# Patient Record
Sex: FEMALE | Race: WHITE | NOT HISPANIC OR LATINO | Employment: FULL TIME | URBAN - METROPOLITAN AREA
[De-identification: names, ages, dates, MRNs, and addresses within clinical notes are randomized per-mention and may not be internally consistent; named-entity substitution may affect disease eponyms.]

---

## 2017-01-25 ENCOUNTER — HOSPITAL ENCOUNTER (OUTPATIENT)
Dept: RADIOLOGY | Facility: OTHER | Age: 57
Discharge: HOME | End: 2017-01-25
Attending: OBSTETRICS & GYNECOLOGY
Payer: COMMERCIAL

## 2017-01-25 DIAGNOSIS — R93.89 THICKENED ENDOMETRIUM: ICD-10-CM

## 2017-01-25 PROCEDURE — 76856 US EXAM PELVIC COMPLETE: CPT | Mod: TC

## 2017-01-25 PROCEDURE — 76856 US EXAM PELVIC COMPLETE: CPT | Mod: 26,,, | Performed by: RADIOLOGY

## 2017-01-25 PROCEDURE — 76830 TRANSVAGINAL US NON-OB: CPT | Mod: 26,,, | Performed by: RADIOLOGY

## 2017-03-06 ENCOUNTER — PATIENT MESSAGE (OUTPATIENT)
Dept: GYNECOLOGIC ONCOLOGY | Facility: CLINIC | Age: 57
End: 2017-03-06

## 2017-03-06 DIAGNOSIS — N95.0 PMB (POSTMENOPAUSAL BLEEDING): ICD-10-CM

## 2017-03-06 NOTE — PROGRESS NOTES
Patient with persistent bleeding in January.   I have recommended D&C. She does not want to do this at this time.   Will get AMH, E2 and FSH to evaluate for menopausal status.     I recommended that she get a 2nd opinion with either Dr. Miranda or Dr. Miller.     She has CH that has not been treated because of breast cancer. EMBx is not possible in the office.

## 2017-03-09 ENCOUNTER — LAB VISIT (OUTPATIENT)
Dept: LAB | Facility: HOSPITAL | Age: 57
End: 2017-03-09
Attending: OBSTETRICS & GYNECOLOGY
Payer: COMMERCIAL

## 2017-03-09 DIAGNOSIS — N95.0 PMB (POSTMENOPAUSAL BLEEDING): ICD-10-CM

## 2017-03-09 LAB — FSH SERPL-ACNC: 5.7 MIU/ML

## 2017-03-09 PROCEDURE — 83001 ASSAY OF GONADOTROPIN (FSH): CPT

## 2017-03-09 PROCEDURE — 36415 COLL VENOUS BLD VENIPUNCTURE: CPT

## 2017-03-09 PROCEDURE — 83520 IMMUNOASSAY QUANT NOS NONAB: CPT

## 2017-03-13 LAB — MIS SERPL-MCNC: <0.1 NG/ML

## 2017-03-16 ENCOUNTER — OFFICE VISIT (OUTPATIENT)
Dept: INTERNAL MEDICINE | Facility: CLINIC | Age: 57
End: 2017-03-16
Payer: COMMERCIAL

## 2017-03-16 VITALS
SYSTOLIC BLOOD PRESSURE: 120 MMHG | BODY MASS INDEX: 25.8 KG/M2 | WEIGHT: 174.19 LBS | HEIGHT: 69 IN | DIASTOLIC BLOOD PRESSURE: 75 MMHG

## 2017-03-16 DIAGNOSIS — D70.0 CONGENITAL NEUTROPENIA: ICD-10-CM

## 2017-03-16 DIAGNOSIS — N85.01 COMPLEX ENDOMETRIAL HYPERPLASIA WITHOUT ATYPIA: ICD-10-CM

## 2017-03-16 DIAGNOSIS — Z00.00 ANNUAL PHYSICAL EXAM: Primary | ICD-10-CM

## 2017-03-16 DIAGNOSIS — C50.911 MALIGNANT NEOPLASM OF RIGHT BREAST, STAGE 1: ICD-10-CM

## 2017-03-16 PROCEDURE — 99396 PREV VISIT EST AGE 40-64: CPT | Mod: S$GLB,,, | Performed by: INTERNAL MEDICINE

## 2017-03-16 PROCEDURE — 99999 PR PBB SHADOW E&M-EST. PATIENT-LVL III: CPT | Mod: PBBFAC,,, | Performed by: INTERNAL MEDICINE

## 2017-03-16 NOTE — PROGRESS NOTES
Subjective:       Patient ID: Noelle Rivas is a 56 y.o. female.    Chief Complaint: Annual Exam    HPI Comments: Annual exam    Endometrial hyperplasia, has had some irregular vaginal bleeding.  Unclear menopause.  Without atypica- sees Dr Foy.  May need a hysterectomy since she should not take progesterone.  Getting a second opinion.    Breast cancer. Stage 1 doing well.    D def and B12 deficiency.  Had been a vegan and now has added some lean proteins, working on this.    Factor V Leiden- she has had no clots.    Patient Active Problem List:     Factor V Leiden mutation     Calculus of ureter     Estrogen receptor positive status (ER+)     Anxiety     Vitamin B 12 deficiency     Degenerative disc disease     Cyanocobalamin deficiency     Mild vitamin D deficiency     Leukopenia     Breast cancer, stage 1     Thrombocytopenia     Thickened endometrium     Complex endometrial hyperplasia without atypia     PMB (postmenopausal bleeding)          Review of Systems   Constitutional: Negative for activity change, appetite change, chills, fatigue and fever.   HENT: Negative for ear discharge, nosebleeds, sinus pressure and sore throat.    Eyes: Negative for visual disturbance.   Respiratory: Negative for apnea, cough, chest tightness, shortness of breath and wheezing.    Cardiovascular: Negative for chest pain, palpitations and leg swelling.   Gastrointestinal: Negative for abdominal distention, abdominal pain, anal bleeding, blood in stool, constipation, diarrhea, nausea and vomiting.   Genitourinary: Negative for dysuria, frequency, hematuria and vaginal bleeding.        Irregular bleeding   Musculoskeletal: Negative for gait problem, joint swelling and myalgias.   Skin: Negative for rash.   Neurological: Negative for dizziness, tremors, weakness, light-headedness and headaches.   Hematological: Negative for adenopathy. Does not bruise/bleed easily.   Psychiatric/Behavioral: Negative for confusion,  hallucinations, sleep disturbance and suicidal ideas.       Objective:      Physical Exam   Constitutional: She is oriented to person, place, and time. She appears well-developed and well-nourished.   HENT:   Head: Normocephalic and atraumatic.   Right Ear: External ear normal.   Left Ear: External ear normal.   Nose: Nose normal.   Mouth/Throat: Oropharynx is clear and moist. No oropharyngeal exudate.   Eyes: Conjunctivae and EOM are normal. No scleral icterus.   Neck: Normal range of motion. Neck supple. No JVD present. No thyromegaly present.   Cardiovascular: Normal rate, regular rhythm, normal heart sounds and intact distal pulses.  Exam reveals no gallop.    No murmur heard.  Pulmonary/Chest: Effort normal and breath sounds normal. No respiratory distress. She has no wheezes. She exhibits no tenderness.   Abdominal: Soft. Bowel sounds are normal. She exhibits no distension and no mass. There is no tenderness. There is no rebound and no guarding.   Musculoskeletal: Normal range of motion. She exhibits no edema or tenderness.   Lymphadenopathy:     She has no cervical adenopathy.   Neurological: She is alert and oriented to person, place, and time. She displays normal reflexes. No cranial nerve deficit. Coordination normal.   Skin: Skin is warm. No rash noted. No erythema.   Psychiatric: She has a normal mood and affect. Her behavior is normal. Judgment and thought content normal.   Nursing note and vitals reviewed.      Assessment:       1. Annual physical exam    2. Complex endometrial hyperplasia without atypia    3. Congenital neutropenia    4. Malignant neoplasm of right breast, stage 1        Plan:         Annual physical exam  -     CBC auto differential; Future; Expected date: 3/16/17  -     Comprehensive metabolic panel; Future; Expected date: 3/16/17  -     Vitamin B12; Future; Expected date: 3/16/17  -     Vitamin D; Future; Expected date: 3/16/17  -     Lipid panel; Future; Expected date:  3/16/17    Complex endometrial hyperplasia without atypia    Congenital neutropenia    Malignant neoplasm of right breast, stage 1    Keep GYN follow up  Exercise  Keep mammogram and breast clinic follow up    I will review all studies and determine further tx depending on findings    Pneumovax recommended

## 2017-03-16 NOTE — MR AVS SNAPSHOT
[facility]  [address]  [facility] [address]  Phone: [phone]  Fax: [phone]                  Noelle Rivas   3/16/2017 11:30 AM   Office Visit    Description:  Female : 1960   Provider:  Emelyn Bernardo MD   Department:  [facility]           Reason for Visit     Annual Exam           Diagnoses this Visit        Comments    Annual physical exam    -  Primary     Complex endometrial hyperplasia without atypia         Congenital neutropenia         Malignant neoplasm of right breast, stage 1                To Do List           Future Appointments        Provider Department Dept Phone    2017 9:30 AM MERLIN Kirk-FNP [facility] [phone]    2017 10:00 AM NOM [id number] DX [facility] [phone]      Goals (5 Years of Data)     None      [facility]     [facility] Care [facility] -  Assistance  Registered nurses in the [facility] provide clinical advisement, health education, appointment booking, and other advisory services.  Call for this free service at [phone].             Medications           Message regarding Medications     Verify the changes and/or additions to your medication regime listed below are the same as discussed with your clinician today.  If any of these changes or additions are incorrect, please notify your healthcare provider.        STOP taking these medications     cyanocobalamin (VITAMIN B-12) 1000 MCG tablet Take 100 mcg by mouth once daily.             Verify that the below list of medications is an accurate representation of the medications you are currently taking.  If none reported, the list may be blank. If incorrect, please contact your healthcare provider. Carry this list with you in case of emergency.           Current Medications     aspirin (ECOTRIN) 81 MG EC tablet Take 81 mg by mouth once daily.      CALCIUM  "CARBONATE/VITAMIN D3 (CALCIUM WITH VITAMIN D ORAL) Take by mouth.      lactobacillus rhamnosus, GG, (PROBIOTIC) 10 billion cell capsule Take 3 capsules by mouth once daily.     loratadine (CLARITIN) 10 mg tablet Take 10 mg by mouth once daily.      multivitamin (MULTIVITAMIN) per tablet Take 1 tablet by mouth once daily.      vitamin D 185 MG Tab Take 1,000 mg by mouth once daily.             Clinical Reference Information           Your Vitals Were     BP Height Weight BMI       120/75 (BP Location: Right arm, Patient Position: Sitting, BP Method: Manual) 5' 9" (1.753 m) 79 kg (174 lb 2.6 oz) 25.72 kg/m2       Blood Pressure          Most Recent Value    BP  120/75      Allergies as of 3/16/2017     Adhesive    Iodine And Iodide Containing Products    Latex, Natural Rubber      Immunizations Administered on Date of Encounter - 3/16/2017     None      Orders Placed During Today's Visit     Future Labs/Procedures Expected by Expires    CBC auto differential  3/16/2017 3/16/2018    Comprehensive metabolic panel  3/16/2017 3/16/2018    Lipid panel  3/16/2017 3/16/2018    Vitamin B12  3/16/2017 3/16/2018    Vitamin D  3/16/2017 (Approximate) 3/16/2018      Language Assistance Services     ATTENTION: Language assistance services are available, free of charge. Please call [phone].      ATENCIÓN: Si priyala barak, tiene a whyte disposición servicios gratuitos de asistencia lingüística. Llame al [phone].     [facility] Ý: N?u b?n nói Ti?ng Vi?t, có các d?ch v? h? tr? ngôn ng? mi?n phí dành cho b?n. G?i s? [phone].         [facility] complies with applicable Federal civil rights laws and does not discriminate on the basis of race, color, national origin, age, disability, or sex.        "

## 2017-03-17 ENCOUNTER — TELEPHONE (OUTPATIENT)
Dept: GYNECOLOGIC ONCOLOGY | Facility: CLINIC | Age: 57
End: 2017-03-17

## 2017-03-17 NOTE — TELEPHONE ENCOUNTER
I called and discussed FSH level with her. This would suggest that she still is producing estrogen.     I told her that I do not think she is having ovulatory cycles.   Her antimullerian hormone level suggest little ovarian reserve.     We discussed the following options:     1. Repeat D&C given EMS of 17 mm. Her last D&C wan April 2016. She had CH without atypia. Has not received progestins because of her history of MD (+) breast cancer. She inquired if I go to [facility]. I told her I do not. She is concerned about residents being involved and that Dr. Sheehan did not have residents.      2. She could have her D&C at [facility] with Dr. Sheehan    3. A second opinion with either Dr. Miranda or Dr. Miller about the need for a D&C.     My concern is that she had CH in April 2016 and this has not been addressed. Her EMS remains thickened. She has intermittent vaginal bleeding.     She needs a D&C to see if the CH is persistent or has advanced.     She voiced understanding and is to get back with me as to her decision.

## 2017-03-21 ENCOUNTER — PATIENT MESSAGE (OUTPATIENT)
Dept: INTERNAL MEDICINE | Facility: CLINIC | Age: 57
End: 2017-03-21

## 2017-03-21 ENCOUNTER — LAB VISIT (OUTPATIENT)
Dept: LAB | Facility: HOSPITAL | Age: 57
End: 2017-03-21
Attending: INTERNAL MEDICINE
Payer: COMMERCIAL

## 2017-03-21 DIAGNOSIS — Z00.00 ANNUAL PHYSICAL EXAM: ICD-10-CM

## 2017-03-21 DIAGNOSIS — E55.9 MILD VITAMIN D DEFICIENCY: ICD-10-CM

## 2017-03-21 DIAGNOSIS — E53.8 VITAMIN B 12 DEFICIENCY: ICD-10-CM

## 2017-03-21 DIAGNOSIS — E53.8 VITAMIN B 12 DEFICIENCY: Primary | ICD-10-CM

## 2017-03-21 LAB
25(OH)D3+25(OH)D2 SERPL-MCNC: 26 NG/ML
25(OH)D3+25(OH)D2 SERPL-MCNC: 26 NG/ML
ALBUMIN SERPL BCP-MCNC: 3.5 G/DL
ALP SERPL-CCNC: 69 U/L
ALT SERPL W/O P-5'-P-CCNC: 14 U/L
ANION GAP SERPL CALC-SCNC: 5 MMOL/L
AST SERPL-CCNC: 20 U/L
BASOPHILS # BLD AUTO: 0.02 K/UL
BASOPHILS NFR BLD: 0.5 %
BILIRUB SERPL-MCNC: 0.7 MG/DL
BUN SERPL-MCNC: 12 MG/DL
CALCIUM SERPL-MCNC: 8.8 MG/DL
CHLORIDE SERPL-SCNC: 107 MMOL/L
CHOLEST/HDLC SERPL: 2.9 {RATIO}
CO2 SERPL-SCNC: 28 MMOL/L
CREAT SERPL-MCNC: 0.7 MG/DL
DIFFERENTIAL METHOD: ABNORMAL
EOSINOPHIL # BLD AUTO: 0.2 K/UL
EOSINOPHIL NFR BLD: 4.2 %
ERYTHROCYTE [DISTWIDTH] IN BLOOD BY AUTOMATED COUNT: 12.6 %
EST. GFR  (AFRICAN AMERICAN): >60 ML/MIN/1.73 M^2
EST. GFR  (NON AFRICAN AMERICAN): >60 ML/MIN/1.73 M^2
GLUCOSE SERPL-MCNC: 87 MG/DL
HCT VFR BLD AUTO: 37.9 %
HDL/CHOLESTEROL RATIO: 34.3 %
HDLC SERPL-MCNC: 134 MG/DL
HDLC SERPL-MCNC: 46 MG/DL
HGB BLD-MCNC: 12.9 G/DL
LDLC SERPL CALC-MCNC: 75.8 MG/DL
LYMPHOCYTES # BLD AUTO: 1.1 K/UL
LYMPHOCYTES NFR BLD: 24.6 %
MCH RBC QN AUTO: 33.9 PG
MCHC RBC AUTO-ENTMCNC: 34 %
MCV RBC AUTO: 100 FL
MONOCYTES # BLD AUTO: 0.5 K/UL
MONOCYTES NFR BLD: 10.8 %
NEUTROPHILS # BLD AUTO: 2.6 K/UL
NEUTROPHILS NFR BLD: 59.7 %
NONHDLC SERPL-MCNC: 88 MG/DL
PLATELET # BLD AUTO: 163 K/UL
PMV BLD AUTO: 11.3 FL
POTASSIUM SERPL-SCNC: 4.1 MMOL/L
PROT SERPL-MCNC: 6.5 G/DL
RBC # BLD AUTO: 3.81 M/UL
SODIUM SERPL-SCNC: 140 MMOL/L
TRIGL SERPL-MCNC: 61 MG/DL
VIT B12 SERPL-MCNC: 346 PG/ML
VIT B12 SERPL-MCNC: 346 PG/ML
WBC # BLD AUTO: 4.27 K/UL

## 2017-03-21 PROCEDURE — 80053 COMPREHEN METABOLIC PANEL: CPT

## 2017-03-21 PROCEDURE — 85025 COMPLETE CBC W/AUTO DIFF WBC: CPT

## 2017-03-21 PROCEDURE — 82607 VITAMIN B-12: CPT

## 2017-03-21 PROCEDURE — 82306 VITAMIN D 25 HYDROXY: CPT

## 2017-03-21 PROCEDURE — 80061 LIPID PANEL: CPT

## 2017-03-21 PROCEDURE — 36415 COLL VENOUS BLD VENIPUNCTURE: CPT

## 2017-04-18 ENCOUNTER — HOSPITAL ENCOUNTER (OUTPATIENT)
Dept: RADIOLOGY | Facility: HOSPITAL | Age: 57
Discharge: HOME | End: 2017-04-18
Attending: NURSE PRACTITIONER
Payer: COMMERCIAL

## 2017-04-18 ENCOUNTER — OFFICE VISIT (OUTPATIENT)
Dept: SURGERY | Facility: CLINIC | Age: 57
End: 2017-04-18
Payer: COMMERCIAL

## 2017-04-18 VITALS
HEIGHT: 69 IN | WEIGHT: 173.75 LBS | DIASTOLIC BLOOD PRESSURE: 60 MMHG | HEART RATE: 60 BPM | SYSTOLIC BLOOD PRESSURE: 105 MMHG | TEMPERATURE: 98 F | BODY MASS INDEX: 25.73 KG/M2

## 2017-04-18 DIAGNOSIS — Z85.3 PERSONAL HISTORY OF BREAST CANCER: Primary | ICD-10-CM

## 2017-04-18 DIAGNOSIS — N64.4 BREAST PAIN: ICD-10-CM

## 2017-04-18 DIAGNOSIS — Z85.3 PERSONAL HISTORY OF BREAST CANCER: ICD-10-CM

## 2017-04-18 PROCEDURE — 76642 ULTRASOUND BREAST LIMITED: CPT | Mod: TC,RT

## 2017-04-18 PROCEDURE — 77062 BREAST TOMOSYNTHESIS BI: CPT | Mod: 26,,, | Performed by: RADIOLOGY

## 2017-04-18 PROCEDURE — 99213 OFFICE O/P EST LOW 20 MIN: CPT | Mod: S$GLB,,, | Performed by: NURSE PRACTITIONER

## 2017-04-18 PROCEDURE — 77066 DX MAMMO INCL CAD BI: CPT | Mod: 26,,, | Performed by: RADIOLOGY

## 2017-04-18 PROCEDURE — 99999 PR PBB SHADOW E&M-EST. PATIENT-LVL III: CPT | Mod: PBBFAC,,, | Performed by: NURSE PRACTITIONER

## 2017-04-18 PROCEDURE — 77062 BREAST TOMOSYNTHESIS BI: CPT | Mod: TC

## 2017-04-18 PROCEDURE — 76642 ULTRASOUND BREAST LIMITED: CPT | Mod: 26,RT,, | Performed by: RADIOLOGY

## 2017-04-18 RX ORDER — LANOLIN ALCOHOL/MO/W.PET/CERES
1000 CREAM (GRAM) TOPICAL EVERY OTHER DAY
COMMUNITY

## 2017-04-18 NOTE — PROGRESS NOTES
Subjective:      Patient ID: Noelle Rivas is a 56 y.o. female.    Chief Complaint: No chief complaint on file.      HPI: (PF, EPF - 1-3) (Detailed, Comp, - 4)  returning patient presents for breast cancer surveillance. Denies breast mass, nipple discharge, skin changes, new onset bone pain, unexplained weight loss, cough, SOB. She reports intermittent tenderness right UOQ. She reports mixed feelings about recommendations for hysterectomy secondary to hyperplasia of the uterus, has been seen by Dr Sheehan and Dr Foy with hysterectomy recommended. No atypia reported at time of D & C.      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 Adjuvant XRT, declined hormonal therapy       Review of Systems   Constitutional: Negative for appetite change and fatigue.   Respiratory: Negative for cough and shortness of breath.    Cardiovascular: Negative for chest pain.   Musculoskeletal: Negative for back pain.     Objective:   Physical Exam   Pulmonary/Chest: She exhibits no mass, no tenderness, no laceration, no edema, no swelling and no retraction. Right breast exhibits tenderness. Right breast exhibits no inverted nipple, no mass, no nipple discharge and no skin change. Left breast exhibits no inverted nipple, no mass, no nipple discharge, no skin change and no tenderness. Breasts are symmetrical. There is no breast swelling.   No upper extremity lymphedema  Breathing non-labored   She reports tenderness right UOQ associated with glandular tissue, no discrete mass appreciated    Lymphadenopathy:     She has no cervical adenopathy.     She has no axillary adenopathy.        Right: No supraclavicular adenopathy present.        Left: No  supraclavicular adenopathy present.     Assessment:       1. Personal history of breast cancer    2. Breast pain        Plan:       bilat diag mmg and right US today , no abnormality or changes on mammogram, ultrasound with no mass seen, this correlates with clinical findings today of no palpable breast abnormality, right breast tenderness is not suspicious for malignancy, appears to be related to glandular tissue, could be secondary to previous XRT    Spent 30 min in discussion regarding her concerns about hysterectomy, I advised her to avoid progesterone secondary to history of ER/NC + breast cancer. She will f/u with GYN oncology and may seek a third opinion as she is reluctant to proceed with hysterectomy.     Return to me in one year with bilat diag mmg  Call for any interval palpable breast mass, pain, nipple discharge, skin changes or other breast related concerns

## 2017-09-26 ENCOUNTER — OFFICE VISIT (OUTPATIENT)
Dept: OBSTETRICS AND GYNECOLOGY | Facility: CLINIC | Age: 57
End: 2017-09-26
Payer: COMMERCIAL

## 2017-09-26 VITALS
HEIGHT: 69 IN | DIASTOLIC BLOOD PRESSURE: 72 MMHG | BODY MASS INDEX: 26.22 KG/M2 | SYSTOLIC BLOOD PRESSURE: 108 MMHG | WEIGHT: 177 LBS

## 2017-09-26 DIAGNOSIS — N95.0 POST-MENOPAUSAL BLEEDING: Primary | ICD-10-CM

## 2017-09-26 DIAGNOSIS — N85.01 COMPLEX ENDOMETRIAL HYPERPLASIA WITHOUT ATYPIA: ICD-10-CM

## 2017-09-26 PROCEDURE — 3008F BODY MASS INDEX DOCD: CPT | Mod: S$GLB,,, | Performed by: OBSTETRICS & GYNECOLOGY

## 2017-09-26 PROCEDURE — 99214 OFFICE O/P EST MOD 30 MIN: CPT | Mod: S$GLB,,, | Performed by: OBSTETRICS & GYNECOLOGY

## 2017-09-26 PROCEDURE — 99999 PR PBB SHADOW E&M-EST. PATIENT-LVL III: CPT | Mod: PBBFAC,,, | Performed by: OBSTETRICS & GYNECOLOGY

## 2017-09-26 NOTE — PROGRESS NOTES
Subjective:       Patient ID: Noelle Rivas is a 56 y.o. female.    Chief Complaint:  Follow-up      History of Present Illness  HPI  This 56 yr old female is here to discuss her complex hyperplasia without atypia diagnosed in 2016. See Dr Foy's notes from last yr.  At that time I recommended a Hyst /BSO and referred her to Dr Foy GYN/ONC.  He recommended same and she declined.  Later she had ultrasound done  and had 17mm thickened lining of uterus for which Dr Foy told her she at least needed D&C as we cannot rule out cancer.  She has a history of ER pos breast ca so taking progesterone is not good option for her .  Today she is concerned about menopause and thought that if she is menopausal her endometrial cancer risk will go away and she would not need surgery.  I explained that she might already have cancer and that menopause (and no estrogen) cannot get rid of an underlying cancer.  I explained that even with the complex hyperplasia with out atypia in 2016 she could have had then cancer that was not picked up on the D&C which is why hyst was recommended.  She agreed to do ultrasound and D&C and will make decision based on this.  She has been told numerous times and by numerous physicians that she needs a Hyst/BSO.  She is concerned about where and when.  We spent over 25 min and all in discussion.  GYN & OB History  No LMP recorded. Patient is not currently having periods (Reason: Perimenopausal).   Date of Last Pap: 2015    OB History    Para Term  AB Living   0             SAB TAB Ectopic Multiple Live Births                         Review of Systems  Review of Systems   Constitutional: Negative for chills and fever.   Respiratory: Negative for shortness of breath.    Cardiovascular: Negative for chest pain.   Gastrointestinal: Negative for abdominal pain, nausea and vomiting.   Genitourinary: Negative for difficulty urinating, dyspareunia, genital sores,  menstrual problem, pelvic pain, vaginal bleeding, vaginal discharge and vaginal pain.   Skin: Negative for wound.   Hematological: Negative for adenopathy.           Objective:    Physical Exam       Assessment:        1. Post-menopausal bleeding    2. Complex endometrial hyperplasia without atypia               Plan:      Ultrasound    D&C is recommended for sure and will take one step at a time with this very hesitant pt.

## 2017-10-06 ENCOUNTER — HOSPITAL ENCOUNTER (OUTPATIENT)
Dept: RADIOLOGY | Facility: HOSPITAL | Age: 57
Discharge: HOME | End: 2017-10-06
Attending: OBSTETRICS & GYNECOLOGY
Payer: COMMERCIAL

## 2017-10-06 DIAGNOSIS — N95.0 POST-MENOPAUSAL BLEEDING: ICD-10-CM

## 2017-10-06 PROCEDURE — 76856 US EXAM PELVIC COMPLETE: CPT | Mod: 26,,, | Performed by: RADIOLOGY

## 2017-10-06 PROCEDURE — 76856 US EXAM PELVIC COMPLETE: CPT | Mod: TC

## 2017-10-06 PROCEDURE — 76830 TRANSVAGINAL US NON-OB: CPT | Mod: 26,,, | Performed by: RADIOLOGY

## 2017-10-13 ENCOUNTER — OFFICE VISIT (OUTPATIENT)
Dept: FAMILY MEDICINE | Facility: CLINIC | Age: 57
End: 2017-10-13
Payer: COMMERCIAL

## 2017-10-13 ENCOUNTER — PATIENT MESSAGE (OUTPATIENT)
Dept: OBSTETRICS AND GYNECOLOGY | Facility: CLINIC | Age: 57
End: 2017-10-13

## 2017-10-13 VITALS
RESPIRATION RATE: 18 BRPM | BODY MASS INDEX: 25.7 KG/M2 | TEMPERATURE: 98 F | HEART RATE: 72 BPM | HEIGHT: 69 IN | WEIGHT: 173.5 LBS | DIASTOLIC BLOOD PRESSURE: 68 MMHG | SYSTOLIC BLOOD PRESSURE: 103 MMHG

## 2017-10-13 DIAGNOSIS — M54.32 LEFT SIDED SCIATICA: ICD-10-CM

## 2017-10-13 DIAGNOSIS — T14.8XXA MUSCLE STRAIN: Primary | ICD-10-CM

## 2017-10-13 DIAGNOSIS — M25.552 ACUTE HIP PAIN, LEFT: ICD-10-CM

## 2017-10-13 PROCEDURE — 99213 OFFICE O/P EST LOW 20 MIN: CPT | Mod: S$GLB,,, | Performed by: FAMILY MEDICINE

## 2017-10-13 PROCEDURE — 99999 PR PBB SHADOW E&M-EST. PATIENT-LVL III: CPT | Mod: PBBFAC,,, | Performed by: FAMILY MEDICINE

## 2017-10-13 NOTE — PROGRESS NOTES
Subjective:       Patient ID: Noelle Rivas is a 56 y.o. female.    Chief Complaint: Hip Pain (left hip pain, hurt in yoga class on Saturday, pain radiates down leg to foot)    Pain onset after a full workout Saturday - yoga; ellipitcal etc.  Noted in left buttock and down to foot.  Now pain radiates to below knee to shin area.       Hip Pain    The incident occurred 5 to 7 days ago. The incident occurred at the gym. The pain is present in the left hip, left thigh, left knee and left foot. The quality of the pain is described as aching. The pain is moderate. The pain has been fluctuating since onset. Pertinent negatives include no muscle weakness, numbness or tingling. The symptoms are aggravated by weight bearing (Sitting). She has tried ice for the symptoms. The treatment provided no relief.     Review of Systems   Constitutional: Negative for fever.   Respiratory: Negative for shortness of breath.    Cardiovascular: Negative for chest pain.   Gastrointestinal: Negative for abdominal pain and nausea.   Skin: Negative for rash.   Neurological: Negative for tingling and numbness.   All other systems reviewed and are negative.      Objective:      Physical Exam   Constitutional: She appears well-developed. No distress.   Cardiovascular: Normal rate and regular rhythm.    No murmur heard.  Pulmonary/Chest: Effort normal and breath sounds normal.   Musculoskeletal:        Arms:  No LE edema; negative Cali's b/l   Skin: Skin is warm and dry. No rash noted.       Assessment:       No diagnosis found.    Plan:         Noelle Freedman was seen today for hip pain.    Diagnoses and all orders for this visit:    Muscle strain  -     X-Ray Hip 2 View Left; Future    Left sided sciatica  -     X-Ray Hip 2 View Left; Future    Acute hip pain, left  -     X-Ray Hip 2 View Left; Future    Rest; heat TID; Aleve 1 BID; XR in 3 days INI.

## 2017-10-17 ENCOUNTER — TELEPHONE (OUTPATIENT)
Dept: OBSTETRICS AND GYNECOLOGY | Facility: CLINIC | Age: 57
End: 2017-10-17

## 2017-10-17 ENCOUNTER — TELEPHONE (OUTPATIENT)
Dept: FAMILY MEDICINE | Facility: CLINIC | Age: 57
End: 2017-10-17

## 2017-10-17 NOTE — TELEPHONE ENCOUNTER
----- Message from Omero Olivier MA sent at 10/16/2017  8:29 AM CDT -----  Please call this pt for pre op appointment for D&C

## 2017-10-17 NOTE — TELEPHONE ENCOUNTER
Per patient she is feeling better after resting as recommended by Dr. Fontana and does not think she is going to need the hip x-ray.  I informed her we would take it out of the system, but to let us know if she needs anything.

## 2017-11-01 ENCOUNTER — OFFICE VISIT (OUTPATIENT)
Dept: OBSTETRICS AND GYNECOLOGY | Facility: CLINIC | Age: 57
End: 2017-11-01
Payer: COMMERCIAL

## 2017-11-01 VITALS
WEIGHT: 175.25 LBS | BODY MASS INDEX: 25.96 KG/M2 | SYSTOLIC BLOOD PRESSURE: 110 MMHG | HEIGHT: 69 IN | DIASTOLIC BLOOD PRESSURE: 68 MMHG

## 2017-11-01 DIAGNOSIS — N85.02 ATYPICAL ENDOMETRIAL HYPERPLASIA: Primary | ICD-10-CM

## 2017-11-01 PROCEDURE — 99213 OFFICE O/P EST LOW 20 MIN: CPT | Mod: S$GLB,,, | Performed by: OBSTETRICS & GYNECOLOGY

## 2017-11-01 PROCEDURE — 99999 PR PBB SHADOW E&M-EST. PATIENT-LVL III: CPT | Mod: PBBFAC,,, | Performed by: OBSTETRICS & GYNECOLOGY

## 2017-11-01 RX ORDER — MUPIROCIN 20 MG/G
OINTMENT TOPICAL
Status: CANCELLED | OUTPATIENT
Start: 2017-11-01

## 2017-11-01 NOTE — H&P
56 y.o.  presents for pre-op H&P for D&C hysteroscope for complex hyperplasia with no atypia that was diagnosed last yr.  She refused hyst by me and I referred her to Dr Foy and she also chose to not have hyst.  She has been counseled extensively that she still needs a hyst but she has agreed to the D&C first and is thinking about Hyst in Dec.  We have disucssed this several times. She has a history of breast ca and very anxious about having another surgery.  No LMP recorded. Patient is not currently having periods (Reason: Perimenopausal)..    Past Medical History:   Diagnosis Date    Allergy     Anxiety     Anxiety 2012    Breast cancer     Calcium nephrolithiasis     Complex endometrial hyperplasia without atypia 2016    Degenerative disc disease     DJD (degenerative joint disease) of knee     Factor V Leiden mutation No h/o DVT    Kidney stones     Leukopenia 2012    PMB (postmenopausal bleeding) 3/6/2017    Stroke     questionable  -     Vitamin B 12 deficiency      Past Surgical History:   Procedure Laterality Date    BREAST BIOPSY      BREAST LUMPECTOMY  12    BREAST LUMPECTOMY W/ NEEDLE LOCALIZATION      DILATION AND CURETTAGE OF UTERUS       Family History   Problem Relation Age of Onset    Hypertension Father     Coronary artery disease Father     Prostate cancer Father     Cancer Father      prostate    Heart disease Father      stenting    Colon cancer Paternal Grandfather     Melanoma Mother     Cancer Mother      melanoma    Hyperlipidemia Brother     No Known Problems Brother     No Known Problems Brother     Stroke Maternal Grandmother     Lung cancer Maternal Grandfather     Breast cancer Paternal Grandmother       [place] 62    Ovarian cancer Neg Hx     Uterine cancer Neg Hx     Psoriasis Neg Hx     Lupus Neg Hx     Eczema Neg Hx      Review of patient's allergies indicates:   Allergen Reactions    Adhesive Itching and Rash      Micropore/Medipore Tape - paper tape is ok    Iodine and iodide containing products Itching    Latex, natural rubber Itching       Current Outpatient Prescriptions:     aspirin (ECOTRIN) 81 MG EC tablet, Take 81 mg by mouth once daily.  , Disp: , Rfl:     cyanocobalamin (VITAMIN B-12) 1000 MCG tablet, Take 100 mcg by mouth once daily., Disp: , Rfl:     lactobacillus rhamnosus, GG, (PROBIOTIC) 10 billion cell capsule, Take 3 capsules by mouth once daily. , Disp: , Rfl:     loratadine (CLARITIN) 10 mg tablet, Take 10 mg by mouth once daily.  , Disp: , Rfl:     multivitamin (MULTIVITAMIN) per tablet, Take 1 tablet by mouth once daily.  , Disp: , Rfl:     vitamin D 185 MG Tab, Take 2,000 mg by mouth once daily. , Disp: , Rfl:   Social History     Social History    Marital status: Single     Spouse name: N/A    Number of children: N/A    Years of education: N/A     Occupational History    Nurse   [employer]     Social History Main Topics    Smoking status: Never Smoker    Smokeless tobacco: Never Used    Alcohol use Yes      Comment: Minimal    Drug use: No    Sexual activity: No     Other Topics Concern    Are You Pregnant Or Think You May Be? No    Breast-Feeding No     Social History Narrative    No narrative on file         Last pap  Neg 12 /15  Last pathology 04/29/16  FINAL PATHOLOGIC DIAGNOSIS  Endometrium, curettage:  Complex hyperplasia without atypia.  COMMENT: This case is reviewed by Dr. Jones who concurs with this diagnosis.  [facility]  Diagnosed by: Darius  Last ultrasound 10/17    Findings: The uterus measures 8.5 x 4.9 x 3.6 cm. Uterine parenchyma is mostly homogeneous with persistent evidence of a subserosal uterine fibroid measuring 1.0 x 0.9 x 0.6 cm. The endometrial stripe measures 0.8 cm, compared to 1.7 cm previously.    The right ovary is normal in size and measures 1.5 x 1.0 x 2.3 cm cm. The left ovary is normal in size and measures 2.5 x 2.3 x 1.1 cm.  Subcentimeter follicles are seen in both ovaries, which appear smaller than on prior examination . Arterial and venous flow are preserved bilaterally with resistive indices of 0.66 on the right and 0.87  on the left. No free fluid is seen.   Impression         Interval decrease in size of the endometrial stripe.  In a premenopausal patient, this endometrial thickness is within normal limits.  In a postmenopausal woman this thickness is above normal limits.  Recommend laboratory confirmation to assess menopausal status, and consider endometrial sampling if the patient is truly postmenopausal.    Presence of bilateral ovarian follicles, suggesting functioning ovaries.    ______________________________________     Electronically signed by resident: JEIMY ARMIJO MD  Date: 10/06/17  Time: 15:06            As the supervising and teaching physician, I personally reviewed the images and resident's interpretation and I agree with the findings.            Electronically signed by: Felipe Jarquin MD  Date: 10/06/17  Time: 15:09     Encounter     View Encounter                  Vitals:    11/01/17 1036   BP: 110/68     General Appearance: Alert, appropriate appearance for age. No acute distress, Chest/Respiratory Exam: Normal chest wall and respirations. Clear to auscultation.   Cardiovascular Exam: regular rate and rhythm, S1, S2 normal, no murmur, click, rub or gallop   Gastrointestinal Exam: soft, non-tender; bowel sounds normal; no masses,  no organomegaly  Pelvic Exam Female: Vulva and vagina appear normal. Bimanual exam reveals normal uterus and adnexa.   Psychiatric Exam: Alert and oriented, appropriate affect.    Assessment: pt with yr ago complex atypia with out atypia for repeat D&C     Plan: D&C to rule out ca at this point before hyst/BSO  I have discussed the risks, benefits, indications, and alternatives of the procedure in detail.  The patient verbalizes her understanding.  All questions answered.  Consents  signed.  The patient agrees to proceed to proceed as planned.

## 2017-11-03 ENCOUNTER — ANESTHESIA EVENT (OUTPATIENT)
Dept: SURGERY | Facility: OTHER | Age: 57
End: 2017-11-03
Payer: COMMERCIAL

## 2017-11-03 ENCOUNTER — TELEPHONE (OUTPATIENT)
Dept: OBSTETRICS AND GYNECOLOGY | Facility: CLINIC | Age: 57
End: 2017-11-03

## 2017-11-03 ENCOUNTER — HOSPITAL ENCOUNTER (OUTPATIENT)
Dept: PREADMISSION TESTING | Facility: OTHER | Age: 57
Discharge: HOME | End: 2017-11-03
Attending: ORTHOPAEDIC SURGERY
Payer: COMMERCIAL

## 2017-11-03 VITALS
WEIGHT: 173 LBS | RESPIRATION RATE: 16 BRPM | TEMPERATURE: 98 F | HEART RATE: 77 BPM | HEIGHT: 69 IN | OXYGEN SATURATION: 99 % | DIASTOLIC BLOOD PRESSURE: 67 MMHG | BODY MASS INDEX: 25.62 KG/M2 | SYSTOLIC BLOOD PRESSURE: 114 MMHG

## 2017-11-03 RX ORDER — OXYCODONE HYDROCHLORIDE 5 MG/1
5 TABLET ORAL ONCE AS NEEDED
Status: CANCELLED | OUTPATIENT
Start: 2017-11-03 | End: 2017-11-03

## 2017-11-03 RX ORDER — FAMOTIDINE 20 MG/1
20 TABLET, FILM COATED ORAL
Status: CANCELLED | OUTPATIENT
Start: 2017-11-03 | End: 2017-11-03

## 2017-11-03 RX ORDER — LIDOCAINE HYDROCHLORIDE 10 MG/ML
1 INJECTION, SOLUTION EPIDURAL; INFILTRATION; INTRACAUDAL; PERINEURAL ONCE
Status: CANCELLED | OUTPATIENT
Start: 2017-11-03 | End: 2017-11-03

## 2017-11-03 RX ORDER — MIDAZOLAM HYDROCHLORIDE 5 MG/ML
4 INJECTION INTRAMUSCULAR; INTRAVENOUS
Status: CANCELLED | OUTPATIENT
Start: 2017-11-03

## 2017-11-03 RX ORDER — SODIUM CHLORIDE, SODIUM LACTATE, POTASSIUM CHLORIDE, CALCIUM CHLORIDE 600; 310; 30; 20 MG/100ML; MG/100ML; MG/100ML; MG/100ML
INJECTION, SOLUTION INTRAVENOUS CONTINUOUS
Status: CANCELLED | OUTPATIENT
Start: 2017-11-03

## 2017-11-03 NOTE — ANESTHESIA PREPROCEDURE EVALUATION
11/03/2017  Noelle Rivas is a 56 y.o., female.    Anesthesia Evaluation    I have reviewed the Patient Summary Reports.    I have reviewed the Nursing Notes.   I have reviewed the Medications.     Review of Systems  Anesthesia Hx:  No problems with previous Anesthesia    Social:  Non-Smoker    Hematology/Oncology:  Hematology Normal   Oncology Normal   Hematology Comments: Factor V deficiency.    EENT/Dental:EENT/Dental Normal   Cardiovascular:   Exercise tolerance: good    Pulmonary:  Pulmonary Normal    Renal/:   Denies Chronic Renal Disease.     Hepatic/GI:  Hepatic/GI Normal    Musculoskeletal:   Arthritis     Neurological:   CVA, no residual symptoms    Endocrine:  Endocrine Normal    Dermatological:  Skin Normal    Psych:  Psychiatric Normal           Physical Exam  General:  Well nourished    Airway/Jaw/Neck:  Mallampati: I Jaw/Neck Findings:  Neck ROM: Normal ROM                 Anesthesia Plan  Type of Anesthesia, risks & benefits discussed:  Anesthesia Type:  general  Patient's Preference:   Intra-op Monitoring Plan: standard ASA monitors  Intra-op Monitoring Plan Comments:   Post Op Pain Control Plan:   Post Op Pain Control Plan Comments:   Induction:   IV  Beta Blocker:         Informed Consent: Patient understands risks and agrees with Anesthesia plan.  Questions answered. Anesthesia consent signed with patient.  ASA Score: 3     Day of Surgery Review of History & Physical:    H&P update referred to the surgeon.     Anesthesia Plan Notes: H/H        Ready For Surgery From Anesthesia Perspective.

## 2017-11-03 NOTE — TELEPHONE ENCOUNTER
----- Message from Caitlyn Godinez sent at 11/3/2017  3:33 PM CDT -----  Contact: pt  x_  1st Request  _  2nd Request  _  3rd Request    Who:pt    Why: pt would like to speak with a nurse in regards to dnc appt.. Please advise    What Number to Call Back: [phone]    When to Expect a call back: (Before the end of the day)   -- if call after 3:00 call back will be tomorrow.

## 2017-11-03 NOTE — DISCHARGE INSTRUCTIONS
PRE-ADMIT TESTING -  [phone]    [address]  [place] [facility]          Your surgery has been scheduled at [facility]. We are pleased to have the opportunity to serve you. For Further Information please call [phone].    On the day of surgery please report to the Information Desk on the 1st floor.    · CONTACT YOUR PHYSICIAN'S OFFICE THE DAY PRIOR TO YOUR SURGERY TO OBTAIN YOUR ARRIVAL TIME.     · The evening before surgery do not eat anything after 9 p.m. ( this includes hard candy, chewing gum and mints).  You may only have GATORADE, POWERADE AND WATER  from 9 p.m. until you leave your home.   DO NOT DRINK ANY LIQUIDS ON THE WAY TO THE HOSPITAL.      SPECIAL MEDICATION INSTRUCTIONS: TAKE medications checked off by the Anesthesiologist on your Medication List.    Angiogram Patients: Take medications as instructed by your physician, including aspirin.     Surgery Patients:    If you take ASPIRIN - Your PHYSICIAN/SURGEON will need to inform you IF/OR when you need to stop taking aspirin prior to your surgery.     Do Not take any medications containing IBUPROFEN.  Do Not Wear any make-up or dark nail polish   (especially eye make-up) to surgery. If you come to surgery with makeup on you will be required to remove the makeup or nail polish.    Do not shave your surgical area at least 5 days prior to your surgery. The surgical prep will be performed at the hospital according to Infection Control regulations.    Leave all valuables at home.   Do Not wear any jewelry or watches, including any metal in body piercings.  Contact Lens must be removed before surgery. Either do not wear the contact lens or bring a case and solution for storage.  Please bring a container for eyeglasses or dentures as required.  Bring any paperwork your physician has provided, such as consent forms,  history and physicals, doctor's orders, etc.   Bring comfortable clothes that are loose fitting to wear upon  discharge. Take into consideration the type of surgery being performed.  Maintain your diet as advised per your physician the day prior to surgery.      Adequate rest the night before surgery is advised.   Park in the Parking lot behind the hospital or in the [facility] across the street from the parking lot. Parking is complimentary.  If you will be discharged the same day as your procedure, please arrange for a responsible adult to drive you home or to accompany you if traveling by taxi.   YOU WILL NOT BE PERMITTED TO DRIVE OR TO LEAVE THE HOSPITAL ALONE AFTER SURGERY.   It is strongly recommended that you arrange for someone to remain with you for the first 24 hrs following your surgery.       Thank you for your cooperation.  The Staff of [facility].        Bathing Instructions                                                                 Please shower the evening before and morning of your procedure with    ANTIBACTERIAL SOAP. ( DIAL, etc )  Concentrate on the surgical area   for at least 3 minutes and rinse completely. Dry off as usual.   Do not use any deodorant, powder, body lotions, perfume, after shave or    cologne.

## 2017-11-09 ENCOUNTER — HOSPITAL ENCOUNTER (OUTPATIENT)
Facility: OTHER | Age: 57
Discharge: HOME | End: 2017-11-09
Attending: OBSTETRICS & GYNECOLOGY | Admitting: OBSTETRICS & GYNECOLOGY
Payer: COMMERCIAL

## 2017-11-09 ENCOUNTER — ANESTHESIA (OUTPATIENT)
Dept: SURGERY | Facility: OTHER | Age: 57
End: 2017-11-09
Payer: COMMERCIAL

## 2017-11-09 ENCOUNTER — SURGERY (OUTPATIENT)
Age: 57
End: 2017-11-09

## 2017-11-09 VITALS
BODY MASS INDEX: 25.62 KG/M2 | TEMPERATURE: 98 F | DIASTOLIC BLOOD PRESSURE: 67 MMHG | RESPIRATION RATE: 16 BRPM | HEIGHT: 69 IN | OXYGEN SATURATION: 98 % | WEIGHT: 173 LBS | HEART RATE: 66 BPM | SYSTOLIC BLOOD PRESSURE: 117 MMHG

## 2017-11-09 DIAGNOSIS — N85.02 ATYPICAL ENDOMETRIAL HYPERPLASIA: ICD-10-CM

## 2017-11-09 LAB
B-HCG UR QL: NEGATIVE
CTP QC/QA: YES

## 2017-11-09 PROCEDURE — 71000016 HC POSTOP RECOV ADDL HR: Performed by: OBSTETRICS & GYNECOLOGY

## 2017-11-09 PROCEDURE — 71000015 HC POSTOP RECOV 1ST HR: Performed by: OBSTETRICS & GYNECOLOGY

## 2017-11-09 PROCEDURE — 37000009 HC ANESTHESIA EA ADD 15 MINS: Performed by: OBSTETRICS & GYNECOLOGY

## 2017-11-09 PROCEDURE — 25000003 PHARM REV CODE 250: Performed by: OBSTETRICS & GYNECOLOGY

## 2017-11-09 PROCEDURE — 25000003 PHARM REV CODE 250: Performed by: SPECIALIST

## 2017-11-09 PROCEDURE — 63600175 PHARM REV CODE 636 W HCPCS: Performed by: NURSE ANESTHETIST, CERTIFIED REGISTERED

## 2017-11-09 PROCEDURE — 37000008 HC ANESTHESIA 1ST 15 MINUTES: Performed by: OBSTETRICS & GYNECOLOGY

## 2017-11-09 PROCEDURE — 58558 HYSTEROSCOPY BIOPSY: CPT | Mod: ,,, | Performed by: OBSTETRICS & GYNECOLOGY

## 2017-11-09 PROCEDURE — 71000033 HC RECOVERY, INTIAL HOUR: Performed by: OBSTETRICS & GYNECOLOGY

## 2017-11-09 PROCEDURE — 63600175 PHARM REV CODE 636 W HCPCS: Performed by: SPECIALIST

## 2017-11-09 PROCEDURE — 25000003 PHARM REV CODE 250: Performed by: NURSE ANESTHETIST, CERTIFIED REGISTERED

## 2017-11-09 PROCEDURE — 36000706: Performed by: OBSTETRICS & GYNECOLOGY

## 2017-11-09 PROCEDURE — 36000707: Performed by: OBSTETRICS & GYNECOLOGY

## 2017-11-09 PROCEDURE — 81025 URINE PREGNANCY TEST: CPT | Performed by: SPECIALIST

## 2017-11-09 PROCEDURE — 88305 TISSUE EXAM BY PATHOLOGIST: CPT | Mod: 26,,, | Performed by: PATHOLOGY

## 2017-11-09 PROCEDURE — 25000003 PHARM REV CODE 250: Performed by: ANESTHESIOLOGY

## 2017-11-09 PROCEDURE — 27201423 OPTIME MED/SURG SUP & DEVICES STERILE SUPPLY: Performed by: OBSTETRICS & GYNECOLOGY

## 2017-11-09 PROCEDURE — 88305 TISSUE EXAM BY PATHOLOGIST: CPT | Performed by: PATHOLOGY

## 2017-11-09 RX ORDER — HYDROCODONE BITARTRATE AND ACETAMINOPHEN 5; 325 MG/1; MG/1
1 TABLET ORAL EVERY 4 HOURS PRN
Status: DISCONTINUED | OUTPATIENT
Start: 2017-11-09 | End: 2017-11-09 | Stop reason: HOSPADM

## 2017-11-09 RX ORDER — OXYCODONE HYDROCHLORIDE 5 MG/1
5 TABLET ORAL ONCE AS NEEDED
Status: DISCONTINUED | OUTPATIENT
Start: 2017-11-09 | End: 2017-11-09 | Stop reason: HOSPADM

## 2017-11-09 RX ORDER — ONDANSETRON 8 MG/1
8 TABLET, ORALLY DISINTEGRATING ORAL EVERY 8 HOURS PRN
Status: DISCONTINUED | OUTPATIENT
Start: 2017-11-09 | End: 2017-11-09 | Stop reason: HOSPADM

## 2017-11-09 RX ORDER — LIDOCAINE HCL/PF 100 MG/5ML
SYRINGE (ML) INTRAVENOUS
Status: DISCONTINUED | OUTPATIENT
Start: 2017-11-09 | End: 2017-11-09

## 2017-11-09 RX ORDER — LIDOCAINE HYDROCHLORIDE 10 MG/ML
1 INJECTION, SOLUTION EPIDURAL; INFILTRATION; INTRACAUDAL; PERINEURAL ONCE
Status: DISCONTINUED | OUTPATIENT
Start: 2017-11-09 | End: 2017-11-09 | Stop reason: HOSPADM

## 2017-11-09 RX ORDER — ONDANSETRON 2 MG/ML
INJECTION INTRAMUSCULAR; INTRAVENOUS
Status: DISCONTINUED | OUTPATIENT
Start: 2017-11-09 | End: 2017-11-09

## 2017-11-09 RX ORDER — GLYCOPYRROLATE 0.2 MG/ML
INJECTION INTRAMUSCULAR; INTRAVENOUS
Status: DISCONTINUED | OUTPATIENT
Start: 2017-11-09 | End: 2017-11-09

## 2017-11-09 RX ORDER — OXYCODONE HYDROCHLORIDE 5 MG/1
5 TABLET ORAL
Status: DISCONTINUED | OUTPATIENT
Start: 2017-11-09 | End: 2017-11-09 | Stop reason: HOSPADM

## 2017-11-09 RX ORDER — HYDROCODONE BITARTRATE AND ACETAMINOPHEN 5; 325 MG/1; MG/1
1 TABLET ORAL EVERY 4 HOURS PRN
Qty: 5 TABLET | Refills: 0 | Status: SHIPPED | OUTPATIENT
Start: 2017-11-09 | End: 2018-04-24

## 2017-11-09 RX ORDER — MEPERIDINE HYDROCHLORIDE 50 MG/ML
12.5 INJECTION INTRAMUSCULAR; INTRAVENOUS; SUBCUTANEOUS ONCE AS NEEDED
Status: DISCONTINUED | OUTPATIENT
Start: 2017-11-09 | End: 2017-11-09 | Stop reason: HOSPADM

## 2017-11-09 RX ORDER — PROPOFOL 10 MG/ML
VIAL (ML) INTRAVENOUS
Status: DISCONTINUED | OUTPATIENT
Start: 2017-11-09 | End: 2017-11-09

## 2017-11-09 RX ORDER — MUPIROCIN 20 MG/G
OINTMENT TOPICAL
Status: DISCONTINUED | OUTPATIENT
Start: 2017-11-09 | End: 2017-11-09 | Stop reason: HOSPADM

## 2017-11-09 RX ORDER — FENTANYL CITRATE 50 UG/ML
25 INJECTION, SOLUTION INTRAMUSCULAR; INTRAVENOUS EVERY 5 MIN PRN
Status: DISCONTINUED | OUTPATIENT
Start: 2017-11-09 | End: 2017-11-09 | Stop reason: HOSPADM

## 2017-11-09 RX ORDER — DIPHENHYDRAMINE HYDROCHLORIDE 50 MG/ML
25 INJECTION INTRAMUSCULAR; INTRAVENOUS EVERY 4 HOURS PRN
Status: DISCONTINUED | OUTPATIENT
Start: 2017-11-09 | End: 2017-11-09 | Stop reason: HOSPADM

## 2017-11-09 RX ORDER — FAMOTIDINE 20 MG/1
20 TABLET, FILM COATED ORAL
Status: COMPLETED | OUTPATIENT
Start: 2017-11-09 | End: 2017-11-09

## 2017-11-09 RX ORDER — SODIUM CHLORIDE 0.9 % (FLUSH) 0.9 %
3 SYRINGE (ML) INJECTION
Status: DISCONTINUED | OUTPATIENT
Start: 2017-11-09 | End: 2017-11-09 | Stop reason: HOSPADM

## 2017-11-09 RX ORDER — DIPHENHYDRAMINE HCL 25 MG
25 CAPSULE ORAL EVERY 4 HOURS PRN
Status: DISCONTINUED | OUTPATIENT
Start: 2017-11-09 | End: 2017-11-09 | Stop reason: HOSPADM

## 2017-11-09 RX ORDER — ONDANSETRON 2 MG/ML
4 INJECTION INTRAMUSCULAR; INTRAVENOUS DAILY PRN
Status: DISCONTINUED | OUTPATIENT
Start: 2017-11-09 | End: 2017-11-09 | Stop reason: HOSPADM

## 2017-11-09 RX ORDER — FENTANYL CITRATE 50 UG/ML
INJECTION, SOLUTION INTRAMUSCULAR; INTRAVENOUS
Status: DISCONTINUED | OUTPATIENT
Start: 2017-11-09 | End: 2017-11-09

## 2017-11-09 RX ORDER — MIDAZOLAM HYDROCHLORIDE 5 MG/ML
4 INJECTION INTRAMUSCULAR; INTRAVENOUS
Status: DISCONTINUED | OUTPATIENT
Start: 2017-11-09 | End: 2017-11-09 | Stop reason: HOSPADM

## 2017-11-09 RX ORDER — IBUPROFEN 800 MG/1
800 TABLET ORAL EVERY 8 HOURS PRN
Qty: 30 TABLET | Refills: 2 | Status: SHIPPED | OUTPATIENT
Start: 2017-11-09 | End: 2018-04-24

## 2017-11-09 RX ORDER — IBUPROFEN 600 MG/1
600 TABLET ORAL EVERY 6 HOURS PRN
Status: DISCONTINUED | OUTPATIENT
Start: 2017-11-09 | End: 2017-11-09 | Stop reason: HOSPADM

## 2017-11-09 RX ORDER — SODIUM CHLORIDE, SODIUM LACTATE, POTASSIUM CHLORIDE, CALCIUM CHLORIDE 600; 310; 30; 20 MG/100ML; MG/100ML; MG/100ML; MG/100ML
INJECTION, SOLUTION INTRAVENOUS CONTINUOUS
Status: DISCONTINUED | OUTPATIENT
Start: 2017-11-09 | End: 2017-11-09 | Stop reason: HOSPADM

## 2017-11-09 RX ORDER — HYDROCODONE BITARTRATE AND ACETAMINOPHEN 5; 325 MG/1; MG/1
1 TABLET ORAL EVERY 4 HOURS PRN
Qty: 5 TABLET | Refills: 0 | Status: SHIPPED | OUTPATIENT
Start: 2017-11-09 | End: 2017-11-09

## 2017-11-09 RX ORDER — HYDROMORPHONE HYDROCHLORIDE 2 MG/ML
0.4 INJECTION, SOLUTION INTRAMUSCULAR; INTRAVENOUS; SUBCUTANEOUS EVERY 5 MIN PRN
Status: DISCONTINUED | OUTPATIENT
Start: 2017-11-09 | End: 2017-11-09 | Stop reason: HOSPADM

## 2017-11-09 RX ADMIN — GLYCOPYRROLATE 0.2 MG: 0.2 INJECTION, SOLUTION INTRAMUSCULAR; INTRAVENOUS at 09:11

## 2017-11-09 RX ADMIN — MIDAZOLAM HYDROCHLORIDE 4 MG: 5 INJECTION, SOLUTION INTRAMUSCULAR; INTRAVENOUS at 08:11

## 2017-11-09 RX ADMIN — OXYCODONE HYDROCHLORIDE 5 MG: 5 TABLET ORAL at 10:11

## 2017-11-09 RX ADMIN — SODIUM CHLORIDE, SODIUM LACTATE, POTASSIUM CHLORIDE, AND CALCIUM CHLORIDE: 600; 310; 30; 20 INJECTION, SOLUTION INTRAVENOUS at 08:11

## 2017-11-09 RX ADMIN — FAMOTIDINE 20 MG: 20 TABLET, FILM COATED ORAL at 08:11

## 2017-11-09 RX ADMIN — ONDANSETRON 4 MG: 2 INJECTION INTRAMUSCULAR; INTRAVENOUS at 09:11

## 2017-11-09 RX ADMIN — PROPOFOL 160 MG: 10 INJECTION, EMULSION INTRAVENOUS at 09:11

## 2017-11-09 RX ADMIN — MUPIROCIN: 20 OINTMENT TOPICAL at 08:11

## 2017-11-09 RX ADMIN — EPHEDRINE SULFATE 10 MG: 50 INJECTION INTRAMUSCULAR; INTRAVENOUS; SUBCUTANEOUS at 09:11

## 2017-11-09 RX ADMIN — CARBOXYMETHYLCELLULOSE SODIUM 2 DROP: 2.5 SOLUTION/ DROPS OPHTHALMIC at 09:11

## 2017-11-09 RX ADMIN — LIDOCAINE HYDROCHLORIDE 75 MG: 20 INJECTION, SOLUTION INTRAVENOUS at 09:11

## 2017-11-09 RX ADMIN — FENTANYL CITRATE 100 MCG: 50 INJECTION, SOLUTION INTRAMUSCULAR; INTRAVENOUS at 09:11

## 2017-11-09 NOTE — ANESTHESIA POSTPROCEDURE EVALUATION
"Anesthesia Post Evaluation    Patient: Noelle Rivas    Procedure(s) Performed: Procedure(s) (LRB):  [id number] (Bilateral)    Final Anesthesia Type: general  Patient location during evaluation: PACU  Patient participation: Yes- Able to Participate  Level of consciousness: oriented and awake  Post-procedure vital signs: reviewed and stable  Pain management: adequate  Airway patency: patent  PONV status at discharge: No PONV  Anesthetic complications: no      Cardiovascular status: hemodynamically stable  Respiratory status: unassisted, spontaneous ventilation and room air  Hydration status: euvolemic  Follow-up not needed.        Visit Vitals  /66 (BP Location: Left arm, Patient Position: Lying)   Pulse 62   Temp 36.5 °C (97.7 °F) (Oral)   Resp 16   Ht 5' 9" (1.753 m)   Wt 78.5 kg (173 lb 0.3 oz)   SpO2 100%   BMI 25.55 kg/m²       Pain/Emeli Score: Pain Assessment Performed: Yes (11/9/2017 11:05 AM)  Presence of Pain: denies (11/9/2017 11:05 AM)  Pain Rating Prior to Med Admin: 2 (11/9/2017 10:35 AM)  Emeli Score: 10 (11/9/2017 11:05 AM)      "

## 2017-11-09 NOTE — DISCHARGE INSTRUCTIONS
Home Care Instruction D&C Hysteroscopy             ACTIVITY LEVEL:  If you received sedation and/or an anesthetic, you may feel sleepy for several hours. Rest until you feel more  awake. Gradually resume your normal activities.    DIET:  At home, continue with liquids. If there is no nausea, you may eat a soft diet and gradually resume a regular diet.    BATHING:  You may shower  as desired in one day.  You should avoid tub baths, hot tubs and swimming pools until seen by your physician for a post-op follow up.    CARE:  You can expect watery or bloody vaginal discharge for several days. Do not use tampons, please only use pads. Sexual activity is restricted as advised by your doctor.    MEDICATIONS:  You will receive instructions for any pain and/or antibiotic prescriptions. Pain medication should be taken only if needed and as directed. Antibiotics, if ordered, should be taken as directed until the entire prescription is completed.    ADDITIONAL INFORMATION:  __________________________________________________________________________________________  WHEN TO CALL THE DOCTOR:   For any heavy vaginal bleeding   Fever over 101°F (38.4°C)   Any lower abdominal pain not relieved by the pain mediation  RETURN APPOINTMENT:  __________________________________________________________________________________________  FOR EMERGENCIES:  If any unusual problems or difficulties occur, contact  __________________ or the resident at [phone] (page ) or the Clinic office, [phone].      Anesthesia: After Your Surgery  Youve just had surgery. During surgery, you received medication called anesthesia to keep you comfortable and pain-free. After surgery, you may experience some pain or nausea. This is common. Here are some tips for feeling better and recovering after surgery.    Going home  Your doctor or nurse will show you how to take care of yourself when you go home. He or she will also answer your  questions. Have an adult family member or friend drive you home. For the first 24 hours after your surgery:  · Do not drive or use heavy equipment.  · Do not make important decisions or sign legal documents.  · Avoid alcohol.  · Have someone stay with you, if needed. He or she can watch for problems and help keep you safe.  Be sure to keep all follow-up appointments with your doctor. And rest after your procedure for as long as your doctor tells you to.    Coping with pain  If you have pain after surgery, pain medication will help you feel better. Take it as directed, before pain becomes severe. Also, ask your doctor or pharmacist about other ways to control pain, such as with heat, ice, and relaxation. And follow any other instructions your surgeon or nurse gives you.    Tips for taking pain medication  To get the best relief possible, remember these points:  · Pain medications can upset your stomach. Taking them with a little food may help.  · Most pain relievers taken by mouth need at least 20 to 30 minutes to take effect.  · Taking medication on a schedule can help you remember to take it. Try to time your medication so that you can take it before beginning an activity, such as dressing, walking, or sitting down for dinner.  · Constipation is a common side effect of pain medications. Contact your doctor before taking any medications like laxatives or stool softeners to help relieve constipation. Also ask about any dietary restrictions, because drinking lots of fluids and eating foods like fruits and vegetables that are high in fiber can also help. Remember, dont take laxatives unless your surgeon has prescribed them.  · Mixing alcohol and pain medication can cause dizziness and slow your breathing. It can even be fatal. Dont drink alcohol while taking pain medication.  · Pain medication can slow your reflexes. Dont drive or operate machinery while taking pain medication.  If your health care provider tells  you to take acetaminophen to help relieve your pain, ask him or her how much you are supposed to take each day. (Acetaminophen is the generic name for Tylenol and other brand-name pain relievers.) Acetaminophen or other pain relievers may interact with your prescription medicines or other over-the-counter (OTC) drugs. Some prescription medications contain acetaminophen along with other active ingredients. Using both prescription and OTC acetaminophen for pain can cause you to overdose. The FDA recommends that you read the labels on your OTC medications carefully. This will help you to clearly understand the list of active ingredients, dosing instructions, and any warnings. It may also help you avoid taking too much acetaminophen. If you have questions or don't understand the information, ask your pharmacist or health care provider to explain it to you before you take the OTC medication.    Managing nausea  Some people have an upset stomach after surgery. This is often due to anesthesia, pain, pain medications, or the stress of surgery. The following tips will help you manage nausea and get good nutrition as you recover. If you were on a special diet before surgery, ask your doctor if you should follow it during recovery. These tips may help:  · Dont push yourself to eat. Your body will tell you when to eat and how much.  · Start off with clear liquids and soup. They are easier to digest.  · Progress to semi-solid foods (mashed potatoes, applesauce, and gelatin) as you feel ready.  · Slowly move to solid foods. Dont eat fatty, rich, or spicy foods at first.  · Dont force yourself to have three large meals a day. Instead, eat smaller amounts more often.  · Take pain medications with a small amount of solid food, such as crackers or toast to avoid nausea.      Call your surgeon if  · You still have pain an hour after taking medication (it may not be strong enough).  · You feel too sleepy, dizzy, or groggy  (medication may be too strong).  · You have side effects like nausea, vomiting, or skin changes (rash, itching, or hives).   © 0260-9449 [employer]. [address] [place] [address], [place] [address]. All rights reserved. This information is not intended as a substitute for professional medical care. Always follow your healthcare professional's instructions.

## 2017-11-09 NOTE — PLAN OF CARE
Noelle Rivas has met all discharge criteria from Phase II. Vital Signs are stable, ambulating  without difficulty. Discharge instructions given, patient verbalized understanding. Discharged from facility via wheelchair in stable condition.

## 2017-11-09 NOTE — DISCHARGE SUMMARY
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 Bilateral ostea visualized on hysteroscopy. Atrophic endometrium visualized, no abnormal areas/polyps/lesions. Fluid deficit [id number]    Estimated Blood Loss: * No values recorded between 11/9/2017  9:42 AM and 11/9/2017 10:00 AM *         Specimens:   Specimen (12h ago through future)    None          Discharge Note    SUMMARY     Admit Date: 11/9/2017    Discharge Date and Time:  11/09/2017 10:01 AM    Hospital Course (synopsis of major diagnoses, care, treatment, and services provided during the course of the hospital stay): 55 yo female with endometrial hyperplasia and PMH including breast cancer underwent a hysteroscopy and D&C. She tolerated the procedure well and was transferred to the PACU in stable condition. She was discharged home on POD#0 after meeting routine post op milestones, ambulating, voiding, tolerating PO. She will f/u in the clinic in 6 weeks for post op visit.  See discharge medications below. Precautions given: fever, pain, purulent discharge, N/V, bleeding etc.       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  Qty: 5 tablet, Refills: 0      ibuprofen (ADVIL,MOTRIN) 800 MG tablet Take 1 tablet (800 mg total) by mouth every 8 (eight) hours as needed for Pain.  Qty: 30 tablet, Refills: 2         CONTINUE these medications which have NOT CHANGED    Details   lactobacillus rhamnosus, GG, (PROBIOTIC) 10 billion cell capsule Take 3 capsules by mouth once daily.       loratadine (CLARITIN) 10 mg tablet Take 10 mg by mouth once daily.        multivitamin (MULTIVITAMIN) per tablet Take 1 tablet by mouth once daily.        vitamin D 185 MG Tab Take 2,000 mg by mouth once daily.       aspirin (ECOTRIN) 81 MG EC tablet Take 81 mg by mouth once daily.        cyanocobalamin (VITAMIN B-12) 1000 MCG tablet Take 100 mcg by mouth once daily.             Discharge Procedure Orders  Diet general     Weight bearing restrictions (specify)     Lifting restrictions     Ice to affected area     Keep surgical extremity elevated     Shower on day dressing removed (No bath)     Sponge bath only until clinic visit     Activity as tolerated     Call MD for:  extreme fatigue     Call MD for:  persistent dizziness or light-headedness     Call MD for:  hives     Call MD for:  redness, tenderness, or signs of infection (pain, swelling, redness, odor or green/yellow discharge around incision site)     Call MD for:  difficulty breathing, headache or visual disturbances     Call MD for:  severe uncontrolled pain     Call MD for:  persistent nausea and vomiting     Call MD for:  temperature >100.4     No dressing needed       Follow-up Information     E Roldan Sheehan MD In 4 weeks.    Specialties:  Obstetrics, Obstetrics and Gynecology  Why:  Post op visit  Contact information:  [address] [address] [phone]

## 2017-11-09 NOTE — TRANSFER OF CARE
"Anesthesia Transfer of Care Note    Patient: Noelle Rivas    Procedure(s) Performed: Procedure(s) (LRB):  [id number] (Bilateral)    Patient location: PACU    Transport from OR: Transported from OR on 2-3 L/min O2 by NC with adequate spontaneous ventilation    Post pain: adequate analgesia    Post assessment: no apparent anesthetic complications    Post vital signs: stable    Level of consciousness: awake, alert and oriented    Nausea/Vomiting: no nausea/vomiting    Complications: none    Transfer of care protocol was followed      Last vitals:   Visit Vitals  /70   Pulse 64   Temp 36.7 °C (98 °F)   Resp 18   Ht 5' 9" (1.753 m)   Wt 78.5 kg (173 lb 0.3 oz)   SpO2 98%   BMI 25.55 kg/m²     "

## 2017-11-09 NOTE — INTERVAL H&P NOTE
The patient has been examined and the H&P has been reviewed:    I concur with the findings and no changes have occurred since H&P was written.    Anesthesia/Surgery risks, benefits and alternative options discussed and understood by patient/family.          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

## 2017-11-09 NOTE — H&P (VIEW-ONLY)
56 y.o.  presents for pre-op H&P for D&C hysteroscope for complex hyperplasia with no atypia that was diagnosed last yr.  She refused hyst by me and I referred her to Dr Foy and she also chose to not have hyst.  She has been counseled extensively that she still needs a hyst but she has agreed to the D&C first and is thinking about Hyst in Dec.  We have disucssed this several times. She has a history of breast ca and very anxious about having another surgery.  No LMP recorded. Patient is not currently having periods (Reason: Perimenopausal)..    Past Medical History:   Diagnosis Date    Allergy     Anxiety     Anxiety 2012    Breast cancer     Calcium nephrolithiasis     Complex endometrial hyperplasia without atypia 2016    Degenerative disc disease     DJD (degenerative joint disease) of knee     Factor V Leiden mutation No h/o DVT    Kidney stones     Leukopenia 2012    PMB (postmenopausal bleeding) 3/6/2017    Stroke     questionable  -     Vitamin B 12 deficiency      Past Surgical History:   Procedure Laterality Date    BREAST BIOPSY      BREAST LUMPECTOMY  12    BREAST LUMPECTOMY W/ NEEDLE LOCALIZATION      DILATION AND CURETTAGE OF UTERUS       Family History   Problem Relation Age of Onset    Hypertension Father     Coronary artery disease Father     Prostate cancer Father     Cancer Father      prostate    Heart disease Father      stenting    Colon cancer Paternal Grandfather     Melanoma Mother     Cancer Mother      melanoma    Hyperlipidemia Brother     No Known Problems Brother     No Known Problems Brother     Stroke Maternal Grandmother     Lung cancer Maternal Grandfather     Breast cancer Paternal Grandmother       [place] 62    Ovarian cancer Neg Hx     Uterine cancer Neg Hx     Psoriasis Neg Hx     Lupus Neg Hx     Eczema Neg Hx      Review of patient's allergies indicates:   Allergen Reactions    Adhesive Itching and Rash      Micropore/Medipore Tape - paper tape is ok    Iodine and iodide containing products Itching    Latex, natural rubber Itching       Current Outpatient Prescriptions:     aspirin (ECOTRIN) 81 MG EC tablet, Take 81 mg by mouth once daily.  , Disp: , Rfl:     cyanocobalamin (VITAMIN B-12) 1000 MCG tablet, Take 100 mcg by mouth once daily., Disp: , Rfl:     lactobacillus rhamnosus, GG, (PROBIOTIC) 10 billion cell capsule, Take 3 capsules by mouth once daily. , Disp: , Rfl:     loratadine (CLARITIN) 10 mg tablet, Take 10 mg by mouth once daily.  , Disp: , Rfl:     multivitamin (MULTIVITAMIN) per tablet, Take 1 tablet by mouth once daily.  , Disp: , Rfl:     vitamin D 185 MG Tab, Take 2,000 mg by mouth once daily. , Disp: , Rfl:   Social History     Social History    Marital status: Single     Spouse name: N/A    Number of children: N/A    Years of education: N/A     Occupational History    Nurse   [employer]     Social History Main Topics    Smoking status: Never Smoker    Smokeless tobacco: Never Used    Alcohol use Yes      Comment: Minimal    Drug use: No    Sexual activity: No     Other Topics Concern    Are You Pregnant Or Think You May Be? No    Breast-Feeding No     Social History Narrative    No narrative on file         Last pap  Neg 12 /15  Last pathology 04/29/16  FINAL PATHOLOGIC DIAGNOSIS  Endometrium, curettage:  Complex hyperplasia without atypia.  COMMENT: This case is reviewed by Dr. Jones who concurs with this diagnosis.  [facility] [place]  Diagnosed by: Darius  Last ultrasound 10/17    Findings: The uterus measures 8.5 x 4.9 x 3.6 cm. Uterine parenchyma is mostly homogeneous with persistent evidence of a subserosal uterine fibroid measuring 1.0 x 0.9 x 0.6 cm. The endometrial stripe measures 0.8 cm, compared to 1.7 cm previously.    The right ovary is normal in size and measures 1.5 x 1.0 x 2.3 cm cm. The left ovary is normal in size and measures 2.5 x 2.3 x 1.1 cm.  Subcentimeter follicles are seen in both ovaries, which appear smaller than on prior examination . Arterial and venous flow are preserved bilaterally with resistive indices of 0.66 on the right and 0.87  on the left. No free fluid is seen.   Impression         Interval decrease in size of the endometrial stripe.  In a premenopausal patient, this endometrial thickness is within normal limits.  In a postmenopausal woman this thickness is above normal limits.  Recommend laboratory confirmation to assess menopausal status, and consider endometrial sampling if the patient is truly postmenopausal.    Presence of bilateral ovarian follicles, suggesting functioning ovaries.    ______________________________________     Electronically signed by resident: JEIMY ARMIJO MD  Date: 10/06/17  Time: 15:06            As the supervising and teaching physician, I personally reviewed the images and resident's interpretation and I agree with the findings.            Electronically signed by: Felipe Jarquin MD  Date: 10/06/17  Time: 15:09     Encounter     View Encounter                  Vitals:    11/01/17 1036   BP: 110/68     General Appearance: Alert, appropriate appearance for age. No acute distress, Chest/Respiratory Exam: Normal chest wall and respirations. Clear to auscultation.   Cardiovascular Exam: regular rate and rhythm, S1, S2 normal, no murmur, click, rub or gallop   Gastrointestinal Exam: soft, non-tender; bowel sounds normal; no masses,  no organomegaly  Pelvic Exam Female: Vulva and vagina appear normal. Bimanual exam reveals normal uterus and adnexa.   Psychiatric Exam: Alert and oriented, appropriate affect.    Assessment: pt with yr ago complex atypia with out atypia for repeat D&C     Plan: D&C to rule out ca at this point before hyst/BSO  I have discussed the risks, benefits, indications, and alternatives of the procedure in detail.  The patient verbalizes her understanding.  All questions answered.  Consents  signed.  The patient agrees to proceed to proceed as planned.

## 2017-11-09 NOTE — OP NOTE
Operative Report    Procedure: Dilation and Curettage, Hysteroscopy    Date of Surgery 11/09/2017    Surgeon: Monique Sheehan MD    Assistant: Charo Tim MD (res)    Pre-Op Diagnosis: Endometrial hyperplasia    Post-op Diagnosis:  same    EBL: <1mL     IVF: 800mL    Urine Output: 120mL     Specimen: endometrial curettage     Findings: Cervix normal in appearance. Bilateral ostea visualized on hysteroscopy. Atrophic endometrium visualized, no abnormal areas/polyps/lesions. Fluid deficit [id number]    Procedure in Detail:  The patient was taken to the Operating Room where general was obtained, the patient was placed in the lithotomy position, with her legs in the  Morris stirrups.  The patient was then prepped and draped in the normal sterile fashion. The weighted speculum was placed in the posterior aspect of the vagina and the right angle retractor was placed in the  Anterior aspect of the vagina.  The cervix was visualized and a single-tooth tenaculum was placed on the anterior aspect of the cervix.  The uterus was sounded to8 cm with the uterine sound. The hysteroscope was then white balanced and the line cleared of air, it was then inserted into the cervical os and the uterine cavity was directly visualized, bilateral ostea were noted at that time with the above findings. The hysteroscope was then removed and currettage of the uterus of the uterus was performed and specimens were sent to pathology for evaluation. The single tooth tenaculum was then removed and the cervix was made hemostatic with pressure.  Sponge, lap and needle counts were correct x 2.  The patient tolerated the procedure well and was taken to recovery in stable condition.    Charo Tim MD

## 2017-11-28 ENCOUNTER — PATIENT MESSAGE (OUTPATIENT)
Dept: OBSTETRICS AND GYNECOLOGY | Facility: CLINIC | Age: 57
End: 2017-11-28

## 2017-11-29 ENCOUNTER — PATIENT MESSAGE (OUTPATIENT)
Dept: OBSTETRICS AND GYNECOLOGY | Facility: CLINIC | Age: 57
End: 2017-11-29

## 2017-12-22 ENCOUNTER — TELEPHONE (OUTPATIENT)
Dept: INTERNAL MEDICINE | Facility: CLINIC | Age: 57
End: 2017-12-22

## 2017-12-22 RX ORDER — OSELTAMIVIR PHOSPHATE 75 MG/1
75 CAPSULE ORAL 2 TIMES DAILY
Qty: 10 CAPSULE | Refills: 0 | Status: SHIPPED | OUTPATIENT
Start: 2017-12-22 | End: 2017-12-27

## 2017-12-22 NOTE — TELEPHONE ENCOUNTER
Spoke to patient and states that she is going to visit family members on Sunday for the Christmas holidays and members of the family was diagnosis with the Flu----patient is wanting to know if she should be on the Tamiflu as a procaution----pls advise  Medication can be called into [facility] in [place]

## 2017-12-22 NOTE — TELEPHONE ENCOUNTER
----- Message from Mark Rodriguez sent at 12/22/2017 12:30 PM CST -----  Contact: self   Patient would like doctor to call in RX for tamiflu. Patient going to visit family for [place] and several family members has the flu.    Pharmacy: [id number] [place] - [address]  [phone] (Phone)  [phone] (Fax)    Please advise

## 2018-01-25 DIAGNOSIS — Z85.3 PERSONAL HISTORY OF BREAST CANCER: Primary | ICD-10-CM

## 2018-02-27 ENCOUNTER — OFFICE VISIT (OUTPATIENT)
Dept: OPTOMETRY | Facility: CLINIC | Age: 58
End: 2018-02-27
Payer: COMMERCIAL

## 2018-02-27 DIAGNOSIS — Z01.00 EXAMINATION OF EYES AND VISION: Primary | ICD-10-CM

## 2018-02-27 DIAGNOSIS — H52.222 REGULAR ASTIGMATISM OF LEFT EYE: ICD-10-CM

## 2018-02-27 DIAGNOSIS — H52.11 MYOPIA OF RIGHT EYE: ICD-10-CM

## 2018-02-27 PROCEDURE — 92015 DETERMINE REFRACTIVE STATE: CPT | Mod: S$GLB,,, | Performed by: OPTOMETRIST

## 2018-02-27 PROCEDURE — 92014 COMPRE OPH EXAM EST PT 1/>: CPT | Mod: S$GLB,,, | Performed by: OPTOMETRIST

## 2018-02-27 PROCEDURE — 99999 PR PBB SHADOW E&M-EST. PATIENT-LVL III: CPT | Mod: PBBFAC,,, | Performed by: OPTOMETRIST

## 2018-02-27 NOTE — PROGRESS NOTES
"HPI     Concerns About Ocular Health    Additional comments: Eye exam and refraction.  No acute ocular/vision problems  No apparent VA changes.            Comments   Patient's age: 57 y.o. WF  Occupation: revenue integrity at [employer]  Approximate date of last eye examination:  10/04/2016  Name of last eye doctor seen: Dr. Ordoñez    Wears glasses? yes     If yes, wears  Full-time or part-time?  Full time  Present glasses are: Bifocal, SV Distance, SV Reading?  progressives  Approximate age of present glasses:  1 yr   Got new glasses following last exam, or subsequently?:  yes   Any problem with VA with glasses?  Patient feels her vision has changed   Wears CLs?:  no  Headaches?  no  Eye pain/discomfort?  no                                                                                     Flashes?  no  Floaters?  no  Diplopia/Double vision?  no  Patient's Ocular History:         Any eye surgeries? no         Any eye injury?  no         Any treatment for eye disease?  no  Family history of eye disease?  Brother-kerataconus  Significant patient medical history:         1. Diabetes?  no       If yes, IDDM or NIDDM? n/a   2. HBP?  no              3. Other (describe):  Breast cancer   ! OTC eyedrops currently using:  no   ! Prescription eye meds currently using:  no   ! Any history of allergy/adverse reaction to any eye meds used   previously?  no    ! Any history of allergy/adverse reaction to eyedrops used during prior   eye exam(s)? no    ! Any history of allergy/adverse reaction to Novacaine or similar meds?   no   ! Any history of allergy/adverse reaction to Epinephrine or similar meds?   no    ! Patient okay with use of anesthetic eyedrops to check eye pressure?    yes        ! Patient okay with use of eyedrops to dilate pupils today?  yes   !  Allergies/Medications/Medical History/Family History reviewed today?    yes      PD =   58/54  Desired reading distance =  13"                                                 "                    Last edited by Og Ordoñez, OD on 2/27/2018  4:13 PM. (History)            Assessment /Plan     For exam results, see Encounter Report.    1. Examination of eyes and vision - Both Eyes     2. Myopia of right eye     3. Regular astigmatism of left eye                      Good ocular health in both eyes.  Myopia in the right eye, and regular astigmatic refractive error in the left eye, with good correctable VA in each eye.  Presbyopia consistent with age.  New spectacle lens Rx issued for use as desired.   Recommend full-time wear.  Recheck in 18 months  - or prior if any problems or apparent change in VA in the interim.

## 2018-02-27 NOTE — PATIENT INSTRUCTIONS
Good ocular health in both eyes.  Myopia in the right eye, and regular astigmatic refractive error in the left eye, with good correctable VA in each eye.  Presbyopia consistent with age.  New spectacle lens Rx issued for use as desired.   Recommend full-time wear.  Recheck in 18 months  - or prior if any problems or apparent change in VA in the interim.

## 2018-04-24 ENCOUNTER — HOSPITAL ENCOUNTER (OUTPATIENT)
Dept: RADIOLOGY | Facility: HOSPITAL | Age: 58
Discharge: HOME | End: 2018-04-24
Attending: NURSE PRACTITIONER
Payer: COMMERCIAL

## 2018-04-24 ENCOUNTER — OFFICE VISIT (OUTPATIENT)
Dept: SURGERY | Facility: CLINIC | Age: 58
End: 2018-04-24
Payer: COMMERCIAL

## 2018-04-24 VITALS
HEART RATE: 75 BPM | SYSTOLIC BLOOD PRESSURE: 104 MMHG | BODY MASS INDEX: 26.35 KG/M2 | HEIGHT: 69 IN | TEMPERATURE: 98 F | WEIGHT: 177.94 LBS | DIASTOLIC BLOOD PRESSURE: 69 MMHG

## 2018-04-24 DIAGNOSIS — Z85.3 PERSONAL HISTORY OF BREAST CANCER: Primary | ICD-10-CM

## 2018-04-24 DIAGNOSIS — Z85.3 PERSONAL HISTORY OF BREAST CANCER: ICD-10-CM

## 2018-04-24 PROCEDURE — 77066 DX MAMMO INCL CAD BI: CPT | Mod: TC,PO

## 2018-04-24 PROCEDURE — G0279 TOMOSYNTHESIS, MAMMO: HCPCS | Mod: 26,,, | Performed by: RADIOLOGY

## 2018-04-24 PROCEDURE — 99213 OFFICE O/P EST LOW 20 MIN: CPT | Mod: S$GLB,,, | Performed by: NURSE PRACTITIONER

## 2018-04-24 PROCEDURE — 99999 PR PBB SHADOW E&M-EST. PATIENT-LVL III: CPT | Mod: PBBFAC,,, | Performed by: NURSE PRACTITIONER

## 2018-04-24 PROCEDURE — 77066 DX MAMMO INCL CAD BI: CPT | Mod: 26,,, | Performed by: RADIOLOGY

## 2018-04-24 NOTE — PROGRESS NOTES
"Subjective:      Patient ID: Noelle Rivas is a 57 y.o. female.    Chief Complaint: Breast Cancer Screening (CBE/Hx of Breast Cancer)      HPI: (PF, EPF - 1-3) (Detailed, Comp, - 4)returning patient presents for breast cancer surveillance. Denies breast mass, nipple discharge, skin changes, new onset bone pain, unexplained weight loss, cough, SOB. She reports intermittent tenderness right UOQ which she thinks this may be more frequent "pinching ".  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 Adjuvant XRT, declined hormonal therapy      Review of Systems  Objective:   Physical Exam   Pulmonary/Chest: She exhibits no mass, no laceration, no edema, no swelling and no retraction. Right breast exhibits tenderness. Right breast exhibits no inverted nipple, no mass, no nipple discharge and no skin change. Left breast exhibits no inverted nipple, no mass, no nipple discharge, no skin change and no tenderness. There is no breast swelling.   Lumpectomy scar right breast at 10 o'clock with focal tenderness reported, no mass appreciated, she also reported tenderness in the lower right breast without mass or thickening.    Lymphadenopathy:     She has no cervical adenopathy.     She has no axillary adenopathy.        Right: No supraclavicular adenopathy present.        Left: No supraclavicular adenopathy present.     Assessment:       1. Personal history of breast cancer        Plan:       No abnormality noted on exam today  Discussed referral to PT for pain " at surgical site, she declines at this time  mmg today, no changes or abnormality reported  Call for any interval palpable breast mass, pain, nipple discharge, skin changes or other breast related concerns

## 2018-05-22 ENCOUNTER — CLINICAL SUPPORT (OUTPATIENT)
Dept: INTERNAL MEDICINE | Facility: CLINIC | Age: 58
End: 2018-05-22
Payer: COMMERCIAL

## 2018-05-22 ENCOUNTER — OFFICE VISIT (OUTPATIENT)
Dept: INTERNAL MEDICINE | Facility: CLINIC | Age: 58
End: 2018-05-22
Payer: COMMERCIAL

## 2018-05-22 VITALS
SYSTOLIC BLOOD PRESSURE: 110 MMHG | WEIGHT: 176.38 LBS | BODY MASS INDEX: 26.12 KG/M2 | DIASTOLIC BLOOD PRESSURE: 60 MMHG | HEIGHT: 69 IN

## 2018-05-22 DIAGNOSIS — Z80.8 FAMILY HISTORY OF MALIGNANT MELANOMA: ICD-10-CM

## 2018-05-22 DIAGNOSIS — M67.479 GANGLION CYST OF FOOT: ICD-10-CM

## 2018-05-22 DIAGNOSIS — Z00.00 ANNUAL PHYSICAL EXAM: ICD-10-CM

## 2018-05-22 DIAGNOSIS — Z23 NEED FOR 23-POLYVALENT PNEUMOCOCCAL POLYSACCHARIDE VACCINE: Primary | ICD-10-CM

## 2018-05-22 PROBLEM — N85.00 ENDOMETRIAL HYPERPLASIA, UNSPECIFIED: Status: ACTIVE | Noted: 2018-05-22

## 2018-05-22 PROBLEM — N85.02 ATYPICAL ENDOMETRIAL HYPERPLASIA: Status: RESOLVED | Noted: 2017-11-09 | Resolved: 2018-05-22

## 2018-05-22 PROBLEM — N95.0 PMB (POSTMENOPAUSAL BLEEDING): Status: RESOLVED | Noted: 2017-03-06 | Resolved: 2018-05-22

## 2018-05-22 PROCEDURE — 99999 PR PBB SHADOW E&M-EST. PATIENT-LVL IV: CPT | Mod: PBBFAC,,, | Performed by: INTERNAL MEDICINE

## 2018-05-22 PROCEDURE — 99999 PR PBB SHADOW E&M-EST. PATIENT-LVL I: CPT | Mod: PBBFAC,,,

## 2018-05-22 PROCEDURE — 90471 IMMUNIZATION ADMIN: CPT | Mod: S$GLB,,, | Performed by: INTERNAL MEDICINE

## 2018-05-22 PROCEDURE — 99396 PREV VISIT EST AGE 40-64: CPT | Mod: 25,S$GLB,, | Performed by: INTERNAL MEDICINE

## 2018-05-22 PROCEDURE — 90732 PPSV23 VACC 2 YRS+ SUBQ/IM: CPT | Mod: S$GLB,,, | Performed by: INTERNAL MEDICINE

## 2018-05-22 RX ORDER — VIT C/E/ZN/COPPR/LUTEIN/ZEAXAN 250MG-90MG
2000 CAPSULE ORAL DAILY
Qty: 60 CAPSULE | Refills: 12 | Status: SHIPPED | OUTPATIENT
Start: 2018-05-22 | End: 2021-04-29

## 2018-05-22 NOTE — PATIENT INSTRUCTIONS
Prevention Guidelines, Women Ages 50 to 64  Screening tests and vaccines are an important part of managing your health. Health counseling is essential, too. Below are guidelines for these, for women ages 50 to 64. Talk with your healthcare provider to make sure youre up to date on what you need.  Screening Who needs it How often   Type 2 diabetes or prediabetes All adults beginning at age 45 and adults without symptoms at any age who are overweight or obese and have 1 or more additional risk factors for diabetes. At  least every 3 years   Alcohol misuse All women in this age group At routine exams   Blood pressure All women in this age group Every 2 years if your blood pressure is less than 120/80 mm Hg; yearly if your systolic blood pressure is 120 to 139 mm Hg, or your diastolic blood pressure reading is 80 to 89 mm Hg   Breast cancer All women in this age group Yearly mammogram and clinical breast exam1   Cervical cancer All women in this age group, except women who have had a complete hysterectomy Pap test every 3 years or Pap test with human papillomavirus (HPV) test every 5 years   Chlamydia Women at increased risk for infection At routine exams   Colorectal cancer All women in this age group Flexible sigmoidoscopy every 5 years, or colonoscopy every 10 years, or double-contrast barium enema every 5 years; yearly fecal occult blood test or fecal immunochemical test; or a stool DNA test as often as your health care provider advises; talk with your health care provider about which tests are best for you   Depression All women in this age group At routine exams   Gonorrhea Sexually active women at increased risk for infection At routine exams   Hepatitis C Anyone at increased risk; 1 time for those born between 1945 and 1965 At routine exams   High cholesterol or triglycerides All women in this age group who are at risk for coronary artery disease At least every 5 years   HIV All women At routine exams   Lung  cancer Adults age 55 to 80 who have smoked Yearly screening in smokers with 30 pack-year history of smoking or who quit within 15 years   Obesity All women in this age group At routine exams   Osteoporosis Women who are postmenopausal Ask your healthcare provider   Syphilis Women at increased risk for infection - talk with your healthcare provider At routine exams   Tuberculosis Women at increased risk for infection - talk with your healthcare provider Ask your healthcare provider   Vision All women in this age group Ask your healthcare provider   Vaccine Who needs it How often   Chickenpox (varicella) All women in this age group who have no record of this infection or vaccine 2 doses; the second dose should be given at least 4 weeks after the first dose   Hepatitis A Women at increased risk for infection - talk with your healthcare provider 2 doses given at least 6 months apart   Hepatitis B Women at increased risk for infection - talk with your healthcare provider 3 doses over 6 months; second dose should be given 1 month after the first dose; the third dose should be given at least 2 months after the second dose and at least 4 months after the first dose   Haemophilus influenzaeType B (HIB) Women at increased risk for infection - talk with your healthcare provider 1 to 3 doses   Influenza (flu) All women in this age group Once a year   Measles, mumps, rubella (MMR) Women in this age group through their late 50s who have no record of these infections or vaccines 1 dose   Meningococcal Women at increased risk for infection - talk with your healthcare provider 1 or more doses   Pneumococcal conjugate vaccine (PCV13) and pneumococcal polysaccharide vaccine (PPSV23) Women at increased risk for infection - talk with your healthcare provider PCV13: 1 dose ages 19 to 65 (protects against 13 types of pneumococcal bacteria)  PPSV23: 1 to 2 doses through age 64, or 1 dose at 65 or older (protects against 23 types of  pneumococcal bacteria)   Tetanus/diphtheria/pertussis (Td/Tdap) booster All women in this age group Td every 10 years, or a one-time dose of Tdap instead of a Td booster after age 18, then Td every 10 years   Zoster All women ages 60 and older 1 dose   Counseling Who needs it How often   BRCA gene mutation testing for breast and ovarian cancer susceptibility Women with increased risk for having gene mutation When your risk is known   Breast cancer and chemoprevention Women at high risk for breast cancer When your risk is known   Diet and exercise Women who are overweight or obese When diagnosed, and then at routine exams   Sexually transmitted infection prevention Women at increased risk for infection - talk with your healthcare provider At routine exams   Use of daily aspirin Women ages 55 and up in this age group who are at risk for cardiovascular health problems such as stroke When your risk is known   Use of tobacco and the health effects it can cause All women in this age group Every exam   1American [employer]  Date Last Reviewed: 1/26/2016  © 9822-3308 [employer]. [address] [place] [address], [place] [address]. All rights reserved. This information is not intended as a substitute for professional medical care. Always follow your healthcare professional's instructions.    [employer]

## 2018-05-22 NOTE — PROGRESS NOTES
Subjective:       Patient ID: Noelle Rivas is a 57 y.o. female.    Chief Complaint: Annual Exam    Annual exam    Recall endometrial hyperplasia, has had some irregular vaginal bleeding.   Without atypia- sees Dr Foy.  D & C 11/17.  Will be due soon.  Will likely have another u/s then possibly a hysterectomy.  Hyperplasia without atypia.  Menopausal at this time.     Breast cancer. Stage 1 doing well. Seen in breast surgery 4/18.     D def and B12 deficiency.  Had been a vegan and now has added some lean proteins, working on this.     Factor V Leiden- she has had no clots.    Patient Active Problem List:     Factor V Leiden mutation     Calculus of ureter     Estrogen receptor positive status (ER+)     Anxiety     Vitamin B 12 deficiency     Degenerative disc disease     Mild vitamin D deficiency     Congenital neutropenia     Malignant neoplasm of right breast, stage 1     Thrombocytopenia     Endometrial hyperplasia               Review of Systems   Constitutional: Negative for activity change and unexpected weight change.        Slight weight gain   HENT: Negative for hearing loss, rhinorrhea and trouble swallowing.    Eyes: Negative for discharge and visual disturbance.   Respiratory: Negative for chest tightness and wheezing.    Cardiovascular: Negative for chest pain and palpitations.   Gastrointestinal: Negative for blood in stool, constipation, diarrhea and vomiting.   Endocrine: Negative for polydipsia and polyuria.   Genitourinary: Negative for difficulty urinating, dysuria, hematuria and menstrual problem.        No vaginal bleeding   Musculoskeletal: Negative for arthralgias, joint swelling and neck pain.        Bumps under R foot   Neurological: Negative for weakness and headaches.   Psychiatric/Behavioral: Negative for confusion and dysphoric mood.       Objective:      Physical Exam   Constitutional: She is oriented to person, place, and time. She appears well-developed and well-nourished.    HENT:   Head: Normocephalic and atraumatic.   Right Ear: External ear normal.   Left Ear: External ear normal.   Nose: Nose normal.   Mouth/Throat: Oropharynx is clear and moist. No oropharyngeal exudate.   Eyes: Conjunctivae and EOM are normal. No scleral icterus.   Neck: Normal range of motion. Neck supple. No JVD present. No thyromegaly present.   Cardiovascular: Normal rate, regular rhythm, normal heart sounds and intact distal pulses.  Exam reveals no gallop.    No murmur heard.  Pulmonary/Chest: Effort normal and breath sounds normal. No respiratory distress. She has no wheezes.   Abdominal: Soft. Bowel sounds are normal. She exhibits no distension and no mass. There is no tenderness. There is no rebound and no guarding.   Musculoskeletal: Normal range of motion. She exhibits no edema or tenderness.   Cystic lesions R foot   Lymphadenopathy:     She has no cervical adenopathy.   Neurological: She is alert and oriented to person, place, and time. She displays normal reflexes. No cranial nerve deficit. Coordination normal.   Skin: Skin is warm. No rash noted. No erythema.   Psychiatric: She has a normal mood and affect. Her behavior is normal. Judgment and thought content normal.   Nursing note and vitals reviewed.      Assessment:       1. Annual physical exam        Plan:     Annual physical exam  -     CBC auto differential; Future; Expected date: 05/22/2018  -     Comprehensive metabolic panel; Future; Expected date: 05/22/2018  -     Lipid panel; Future; Expected date: 05/22/2018  -     TSH; Future; Expected date: 05/22/2018  -     Vitamin B12; Future; Expected date: 05/22/2018  -     Vitamin D; Future; Expected date: 05/22/2018    Other orders  -     cholecalciferol, vitamin D3, 1,000 unit capsule; Take 2 capsules (2,000 Units total) by mouth once daily.  Dispense: 60 capsule; Refill: 12        Pneumovax today; Shingrix recommended as well    Exercise and healthy habits reviewed  I will review all  studies when completed  Attempt 5 lb weight loss in the next year  Keep follow-up in gyn at the appropriate interval.  Up-to-date with breast cancer assessment

## 2018-06-05 ENCOUNTER — PATIENT MESSAGE (OUTPATIENT)
Dept: INTERNAL MEDICINE | Facility: CLINIC | Age: 58
End: 2018-06-05

## 2018-06-05 ENCOUNTER — LAB VISIT (OUTPATIENT)
Dept: LAB | Facility: HOSPITAL | Age: 58
End: 2018-06-05
Payer: COMMERCIAL

## 2018-06-05 DIAGNOSIS — Z00.00 ANNUAL PHYSICAL EXAM: ICD-10-CM

## 2018-06-05 DIAGNOSIS — R79.89 ABNORMAL TSH: ICD-10-CM

## 2018-06-05 LAB
25(OH)D3+25(OH)D2 SERPL-MCNC: 39 NG/ML
ALBUMIN SERPL BCP-MCNC: 4.1 G/DL
ALP SERPL-CCNC: 90 U/L
ALT SERPL W/O P-5'-P-CCNC: 19 U/L
ANION GAP SERPL CALC-SCNC: 6 MMOL/L
AST SERPL-CCNC: 22 U/L
BASOPHILS # BLD AUTO: 0.03 K/UL
BASOPHILS NFR BLD: 0.8 %
BILIRUB SERPL-MCNC: 0.5 MG/DL
BUN SERPL-MCNC: 13 MG/DL
CALCIUM SERPL-MCNC: 9.8 MG/DL
CHLORIDE SERPL-SCNC: 106 MMOL/L
CHOLEST SERPL-MCNC: 171 MG/DL
CHOLEST/HDLC SERPL: 3.9 {RATIO}
CO2 SERPL-SCNC: 28 MMOL/L
CREAT SERPL-MCNC: 0.9 MG/DL
DIFFERENTIAL METHOD: ABNORMAL
EOSINOPHIL # BLD AUTO: 0.2 K/UL
EOSINOPHIL NFR BLD: 4.6 %
ERYTHROCYTE [DISTWIDTH] IN BLOOD BY AUTOMATED COUNT: 12.3 %
EST. GFR  (AFRICAN AMERICAN): >60 ML/MIN/1.73 M^2
EST. GFR  (NON AFRICAN AMERICAN): >60 ML/MIN/1.73 M^2
GLUCOSE SERPL-MCNC: 93 MG/DL
HCT VFR BLD AUTO: 39.7 %
HDLC SERPL-MCNC: 44 MG/DL
HDLC SERPL: 25.7 %
HGB BLD-MCNC: 13 G/DL
IMM GRANULOCYTES # BLD AUTO: 0.01 K/UL
IMM GRANULOCYTES NFR BLD AUTO: 0.3 %
LDLC SERPL CALC-MCNC: 108.2 MG/DL
LYMPHOCYTES # BLD AUTO: 1.5 K/UL
LYMPHOCYTES NFR BLD: 37.1 %
MCH RBC QN AUTO: 33.8 PG
MCHC RBC AUTO-ENTMCNC: 32.7 G/DL
MCV RBC AUTO: 103 FL
MONOCYTES # BLD AUTO: 0.3 K/UL
MONOCYTES NFR BLD: 8.2 %
NEUTROPHILS # BLD AUTO: 1.9 K/UL
NEUTROPHILS NFR BLD: 49 %
NONHDLC SERPL-MCNC: 127 MG/DL
NRBC BLD-RTO: 0 /100 WBC
PLATELET # BLD AUTO: 160 K/UL
PMV BLD AUTO: 10.9 FL
POTASSIUM SERPL-SCNC: 4.1 MMOL/L
PROT SERPL-MCNC: 7.1 G/DL
RBC # BLD AUTO: 3.85 M/UL
SODIUM SERPL-SCNC: 140 MMOL/L
T4 FREE SERPL-MCNC: 0.88 NG/DL
TRIGL SERPL-MCNC: 94 MG/DL
TSH SERPL DL<=0.005 MIU/L-ACNC: 4.39 UIU/ML
VIT B12 SERPL-MCNC: 985 PG/ML
WBC # BLD AUTO: 3.91 K/UL

## 2018-06-05 PROCEDURE — 80053 COMPREHEN METABOLIC PANEL: CPT

## 2018-06-05 PROCEDURE — 84443 ASSAY THYROID STIM HORMONE: CPT

## 2018-06-05 PROCEDURE — 80061 LIPID PANEL: CPT

## 2018-06-05 PROCEDURE — 84439 ASSAY OF FREE THYROXINE: CPT

## 2018-06-05 PROCEDURE — 82607 VITAMIN B-12: CPT

## 2018-06-05 PROCEDURE — 36415 COLL VENOUS BLD VENIPUNCTURE: CPT

## 2018-06-05 PROCEDURE — 85025 COMPLETE CBC W/AUTO DIFF WBC: CPT

## 2018-06-05 PROCEDURE — 82306 VITAMIN D 25 HYDROXY: CPT

## 2018-06-21 ENCOUNTER — PATIENT MESSAGE (OUTPATIENT)
Dept: INTERNAL MEDICINE | Facility: CLINIC | Age: 58
End: 2018-06-21

## 2018-06-21 NOTE — TELEPHONE ENCOUNTER
Okay to schedule her for the TSH on September 11th as per her e-mail.  I already responded to her e-mail, she does not need an iodine level, thanks

## 2018-07-03 ENCOUNTER — HOSPITAL ENCOUNTER (OUTPATIENT)
Dept: RADIOLOGY | Facility: CLINIC | Age: 58
Discharge: HOME | End: 2018-07-03
Attending: PODIATRIST
Payer: COMMERCIAL

## 2018-07-03 ENCOUNTER — OFFICE VISIT (OUTPATIENT)
Dept: PODIATRY | Facility: CLINIC | Age: 58
End: 2018-07-03
Payer: COMMERCIAL

## 2018-07-03 VITALS
HEART RATE: 69 BPM | HEIGHT: 69 IN | BODY MASS INDEX: 26.22 KG/M2 | WEIGHT: 177 LBS | DIASTOLIC BLOOD PRESSURE: 67 MMHG | SYSTOLIC BLOOD PRESSURE: 105 MMHG

## 2018-07-03 DIAGNOSIS — M72.2 PLANTAR FIBROMATOSIS: ICD-10-CM

## 2018-07-03 DIAGNOSIS — M72.2 PLANTAR FIBROMATOSIS: Primary | ICD-10-CM

## 2018-07-03 PROCEDURE — 73630 X-RAY EXAM OF FOOT: CPT | Mod: 26,RT,S$GLB, | Performed by: RADIOLOGY

## 2018-07-03 PROCEDURE — 3008F BODY MASS INDEX DOCD: CPT | Mod: CPTII,S$GLB,, | Performed by: PODIATRIST

## 2018-07-03 PROCEDURE — 99999 PR PBB SHADOW E&M-EST. PATIENT-LVL III: CPT | Mod: PBBFAC,,, | Performed by: PODIATRIST

## 2018-07-03 PROCEDURE — 73630 X-RAY EXAM OF FOOT: CPT | Mod: TC,FY,PO,RT

## 2018-07-03 PROCEDURE — 99213 OFFICE O/P EST LOW 20 MIN: CPT | Mod: S$GLB,,, | Performed by: PODIATRIST

## 2018-07-03 NOTE — PROGRESS NOTES
Subjective:      Patient ID: Noelle Rivas is a 57 y.o. female.    Chief Complaint: Cyst (right foot ,tingles some times)    Pain in arch right foot with small bumps.  Gradual onset, worsening over past several weeks, aggravated by increased weight bearing, shoe gear, pressure.  No previous medical treatment.  No self treatment.     Review of Systems   Constitution: Negative for chills, diaphoresis, fever, malaise/fatigue and night sweats.   Cardiovascular: Negative for claudication, cyanosis, leg swelling and syncope.   Skin: Positive for suspicious lesions. Negative for color change, dry skin, nail changes, rash and unusual hair distribution.   Musculoskeletal: Negative for falls, joint pain, joint swelling, muscle cramps, muscle weakness and stiffness.   Gastrointestinal: Negative for constipation, diarrhea, nausea and vomiting.   Neurological: Negative for brief paralysis, disturbances in coordination, focal weakness, numbness, paresthesias, sensory change and tremors.           Objective:      Physical Exam   Constitutional: She is oriented to person, place, and time. She appears well-developed and well-nourished. She is cooperative. No distress.   Cardiovascular:   Pulses:       Popliteal pulses are 2+ on the right side, and 2+ on the left side.        Dorsalis pedis pulses are 2+ on the right side, and 2+ on the left side.        Posterior tibial pulses are 2+ on the right side, and 2+ on the left side.   Capillary refill 3 seconds all toes/distal feet, all toes/both feet warm to touch.      Negative lymphadenopathy bilateral popliteal fossa and tarsal tunnel.      Negavie lower extremity edema bilateral.     Musculoskeletal:        Right ankle: She exhibits normal range of motion, no swelling, no ecchymosis, no deformity, no laceration and normal pulse. Achilles tendon normal. Achilles tendon exhibits no pain, no defect and normal Lay's test results.   Small firm non mobile nodular masses palpable  in the medial band of right plantar fascia without pain, deformity, or loss of function.    Otherwise, Normal angle, base, station of gait. All ten toes without clubbing, cyanosis, or signs of ischemia.  No pain to palpation bilateral lower extremities.  Range of motion, stability, muscle strength, and muscle tone normal bilateral feet and legs.     Lymphadenopathy:   Negative lymphadenopathy bilateral popliteal fossa and tarsal tunnel.    Negative lymphangitic streaking bilateral feet/ankles/legs.   Neurological: She is alert and oriented to person, place, and time. She has normal strength. She displays no atrophy and no tremor. No sensory deficit. She exhibits normal muscle tone. Gait normal.   Reflex Scores:       Patellar reflexes are 2+ on the right side and 2+ on the left side.       Achilles reflexes are 2+ on the right side and 2+ on the left side.  Negative tinel sign to percussion sural, superficial peroneal, deep peroneal, saphenous, and posterior tibial nerves right and left ankles and feet.     Skin: Skin is warm, dry and intact. Capillary refill takes 2 to 3 seconds. No abrasion, no bruising, no burn, no ecchymosis, no laceration, no lesion and no rash noted. She is not diaphoretic. No cyanosis or erythema. No pallor. Nails show no clubbing.   Skin is normal age and health appropriate color, turgor, texture, and temperature bilateral lower extremities without ulceration, hyperpigmentation, discoloration, masses nodules or cords palpated.  No ecchymosis, erythema, edema, or cardinal signs of infection bilateral lower extremities.       Psychiatric: She has a normal mood and affect.             Assessment:       Encounter Diagnosis   Name Primary?    Plantar fibromatosis Yes         Plan:       Noelle Freedman was seen today for cyst.    Diagnoses and all orders for this visit:    Plantar fibromatosis  -     X-Ray Foot Complete Right; Future      I counseled the patient on her conditions, their implications  and medical management.    Discussed very low chance of cancer with any mass in body only disprovable by biopsy and pathology.  Patient verbalizes understanding and declines biopsy/immaging today.     Discussed conservative treatment with shoes of adequate dimensions, material, and style to alleviate symptoms and delay or prevent surgical intervention.    xrays right foot.          Follow-up if symptoms worsen or fail to improve.

## 2018-07-10 ENCOUNTER — OFFICE VISIT (OUTPATIENT)
Dept: CARDIOLOGY | Facility: CLINIC | Age: 58
End: 2018-07-10
Payer: COMMERCIAL

## 2018-07-10 VITALS
BODY MASS INDEX: 26.35 KG/M2 | DIASTOLIC BLOOD PRESSURE: 66 MMHG | HEIGHT: 69 IN | SYSTOLIC BLOOD PRESSURE: 112 MMHG | WEIGHT: 177.94 LBS | HEART RATE: 64 BPM

## 2018-07-10 DIAGNOSIS — R07.9 CHEST PAIN, UNSPECIFIED TYPE: Primary | ICD-10-CM

## 2018-07-10 DIAGNOSIS — R00.2 PALPITATIONS: ICD-10-CM

## 2018-07-10 PROCEDURE — 3008F BODY MASS INDEX DOCD: CPT | Mod: CPTII,S$GLB,, | Performed by: INTERNAL MEDICINE

## 2018-07-10 PROCEDURE — 99999 PR PBB SHADOW E&M-EST. PATIENT-LVL IV: CPT | Mod: PBBFAC,,, | Performed by: INTERNAL MEDICINE

## 2018-07-10 PROCEDURE — 99204 OFFICE O/P NEW MOD 45 MIN: CPT | Mod: S$GLB,,, | Performed by: INTERNAL MEDICINE

## 2018-07-10 PROCEDURE — 93000 ELECTROCARDIOGRAM COMPLETE: CPT | Mod: S$GLB,,, | Performed by: INTERNAL MEDICINE

## 2018-07-10 NOTE — PATIENT INSTRUCTIONS
ECG in clinic was Normal    - Make note of when palpitations occur  - Please let us know if chest pain returns

## 2018-07-10 NOTE — PROGRESS NOTES
Subjective     Chief Complaint: Chest pain and Palpitations    History of Present Illness:  Ms. Noelle Riavs is a 57 y.o. female who presents to clinic for evaluation of palpitations and chest pain.    The patient's past medical history is significant for breast cancer and factor V Leiden (no history of DVT). She first felt symptoms two weeks ago. She states she felt left significant left sided chest tightness following a particularly strenuous yoga class. Pain was located under her left breast and did not radiate. The pain improved after lying down for a few minutes. She has since returned to yoga several times and has not felt the pain since. She admits she was worried at the time because a coworker had just had a stent a few days prior.    The second episode of symptoms occurred two days ago where she was having palpitations. She was having an argument with her father over the phone at the time and was getting very agitated. She felt she could feel the palpitations off and on for the rest of the day. She denies chest discomfort and SOB at that time. She has not had return of symptoms.    She denies smoking and drinks only a few times a year for special occassions. Her family history is significant for her father with HTN and CAD requiring stents. She is able to do normal housework, climb stairs, and practice yoga without chest discomfort or SOB.    Review of Systems   Constitutional: Negative for chills and fever.   HENT: Negative for sore throat.    Respiratory: Negative for cough and shortness of breath.    Cardiovascular: Negative for chest pain, palpitations, leg swelling and PND.   Gastrointestinal: Negative for abdominal pain.   Genitourinary: Negative for dysuria.   Musculoskeletal: Negative for myalgias.   Skin: Negative for rash.   Neurological: Negative for dizziness, loss of consciousness and headaches.   Endo/Heme/Allergies: Does not bruise/bleed easily.       PAST HISTORY:     Past Medical  History:   Diagnosis Date    Abnormal TSH 2018    Allergy     Anxiety     Anxiety 2012    Breast cancer 2012    right    Calcium nephrolithiasis     Complex endometrial hyperplasia without atypia 2016    Degenerative disc disease     DJD (degenerative joint disease) of knee     Endometrial hyperplasia, unspecified: see evaluation 2017 2018    Factor V Leiden mutation No h/o DVT    Kidney stones     Leukopenia 2012    PMB (postmenopausal bleeding) 3/6/2017    Stroke     questionable  -     Vitamin B 12 deficiency        Past Surgical History:   Procedure Laterality Date    BREAST BIOPSY Right 2012    BREAST BIOPSY Right 2014    BREAST LUMPECTOMY Right 12    w/ radiation    BREAST LUMPECTOMY W/ NEEDLE LOCALIZATION      DILATION AND CURETTAGE OF UTERUS         Family History   Problem Relation Age of Onset    Hypertension Father     Coronary artery disease Father     Prostate cancer Father     Cancer Father         prostate    Heart disease Father         stenting    Colon cancer Paternal Grandfather     Melanoma Mother     Cancer Mother         melanoma    Hyperlipidemia Brother     No Known Problems Brother     No Known Problems Brother     Stroke Maternal Grandmother     Lung cancer Maternal Grandfather     Breast cancer Paternal Grandmother          [place] 63    Ovarian cancer Neg Hx     Uterine cancer Neg Hx     Psoriasis Neg Hx     Lupus Neg Hx     Eczema Neg Hx        Social History     Social History    Marital status: Single     Spouse name: N/A    Number of children: N/A    Years of education: N/A     Occupational History    Nurse   [employer]     Social History Main Topics    Smoking status: Never Smoker    Smokeless tobacco: Never Used    Alcohol use Yes      Comment: Minimal    Drug use: No    Sexual activity: No     Other Topics Concern    Are You Pregnant Or Think You May Be? No    Breast-Feeding  "No     Social History Narrative    None       MEDICATIONS & ALLERGIES:     Current Outpatient Prescriptions on File Prior to Visit   Medication Sig    aspirin (ECOTRIN) 81 MG EC tablet Take 81 mg by mouth once daily.      cholecalciferol, vitamin D3, 1,000 unit capsule Take 2 capsules (2,000 Units total) by mouth once daily.    cyanocobalamin (VITAMIN B-12) 1000 MCG tablet Take 100 mcg by mouth every other day.     loratadine (CLARITIN) 10 mg tablet Take 10 mg by mouth once daily.      multivitamin (MULTIVITAMIN) per tablet Take 1 tablet by mouth once daily.       No current facility-administered medications on file prior to visit.        Review of patient's allergies indicates:   Allergen Reactions    Adhesive Itching and Rash     Micropore/Medipore Tape - paper tape is ok    Iodine and iodide containing products Itching    Latex, natural rubber Itching       OBJECTIVE:     Vital Signs:  Vitals:    07/10/18 1013   BP: 112/66   BP Location: Left arm   Patient Position: Sitting   BP Method: Large (Automatic)   Pulse: 64   Weight: 80.7 kg (177 lb 14.6 oz)   Height: 5' 9" (1.753 m)       Body mass index is 26.27 kg/m².     Physical Exam:  General:  Well developed, well nourished, no acute distress  Head: Normocephalic, atraumatic  Eyes: PERRL, EOMI, clear sclera  Neck: supple, normal ROM, no thyromegaly   CVS:  RRR, S1 and S2 normal, no murmurs, rubs, gallops, no JVD, 2+ peripheral pulses  Resp:  Lungs clear to auscultation, no wheezes, rales, rhonchi, cough  GI:  Abdomen soft, non-tender, non-distended, normoactive bowel sounds  MSK:  No muscle atrophy, cyanosis, peripheral edema   Neuro:  CNII-XII grossly intact, no focal deficits noted    Laboratory  Lab Results   Component Value Date    WBC 3.91 06/05/2018    HGB 13.0 06/05/2018    HCT 39.7 06/05/2018     (H) 06/05/2018     06/05/2018       No results found for: INR, PROTIME  No results found for: HGBA1C  No results for input(s): POCTGLUCOSE " in the last 72 hours.    Diagnostic Results:  Labs: Reviewed  ECG: Reviewed   - 2012: Sinus bradycardia with PAC's    ASSESSMENT & PLAN:   Ms. Noelle Rivas is a 57 y.o. female    Palpitations  - Single isolated, high-stress incident  - Normal ECG in clinic today: Sinus bradycardia, HR 59  - If symptoms recur, may consider holter monitor    Chest Pain  - Likely nonischemic given patient has been able to repeat activity without recurrence of symptoms  - No need for ischemic workup at this time    Patient does not need to return to clinic unless symptoms return/worsen.    Ethan Khanna MD  Internal Medicine PGY1  Attending Physician: Dr. Lawrence

## 2018-07-12 NOTE — PROGRESS NOTES
I have personally interviewed and examined the patient. I have reviewed the notes, assessments and plans of Dr Khanna and I agree with his evaluation of Noelle Rivas.

## 2018-07-13 ENCOUNTER — PATIENT MESSAGE (OUTPATIENT)
Dept: INTERNAL MEDICINE | Facility: CLINIC | Age: 58
End: 2018-07-13

## 2018-07-13 NOTE — TELEPHONE ENCOUNTER
Hi, this patient has a question about an EKG that was ordered in the office visit she had with you.  Can you please respond to her message?  I appreciate it-  Thank you    Emelyn Bernardo

## 2018-08-21 ENCOUNTER — OFFICE VISIT (OUTPATIENT)
Dept: DERMATOLOGY | Facility: CLINIC | Age: 58
End: 2018-08-21
Payer: COMMERCIAL

## 2018-08-21 DIAGNOSIS — Z12.83 SCREENING EXAM FOR SKIN CANCER: Primary | ICD-10-CM

## 2018-08-21 DIAGNOSIS — D18.00 ANGIOMA: ICD-10-CM

## 2018-08-21 DIAGNOSIS — D22.9 MULTIPLE BENIGN NEVI: ICD-10-CM

## 2018-08-21 DIAGNOSIS — L72.11 PILAR CYST: ICD-10-CM

## 2018-08-21 PROCEDURE — 99999 PR PBB SHADOW E&M-EST. PATIENT-LVL II: CPT | Mod: PBBFAC,,, | Performed by: DERMATOLOGY

## 2018-08-21 PROCEDURE — 99213 OFFICE O/P EST LOW 20 MIN: CPT | Mod: S$GLB,,, | Performed by: DERMATOLOGY

## 2018-08-21 NOTE — PROGRESS NOTES
Subjective:       Patient ID:  Noelle Rivas is a 57 y.o. female who presents for   Chief Complaint   Patient presents with    Skin Check     tbse     HPI    56 yo female with fam hx of melanoma in mother (passed away from this year 7 yrs ago).  History of several pilar cysts that have been removed in the past.  The patient denies any moles or growths of the skin that are rapidly growing, hurting, itching, bleeding, or changing colors.  Has a pink spot on left nasal tip she is interested in cosmetic removal.  Also interested in LHR of dark hairs on legs; had done in bikini area and armpits in past.    Review of Systems   Skin: Positive for daily sunscreen use, activity-related sunscreen use and wears hat.   Hematologic/Lymphatic: Bruises/bleeds easily.        Bruises        Objective:    Physical Exam   Constitutional: She appears well-developed and well-nourished. No distress.   Neurological: She is alert and oriented to person, place, and time. She is not disoriented.   Psychiatric: She has a normal mood and affect.   Skin:   Areas Examined (abnormalities noted in diagram):   Scalp / Hair Palpated and Inspected  Head / Face Inspection Performed  Neck Inspection Performed  Chest / Axilla Inspection Performed  Abdomen Inspection Performed  Genitals / Buttocks / Groin Inspection Performed  Back Inspection Performed  RUE Inspected  LUE Inspection Performed  RLE Inspected  LLE Inspection Performed  Nails and Digits Inspection Performed                   Diagram Legend     Erythematous scaling macule/papule c/w actinic keratosis       Vascular papule c/w angioma      Pigmented verrucoid papule/plaque c/w seborrheic keratosis      Yellow umbilicated papule c/w sebaceous hyperplasia      Irregularly shaped tan macule c/w lentigo     1-2 mm smooth white papules consistent with Milia      Movable subcutaneous cyst with punctum c/w epidermal inclusion cyst      Subcutaneous movable cyst c/w pilar cyst      Firm pink  to brown papule c/w dermatofibroma      Pedunculated fleshy papule(s) c/w skin tag(s)      Evenly pigmented macule c/w junctional nevus     Mildly variegated pigmented, slightly irregular-bordered macule c/w mildly atypical nevus      Flesh colored to evenly pigmented papule c/w intradermal nevus       Pink pearly papule/plaque c/w basal cell carcinoma      Erythematous hyperkeratotic cursted plaque c/w SCC      Surgical scar with no sign of skin cancer recurrence      Open and closed comedones      Inflammatory papules and pustules      Verrucoid papule consistent consistent with wart     Erythematous eczematous patches and plaques     Dystrophic onycholytic nail with subungual debris c/w onychomycosis     Umbilicated papule    Erythematous-base heme-crusted tan verrucoid plaque consistent with inflamed seborrheic keratosis     Erythematous Silvery Scaling Plaque c/w Psoriasis     See annotation      Assessment / Plan:        Screening exam for skin cancer    Total body skin examination performed today including at least 12 points as noted in physical examination. No lesions suspicious for malignancy noted.    Pilar cyst  Discussed excision in future if any become painful or infected    Multiple benign nevi  Discussed ABCDE's of nevi.  Monitor for new mole or moles that are becoming bigger, darker, irritated, or developing irregular borders.     Angioma  This is a benign vascular lesion. Reassurance given. No treatment required.     Pt with fibrous papule left nasal tip. This can be removed for cosmesis if she would like, advised discuss laser treatment vs cautery with laser clinic.  She can also discuss laser hair removal with our laser clinic.    Patient instructed in importance in daily sun protection of at least spf 30. Sun avoidance and topical protection discussed.     Patient encouraged to wear hat for all outdoor exposure.     Also discussed sun protective clothing.               Follow-up in about 1 year  (around 8/21/2019).

## 2018-09-11 ENCOUNTER — LAB VISIT (OUTPATIENT)
Dept: LAB | Facility: HOSPITAL | Age: 58
End: 2018-09-11
Attending: INTERNAL MEDICINE
Payer: COMMERCIAL

## 2018-09-11 ENCOUNTER — PATIENT MESSAGE (OUTPATIENT)
Dept: INTERNAL MEDICINE | Facility: CLINIC | Age: 58
End: 2018-09-11

## 2018-09-11 DIAGNOSIS — R79.89 ABNORMAL TSH: ICD-10-CM

## 2018-09-11 LAB — TSH SERPL DL<=0.005 MIU/L-ACNC: 2.84 UIU/ML

## 2018-09-11 PROCEDURE — 36415 COLL VENOUS BLD VENIPUNCTURE: CPT

## 2018-09-11 PROCEDURE — 84443 ASSAY THYROID STIM HORMONE: CPT

## 2018-09-18 ENCOUNTER — PATIENT MESSAGE (OUTPATIENT)
Dept: GYNECOLOGIC ONCOLOGY | Facility: CLINIC | Age: 58
End: 2018-09-18

## 2019-03-21 ENCOUNTER — OFFICE VISIT (OUTPATIENT)
Dept: GYNECOLOGIC ONCOLOGY | Facility: CLINIC | Age: 59
End: 2019-03-21
Payer: COMMERCIAL

## 2019-03-21 VITALS
HEART RATE: 60 BPM | DIASTOLIC BLOOD PRESSURE: 69 MMHG | SYSTOLIC BLOOD PRESSURE: 125 MMHG | HEIGHT: 69 IN | WEIGHT: 180.75 LBS | BODY MASS INDEX: 26.77 KG/M2

## 2019-03-21 DIAGNOSIS — Z01.419 WELL WOMAN EXAM WITH ROUTINE GYNECOLOGICAL EXAM: ICD-10-CM

## 2019-03-21 DIAGNOSIS — N85.00 ENDOMETRIAL HYPERPLASIA, UNSPECIFIED: Primary | ICD-10-CM

## 2019-03-21 PROCEDURE — 88175 CYTOPATH C/V AUTO FLUID REDO: CPT

## 2019-03-21 PROCEDURE — 99396 PREV VISIT EST AGE 40-64: CPT | Mod: S$GLB,,, | Performed by: OBSTETRICS & GYNECOLOGY

## 2019-03-21 PROCEDURE — 99999 PR PBB SHADOW E&M-EST. PATIENT-LVL III: CPT | Mod: PBBFAC,,, | Performed by: OBSTETRICS & GYNECOLOGY

## 2019-03-21 PROCEDURE — 99396 PR PREVENTIVE VISIT,EST,40-64: ICD-10-PCS | Mod: S$GLB,,, | Performed by: OBSTETRICS & GYNECOLOGY

## 2019-03-21 PROCEDURE — 99999 PR PBB SHADOW E&M-EST. PATIENT-LVL III: ICD-10-PCS | Mod: PBBFAC,,, | Performed by: OBSTETRICS & GYNECOLOGY

## 2019-03-21 NOTE — PROGRESS NOTES
"Subjective:       Patient ID: Noelle Rivas is a 58 y.o. female.    Chief Complaint: Follow-up (hx PMB)    HPI   Patient comes in today for follow-up of complex hyperplasia.  She 1st saw me in June 2016 as a consultation.  At that time I saw her because of persistent complex hyperplasia.  At that time I recommended a robotic assisted laparoscopic hysterectomy with bilateral salpingo-oophorectomy but she did not want to proceed with that.    She was unable to take progestins because of progesterone receptor positive breast cancer.    She was followed conservatively.             November 2017 patient had a D&C with Dr. Sheehan.  Pathology showed:  ENDOMETRIUM: FRAGMENTS OF INACTIVE DISORDERED ENDOMETRIUM WITH AREAS OF STROMAL  BREAKDOWN, FOCUS OF SIMPLE HYPERPLASIA; NO EVIDENCE OF ATYPIA OR MALIGNANCY.    PAP: 11/17/2015: normal.     Breast Cancer in 2012. Lumpectomy and ASN. Radiation. No chemo. No tamoxifen .    BRCA 1/2 negative.      Mammogram:  April 24, 2018:  Normal  Colonoscopy:  March 18, 2013:  Normal. Repeat in 10 years    Has breast exams with breast Clinic.  She has an appointment for this.    Review of Systems   Constitutional: Negative for chills, fatigue and fever.   Respiratory: Negative for cough, shortness of breath and wheezing.    Cardiovascular: Negative for chest pain, palpitations and leg swelling.   Gastrointestinal: Negative for abdominal pain, constipation, diarrhea, nausea and vomiting.   Genitourinary: Negative for difficulty urinating, dysuria, frequency, genital sores, hematuria, urgency, vaginal bleeding, vaginal discharge and vaginal pain.   Neurological: Negative for weakness.   Hematological: Negative for adenopathy. Does not bruise/bleed easily.   Psychiatric/Behavioral: The patient is not nervous/anxious.        Objective:   /69   Pulse 60   Ht 5' 9" (1.753 m)   Wt 82 kg (180 lb 12.4 oz)   BMI 26.70 kg/m²      Physical Exam   Constitutional: She is oriented to person, " place, and time. She appears well-developed and well-nourished.   HENT:   Head: Normocephalic and atraumatic.   Abdominal: Soft. She exhibits no distension and no mass. There is no tenderness. There is no rebound and no guarding.   Genitourinary:   Genitourinary Comments: Bimanual exam:  Vulva: no lesions. Normal appearance  Urethra: Normal size and location. No lesions  Bladder: No masses or tenderness.  Vagina: normal mucosa. No lesion  Cervix: normal   Uterus: normal   Adnexa: no masses.  Rectovaginal: No posterior cul de sac thickening or nodularity.  Rectal: no masses. Nontender. Normal tone.        Neurological: She is alert and oriented to person, place, and time.   Skin: Skin is warm and dry.   Psychiatric: She has a normal mood and affect. Her behavior is normal. Judgment and thought content normal.       Assessment:       1. Endometrial hyperplasia, unspecified: see evaluation 2017    2. Well woman exam with routine gynecological exam        Plan:   Endometrial hyperplasia, unspecified: see evaluation 2017  I will call her with the results of the ultrasound.    If her endometrial stripe is thickened then she will need an office endometrial biopsy.    If her endometrial stripe is normal then she does not need any additional management at this time.  -     US Pelvis Comp with Transvag NON-OB (xpd; Future; Expected date: 03/21/2019    Well woman exam with routine gynecological exam  -     Liquid-based pap smear, screening

## 2019-04-09 ENCOUNTER — HOSPITAL ENCOUNTER (OUTPATIENT)
Dept: RADIOLOGY | Facility: HOSPITAL | Age: 59
Discharge: HOME | End: 2019-04-09
Attending: OBSTETRICS & GYNECOLOGY
Payer: COMMERCIAL

## 2019-04-09 ENCOUNTER — TELEPHONE (OUTPATIENT)
Dept: GYNECOLOGIC ONCOLOGY | Facility: CLINIC | Age: 59
End: 2019-04-09

## 2019-04-09 DIAGNOSIS — N85.00 ENDOMETRIAL HYPERPLASIA, UNSPECIFIED: ICD-10-CM

## 2019-04-09 PROCEDURE — 76856 US PELVIS COMP WITH TRANSVAG NON-OB (XPD): ICD-10-PCS | Mod: 26,,, | Performed by: RADIOLOGY

## 2019-04-09 PROCEDURE — 76856 US EXAM PELVIC COMPLETE: CPT | Mod: 26,,, | Performed by: RADIOLOGY

## 2019-04-09 PROCEDURE — 76830 TRANSVAGINAL US NON-OB: CPT | Mod: 26,,, | Performed by: RADIOLOGY

## 2019-04-09 PROCEDURE — 76830 US PELVIS COMP WITH TRANSVAG NON-OB (XPD): ICD-10-PCS | Mod: 26,,, | Performed by: RADIOLOGY

## 2019-04-09 PROCEDURE — 76830 TRANSVAGINAL US NON-OB: CPT | Mod: TC

## 2019-04-09 NOTE — TELEPHONE ENCOUNTER
Called patient per Dr. Foy informing her that her ultra sound is normal and he will see her back in 6 months. Patient voiced understanding. GONZALEZ

## 2019-04-09 NOTE — TELEPHONE ENCOUNTER
----- Message from Dallas Foy MD sent at 4/9/2019 12:37 PM CDT -----  Please let her know that her ultrasound was normal.  And that she should see me again in 6 months.Thank you.

## 2019-05-07 ENCOUNTER — HOSPITAL ENCOUNTER (OUTPATIENT)
Dept: RADIOLOGY | Facility: HOSPITAL | Age: 59
Discharge: HOME | End: 2019-05-07
Attending: NURSE PRACTITIONER
Payer: COMMERCIAL

## 2019-05-07 ENCOUNTER — OFFICE VISIT (OUTPATIENT)
Dept: SURGERY | Facility: CLINIC | Age: 59
End: 2019-05-07
Payer: COMMERCIAL

## 2019-05-07 VITALS
HEART RATE: 63 BPM | TEMPERATURE: 98 F | HEIGHT: 69 IN | WEIGHT: 180 LBS | SYSTOLIC BLOOD PRESSURE: 123 MMHG | BODY MASS INDEX: 26.66 KG/M2 | DIASTOLIC BLOOD PRESSURE: 71 MMHG

## 2019-05-07 VITALS — BODY MASS INDEX: 26.66 KG/M2 | HEIGHT: 69 IN | WEIGHT: 180 LBS

## 2019-05-07 DIAGNOSIS — Z85.3 PERSONAL HISTORY OF BREAST CANCER: ICD-10-CM

## 2019-05-07 DIAGNOSIS — N64.89 BREAST ASYMMETRY: Primary | ICD-10-CM

## 2019-05-07 DIAGNOSIS — N64.89 BREAST ASYMMETRY: ICD-10-CM

## 2019-05-07 DIAGNOSIS — Z12.39 BREAST CANCER SCREENING: ICD-10-CM

## 2019-05-07 DIAGNOSIS — N64.4 BREAST PAIN: ICD-10-CM

## 2019-05-07 PROCEDURE — 3008F BODY MASS INDEX DOCD: CPT | Mod: CPTII,S$GLB,, | Performed by: NURSE PRACTITIONER

## 2019-05-07 PROCEDURE — 76642 ULTRASOUND BREAST LIMITED: CPT | Mod: TC,50,PO

## 2019-05-07 PROCEDURE — 99213 OFFICE O/P EST LOW 20 MIN: CPT | Mod: S$GLB,,, | Performed by: NURSE PRACTITIONER

## 2019-05-07 PROCEDURE — 77062 BREAST TOMOSYNTHESIS BI: CPT | Mod: TC,PO

## 2019-05-07 PROCEDURE — 76642 ULTRASOUND BREAST LIMITED: CPT | Mod: 26,50,, | Performed by: RADIOLOGY

## 2019-05-07 PROCEDURE — 99999 PR PBB SHADOW E&M-EST. PATIENT-LVL IV: CPT | Mod: PBBFAC,,, | Performed by: NURSE PRACTITIONER

## 2019-05-07 PROCEDURE — 76642 US BREAST BILATERAL LIMITED: ICD-10-PCS | Mod: 26,50,, | Performed by: RADIOLOGY

## 2019-05-07 PROCEDURE — 77066 MAMMO DIGITAL DIAGNOSTIC BILAT WITH TOMOSYNTHESIS_CAD: ICD-10-PCS | Mod: 26,,, | Performed by: RADIOLOGY

## 2019-05-07 PROCEDURE — G0279 MAMMO DIGITAL DIAGNOSTIC BILAT WITH TOMOSYNTHESIS_CAD: ICD-10-PCS | Mod: 26,,, | Performed by: RADIOLOGY

## 2019-05-07 PROCEDURE — 77066 DX MAMMO INCL CAD BI: CPT | Mod: 26,,, | Performed by: RADIOLOGY

## 2019-05-07 PROCEDURE — 99999 PR PBB SHADOW E&M-EST. PATIENT-LVL IV: ICD-10-PCS | Mod: PBBFAC,,, | Performed by: NURSE PRACTITIONER

## 2019-05-07 PROCEDURE — 99213 PR OFFICE/OUTPT VISIT, EST, LEVL III, 20-29 MIN: ICD-10-PCS | Mod: S$GLB,,, | Performed by: NURSE PRACTITIONER

## 2019-05-07 PROCEDURE — G0279 TOMOSYNTHESIS, MAMMO: HCPCS | Mod: 26,,, | Performed by: RADIOLOGY

## 2019-05-07 PROCEDURE — 3008F PR BODY MASS INDEX (BMI) DOCUMENTED: ICD-10-PCS | Mod: CPTII,S$GLB,, | Performed by: NURSE PRACTITIONER

## 2019-05-07 NOTE — PROGRESS NOTES
"Patient ID: Noelle Rivas is a 58 y.o. female.    Chief Complaint: Breast Cancer Screening        HPI:  Established patient of the Department of Breast Surgery, previously seen by ROB Kirk, and new to me, presents today for breast cancer surveillance.    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 Adjuvant XRT, declined hormonal therapy.    Diagnosed with breast ca at ~51 y/o.    Core biopsy for calcifications right breast 2014 with benign breast tissue associated with calcifications, stability noted on f/u mmg    She continues to see Dr. Foy regarding endometrial thickening     Breast Imaging  Last mmg 18 report reviewed:  BI-RADS 2    Interval Breast History    Pt reports R ax tail/UOQ pain, "have always had it," mild, intermittent, not daily.    Pt reports feeling "little lumps occasionally" in the R UOQ.  Has felt lumps in this region since her breast ca tx, but they change, not always the same lump.    Patient denies breast-related skin changes, nipple discharge and nipple retraction.  Denies L breast pain or lumps.    GYN History:  HRT usage:  never   Uterus and ovaries intact    Other Oncologic History:  Personal history of other cancer not abovementioned:  no  Personal history of genetic testing:  BRCA1/2 testing in 2016 negative    Family Oncologic History:    Ashkenazi Latter-day ancestry:  no    Family History   Problem Relation Age of Onset    Cancer Father         prostate ca, metastatic    Colon cancer Paternal Grandfather     Melanoma Mother     Cancer Mother         melanoma    Lung cancer Maternal Grandfather     Breast cancer Paternal Grandmother          at age 62    Ovarian cancer Neg Hx      History of genetic testing in relatives:  no      Review of Systems - See HPI for breast ROS.  Constitutional:  Negative for unexplained weight " "loss.  Respiratory:  Negative for cough, hemoptysis, or dyspnea.  Musculoskeletal:  Negative for bone pain.    Neurological:  Negative for new, worsening, or changing headaches.  Gastrointestinal:  Negative for new, worsening, or changing abdominal pain.      Objective:   Physical Exam   Pulmonary/Chest: Effort normal. No respiratory distress. She exhibits no mass, no edema and no deformity. Right breast exhibits tenderness. Right breast exhibits no inverted nipple, no mass, no nipple discharge and no skin change. Left breast exhibits tenderness. Left breast exhibits no inverted nipple, no mass, no nipple discharge and no skin change. No breast swelling. Breasts are asymmetrical.   R UOQ medial to scar with pain and lumpiness per patient; slightly thickened tissue appreciated, c/w XRT changes, no discrete mass or any skin change appreciated; low clinical suspicion; marked for imaging.  L 4-5oc region about 5cmfn with TTP with associated vague nodularity appreciated, more nodular as compared to corresponding contralateral region, no discrete mass or any skin change appreciated; low clinical suspicion; marked for imaging.   Genitourinary: No breast swelling.   Lymphadenopathy:     She has no cervical adenopathy.     She has no axillary adenopathy.        Right: No supraclavicular adenopathy present.        Left: No supraclavicular adenopathy present.     Assessment:       1. Breast asymmetry    2. Breast pain    3. Personal history of breast cancer    4. Breast cancer screening          Plan:     Because of pt's age of dx, recommend Q6-mo CBEs along with annual mmg and annual breast MRI alternating Q6 mos.  Pt desires to proceed.  Bilat mmg today for annual.    Pt reports R ax tail/UOQ pain, "have always had it," mild, intermittent, not daily.  Pain not suggestive of pathology given longevity, infrequency, intermittence, mildness, and therefore likely sequela of breast ca tx.    Pt reports feeling "little lumps " "occasionally" in the R UOQ.  Has felt lumps in this region since her breast ca tx, but they change, not always the same lump.  R UOQ medial to scar with pain and lumpiness per patient; slightly thickened tissue appreciated, c/w XRT changes, no discrete mass or any skin change appreciated; low clinical suspicion; marked for imaging.  [facility] mmg & [employer] breast ltd US for R breast "lumpiness."    L 4-5oc region about 5cmfn with TTP with associated vague nodularity appreciated, more nodular as compared to corresponding contralateral region, no discrete mass or any skin change appreciated; low clinical suspicion; marked for imaging.  [employer] & [employer] breast l[facility] US for L breast pain.    If breast imaging negative/benign, RTC in 6 mos with or without breast MRI.    Offered pt referral to [facility] for genetic counseling.  Pt desires to proceed.  Paperwork initiated.    Encouraged breast awareness, including monthly breast self-exams.  Advised patient to RTC with any interval changes or concerns, whether breast-related or systemic.  Questions were encouraged and answered to patient's satisfaction, and patient verbalized understanding of information and agreement with the plan.  "

## 2019-05-10 ENCOUNTER — PATIENT MESSAGE (OUTPATIENT)
Dept: SURGERY | Facility: CLINIC | Age: 59
End: 2019-05-10

## 2019-05-13 ENCOUNTER — TELEPHONE (OUTPATIENT)
Dept: SURGERY | Facility: CLINIC | Age: 59
End: 2019-05-13

## 2019-05-13 NOTE — TELEPHONE ENCOUNTER
As requested by Alok Perez NP patient was placed on the recall list for her Follow up with Alok for her Clinical Breast  Exam  &  Breast MRI in Nov 2019 .

## 2019-05-24 ENCOUNTER — DOCUMENTATION ONLY (OUTPATIENT)
Dept: SURGERY | Facility: CLINIC | Age: 59
End: 2019-05-24

## 2019-05-24 NOTE — PROGRESS NOTES
Received fax from "[employer] advising that this patient of mine has an appointment for genetic counseling with "[facility] on 6/12/19.

## 2019-05-28 ENCOUNTER — OFFICE VISIT (OUTPATIENT)
Dept: INTERNAL MEDICINE | Facility: CLINIC | Age: 59
End: 2019-05-28
Payer: COMMERCIAL

## 2019-05-28 VITALS
HEIGHT: 69 IN | BODY MASS INDEX: 26.45 KG/M2 | WEIGHT: 178.56 LBS | SYSTOLIC BLOOD PRESSURE: 100 MMHG | DIASTOLIC BLOOD PRESSURE: 65 MMHG

## 2019-05-28 DIAGNOSIS — R79.89 ABNORMAL TSH: ICD-10-CM

## 2019-05-28 DIAGNOSIS — D69.6 THROMBOCYTOPENIA: ICD-10-CM

## 2019-05-28 DIAGNOSIS — Z80.8 FAMILY HISTORY OF MALIGNANT MELANOMA: ICD-10-CM

## 2019-05-28 DIAGNOSIS — Z00.00 ANNUAL PHYSICAL EXAM: Primary | ICD-10-CM

## 2019-05-28 DIAGNOSIS — N85.00 ENDOMETRIAL HYPERPLASIA, UNSPECIFIED: ICD-10-CM

## 2019-05-28 DIAGNOSIS — D68.51 FACTOR V LEIDEN MUTATION: ICD-10-CM

## 2019-05-28 DIAGNOSIS — E53.8 VITAMIN B 12 DEFICIENCY: ICD-10-CM

## 2019-05-28 DIAGNOSIS — E55.9 MILD VITAMIN D DEFICIENCY: ICD-10-CM

## 2019-05-28 DIAGNOSIS — C50.911 MALIGNANT NEOPLASM OF RIGHT BREAST, STAGE 1, UNSPECIFIED ESTROGEN RECEPTOR STATUS: ICD-10-CM

## 2019-05-28 PROCEDURE — 99999 PR PBB SHADOW E&M-EST. PATIENT-LVL IV: ICD-10-PCS | Mod: PBBFAC,,, | Performed by: INTERNAL MEDICINE

## 2019-05-28 PROCEDURE — 99396 PREV VISIT EST AGE 40-64: CPT | Mod: S$GLB,,, | Performed by: INTERNAL MEDICINE

## 2019-05-28 PROCEDURE — 99999 PR PBB SHADOW E&M-EST. PATIENT-LVL IV: CPT | Mod: PBBFAC,,, | Performed by: INTERNAL MEDICINE

## 2019-05-28 PROCEDURE — 99396 PR PREVENTIVE VISIT,EST,40-64: ICD-10-PCS | Mod: S$GLB,,, | Performed by: INTERNAL MEDICINE

## 2019-05-28 NOTE — PROGRESS NOTES
Subjective:       Patient ID: Noelle Rivas is a 58 y.o. female.    Chief Complaint: Annual Exam    Annual exam     Recall endometrial hyperplasia, has had some irregular vaginal bleeding.   Without atypia- sees Dr Foy in GYN; last seen 3/19.  Acceptable u/s.  Follows every 6 months.  D & C 11/17.  Hyperplasia without atypia.  In menopause at this time.     Breast cancer. Stage 1 doing well. Seen in Breast Surgery 4/18.     D def and B12 deficiency.  Had been a vegan and now has added some lean proteins, working on this.  Trying to work on her weight.     Factor V Leiden- she has had no clots.    Follows in Dermatology annually, last seen August of 2018.    Slight arthralgias DIP fingers R hand.  No weight loss or morning stiffness.  Overall feels good, energy level is excellent.  Exercises several times a week.    Patient Active Problem List:     Factor V Leiden mutation     Calculus of ureter     Estrogen receptor positive status (ER+)     Anxiety     Vitamin B 12 deficiency     Degenerative disc disease     Mild vitamin D deficiency     Congenital neutropenia     Malignant neoplasm of right breast, stage 1     Thrombocytopenia     Endometrial hyperplasia, unspecified: see evaluation 2017     Family history of malignant melanoma     Ganglion cyst of foot     Abnormal TSH      Review of Systems   Constitutional: Negative for activity change and unexpected weight change.   HENT: Negative for hearing loss, rhinorrhea and trouble swallowing.    Eyes: Negative for discharge and visual disturbance.   Respiratory: Negative for chest tightness and wheezing.    Cardiovascular: Negative for chest pain and palpitations.   Gastrointestinal: Negative for blood in stool, constipation, diarrhea and vomiting.   Endocrine: Negative for polydipsia and polyuria.   Genitourinary: Negative for difficulty urinating, dysuria, hematuria and menstrual problem.        Menopause  LMP April 2017   Musculoskeletal: Positive for  arthralgias. Negative for joint swelling and neck pain.   Neurological: Negative for weakness and headaches.   Psychiatric/Behavioral: Negative for confusion and dysphoric mood.       Objective:      Physical Exam   Constitutional: She is oriented to person, place, and time. She appears well-developed and well-nourished.   HENT:   Head: Normocephalic and atraumatic.   Right Ear: External ear normal.   Left Ear: External ear normal.   Nose: Nose normal.   Mouth/Throat: Oropharynx is clear and moist. No oropharyngeal exudate.   Eyes: Conjunctivae and EOM are normal. No scleral icterus.   Neck: Normal range of motion. Neck supple. No JVD present. No thyromegaly present.   Cardiovascular: Normal rate, regular rhythm, normal heart sounds and intact distal pulses. Exam reveals no gallop.   No murmur heard.  Pulmonary/Chest: Effort normal and breath sounds normal. No respiratory distress. She has no wheezes.   Abdominal: Soft. Bowel sounds are normal. She exhibits no distension and no mass. There is no tenderness. There is no rebound and no guarding.   Musculoskeletal: Normal range of motion. She exhibits no edema or tenderness.   No deformity  Negative squeeze test   Lymphadenopathy:     She has no cervical adenopathy.   Neurological: She is alert and oriented to person, place, and time. She displays normal reflexes. No cranial nerve deficit. Coordination normal.   Skin: Skin is warm. No rash noted. No erythema.   Psychiatric: She has a normal mood and affect. Her behavior is normal. Judgment and thought content normal.   Nursing note and vitals reviewed.      Assessment:       1. Annual physical exam    2. Endometrial hyperplasia, unspecified: see evaluation [id number]    3. Factor V Leiden mutation    4. Thrombocytopenia    5. Malignant neoplasm of right breast, stage 1, unspecified estrogen receptor status    6. Abnormal TSH    7. Mild vitamin D deficiency    8. Vitamin B 12 deficiency    9. Family history of malignant  melanoma        Plan:         Noelle Freedman was seen today for annual exam.    Diagnoses and all orders for this visit:    Annual physical exam  -     CBC auto differential; Future  -     Comprehensive metabolic panel; Future  -     Lipid panel; Future  -     Vitamin D; Future  -     Vitamin B12; Future  -     TSH; Future    Endometrial hyperplasia, unspecified: see evaluation [id number]:  Keep gyn follow-up    Factor V Leiden mutation:  No current issues, will continue to monitor    Thrombocytopenia:  Labs and review    Malignant neoplasm of right breast, stage 1, unspecified estrogen receptor status:  Keep Oncology follow-up    Abnormal TSH:  Labs and review    Mild vitamin D deficiency:  Labs and review    Vitamin B 12 deficiency:  Labs and review    Family history of malignant melanoma:  Keep Dermatology follow-up    Continue with exercise and healthy habits  I will review all studies and determine further tx depending on findings

## 2019-05-29 NOTE — PATIENT INSTRUCTIONS
Prevention Guidelines, Women Ages 50 to 64  Screening tests and vaccines are an important part of managing your health. Health counseling is essential, too. Below are guidelines for these, for women ages 50 to 64. Talk with your healthcare provider to make sure youre up to date on what you need.  Screening Who needs it How often   Type 2 diabetes or prediabetes All adults beginning at age 45 and adults without symptoms at any age who are overweight or obese and have 1 or more additional risk factors for diabetes. At  least every 3 years   Alcohol misuse All women in this age group At routine exams   Blood pressure All women in this age group Every 2 years if your blood pressure is less than 120/80 mm Hg; yearly if your systolic blood pressure is 120 to 139 mm Hg, or your diastolic blood pressure reading is 80 to 89 mm Hg   Breast cancer All women in this age group Yearly mammogram and clinical breast exam1   Cervical cancer All women in this age group, except women who have had a complete hysterectomy Pap test every 3 years or Pap test with human papillomavirus (HPV) test every 5 years   Chlamydia Women at increased risk for infection At routine exams   Colorectal cancer All women in this age group Flexible sigmoidoscopy every 5 years, or colonoscopy every 10 years, or double-contrast barium enema every 5 years; yearly fecal occult blood test or fecal immunochemical test; or a stool DNA test as often as your health care provider advises; talk with your health care provider about which tests are best for you   Depression All women in this age group At routine exams   Gonorrhea Sexually active women at increased risk for infection At routine exams   Hepatitis C Anyone at increased risk; 1 time for those born between 1945 and 1965 At routine exams   High cholesterol or triglycerides All women in this age group who are at risk for coronary artery disease At least every 5 years   HIV All women At routine exams   Lung  cancer Adults age 55 to 80 who have smoked Yearly screening in smokers with 30 pack-year history of smoking or who quit within 15 years   Obesity All women in this age group At routine exams   Osteoporosis Women who are postmenopausal Ask your healthcare provider   Syphilis Women at increased risk for infection - talk with your healthcare provider At routine exams   Tuberculosis Women at increased risk for infection - talk with your healthcare provider Ask your healthcare provider   Vision All women in this age group Ask your healthcare provider   Vaccine Who needs it How often   Chickenpox (varicella) All women in this age group who have no record of this infection or vaccine 2 doses; the second dose should be given at least 4 weeks after the first dose   Hepatitis A Women at increased risk for infection - talk with your healthcare provider 2 doses given at least 6 months apart   Hepatitis B Women at increased risk for infection - talk with your healthcare provider 3 doses over 6 months; second dose should be given 1 month after the first dose; the third dose should be given at least 2 months after the second dose and at least 4 months after the first dose   Haemophilus influenzaeType B (HIB) Women at increased risk for infection - talk with your healthcare provider 1 to 3 doses   Influenza (flu) All women in this age group Once a year   Measles, mumps, rubella (MMR) Women in this age group through their late 50s who have no record of these infections or vaccines 1 dose   Meningococcal Women at increased risk for infection - talk with your healthcare provider 1 or more doses   Pneumococcal conjugate vaccine (PCV13) and pneumococcal polysaccharide vaccine (PPSV23) Women at increased risk for infection - talk with your healthcare provider PCV13: 1 dose ages 19 to 65 (protects against 13 types of pneumococcal bacteria)  PPSV23: 1 to 2 doses through age 64, or 1 dose at 65 or older (protects against 23 types of  pneumococcal bacteria)   Tetanus/diphtheria/pertussis (Td/Tdap) booster All women in this age group Td every 10 years, or a one-time dose of Tdap instead of a Td booster after age 18, then Td every 10 years   Zoster All women ages 60 and older 1 dose   Counseling Who needs it How often   BRCA gene mutation testing for breast and ovarian cancer susceptibility Women with increased risk for having gene mutation When your risk is known   Breast cancer and chemoprevention Women at high risk for breast cancer When your risk is known   Diet and exercise Women who are overweight or obese When diagnosed, and then at routine exams   Sexually transmitted infection prevention Women at increased risk for infection - talk with your healthcare provider At routine exams   Use of daily aspirin Women ages 55 and up in this age group who are at risk for cardiovascular health problems such as stroke When your risk is known   Use of tobacco and the health effects it can cause All women in this age group Every exam   1American [employer]  Date Last Reviewed: 1/26/2016  © 6246-7324 [employer]. [address] [place] [address], [place] [address]. All rights reserved. This information is not intended as a substitute for professional medical care. Always follow your healthcare professional's instructions.

## 2019-06-04 ENCOUNTER — PATIENT MESSAGE (OUTPATIENT)
Dept: INTERNAL MEDICINE | Facility: CLINIC | Age: 59
End: 2019-06-04

## 2019-06-04 ENCOUNTER — LAB VISIT (OUTPATIENT)
Dept: LAB | Facility: HOSPITAL | Age: 59
End: 2019-06-04
Attending: INTERNAL MEDICINE
Payer: COMMERCIAL

## 2019-06-04 DIAGNOSIS — Z00.00 ANNUAL PHYSICAL EXAM: ICD-10-CM

## 2019-06-04 LAB
25(OH)D3+25(OH)D2 SERPL-MCNC: 38 NG/ML (ref 30–96)
ALBUMIN SERPL BCP-MCNC: 4 G/DL (ref 3.5–5.2)
ALP SERPL-CCNC: 89 U/L (ref 55–135)
ALT SERPL W/O P-5'-P-CCNC: 19 U/L (ref 10–44)
ANION GAP SERPL CALC-SCNC: 5 MMOL/L (ref 8–16)
AST SERPL-CCNC: 21 U/L (ref 10–40)
BASOPHILS # BLD AUTO: 0.03 K/UL (ref 0–0.2)
BASOPHILS NFR BLD: 0.7 % (ref 0–1.9)
BILIRUB SERPL-MCNC: 0.6 MG/DL (ref 0.1–1)
BUN SERPL-MCNC: 14 MG/DL (ref 6–20)
CALCIUM SERPL-MCNC: 9.8 MG/DL (ref 8.7–10.5)
CHLORIDE SERPL-SCNC: 107 MMOL/L (ref 95–110)
CHOLEST SERPL-MCNC: 168 MG/DL (ref 120–199)
CHOLEST/HDLC SERPL: 3 {RATIO} (ref 2–5)
CO2 SERPL-SCNC: 29 MMOL/L (ref 23–29)
CREAT SERPL-MCNC: 0.9 MG/DL (ref 0.5–1.4)
DIFFERENTIAL METHOD: ABNORMAL
EOSINOPHIL # BLD AUTO: 0.2 K/UL (ref 0–0.5)
EOSINOPHIL NFR BLD: 4.6 % (ref 0–8)
ERYTHROCYTE [DISTWIDTH] IN BLOOD BY AUTOMATED COUNT: 12.2 % (ref 11.5–14.5)
EST. GFR  (AFRICAN AMERICAN): >60 ML/MIN/1.73 M^2
EST. GFR  (NON AFRICAN AMERICAN): >60 ML/MIN/1.73 M^2
GLUCOSE SERPL-MCNC: 96 MG/DL (ref 70–110)
HCT VFR BLD AUTO: 39.5 % (ref 37–48.5)
HDLC SERPL-MCNC: 56 MG/DL (ref 40–75)
HDLC SERPL: 33.3 % (ref 20–50)
HGB BLD-MCNC: 12.8 G/DL (ref 12–16)
IMM GRANULOCYTES # BLD AUTO: 0 K/UL (ref 0–0.04)
IMM GRANULOCYTES NFR BLD AUTO: 0 % (ref 0–0.5)
LDLC SERPL CALC-MCNC: 96.2 MG/DL (ref 63–159)
LYMPHOCYTES # BLD AUTO: 1.7 K/UL (ref 1–4.8)
LYMPHOCYTES NFR BLD: 37.2 % (ref 18–48)
MCH RBC QN AUTO: 32.8 PG (ref 27–31)
MCHC RBC AUTO-ENTMCNC: 32.4 G/DL (ref 32–36)
MCV RBC AUTO: 101 FL (ref 82–98)
MONOCYTES # BLD AUTO: 0.4 K/UL (ref 0.3–1)
MONOCYTES NFR BLD: 8.6 % (ref 4–15)
NEUTROPHILS # BLD AUTO: 2.2 K/UL (ref 1.8–7.7)
NEUTROPHILS NFR BLD: 48.9 % (ref 38–73)
NONHDLC SERPL-MCNC: 112 MG/DL
NRBC BLD-RTO: 0 /100 WBC
PLATELET # BLD AUTO: 158 K/UL (ref 150–350)
PMV BLD AUTO: 10.8 FL (ref 9.2–12.9)
POTASSIUM SERPL-SCNC: 3.8 MMOL/L (ref 3.5–5.1)
PROT SERPL-MCNC: 7.1 G/DL (ref 6–8.4)
RBC # BLD AUTO: 3.9 M/UL (ref 4–5.4)
SODIUM SERPL-SCNC: 141 MMOL/L (ref 136–145)
TRIGL SERPL-MCNC: 79 MG/DL (ref 30–150)
TSH SERPL DL<=0.005 MIU/L-ACNC: 2.07 UIU/ML (ref 0.4–4)
VIT B12 SERPL-MCNC: 523 PG/ML (ref 210–950)
WBC # BLD AUTO: 4.52 K/UL (ref 3.9–12.7)

## 2019-06-04 PROCEDURE — 80053 COMPREHEN METABOLIC PANEL: CPT

## 2019-06-04 PROCEDURE — 85025 COMPLETE CBC W/AUTO DIFF WBC: CPT

## 2019-06-04 PROCEDURE — 84443 ASSAY THYROID STIM HORMONE: CPT

## 2019-06-04 PROCEDURE — 80061 LIPID PANEL: CPT

## 2019-06-04 PROCEDURE — 82607 VITAMIN B-12: CPT

## 2019-06-04 PROCEDURE — 82306 VITAMIN D 25 HYDROXY: CPT

## 2019-06-04 PROCEDURE — 36415 COLL VENOUS BLD VENIPUNCTURE: CPT

## 2019-06-14 ENCOUNTER — DOCUMENTATION ONLY (OUTPATIENT)
Dept: SURGERY | Facility: CLINIC | Age: 59
End: 2019-06-14

## 2019-06-14 NOTE — PROGRESS NOTES
Received report from Jaylin Syed, genetic counselor with [employer], with recommendations from genetic counseling session on this patient of mine.  Per the report, patient meets national guidelines for testing of the CHEK2 gene, with the patient-elected test being the MyRisk through [employer].  Diagnosis codes are pers hx breast ca (Z85.3), fam hx breast ca (Z80.3).  See the [employer] paperwork, which will be scanned into Media, for complete report including recommendations and instructions.  [facility] will coordinate having the patient come into clinic to sign the necessary forms and  the test kit.

## 2019-06-18 ENCOUNTER — PROCEDURE VISIT (OUTPATIENT)
Dept: DERMATOLOGY | Facility: CLINIC | Age: 59
End: 2019-06-18
Payer: COMMERCIAL

## 2019-06-18 DIAGNOSIS — Z41.1 ELECTIVE PROCEDURE FOR UNACCEPTABLE COSMETIC APPEARANCE: Primary | ICD-10-CM

## 2019-06-18 PROCEDURE — 99499 UNLISTED E&M SERVICE: CPT | Mod: CSM,S$GLB,, | Performed by: DERMATOLOGY

## 2019-06-18 PROCEDURE — 17999 UNLISTD PX SKN MUC MEMB SUBQ: CPT | Mod: CSM,S$GLB,, | Performed by: DERMATOLOGY

## 2019-06-18 PROCEDURE — 99499 NO LOS: ICD-10-PCS | Mod: CSM,S$GLB,, | Performed by: DERMATOLOGY

## 2019-06-18 PROCEDURE — 17999 PR HAIR REMOVAL, HALF LEG (UPPER OR LOWER): ICD-10-PCS | Mod: CSM,S$GLB,, | Performed by: DERMATOLOGY

## 2019-06-24 ENCOUNTER — TELEPHONE (OUTPATIENT)
Dept: SURGERY | Facility: CLINIC | Age: 59
End: 2019-06-24

## 2019-06-24 NOTE — TELEPHONE ENCOUNTER
Message was left for  to please call Mary mcclellan @ [phone] for a Nurse Apt to sign patients consents for Genetic Testing.

## 2019-06-25 ENCOUNTER — TELEPHONE (OUTPATIENT)
Dept: SURGERY | Facility: CLINIC | Age: 59
End: 2019-06-25

## 2019-06-25 NOTE — TELEPHONE ENCOUNTER
Returned call to Ms.Rob regarding scheduling a Nurse Visit to sign her consents for Genetic Testing. Scheduled apt on 07/2/19 @ 3:15 pm informed patient of apt date & time mailed apt.Slip.

## 2019-07-02 ENCOUNTER — TELEPHONE (OUTPATIENT)
Dept: SURGERY | Facility: CLINIC | Age: 59
End: 2019-07-02

## 2019-07-02 ENCOUNTER — PATIENT MESSAGE (OUTPATIENT)
Dept: SURGERY | Facility: CLINIC | Age: 59
End: 2019-07-02

## 2019-07-02 NOTE — TELEPHONE ENCOUNTER
Rescheduled  nurse apt to sign her consents for her Genetic Testing with Mary . New apt date is 7/23/19 @ 2:45 pm. Message sent to patient through my [facility].

## 2019-07-23 ENCOUNTER — CLINICAL SUPPORT (OUTPATIENT)
Dept: SURGERY | Facility: CLINIC | Age: 59
End: 2019-07-23
Payer: COMMERCIAL

## 2019-07-23 ENCOUNTER — LAB VISIT (OUTPATIENT)
Dept: LAB | Facility: HOSPITAL | Age: 59
End: 2019-07-23
Attending: INTERNAL MEDICINE
Payer: COMMERCIAL

## 2019-07-23 DIAGNOSIS — Z85.3 PERSONAL HISTORY OF BREAST CANCER: Primary | ICD-10-CM

## 2019-07-23 DIAGNOSIS — Z85.3 PERSONAL HISTORY OF BREAST CANCER: ICD-10-CM

## 2019-07-23 DIAGNOSIS — Z80.3 FAMILY HISTORY OF BREAST CANCER: ICD-10-CM

## 2019-07-23 PROCEDURE — 99999 PR PBB SHADOW E&M-EST. PATIENT-LVL II: CPT | Mod: PBBFAC,,, | Performed by: NURSE PRACTITIONER

## 2019-07-23 PROCEDURE — 36415 COLL VENOUS BLD VENIPUNCTURE: CPT

## 2019-07-23 PROCEDURE — 99999 PR PBB SHADOW E&M-EST. PATIENT-LVL II: ICD-10-PCS | Mod: PBBFAC,,, | Performed by: NURSE PRACTITIONER

## 2019-08-06 ENCOUNTER — OFFICE VISIT (OUTPATIENT)
Dept: OPTOMETRY | Facility: CLINIC | Age: 59
End: 2019-08-06
Payer: COMMERCIAL

## 2019-08-06 DIAGNOSIS — H52.4 PRESBYOPIA OF BOTH EYES: ICD-10-CM

## 2019-08-06 DIAGNOSIS — H52.222 REGULAR ASTIGMATISM OF LEFT EYE: ICD-10-CM

## 2019-08-06 DIAGNOSIS — H52.11 MYOPIA OF RIGHT EYE: ICD-10-CM

## 2019-08-06 DIAGNOSIS — Z01.00 EXAMINATION OF EYES AND VISION: Primary | ICD-10-CM

## 2019-08-06 PROCEDURE — 92014 COMPRE OPH EXAM EST PT 1/>: CPT | Mod: S$GLB,,, | Performed by: OPTOMETRIST

## 2019-08-06 PROCEDURE — 92015 DETERMINE REFRACTIVE STATE: CPT | Mod: S$GLB,,, | Performed by: OPTOMETRIST

## 2019-08-06 PROCEDURE — 99999 PR PBB SHADOW E&M-EST. PATIENT-LVL III: ICD-10-PCS | Mod: PBBFAC,,, | Performed by: OPTOMETRIST

## 2019-08-06 PROCEDURE — 92014 PR EYE EXAM, EST PATIENT,COMPREHESV: ICD-10-PCS | Mod: S$GLB,,, | Performed by: OPTOMETRIST

## 2019-08-06 PROCEDURE — 92015 PR REFRACTION: ICD-10-PCS | Mod: S$GLB,,, | Performed by: OPTOMETRIST

## 2019-08-06 PROCEDURE — 99999 PR PBB SHADOW E&M-EST. PATIENT-LVL III: CPT | Mod: PBBFAC,,, | Performed by: OPTOMETRIST

## 2019-08-06 NOTE — PROGRESS NOTES
"HPI     Concerns About Ocular Health      Additional comments: General eye examination and refraction.  Patient states vision is stable              Comments     Patient's age: 58 y.o.  Occupation:  [employer]   Approximate date of last eye examination:  02/27/2018  Name of last eye doctor seen:   City/State: [facility]  Wears glasses? Yes      If yes, wears  Full-time or part-time?  Full time   Present glasses are: Bifocal, SV Distance, SV Reading?  Progressive lens   Approximate age of present glasses:  1+ yr    Got new glasses following last exam, or subsequently?:  No    Any problem with VA with glasses?  No changes with vision  Wears CLs?:  No  Headaches?  No   Eye pain/discomfort?  No                                                                                      Flashes?  No   Floaters?  No   Diplopia/Double vision?  No   Patient's Ocular History:         Any eye surgeries? No          Any eye injury?  No          Any treatment for eye disease?  No   Family history of eye disease? Brother-kerataconus   Significant patient medical history:         1. Diabetes?  No        If yes, IDDM or NIDDM?  no   2. HBP?  No               3. Other (describe):  No    ! OTC eyedrops currently using:  no   ! Prescription eye meds currently using:  No    ! Any history of allergy/adverse reaction to any eye meds used   previously?  No    ! Any history of allergy/adverse reaction to eyedrops used during prior   eye exam(s)? No     ! Any history of allergy/adverse reaction to Novacaine or similar meds?   No    ! Any history of allergy/adverse reaction to Epinephrine or similar meds?   No     ! Patient okay with use of anesthetic eyedrops to check eye pressure?    Yes         ! Patient okay with use of eyedrops to dilate pupils today?  Yes    !  Allergies/Medications/Medical History/Family History reviewed today?    Yes       PD =   58/54  Desired reading distance =  13"                                                       "               Last edited by Og Ordoñez, OD on 8/6/2019  3:54 PM. (History)            Assessment /Plan     For exam results, see Encounter Report.    1. Examination of eyes and vision - Both Eyes     2. Myopia of right eye     3. Regular astigmatism of left eye     4. Presbyopia of both eyes                Good ocular health in both eyes.  Myopia in the right eye, and regular astigmatic refractive error in the left eye, with good correctable VA in each eye.  Presbyopia consistent with age.  New spectacle lens Rx issued for use as desired.   Recommend full-time wear.  Recheck in 18 months  - or prior if any problems or apparent change in VA in the interim.

## 2019-08-16 ENCOUNTER — DOCUMENTATION ONLY (OUTPATIENT)
Dept: SURGERY | Facility: CLINIC | Age: 59
End: 2019-08-16

## 2019-08-16 ENCOUNTER — PATIENT MESSAGE (OUTPATIENT)
Dept: SURGERY | Facility: CLINIC | Age: 59
End: 2019-08-16

## 2019-08-16 NOTE — PROGRESS NOTES
Received report from the post-test genetic counseling session, which was held with [facility] genetic counselor Jaylin Syed on 8/8/19.  See Media for full report.  Summary below.     BRCA1/BRCA2 and MyRisk testing through [facility] [employer] came back negative.    Testing is still indicated for pt's relatives.    Familial hematological malignancies  Family hx of leukemia and lymphoma in pt's maternal relatives is suggestive of a possible familial component.  Hematologic malignancies have been associated wit some known hereditary cancer syndromes for which genetic testing is available.  Family hx reported by pt does NOT meet criteria for any of these known syndromes; however, majority of hematologic malignancies are not related to a hereditary cancer syndrome.  At this point in time, there is no genetic testing recommended; however, this is an area of extensive research.  If the family is interested in participating in studies looking for genetic factors in the development of hematologic malignancies, [facility] encourages them to contact the following:  Family Studies:  Hematological Cancer Syndromes  Britta Lowry, [email or web address], [phone] ext [phone]  [email or web address]     The [facility] [employer] results report is also included in this packet.  Per the report, aspects of this pt's personal and/or family hx suggest that further tailored surveillance for colorectal and other cancers may be appropriate, and referral for this may be appropriate; colonoscopy is recommended.  Additionally, per the report, individualized skin cancer screening for melanoma is recommended based on the family hx.  ---    Messaged pt.

## 2019-08-19 ENCOUNTER — TELEPHONE (OUTPATIENT)
Dept: SURGERY | Facility: CLINIC | Age: 59
End: 2019-08-19

## 2019-08-19 NOTE — TELEPHONE ENCOUNTER
Called pt regarding her [facility] message.  She stated she reread the message and is not alarmed.  Reiterated to her that her relatives can visit [email or web address] to locate a genetics provider and that she can contact the Hematological Cancer Syndromes contact regarding family studies.  She stated she sees her dermatology provider yearly and had a colonoscopy last at age 52.  Advised her to update dermatology provider and colonoscopy provider as to [facility]'s screening recommendations and to bring her genetic counseling/testing paperwork with her to her appointments.  She stated she is due for CBE and breast MRI around 11/7/19; offered her 10/2019 appt, but she declined.  Questions were encouraged and answered to patient's satisfaction, and patient verbalized understanding of information and agreement with the plan.

## 2019-08-20 ENCOUNTER — PROCEDURE VISIT (OUTPATIENT)
Dept: DERMATOLOGY | Facility: CLINIC | Age: 59
End: 2019-08-20
Payer: COMMERCIAL

## 2019-08-20 ENCOUNTER — OFFICE VISIT (OUTPATIENT)
Dept: DERMATOLOGY | Facility: CLINIC | Age: 59
End: 2019-08-20

## 2019-08-20 DIAGNOSIS — Z41.1 ELECTIVE PROCEDURE FOR UNACCEPTABLE COSMETIC APPEARANCE: Primary | ICD-10-CM

## 2019-08-20 DIAGNOSIS — D18.00 ANGIOMA: ICD-10-CM

## 2019-08-20 DIAGNOSIS — L68.9 HYPERTRICHOSIS: ICD-10-CM

## 2019-08-20 DIAGNOSIS — Z80.8 FAMILY HISTORY OF MELANOMA: ICD-10-CM

## 2019-08-20 DIAGNOSIS — D22.30 MELANOCYTIC NEVI OF FACE: Primary | ICD-10-CM

## 2019-08-20 DIAGNOSIS — D22.5 MELANOCYTIC NEVI OF TRUNK: ICD-10-CM

## 2019-08-20 DIAGNOSIS — L82.1 SEBORRHEIC KERATOSIS: ICD-10-CM

## 2019-08-20 DIAGNOSIS — L81.4 LENTIGINES: ICD-10-CM

## 2019-08-20 LAB — INTEGRATED BRAC ANALYSIS: NORMAL

## 2019-08-20 PROCEDURE — 99499 UNLISTED E&M SERVICE: CPT | Mod: CSM,S$GLB,, | Performed by: DERMATOLOGY

## 2019-08-20 PROCEDURE — 17999 UNLISTD PX SKN MUC MEMB SUBQ: CPT | Mod: CSM,S$GLB,, | Performed by: DERMATOLOGY

## 2019-08-20 PROCEDURE — 99499 NO LOS: ICD-10-PCS | Mod: CSM,S$GLB,, | Performed by: DERMATOLOGY

## 2019-08-20 PROCEDURE — 99214 OFFICE O/P EST MOD 30 MIN: CPT | Mod: S$GLB,,, | Performed by: DERMATOLOGY

## 2019-08-20 PROCEDURE — 99214 PR OFFICE/OUTPT VISIT, EST, LEVL IV, 30-39 MIN: ICD-10-PCS | Mod: S$GLB,,, | Performed by: DERMATOLOGY

## 2019-08-20 PROCEDURE — 17999 PR HAIR REMOVAL, HALF LEG (UPPER OR LOWER): ICD-10-PCS | Mod: CSM,S$GLB,, | Performed by: DERMATOLOGY

## 2019-08-20 NOTE — PROGRESS NOTES
Subjective:       Patient ID:  Noelle Rivas is a 58 y.o. female who presents for   Chief Complaint   Patient presents with    Mole     TBSE, no change      Mole  - Initial  Affected locations: diffuse  Duration: 50 years  Signs / symptoms: asymptomatic  Severity: mild  Timing: constant  Aggravated by: nothing  Relieving factors/Treatments tried: nothing      Review of Systems   Skin: Positive for activity-related sunscreen use and wears hat. Negative for daily sunscreen use.   Hematologic/Lymphatic: Bruises/bleeds easily (due to ASA).        Objective:    Physical Exam   Constitutional: She appears well-developed and well-nourished. No distress.   HENT:   Mouth/Throat: Lips normal.    Eyes: Lids are normal.  No conjunctival no injection.   Neurological: She is alert and oriented to person, place, and time. She is not disoriented.   Psychiatric: She has a normal mood and affect.   Skin:   Areas Examined (abnormalities noted in diagram):   Scalp / Hair Palpated and Inspected  Head / Face Inspection Performed  Neck Inspection Performed  Chest / Axilla Inspection Performed  Abdomen Inspection Performed  Genitals / Buttocks / Groin Inspection Performed  Back Inspection Performed  RUE Inspected  LUE Inspection Performed  RLE Inspected  LLE Inspection Performed  Nails and Digits Inspection Performed                   Diagram Legend     Erythematous scaling macule/papule c/w actinic keratosis       Vascular papule c/w angioma      Pigmented verrucoid papule/plaque c/w seborrheic keratosis      Yellow umbilicated papule c/w sebaceous hyperplasia      Irregularly shaped tan macule c/w lentigo     1-2 mm smooth white papules consistent with Milia      Movable subcutaneous cyst with punctum c/w epidermal inclusion cyst      Subcutaneous movable cyst c/w pilar cyst      Firm pink to brown papule c/w dermatofibroma      Pedunculated fleshy papule(s) c/w skin tag(s)      Evenly pigmented macule c/w junctional nevus      Mildly variegated pigmented, slightly irregular-bordered macule c/w mildly atypical nevus      Flesh colored to evenly pigmented papule c/w intradermal nevus       Pink pearly papule/plaque c/w basal cell carcinoma      Erythematous hyperkeratotic cursted plaque c/w SCC      Surgical scar with no sign of skin cancer recurrence      Open and closed comedones      Inflammatory papules and pustules      Verrucoid papule consistent consistent with wart     Erythematous eczematous patches and plaques     Dystrophic onycholytic nail with subungual debris c/w onychomycosis     Umbilicated papule    Erythematous-base heme-crusted tan verrucoid plaque consistent with inflamed seborrheic keratosis     Erythematous Silvery Scaling Plaque c/w Psoriasis     See annotation      Assessment / Plan:        Melanocytic nevi of face  Melanocytic nevi of trunk  Multiple benign-appearing nevi present on exam today. Reassurance provided. Counseled patient to periodically examine moles and return to clinic if any changes in size, shape, or color are noted or if it becomes symptomatic (bleeding, itching, pain, etc).    Angioma  This is a benign vascular lesion. Reassurance given. No treatment required.    Seborrheic keratosis  These are benign, inherited growths without a malignant potential. Reassurance given to patient. No treatment is necessary. Handout was provided.    Lentigines  These are benign sun spots which should be monitored for changes. Daily sun protection will reduce the number of new lesions.   Patient instructed in importance of daily broad-spectrum sunscreen use with SPF of at least 30. Sun avoidance and topical protection/protective clothing discussed.    Family history of melanoma  Total body skin examination performed today including at least 12 points as noted in physical examination. No lesions suspicious for malignancy noted.  Patient instructed in importance of daily broad-spectrum sunscreen use with SPF of at  least 30. Sun avoidance and topical protection/protective clothing discussed.    Follow up in about 1 year (around 8/20/2020) for TBSE, or sooner if any new problems or changing lesions.

## 2019-09-17 ENCOUNTER — TELEPHONE (OUTPATIENT)
Dept: ADMINISTRATIVE | Facility: OTHER | Age: 59
End: 2019-09-17

## 2019-09-23 ENCOUNTER — TELEPHONE (OUTPATIENT)
Dept: ADMINISTRATIVE | Facility: OTHER | Age: 59
End: 2019-09-23

## 2019-09-23 NOTE — PROGRESS NOTES
Subjective:       Patient ID: Noelle Rivas is a 58 y.o. female.    Chief Complaint: endometrial hyperplasia    HPI     Patient comes in today for follow up with a history of complex hyperplasia without atypia. She has no GYN complaints today.    She first saw me in June 2016 as a consultation.  At that time I saw her because of persistent complex hyperplasia.  At that time I recommended a robotic assisted laparoscopic hysterectomy with bilateral salpingo-oophorectomy but she did not want to proceed with that.     She was unable to take progestins because of progesterone receptor positive breast cancer.     She was followed conservatively.    LMP 2016. Thinks she was postmenopausal 4/2017. No PMB.    Seen in follow up in March 2019. Pelvic US showed and EMS of 2 mm.     PAP: 3/21/2019: normal.   Mammogram/Breast US:  May 7, 2019:  Normal  Colonoscopy:  March 18, 2013:  Normal. Repeat in 10 years             November 2017 patient had a D&C with Dr. Sheehan.  Pathology showed:  ENDOMETRIUM: FRAGMENTS OF INACTIVE DISORDERED ENDOMETRIUM WITH AREAS OF STROMAL  BREAKDOWN, FOCUS OF SIMPLE HYPERPLASIA; NO EVIDENCE OF ATYPIA OR MALIGNANCY.          Breast Cancer in 2012. Lumpectomy and ASN. Radiation. No chemo. No tamoxifen .     BRCA 1/2 negative.       Has breast exams with breast Clinic.      Review of Systems   Constitutional: Positive for fatigue (from sitting at work). Negative for appetite change, chills, diaphoresis, fever and unexpected weight change.   Respiratory: Negative for cough, chest tightness, shortness of breath and wheezing.    Cardiovascular: Negative for chest pain, palpitations and leg swelling.   Gastrointestinal: Negative for abdominal distention, abdominal pain, blood in stool, constipation, diarrhea, nausea and vomiting.   Genitourinary: Negative for difficulty urinating, dysuria, flank pain, frequency, hematuria, pelvic pain, vaginal bleeding, vaginal discharge and vaginal pain.  "  Musculoskeletal: Negative for arthralgias and back pain.   Skin: Negative for color change and rash.   Neurological: Negative for dizziness, weakness, numbness and headaches.   Hematological: Negative for adenopathy.   Psychiatric/Behavioral: Negative for confusion and sleep disturbance. The patient is not nervous/anxious.        Objective:   /70   Pulse (!) 58   Ht 5' 9" (1.753 m)   Wt 83.9 kg (184 lb 15.5 oz)   BMI 27.31 kg/m²      Physical Exam   Constitutional: She is oriented to person, place, and time. She appears well-developed and well-nourished. No distress.   HENT:   Head: Normocephalic and atraumatic.   Eyes: No scleral icterus.   Neck: Normal range of motion.   Pulmonary/Chest: Effort normal. No respiratory distress.   Abdominal: Soft. She exhibits no distension and no mass. There is no tenderness. There is no rebound and no guarding.   Genitourinary:   Genitourinary Comments: Bimanual exam:  Vulva: no lesions. Normal appearance  Urethra: Normal size and location. No lesions  Bladder: No masses or tenderness.  Vagina: normal mucosa. No lesion. No blood in vault.  Cervix: small, no lesions  Uterus: mobile, nontender  Adnexa: no masses.  Rectovaginal: deferred today   Musculoskeletal: Normal range of motion. She exhibits no edema.   Neurological: She is alert and oriented to person, place, and time.   Skin: Skin is warm and dry. No rash noted. She is not diaphoretic. No pallor.   Psychiatric: She has a normal mood and affect. Her behavior is normal.   Nursing note and vitals reviewed.      Assessment:       1. Endometrial hyperplasia, unspecified: see evaluation 2017        Plan:   Endometrial hyperplasia, unspecified: see evaluation 2017  -     US Pelvis Complete Non OB; Future; Expected date: 09/24/2019  -     Follicle stimulating hormone; Future; Expected date: 09/24/2019    YOU on today's exam  Will repeat pelvic US and confirm postmenopause with [facility]  If pelvic US normal with thin ES, may " f/u with Dr. Sheehan for routine annual GYN care  RTC prn problems/VB

## 2019-09-24 ENCOUNTER — OFFICE VISIT (OUTPATIENT)
Dept: GYNECOLOGIC ONCOLOGY | Facility: CLINIC | Age: 59
End: 2019-09-24
Payer: COMMERCIAL

## 2019-09-24 ENCOUNTER — LAB VISIT (OUTPATIENT)
Dept: LAB | Facility: HOSPITAL | Age: 59
End: 2019-09-24
Payer: COMMERCIAL

## 2019-09-24 VITALS
SYSTOLIC BLOOD PRESSURE: 124 MMHG | DIASTOLIC BLOOD PRESSURE: 70 MMHG | BODY MASS INDEX: 27.39 KG/M2 | WEIGHT: 184.94 LBS | HEIGHT: 69 IN | HEART RATE: 58 BPM

## 2019-09-24 DIAGNOSIS — N85.00 ENDOMETRIAL HYPERPLASIA, UNSPECIFIED: Primary | ICD-10-CM

## 2019-09-24 DIAGNOSIS — N85.00 ENDOMETRIAL HYPERPLASIA, UNSPECIFIED: ICD-10-CM

## 2019-09-24 LAB — FSH SERPL-ACNC: 96.4 MIU/ML

## 2019-09-24 PROCEDURE — 36415 COLL VENOUS BLD VENIPUNCTURE: CPT

## 2019-09-24 PROCEDURE — 3008F PR BODY MASS INDEX (BMI) DOCUMENTED: ICD-10-PCS | Mod: CPTII,S$GLB,, | Performed by: OBSTETRICS & GYNECOLOGY

## 2019-09-24 PROCEDURE — 83001 ASSAY OF GONADOTROPIN (FSH): CPT

## 2019-09-24 PROCEDURE — 99214 PR OFFICE/OUTPT VISIT, EST, LEVL IV, 30-39 MIN: ICD-10-PCS | Mod: S$GLB,,, | Performed by: OBSTETRICS & GYNECOLOGY

## 2019-09-24 PROCEDURE — 3008F BODY MASS INDEX DOCD: CPT | Mod: CPTII,S$GLB,, | Performed by: OBSTETRICS & GYNECOLOGY

## 2019-09-24 PROCEDURE — 99999 PR PBB SHADOW E&M-EST. PATIENT-LVL III: ICD-10-PCS | Mod: PBBFAC,,, | Performed by: OBSTETRICS & GYNECOLOGY

## 2019-09-24 PROCEDURE — 99214 OFFICE O/P EST MOD 30 MIN: CPT | Mod: S$GLB,,, | Performed by: OBSTETRICS & GYNECOLOGY

## 2019-09-24 PROCEDURE — 99999 PR PBB SHADOW E&M-EST. PATIENT-LVL III: CPT | Mod: PBBFAC,,, | Performed by: OBSTETRICS & GYNECOLOGY

## 2019-10-08 ENCOUNTER — HOSPITAL ENCOUNTER (OUTPATIENT)
Dept: RADIOLOGY | Facility: HOSPITAL | Age: 59
Discharge: HOME | End: 2019-10-08
Attending: NURSE PRACTITIONER
Payer: COMMERCIAL

## 2019-10-08 DIAGNOSIS — N85.00 ENDOMETRIAL HYPERPLASIA, UNSPECIFIED: ICD-10-CM

## 2019-10-08 PROCEDURE — 76856 US PELVIS COMP WITH TRANSVAG NON-OB (XPD): ICD-10-PCS | Mod: 26,,, | Performed by: RADIOLOGY

## 2019-10-08 PROCEDURE — 76830 TRANSVAGINAL US NON-OB: CPT | Mod: TC

## 2019-10-08 PROCEDURE — 76830 US PELVIS COMP WITH TRANSVAG NON-OB (XPD): ICD-10-PCS | Mod: 26,,, | Performed by: RADIOLOGY

## 2019-10-08 PROCEDURE — 76830 TRANSVAGINAL US NON-OB: CPT | Mod: 26,,, | Performed by: RADIOLOGY

## 2019-10-08 PROCEDURE — 76856 US EXAM PELVIC COMPLETE: CPT | Mod: 26,,, | Performed by: RADIOLOGY

## 2019-10-15 ENCOUNTER — PATIENT MESSAGE (OUTPATIENT)
Dept: SURGERY | Facility: CLINIC | Age: 59
End: 2019-10-15

## 2019-10-16 ENCOUNTER — TELEPHONE (OUTPATIENT)
Dept: SURGERY | Facility: CLINIC | Age: 59
End: 2019-10-16

## 2019-10-16 NOTE — TELEPHONE ENCOUNTER
Spoke with pt she stated she rather schedule via my chart she would like to work our her schedule first pt also stated evertime she comes to the breast ceter for and ifeanyi appointment she is here for hours and wnts to make sure she has the time to take off from work

## 2019-10-22 ENCOUNTER — PROCEDURE VISIT (OUTPATIENT)
Dept: DERMATOLOGY | Facility: CLINIC | Age: 59
End: 2019-10-22
Payer: COMMERCIAL

## 2019-10-22 DIAGNOSIS — Z41.1 ELECTIVE PROCEDURE FOR UNACCEPTABLE COSMETIC APPEARANCE: Primary | ICD-10-CM

## 2019-10-22 DIAGNOSIS — L68.9 HYPERTRICHOSIS: ICD-10-CM

## 2019-10-22 PROCEDURE — 99499 NO LOS: ICD-10-PCS | Mod: CSM,S$GLB,, | Performed by: DERMATOLOGY

## 2019-10-22 PROCEDURE — 99499 UNLISTED E&M SERVICE: CPT | Mod: CSM,S$GLB,, | Performed by: DERMATOLOGY

## 2019-10-22 PROCEDURE — 17999 UNLISTD PX SKN MUC MEMB SUBQ: CPT | Mod: CSM,S$GLB,, | Performed by: DERMATOLOGY

## 2019-10-22 PROCEDURE — 17999 PR HAIR REMOVAL, HALF LEG (UPPER OR LOWER): ICD-10-PCS | Mod: CSM,S$GLB,, | Performed by: DERMATOLOGY

## 2019-10-23 ENCOUNTER — PATIENT MESSAGE (OUTPATIENT)
Dept: SURGERY | Facility: CLINIC | Age: 59
End: 2019-10-23

## 2019-10-24 ENCOUNTER — TELEPHONE (OUTPATIENT)
Dept: SURGERY | Facility: CLINIC | Age: 59
End: 2019-10-24

## 2019-10-24 NOTE — TELEPHONE ENCOUNTER
SPOKE WITH PT TO GET HER SCHEDULED FOR HER FOLLOW UP APPOINTMENT WITH MIGUEL ANGEL ----- Message from Michelle Guerrero MA sent at 10/24/2019 11:34 AM CDT -----  Contact: [facility]      ----- Message -----  From: Liss Ford  Sent: 10/24/2019  11:18 AM CDT  To: Octav[facility] Healy Staff    Appointment Request From: Noelle Rivas    With Provider: Yuridia Brumfield    Preferred Date Range: 11/12/2019 - 11/12/2019    Preferred Times: Tuesday Morning, Tuesday Afternoon    Reason for visit: Breast Exam    Comments:  Breast Exam

## 2019-11-12 ENCOUNTER — HOSPITAL ENCOUNTER (OUTPATIENT)
Dept: RADIOLOGY | Facility: HOSPITAL | Age: 59
Discharge: HOME | End: 2019-11-12
Attending: NURSE PRACTITIONER
Payer: COMMERCIAL

## 2019-11-12 ENCOUNTER — OFFICE VISIT (OUTPATIENT)
Dept: SURGERY | Facility: CLINIC | Age: 59
End: 2019-11-12
Payer: COMMERCIAL

## 2019-11-12 VITALS
WEIGHT: 185.75 LBS | DIASTOLIC BLOOD PRESSURE: 65 MMHG | BODY MASS INDEX: 27.51 KG/M2 | HEART RATE: 62 BPM | SYSTOLIC BLOOD PRESSURE: 119 MMHG | TEMPERATURE: 98 F | HEIGHT: 69 IN

## 2019-11-12 DIAGNOSIS — Z12.39 BREAST CANCER SCREENING: ICD-10-CM

## 2019-11-12 DIAGNOSIS — Z12.39 BREAST CANCER SCREENING: Primary | ICD-10-CM

## 2019-11-12 DIAGNOSIS — Z85.3 PERSONAL HISTORY OF BREAST CANCER: ICD-10-CM

## 2019-11-12 PROCEDURE — 3008F BODY MASS INDEX DOCD: CPT | Mod: CPTII,S$GLB,, | Performed by: PHYSICIAN ASSISTANT

## 2019-11-12 PROCEDURE — 99213 PR OFFICE/OUTPT VISIT, EST, LEVL III, 20-29 MIN: ICD-10-PCS | Mod: S$GLB,,, | Performed by: PHYSICIAN ASSISTANT

## 2019-11-12 PROCEDURE — A9577 INJ MULTIHANCE: HCPCS | Performed by: NURSE PRACTITIONER

## 2019-11-12 PROCEDURE — 77049 MRI BREAST C-+ W/CAD BI: CPT | Mod: 26,,, | Performed by: RADIOLOGY

## 2019-11-12 PROCEDURE — 25500020 PHARM REV CODE 255: Performed by: NURSE PRACTITIONER

## 2019-11-12 PROCEDURE — 99999 PR PBB SHADOW E&M-EST. PATIENT-LVL III: CPT | Mod: PBBFAC,,, | Performed by: PHYSICIAN ASSISTANT

## 2019-11-12 PROCEDURE — 99213 OFFICE O/P EST LOW 20 MIN: CPT | Mod: S$GLB,,, | Performed by: PHYSICIAN ASSISTANT

## 2019-11-12 PROCEDURE — 77049 MRI BREAST W/WO CONTRAST, W/CAD, BILATERAL: ICD-10-PCS | Mod: 26,,, | Performed by: RADIOLOGY

## 2019-11-12 PROCEDURE — 77049 MRI BREAST C-+ W/CAD BI: CPT | Mod: TC

## 2019-11-12 PROCEDURE — 99999 PR PBB SHADOW E&M-EST. PATIENT-LVL III: ICD-10-PCS | Mod: PBBFAC,,, | Performed by: PHYSICIAN ASSISTANT

## 2019-11-12 PROCEDURE — 3008F PR BODY MASS INDEX (BMI) DOCUMENTED: ICD-10-PCS | Mod: CPTII,S$GLB,, | Performed by: PHYSICIAN ASSISTANT

## 2019-11-12 RX ADMIN — GADOBENATE DIMEGLUMINE 19 ML: 529 INJECTION, SOLUTION INTRAVENOUS at 02:11

## 2019-11-12 NOTE — PROGRESS NOTES
[facility]  [facility]  11/12/2019      SUBJECTIVE:   Ms. Noelle Rivas is a 58 y.o. female with a history of RIGHT breast cancer who presents today for follow up breast cancer screening.      History of Present Illness: Patient was previously seen by QUINCY Suarez and Alok Perez.    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 Adjuvant XRT, declined hormonal therapy.  She had negative genetic testing.    Interval History:  Patient states she is doing well. She denies any unexplained weight loss, changes in vision, or recent headaches.  She denies any bone pain.  She denies palpating any masses at her breasts.  She says she gets occasional pain at her right axilla but this has been ongoing for a few years.      Her last bilateral mammogram was on 5/7/19 and read as BIRADS 2, benign.    She had an MRI of the breast today, and those results are currently pending.      Review of Systems: Denies any chest pain or shortness of breath.  Denies any fever or chills.  See HPI/ Interval History for other systems reviewed.    OBJECTIVE:   Vitals:    11/12/19 1449   BP: 119/65   Pulse: 62   Temp: 98.1 °F (36.7 °C)      Physical Exam:  HEENT: Normocephalic, atraumatic.    General: alert and oriented; no acute distress.  Breast/Chest: Well healed scar at right breast. No masses present bilaterally.  No erythema or edema.    Lymph: No palpable adjacent axillary lymph nodes.  No cervical or supraclavicular lymphadenopathy.      ASSESSMENT:  Ms. Noelle Rivas is a 58 y.o. year old female with a history of RIGHT breast cancer who presents today for follow up breast cancer screening.      PLAN:   I will call or message her once the MRI results are finalized.  We discussed that since she was diagnosed at age 51, doesn't have extremely dense breasts, and is BRCA negative, she doesn't need annual  MRI's of the breast.  She also only needs to see me once a year for follow up.    She will need an annual screening mammogram in May of 2020.    ~Monet Brumfield PA-C      Surgical Oncology            11/12/2019

## 2019-11-14 NOTE — PROGRESS NOTES
Monet,    Please see Mrs. Rivas's breast MRI results, attached.  As you saw her in clinic 2 days ago, I will defer all further breast-related care to you at this point.  Let me know if I can be of any assistance.    Thanks,  Alok

## 2019-11-15 ENCOUNTER — PATIENT MESSAGE (OUTPATIENT)
Dept: SURGERY | Facility: CLINIC | Age: 59
End: 2019-11-15

## 2020-01-21 ENCOUNTER — PROCEDURE VISIT (OUTPATIENT)
Dept: DERMATOLOGY | Facility: CLINIC | Age: 60
End: 2020-01-21
Payer: COMMERCIAL

## 2020-01-21 DIAGNOSIS — Z41.1 ELECTIVE PROCEDURE FOR UNACCEPTABLE COSMETIC APPEARANCE: Primary | ICD-10-CM

## 2020-01-21 DIAGNOSIS — L68.9 HYPERTRICHOSIS: ICD-10-CM

## 2020-01-21 PROCEDURE — 99499 UNLISTED E&M SERVICE: CPT | Mod: CSM,S$GLB,, | Performed by: DERMATOLOGY

## 2020-01-21 PROCEDURE — 17999 PR HAIR REMOVAL, HALF LEG (UPPER OR LOWER): ICD-10-PCS | Mod: CSM,S$GLB,, | Performed by: DERMATOLOGY

## 2020-01-21 PROCEDURE — 99499 NO LOS: ICD-10-PCS | Mod: CSM,S$GLB,, | Performed by: DERMATOLOGY

## 2020-01-21 PROCEDURE — 17999 UNLISTD PX SKN MUC MEMB SUBQ: CPT | Mod: CSM,S$GLB,, | Performed by: DERMATOLOGY

## 2020-04-21 DIAGNOSIS — Z01.84 ANTIBODY RESPONSE EXAMINATION: ICD-10-CM

## 2020-04-29 ENCOUNTER — LAB VISIT (OUTPATIENT)
Dept: LAB | Facility: HOSPITAL | Age: 60
End: 2020-04-29
Attending: INTERNAL MEDICINE
Payer: COMMERCIAL

## 2020-04-29 DIAGNOSIS — Z01.84 ANTIBODY RESPONSE EXAMINATION: ICD-10-CM

## 2020-04-29 LAB — SARS-COV-2 IGG SERPLBLD QL IA.RAPID: NEGATIVE

## 2020-04-29 PROCEDURE — 86769 SARS-COV-2 COVID-19 ANTIBODY: CPT

## 2020-04-29 PROCEDURE — 36415 COLL VENOUS BLD VENIPUNCTURE: CPT

## 2020-05-11 ENCOUNTER — PATIENT MESSAGE (OUTPATIENT)
Dept: SURGERY | Facility: CLINIC | Age: 60
End: 2020-05-11

## 2020-05-19 ENCOUNTER — HOSPITAL ENCOUNTER (OUTPATIENT)
Dept: RADIOLOGY | Facility: CLINIC | Age: 60
Discharge: HOME | End: 2020-05-19
Attending: PHYSICIAN ASSISTANT
Payer: COMMERCIAL

## 2020-05-19 DIAGNOSIS — Z12.39 BREAST CANCER SCREENING: ICD-10-CM

## 2020-05-19 DIAGNOSIS — Z12.31 SCREENING MAMMOGRAM, ENCOUNTER FOR: ICD-10-CM

## 2020-05-19 PROCEDURE — 77067 SCR MAMMO BI INCL CAD: CPT | Mod: 26,,, | Performed by: RADIOLOGY

## 2020-05-19 PROCEDURE — 77067 SCR MAMMO BI INCL CAD: CPT | Mod: TC,PO

## 2020-05-19 PROCEDURE — 77067 MAMMO DIGITAL SCREENING BILAT WITH TOMOSYNTHESIS_CAD: ICD-10-PCS | Mod: 26,,, | Performed by: RADIOLOGY

## 2020-05-19 PROCEDURE — 77063 MAMMO DIGITAL SCREENING BILAT WITH TOMOSYNTHESIS_CAD: ICD-10-PCS | Mod: 26,,, | Performed by: RADIOLOGY

## 2020-05-19 PROCEDURE — 77063 BREAST TOMOSYNTHESIS BI: CPT | Mod: 26,,, | Performed by: RADIOLOGY

## 2020-09-22 ENCOUNTER — OFFICE VISIT (OUTPATIENT)
Dept: OPTOMETRY | Facility: CLINIC | Age: 60
End: 2020-09-22
Payer: COMMERCIAL

## 2020-09-22 DIAGNOSIS — Z13.5 SCREENING FOR EYE CONDITION: ICD-10-CM

## 2020-09-22 DIAGNOSIS — H52.223 REGULAR ASTIGMATISM OF BOTH EYES: ICD-10-CM

## 2020-09-22 DIAGNOSIS — H52.4 PRESBYOPIA OF BOTH EYES: ICD-10-CM

## 2020-09-22 DIAGNOSIS — Z01.00 EXAMINATION OF EYES AND VISION: Primary | ICD-10-CM

## 2020-09-22 PROCEDURE — 99999 PR PBB SHADOW E&M-EST. PATIENT-LVL III: CPT | Mod: PBBFAC,,, | Performed by: OPTOMETRIST

## 2020-09-22 PROCEDURE — 99999 PR PBB SHADOW E&M-EST. PATIENT-LVL III: ICD-10-PCS | Mod: PBBFAC,,, | Performed by: OPTOMETRIST

## 2020-09-22 PROCEDURE — 92014 COMPRE OPH EXAM EST PT 1/>: CPT | Mod: S$GLB,,, | Performed by: OPTOMETRIST

## 2020-09-22 PROCEDURE — 92015 DETERMINE REFRACTIVE STATE: CPT | Mod: S$GLB,,, | Performed by: OPTOMETRIST

## 2020-09-22 PROCEDURE — 92015 PR REFRACTION: ICD-10-PCS | Mod: S$GLB,,, | Performed by: OPTOMETRIST

## 2020-09-22 PROCEDURE — 92014 PR EYE EXAM, EST PATIENT,COMPREHESV: ICD-10-PCS | Mod: S$GLB,,, | Performed by: OPTOMETRIST

## 2020-09-22 NOTE — PATIENT INSTRUCTIONS
Good ocular health in both eyes.  Myopia with astigmatism in the right eye, and regular simple hyperopic astigmatic refractive error in the left eye, with good correctable VA in each eye.  Presbyopia consistent with age.  New spectacle lens Rx issued for use as desired.   Recommend full-time wear.  Recheck in 18 months  - or prior if any problems or apparent change in VA in the interim.

## 2020-09-22 NOTE — PROGRESS NOTES
"HPI     Annual Exam      Additional comments: Annual general eye examination and refraction.   Overall changes with vision.              Comments     Patient's age: 59 y.o. 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 Yes      If yes, wears  Full-time or part-time?  Full time   Present glasses are: Bifocal, SV Distance, SV Reading?  Progressive lens   Approximate age of present glasses:  3 + yrs old   Got new glasses following last exam, or subsequently?:  Yes, pt states   that frame broke. So, patient is wearing a older pair    Any problem with VA with glasses?  Overall   Wears CLs?:  No  Headaches?  No   Eye pain/discomfort?  No                                                                                      Flashes?  No   Floaters?  No   Diplopia/Double vision?  No   Patient's Ocular History:         Any eye surgeries? No          Any eye injury?  No          Any treatment for eye disease?  No   Family history of eye disease? Brother-kerataconus   Significant patient medical history:         1. Diabetes?  No        If yes, IDDM or NIDDM?  no   2. HBP?  No               3. Other (describe):  No    ! OTC eyedrops currently using:  no   ! Prescription eye meds currently using:  No    ! Any history of allergy/adverse reaction to any eye meds used   previously?  No    ! Any history of allergy/adverse reaction to eyedrops used during prior   eye exam(s)? No     ! Any history of allergy/adverse reaction to Novacaine or similar meds?   No    ! Any history of allergy/adverse reaction to Epinephrine or similar meds?   No     ! Patient okay with use of anesthetic eyedrops to check eye pressure?    Yes         ! Patient okay with use of eyedrops to dilate pupils today?  Yes    !  Allergies/Medications/Medical History/Family History reviewed today?    Yes       PD =   58/54  Desired reading distance =  13"         "                                                              Last edited by Og Ordoñez, OD on 9/22/2020 12:04 PM. (History)            Assessment /Plan     For exam results, see Encounter Report.    1. Examination of eyes and vision - Both Eyes     2. Regular astigmatism of both eyes     3. Presbyopia of both eyes     4. Screening for eye condition                    Good ocular health in both eyes.  Myopia with astigmatism in the right eye, and regular simple hyperopic astigmatic refractive error in the left eye, with good correctable VA in each eye.  Presbyopia consistent with age.  New spectacle lens Rx issued for use as desired.   Recommend full-time wear.  Recheck in 18 months  - or prior if any problems or apparent change in VA in the interim.

## 2020-10-05 ENCOUNTER — PATIENT MESSAGE (OUTPATIENT)
Dept: ADMINISTRATIVE | Facility: HOSPITAL | Age: 60
End: 2020-10-05

## 2020-11-09 ENCOUNTER — PATIENT MESSAGE (OUTPATIENT)
Dept: SURGERY | Facility: CLINIC | Age: 60
End: 2020-11-09

## 2020-11-09 ENCOUNTER — TELEPHONE (OUTPATIENT)
Dept: SURGERY | Facility: CLINIC | Age: 60
End: 2020-11-09

## 2020-11-09 NOTE — TELEPHONE ENCOUNTER
Attempted to contact patient to reschedule appointment with CLEO Francisco. Patient scheduled on 11/12 at [facility], wanting to reschedule for 11/11 at [facility] [place] by [facility]. Unable to reach patient at this time, VM left.

## 2020-11-11 NOTE — PROGRESS NOTES
Subjective:       Patient ID: Noelle Rivas is a 59 y.o. female.    Chief Complaint: Follow-up (1 year)    HPI   Patient comes in today for follow up with a history of complex hyperplasia without atypia. She has no GYN complaints today.     She first saw me in June 2016 as a consultation.  At that time I saw her because of persistent complex hyperplasia.  At that time I recommended a robotic assisted laparoscopic hysterectomy with bilateral salpingo-oophorectomy but she did not want to proceed with that.     She was unable to take progestins because of progesterone receptor positive breast cancer.     She was followed conservatively.     LMP 2016. Thinks she was postmenopausal 4/2017. No PMB.     Seen in follow up in March 2019. Pelvic US showed and EMS of 2 mm.     Oct 2019: US: 2 mm EMS.      CBE: today in breast clinic  PAP: 3/21/2019: normal.   Mammogram:  5/19/2020:  Normal. MRI: N.  11/12/2019:   Colonoscopy:  March 18, 2013:  Normal. Repeat in 10 years            November 2017 patient had a D&C with Dr. Sheehan.  Pathology showed:  ENDOMETRIUM: FRAGMENTS OF INACTIVE DISORDERED ENDOMETRIUM WITH AREAS OF STROMAL  BREAKDOWN, FOCUS OF SIMPLE HYPERPLASIA; NO EVIDENCE OF ATYPIA OR MALIGNANCY.     Breast Cancer in 2012. Lumpectomy and ASN. Radiation. No chemo. No tamoxifen .     BRCA 1/2 negative.       Review of Systems   Constitutional: Negative for appetite change, chills, diaphoresis, fatigue, fever and unexpected weight change.   Respiratory: Negative for cough, chest tightness, shortness of breath and wheezing.    Cardiovascular: Negative for chest pain, palpitations and leg swelling.   Gastrointestinal: Negative for abdominal distention, abdominal pain, blood in stool, constipation, diarrhea, nausea and vomiting.   Genitourinary: Negative for difficulty urinating, dysuria, flank pain, frequency, hematuria, menstrual problem, pelvic pain, vaginal bleeding, vaginal discharge and vaginal pain.    Musculoskeletal: Negative for arthralgias and back pain.   Skin: Negative for color change and rash.   Neurological: Negative for dizziness, weakness, numbness and headaches.   Hematological: Negative for adenopathy.   Psychiatric/Behavioral: Negative for confusion and sleep disturbance. The patient is not nervous/anxious.        Objective:   /66   Pulse 63   Wt 78.6 kg (173 lb 4.5 oz)   BMI 25.59 kg/m²      Physical Exam  Vitals signs and nursing note reviewed. Exam conducted with a chaperone present.   Constitutional:       General: She is not in acute distress.     Appearance: Normal appearance. She is well-developed and normal weight. She is not diaphoretic.   HENT:      Head: Normocephalic and atraumatic.   Eyes:      General: No scleral icterus.  Neck:      Musculoskeletal: Normal range of motion and neck supple.   Cardiovascular:      Rate and Rhythm: Normal rate and regular rhythm.      Heart sounds: No murmur.   Pulmonary:      Effort: Pulmonary effort is normal. No respiratory distress.      Breath sounds: Normal breath sounds. No wheezing.   Abdominal:      General: There is no distension.      Palpations: Abdomen is soft. There is no mass.      Tenderness: There is no abdominal tenderness. There is no guarding or rebound.      Hernia: No hernia is present.   Genitourinary:     Comments: Bimanual exam:  Vulva: no lesions. Normal appearance  Urethra: Normal size and location. No lesions  Bladder: No masses or tenderness.  Vagina: normal mucosa. No lesion  Cervix: small, no lesion  Uterus: small, mobile, nontender  Adnexa: no masses.  Rectovaginal: No posterior cul de sac thickening or nodularity.  Rectal: no masses. Nontender. Normal tone.     Musculoskeletal: Normal range of motion.      Right lower leg: No edema.      Left lower leg: No edema.   Lymphadenopathy:      Cervical: No cervical adenopathy.   Skin:     General: Skin is warm and dry.      Coloration: Skin is not pale.      Findings:  No rash.   Neurological:      Mental Status: She is alert and oriented to person, place, and time.   Psychiatric:         Mood and Affect: Mood normal.         Behavior: Behavior normal.         Assessment:       1. Endometrial hyperplasia, unspecified: see evaluation 2017    2. Well woman exam with routine gynecological exam        Plan:   Endometrial hyperplasia, unspecified: see evaluation 2017  -     Cancel: US Pelvis Complete Non OB; Future; Expected date: 11/12/2020    Well woman exam with routine gynecological exam    YOU on today's exam  Discharged from breast clinic and now only requiring annual CBE and screening MMG  Working from home in [employer] [facility] Gyn annual follow up there  RTC prn. Bleeding precautions given.

## 2020-11-12 ENCOUNTER — OFFICE VISIT (OUTPATIENT)
Dept: SURGERY | Facility: CLINIC | Age: 60
End: 2020-11-12
Payer: COMMERCIAL

## 2020-11-12 ENCOUNTER — OFFICE VISIT (OUTPATIENT)
Dept: GYNECOLOGIC ONCOLOGY | Facility: CLINIC | Age: 60
End: 2020-11-12
Payer: COMMERCIAL

## 2020-11-12 VITALS
DIASTOLIC BLOOD PRESSURE: 71 MMHG | WEIGHT: 173.38 LBS | HEART RATE: 66 BPM | BODY MASS INDEX: 25.68 KG/M2 | HEIGHT: 69 IN | SYSTOLIC BLOOD PRESSURE: 116 MMHG

## 2020-11-12 VITALS
DIASTOLIC BLOOD PRESSURE: 66 MMHG | BODY MASS INDEX: 25.59 KG/M2 | SYSTOLIC BLOOD PRESSURE: 122 MMHG | WEIGHT: 173.31 LBS | HEART RATE: 63 BPM

## 2020-11-12 DIAGNOSIS — N85.00 ENDOMETRIAL HYPERPLASIA, UNSPECIFIED: Primary | ICD-10-CM

## 2020-11-12 DIAGNOSIS — Z78.0 MENOPAUSE: Primary | ICD-10-CM

## 2020-11-12 DIAGNOSIS — Z12.31 SCREENING MAMMOGRAM, ENCOUNTER FOR: Primary | ICD-10-CM

## 2020-11-12 DIAGNOSIS — Z01.419 WELL WOMAN EXAM WITH ROUTINE GYNECOLOGICAL EXAM: ICD-10-CM

## 2020-11-12 PROCEDURE — 99999 PR PBB SHADOW E&M-EST. PATIENT-LVL IV: ICD-10-PCS | Mod: PBBFAC,,, | Performed by: PHYSICIAN ASSISTANT

## 2020-11-12 PROCEDURE — 1126F PR PAIN SEVERITY QUANTIFIED, NO PAIN PRESENT: ICD-10-PCS | Mod: S$GLB,,, | Performed by: OBSTETRICS & GYNECOLOGY

## 2020-11-12 PROCEDURE — 1126F AMNT PAIN NOTED NONE PRSNT: CPT | Mod: S$GLB,,, | Performed by: PHYSICIAN ASSISTANT

## 2020-11-12 PROCEDURE — 99213 OFFICE O/P EST LOW 20 MIN: CPT | Mod: S$GLB,,, | Performed by: PHYSICIAN ASSISTANT

## 2020-11-12 PROCEDURE — 99214 OFFICE O/P EST MOD 30 MIN: CPT | Mod: S$GLB,,, | Performed by: OBSTETRICS & GYNECOLOGY

## 2020-11-12 PROCEDURE — 99999 PR PBB SHADOW E&M-EST. PATIENT-LVL III: ICD-10-PCS | Mod: PBBFAC,,, | Performed by: OBSTETRICS & GYNECOLOGY

## 2020-11-12 PROCEDURE — 99999 PR PBB SHADOW E&M-EST. PATIENT-LVL IV: CPT | Mod: PBBFAC,,, | Performed by: PHYSICIAN ASSISTANT

## 2020-11-12 PROCEDURE — 99214 PR OFFICE/OUTPT VISIT, EST, LEVL IV, 30-39 MIN: ICD-10-PCS | Mod: S$GLB,,, | Performed by: OBSTETRICS & GYNECOLOGY

## 2020-11-12 PROCEDURE — 1126F AMNT PAIN NOTED NONE PRSNT: CPT | Mod: S$GLB,,, | Performed by: OBSTETRICS & GYNECOLOGY

## 2020-11-12 PROCEDURE — 3008F PR BODY MASS INDEX (BMI) DOCUMENTED: ICD-10-PCS | Mod: CPTII,S$GLB,, | Performed by: PHYSICIAN ASSISTANT

## 2020-11-12 PROCEDURE — 3008F BODY MASS INDEX DOCD: CPT | Mod: CPTII,S$GLB,, | Performed by: PHYSICIAN ASSISTANT

## 2020-11-12 PROCEDURE — 99999 PR PBB SHADOW E&M-EST. PATIENT-LVL III: CPT | Mod: PBBFAC,,, | Performed by: OBSTETRICS & GYNECOLOGY

## 2020-11-12 PROCEDURE — 3008F PR BODY MASS INDEX (BMI) DOCUMENTED: ICD-10-PCS | Mod: CPTII,S$GLB,, | Performed by: OBSTETRICS & GYNECOLOGY

## 2020-11-12 PROCEDURE — 1126F PR PAIN SEVERITY QUANTIFIED, NO PAIN PRESENT: ICD-10-PCS | Mod: S$GLB,,, | Performed by: PHYSICIAN ASSISTANT

## 2020-11-12 PROCEDURE — 3008F BODY MASS INDEX DOCD: CPT | Mod: CPTII,S$GLB,, | Performed by: OBSTETRICS & GYNECOLOGY

## 2020-11-12 PROCEDURE — 99213 PR OFFICE/OUTPT VISIT, EST, LEVL III, 20-29 MIN: ICD-10-PCS | Mod: S$GLB,,, | Performed by: PHYSICIAN ASSISTANT

## 2020-11-12 NOTE — PROGRESS NOTES
[facility]  [facility] [place]  11/11/2020      SUBJECTIVE:   Ms. Noelle Rivas is a 59 y.o. female with a history of RIGHT breast cancer who presents today for follow up breast cancer screening.      History of Present Illness: Patient was previously seen by QUINCY Suarez and Alok Perez.    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 Adjuvant XRT, declined hormonal therapy.  She had negative genetic testing.    Interval History:  Patient states she is doing well. She denies any unexplained weight loss, changes in vision, or recent headaches.  She denies any bone pain.  She denies palpating any masses at her breasts.  But she does occasionally feel small right breast lumps that are tender. She also occasionally has right axillary pain.     On 5/19/20 she had a bilateral screening mammogram which was read as BIRADS 2, benign.    Review of Systems: Denies any chest pain or shortness of breath.  Denies any fever or chills.  See HPI/ Interval History for other systems reviewed.    OBJECTIVE:   Vitals:    11/12/20 1346   BP: 116/71   Pulse: 66       Physical Exam:  HEENT: Normocephalic, atraumatic.    General: alert and oriented; no acute distress.  Breast/Chest: Well healed scars at right breast. Fibrocystic changes and mild tenderness at 9-2 o'clock right breast. No discrete masses present bilaterally.  No erythema or edema.  No nipple discharge or inversion. No skin changes.  Lymph: No palpable adjacent axillary lymph nodes.     ASSESSMENT:  Ms. Noelle Rivas is a 59 y.o. year old female with a history of RIGHT breast cancer who presents today for follow up breast cancer screening.      PLAN:   We discussed that there was nothing concerning on clinical breast exam today. Since she is >5 years out from her surgery, she can follow up with us as needed. I recommended that she continue  her annual mammograms and breast exams with PCP or OBGYN.  -She will be due for a bilateral screening mammogram in May.    ~Monet Brumfield PA-C      Surgical Oncology            11/11/2020

## 2020-12-22 ENCOUNTER — IMMUNIZATION (OUTPATIENT)
Dept: PRIMARY CARE CLINIC | Facility: CLINIC | Age: 60
End: 2020-12-22
Payer: COMMERCIAL

## 2020-12-22 DIAGNOSIS — Z23 NEED FOR VACCINATION: ICD-10-CM

## 2020-12-22 PROCEDURE — 91300 COVID-19, MRNA, LNP-S, PF, 30 MCG/0.3 ML DOSE VACCINE: ICD-10-PCS | Mod: S$GLB,,, | Performed by: FAMILY MEDICINE

## 2020-12-22 PROCEDURE — 0001A COVID-19, MRNA, LNP-S, PF, 30 MCG/0.3 ML DOSE VACCINE: CPT | Mod: CV19,S$GLB,, | Performed by: FAMILY MEDICINE

## 2020-12-22 PROCEDURE — 91300 COVID-19, MRNA, LNP-S, PF, 30 MCG/0.3 ML DOSE VACCINE: CPT | Mod: S$GLB,,, | Performed by: FAMILY MEDICINE

## 2020-12-22 PROCEDURE — 0001A COVID-19, MRNA, LNP-S, PF, 30 MCG/0.3 ML DOSE VACCINE: ICD-10-PCS | Mod: CV19,S$GLB,, | Performed by: FAMILY MEDICINE

## 2021-01-04 ENCOUNTER — CLINICAL SUPPORT (OUTPATIENT)
Dept: URGENT CARE | Facility: CLINIC | Age: 61
End: 2021-01-04
Payer: COMMERCIAL

## 2021-01-04 ENCOUNTER — PATIENT MESSAGE (OUTPATIENT)
Dept: ADMINISTRATIVE | Facility: HOSPITAL | Age: 61
End: 2021-01-04

## 2021-01-04 DIAGNOSIS — R53.81 MALAISE: ICD-10-CM

## 2021-01-04 DIAGNOSIS — R68.83 CHILLS: ICD-10-CM

## 2021-01-04 DIAGNOSIS — R05.9 COUGH: ICD-10-CM

## 2021-01-04 DIAGNOSIS — R68.83 CHILLS: Primary | ICD-10-CM

## 2021-01-04 LAB
CTP QC/QA: YES
SARS-COV-2 RDRP RESP QL NAA+PROBE: NEGATIVE

## 2021-01-04 PROCEDURE — U0002 COVID-19 LAB TEST NON-CDC: HCPCS | Mod: QW,S$GLB,, | Performed by: INTERNAL MEDICINE

## 2021-01-04 PROCEDURE — U0002: ICD-10-PCS | Mod: QW,S$GLB,, | Performed by: INTERNAL MEDICINE

## 2021-01-05 ENCOUNTER — TELEPHONE (OUTPATIENT)
Dept: PRIMARY CARE CLINIC | Facility: OTHER | Age: 61
End: 2021-01-05

## 2021-01-12 ENCOUNTER — IMMUNIZATION (OUTPATIENT)
Dept: PRIMARY CARE CLINIC | Facility: CLINIC | Age: 61
End: 2021-01-12
Payer: COMMERCIAL

## 2021-01-12 DIAGNOSIS — Z23 NEED FOR VACCINATION: ICD-10-CM

## 2021-01-12 PROCEDURE — 91300 COVID-19, MRNA, LNP-S, PF, 30 MCG/0.3 ML DOSE VACCINE: ICD-10-PCS | Mod: S$GLB,,, | Performed by: PHYSICIAN ASSISTANT

## 2021-01-12 PROCEDURE — 91300 COVID-19, MRNA, LNP-S, PF, 30 MCG/0.3 ML DOSE VACCINE: CPT | Mod: S$GLB,,, | Performed by: PHYSICIAN ASSISTANT

## 2021-01-12 PROCEDURE — 0002A COVID-19, MRNA, LNP-S, PF, 30 MCG/0.3 ML DOSE VACCINE: CPT | Mod: S$GLB,,, | Performed by: PHYSICIAN ASSISTANT

## 2021-01-12 PROCEDURE — 0002A COVID-19, MRNA, LNP-S, PF, 30 MCG/0.3 ML DOSE VACCINE: ICD-10-PCS | Mod: S$GLB,,, | Performed by: PHYSICIAN ASSISTANT

## 2021-03-26 ENCOUNTER — OFFICE VISIT (OUTPATIENT)
Dept: DERMATOLOGY | Facility: CLINIC | Age: 61
End: 2021-03-26
Payer: COMMERCIAL

## 2021-03-26 VITALS — WEIGHT: 173 LBS | BODY MASS INDEX: 25.62 KG/M2 | HEIGHT: 69 IN

## 2021-03-26 DIAGNOSIS — Z80.8 FAMILY HISTORY OF MALIGNANT MELANOMA: ICD-10-CM

## 2021-03-26 DIAGNOSIS — L81.4 SOLAR LENTIGO: ICD-10-CM

## 2021-03-26 DIAGNOSIS — L82.1 SEBORRHEIC KERATOSES: Primary | ICD-10-CM

## 2021-03-26 DIAGNOSIS — D23.9 DERMATOFIBROMA: ICD-10-CM

## 2021-03-26 DIAGNOSIS — D22.9 MULTIPLE BENIGN NEVI: ICD-10-CM

## 2021-03-26 PROCEDURE — 99213 OFFICE O/P EST LOW 20 MIN: CPT | Mod: S$GLB,,, | Performed by: DERMATOLOGY

## 2021-03-26 PROCEDURE — 99999 PR PBB SHADOW E&M-EST. PATIENT-LVL III: CPT | Mod: PBBFAC,,, | Performed by: DERMATOLOGY

## 2021-03-26 PROCEDURE — 3008F BODY MASS INDEX DOCD: CPT | Mod: CPTII,S$GLB,, | Performed by: DERMATOLOGY

## 2021-03-26 PROCEDURE — 99213 PR OFFICE/OUTPT VISIT, EST, LEVL III, 20-29 MIN: ICD-10-PCS | Mod: S$GLB,,, | Performed by: DERMATOLOGY

## 2021-03-26 PROCEDURE — 1126F AMNT PAIN NOTED NONE PRSNT: CPT | Mod: S$GLB,,, | Performed by: DERMATOLOGY

## 2021-03-26 PROCEDURE — 99999 PR PBB SHADOW E&M-EST. PATIENT-LVL III: ICD-10-PCS | Mod: PBBFAC,,, | Performed by: DERMATOLOGY

## 2021-03-26 PROCEDURE — 1126F PR PAIN SEVERITY QUANTIFIED, NO PAIN PRESENT: ICD-10-PCS | Mod: S$GLB,,, | Performed by: DERMATOLOGY

## 2021-03-26 PROCEDURE — 3008F PR BODY MASS INDEX (BMI) DOCUMENTED: ICD-10-PCS | Mod: CPTII,S$GLB,, | Performed by: DERMATOLOGY

## 2021-04-18 ENCOUNTER — OFFICE VISIT (OUTPATIENT)
Dept: URGENT CARE | Facility: CLINIC | Age: 61
End: 2021-04-18
Payer: COMMERCIAL

## 2021-04-18 VITALS
OXYGEN SATURATION: 97 % | WEIGHT: 151 LBS | TEMPERATURE: 98 F | RESPIRATION RATE: 18 BRPM | HEART RATE: 62 BPM | DIASTOLIC BLOOD PRESSURE: 69 MMHG | SYSTOLIC BLOOD PRESSURE: 119 MMHG | BODY MASS INDEX: 22.36 KG/M2 | HEIGHT: 69 IN

## 2021-04-18 DIAGNOSIS — R10.9 FLANK PAIN: ICD-10-CM

## 2021-04-18 DIAGNOSIS — R31.9 HEMATURIA, UNSPECIFIED TYPE: Primary | ICD-10-CM

## 2021-04-18 DIAGNOSIS — R82.998 DARK URINE: ICD-10-CM

## 2021-04-18 LAB
BILIRUB UR QL STRIP: NEGATIVE
GLUCOSE UR QL STRIP: NEGATIVE
KETONES UR QL STRIP: POSITIVE
LEUKOCYTE ESTERASE UR QL STRIP: NEGATIVE
PH, POC UA: 5.5
POC BLOOD, URINE: POSITIVE
POC NITRATES, URINE: NEGATIVE
PROT UR QL STRIP: POSITIVE
SP GR UR STRIP: 1.02 (ref 1–1.03)
UROBILINOGEN UR STRIP-ACNC: ABNORMAL (ref 0.1–1.1)

## 2021-04-18 PROCEDURE — 81003 URINALYSIS AUTO W/O SCOPE: CPT | Mod: QW,S$GLB,, | Performed by: NURSE PRACTITIONER

## 2021-04-18 PROCEDURE — 99204 PR OFFICE/OUTPT VISIT, NEW, LEVL IV, 45-59 MIN: ICD-10-PCS | Mod: 25,S$GLB,, | Performed by: NURSE PRACTITIONER

## 2021-04-18 PROCEDURE — 99204 OFFICE O/P NEW MOD 45 MIN: CPT | Mod: 25,S$GLB,, | Performed by: NURSE PRACTITIONER

## 2021-04-18 PROCEDURE — 81003 POCT URINALYSIS, DIPSTICK, AUTOMATED, W/O SCOPE: ICD-10-PCS | Mod: QW,S$GLB,, | Performed by: NURSE PRACTITIONER

## 2021-04-18 PROCEDURE — 3008F BODY MASS INDEX DOCD: CPT | Mod: CPTII,S$GLB,, | Performed by: NURSE PRACTITIONER

## 2021-04-18 PROCEDURE — 3008F PR BODY MASS INDEX (BMI) DOCUMENTED: ICD-10-PCS | Mod: CPTII,S$GLB,, | Performed by: NURSE PRACTITIONER

## 2021-04-18 RX ORDER — TRAMADOL HYDROCHLORIDE 50 MG/1
50 TABLET ORAL EVERY 6 HOURS
Qty: 12 TABLET | Refills: 0 | Status: SHIPPED | OUTPATIENT
Start: 2021-04-18 | End: 2021-04-29

## 2021-04-18 RX ORDER — ONDANSETRON 4 MG/1
4 TABLET, ORALLY DISINTEGRATING ORAL
Status: COMPLETED | OUTPATIENT
Start: 2021-04-18 | End: 2021-04-18

## 2021-04-18 RX ORDER — TAMSULOSIN HYDROCHLORIDE 0.4 MG/1
0.4 CAPSULE ORAL DAILY
Qty: 30 CAPSULE | Refills: 0 | Status: SHIPPED | OUTPATIENT
Start: 2021-04-18 | End: 2021-04-29

## 2021-04-18 RX ORDER — IBUPROFEN 800 MG/1
800 TABLET ORAL EVERY 8 HOURS PRN
Qty: 30 TABLET | Refills: 0 | Status: SHIPPED | OUTPATIENT
Start: 2021-04-18 | End: 2021-04-29

## 2021-04-18 RX ORDER — ONDANSETRON 4 MG/1
4 TABLET, ORALLY DISINTEGRATING ORAL EVERY 8 HOURS PRN
Qty: 15 TABLET | Refills: 0 | Status: SHIPPED | OUTPATIENT
Start: 2021-04-18 | End: 2021-04-29

## 2021-04-18 RX ADMIN — ONDANSETRON 4 MG: 4 TABLET, ORALLY DISINTEGRATING ORAL at 03:04

## 2021-04-19 ENCOUNTER — TELEPHONE (OUTPATIENT)
Dept: INTERNAL MEDICINE | Facility: CLINIC | Age: 61
End: 2021-04-19

## 2021-04-19 DIAGNOSIS — N20.1 CALCULUS OF URETER: ICD-10-CM

## 2021-04-19 DIAGNOSIS — E55.9 MILD VITAMIN D DEFICIENCY: ICD-10-CM

## 2021-04-19 DIAGNOSIS — R79.89 ABNORMAL TSH: ICD-10-CM

## 2021-04-19 DIAGNOSIS — E78.2 MIXED HYPERLIPIDEMIA: ICD-10-CM

## 2021-04-19 DIAGNOSIS — D69.6 THROMBOCYTOPENIA: Primary | ICD-10-CM

## 2021-04-19 DIAGNOSIS — E53.8 VITAMIN B 12 DEFICIENCY: ICD-10-CM

## 2021-04-22 LAB
BACTERIA UR CULT: NORMAL
BACTERIA UR CULT: NORMAL

## 2021-04-23 ENCOUNTER — LAB VISIT (OUTPATIENT)
Dept: LAB | Facility: HOSPITAL | Age: 61
End: 2021-04-23
Attending: INTERNAL MEDICINE
Payer: COMMERCIAL

## 2021-04-23 ENCOUNTER — HOSPITAL ENCOUNTER (OUTPATIENT)
Dept: RADIOLOGY | Facility: CLINIC | Age: 61
Discharge: HOME | End: 2021-04-23
Attending: INTERNAL MEDICINE
Payer: COMMERCIAL

## 2021-04-23 DIAGNOSIS — N20.1 CALCULUS OF URETER: ICD-10-CM

## 2021-04-23 DIAGNOSIS — D69.6 THROMBOCYTOPENIA: ICD-10-CM

## 2021-04-23 DIAGNOSIS — E53.8 VITAMIN B 12 DEFICIENCY: ICD-10-CM

## 2021-04-23 DIAGNOSIS — E78.2 MIXED HYPERLIPIDEMIA: ICD-10-CM

## 2021-04-23 DIAGNOSIS — E55.9 MILD VITAMIN D DEFICIENCY: ICD-10-CM

## 2021-04-23 DIAGNOSIS — R79.89 ABNORMAL TSH: ICD-10-CM

## 2021-04-23 LAB
25(OH)D3+25(OH)D2 SERPL-MCNC: 52 NG/ML (ref 30–96)
ALBUMIN SERPL BCP-MCNC: 4 G/DL (ref 3.5–5.2)
ALP SERPL-CCNC: 74 U/L (ref 55–135)
ALT SERPL W/O P-5'-P-CCNC: 14 U/L (ref 10–44)
ANION GAP SERPL CALC-SCNC: 7 MMOL/L (ref 8–16)
AST SERPL-CCNC: 18 U/L (ref 10–40)
BASOPHILS # BLD AUTO: 0.03 K/UL (ref 0–0.2)
BASOPHILS NFR BLD: 0.9 % (ref 0–1.9)
BILIRUB SERPL-MCNC: 0.5 MG/DL (ref 0.1–1)
BUN SERPL-MCNC: 17 MG/DL (ref 6–20)
CALCIUM SERPL-MCNC: 9.5 MG/DL (ref 8.7–10.5)
CHLORIDE SERPL-SCNC: 108 MMOL/L (ref 95–110)
CHOLEST SERPL-MCNC: 145 MG/DL (ref 120–199)
CHOLEST/HDLC SERPL: 2.7 {RATIO} (ref 2–5)
CO2 SERPL-SCNC: 27 MMOL/L (ref 23–29)
CREAT SERPL-MCNC: 0.8 MG/DL (ref 0.5–1.4)
DIFFERENTIAL METHOD: ABNORMAL
EOSINOPHIL # BLD AUTO: 0.2 K/UL (ref 0–0.5)
EOSINOPHIL NFR BLD: 4.4 % (ref 0–8)
ERYTHROCYTE [DISTWIDTH] IN BLOOD BY AUTOMATED COUNT: 12.9 % (ref 11.5–14.5)
EST. GFR  (AFRICAN AMERICAN): >60 ML/MIN/1.73 M^2
EST. GFR  (NON AFRICAN AMERICAN): >60 ML/MIN/1.73 M^2
GLUCOSE SERPL-MCNC: 85 MG/DL (ref 70–110)
HCT VFR BLD AUTO: 39.9 % (ref 37–48.5)
HDLC SERPL-MCNC: 53 MG/DL (ref 40–75)
HDLC SERPL: 36.6 % (ref 20–50)
HGB BLD-MCNC: 12.9 G/DL (ref 12–16)
IMM GRANULOCYTES # BLD AUTO: 0 K/UL (ref 0–0.04)
IMM GRANULOCYTES NFR BLD AUTO: 0 % (ref 0–0.5)
LDLC SERPL CALC-MCNC: 79.6 MG/DL (ref 63–159)
LYMPHOCYTES # BLD AUTO: 1.2 K/UL (ref 1–4.8)
LYMPHOCYTES NFR BLD: 34 % (ref 18–48)
MCH RBC QN AUTO: 32.3 PG (ref 27–31)
MCHC RBC AUTO-ENTMCNC: 32.3 G/DL (ref 32–36)
MCV RBC AUTO: 100 FL (ref 82–98)
MONOCYTES # BLD AUTO: 0.3 K/UL (ref 0.3–1)
MONOCYTES NFR BLD: 9.3 % (ref 4–15)
NEUTROPHILS # BLD AUTO: 1.8 K/UL (ref 1.8–7.7)
NEUTROPHILS NFR BLD: 51.4 % (ref 38–73)
NONHDLC SERPL-MCNC: 92 MG/DL
NRBC BLD-RTO: 0 /100 WBC
PLATELET # BLD AUTO: 142 K/UL (ref 150–450)
PMV BLD AUTO: 11.8 FL (ref 9.2–12.9)
POTASSIUM SERPL-SCNC: 3.8 MMOL/L (ref 3.5–5.1)
PROT SERPL-MCNC: 6.8 G/DL (ref 6–8.4)
RBC # BLD AUTO: 4 M/UL (ref 4–5.4)
SODIUM SERPL-SCNC: 142 MMOL/L (ref 136–145)
TRIGL SERPL-MCNC: 62 MG/DL (ref 30–150)
TSH SERPL DL<=0.005 MIU/L-ACNC: 2.27 UIU/ML (ref 0.4–4)
VIT B12 SERPL-MCNC: 1185 PG/ML (ref 210–950)
WBC # BLD AUTO: 3.44 K/UL (ref 3.9–12.7)

## 2021-04-23 PROCEDURE — 85025 COMPLETE CBC W/AUTO DIFF WBC: CPT | Performed by: INTERNAL MEDICINE

## 2021-04-23 PROCEDURE — 80061 LIPID PANEL: CPT | Performed by: INTERNAL MEDICINE

## 2021-04-23 PROCEDURE — 74019 RADEX ABDOMEN 2 VIEWS: CPT | Mod: TC,FY,PO

## 2021-04-23 PROCEDURE — 74019 RADEX ABDOMEN 2 VIEWS: CPT | Mod: 26,,, | Performed by: RADIOLOGY

## 2021-04-23 PROCEDURE — 82607 VITAMIN B-12: CPT | Performed by: INTERNAL MEDICINE

## 2021-04-23 PROCEDURE — 80053 COMPREHEN METABOLIC PANEL: CPT | Performed by: INTERNAL MEDICINE

## 2021-04-23 PROCEDURE — 82306 VITAMIN D 25 HYDROXY: CPT | Performed by: INTERNAL MEDICINE

## 2021-04-23 PROCEDURE — 74019 XR ABDOMEN FLAT AND ERECT: ICD-10-PCS | Mod: 26,,, | Performed by: RADIOLOGY

## 2021-04-23 PROCEDURE — 84443 ASSAY THYROID STIM HORMONE: CPT | Performed by: INTERNAL MEDICINE

## 2021-04-23 PROCEDURE — 86703 HIV-1/HIV-2 1 RESULT ANTBDY: CPT | Performed by: INTERNAL MEDICINE

## 2021-04-23 PROCEDURE — 36415 COLL VENOUS BLD VENIPUNCTURE: CPT | Mod: PO | Performed by: INTERNAL MEDICINE

## 2021-04-26 LAB — HIV 1+2 AB+HIV1 P24 AG SERPL QL IA: NEGATIVE

## 2021-04-29 ENCOUNTER — IMMUNIZATION (OUTPATIENT)
Dept: PHARMACY | Facility: CLINIC | Age: 61
End: 2021-04-29
Payer: COMMERCIAL

## 2021-04-29 ENCOUNTER — CLINICAL SUPPORT (OUTPATIENT)
Dept: OTHER | Facility: CLINIC | Age: 61
End: 2021-04-29
Payer: COMMERCIAL

## 2021-04-29 ENCOUNTER — OFFICE VISIT (OUTPATIENT)
Dept: INTERNAL MEDICINE | Facility: CLINIC | Age: 61
End: 2021-04-29
Payer: COMMERCIAL

## 2021-04-29 ENCOUNTER — PATIENT MESSAGE (OUTPATIENT)
Dept: PHARMACY | Facility: CLINIC | Age: 61
End: 2021-04-29

## 2021-04-29 VITALS
DIASTOLIC BLOOD PRESSURE: 62 MMHG | OXYGEN SATURATION: 99 % | BODY MASS INDEX: 22.36 KG/M2 | WEIGHT: 151 LBS | HEIGHT: 69 IN | HEART RATE: 61 BPM | SYSTOLIC BLOOD PRESSURE: 100 MMHG

## 2021-04-29 DIAGNOSIS — D69.6 THROMBOCYTOPENIA: ICD-10-CM

## 2021-04-29 DIAGNOSIS — E55.9 MILD VITAMIN D DEFICIENCY: ICD-10-CM

## 2021-04-29 DIAGNOSIS — E53.8 VITAMIN B 12 DEFICIENCY: ICD-10-CM

## 2021-04-29 DIAGNOSIS — N20.1 CALCULUS OF URETER: ICD-10-CM

## 2021-04-29 DIAGNOSIS — D70.0 CONGENITAL NEUTROPENIA: ICD-10-CM

## 2021-04-29 DIAGNOSIS — Z80.8 FAMILY HISTORY OF MALIGNANT MELANOMA: ICD-10-CM

## 2021-04-29 DIAGNOSIS — Z00.8 ENCOUNTER FOR OTHER GENERAL EXAMINATION: ICD-10-CM

## 2021-04-29 DIAGNOSIS — D68.51 FACTOR V LEIDEN MUTATION: ICD-10-CM

## 2021-04-29 DIAGNOSIS — R10.11 RIGHT UPPER QUADRANT ABDOMINAL PAIN: ICD-10-CM

## 2021-04-29 DIAGNOSIS — Z00.00 ANNUAL PHYSICAL EXAM: Primary | ICD-10-CM

## 2021-04-29 DIAGNOSIS — C50.911 MALIGNANT NEOPLASM OF RIGHT BREAST, STAGE 1, UNSPECIFIED ESTROGEN RECEPTOR STATUS: ICD-10-CM

## 2021-04-29 PROBLEM — R79.89 ABNORMAL TSH: Status: RESOLVED | Noted: 2018-06-05 | Resolved: 2021-04-29

## 2021-04-29 PROCEDURE — 3008F PR BODY MASS INDEX (BMI) DOCUMENTED: ICD-10-PCS | Mod: CPTII,S$GLB,, | Performed by: INTERNAL MEDICINE

## 2021-04-29 PROCEDURE — 99396 PREV VISIT EST AGE 40-64: CPT | Mod: S$GLB,,, | Performed by: INTERNAL MEDICINE

## 2021-04-29 PROCEDURE — 99999 PR PBB SHADOW E&M-EST. PATIENT-LVL V: CPT | Mod: PBBFAC,,, | Performed by: INTERNAL MEDICINE

## 2021-04-29 PROCEDURE — 3008F BODY MASS INDEX DOCD: CPT | Mod: CPTII,S$GLB,, | Performed by: INTERNAL MEDICINE

## 2021-04-29 PROCEDURE — 1126F PR PAIN SEVERITY QUANTIFIED, NO PAIN PRESENT: ICD-10-PCS | Mod: S$GLB,,, | Performed by: INTERNAL MEDICINE

## 2021-04-29 PROCEDURE — 1126F AMNT PAIN NOTED NONE PRSNT: CPT | Mod: S$GLB,,, | Performed by: INTERNAL MEDICINE

## 2021-04-29 PROCEDURE — 99999 PR PBB SHADOW E&M-EST. PATIENT-LVL V: ICD-10-PCS | Mod: PBBFAC,,, | Performed by: INTERNAL MEDICINE

## 2021-04-29 PROCEDURE — 99396 PR PREVENTIVE VISIT,EST,40-64: ICD-10-PCS | Mod: S$GLB,,, | Performed by: INTERNAL MEDICINE

## 2021-04-29 RX ORDER — VIT C/E/ZN/COPPR/LUTEIN/ZEAXAN 250MG-90MG
1000 CAPSULE ORAL DAILY
Qty: 30 CAPSULE | Refills: 12
Start: 2021-04-29

## 2021-05-11 ENCOUNTER — PATIENT MESSAGE (OUTPATIENT)
Dept: INTERNAL MEDICINE | Facility: CLINIC | Age: 61
End: 2021-05-11

## 2021-05-11 ENCOUNTER — HOSPITAL ENCOUNTER (OUTPATIENT)
Dept: RADIOLOGY | Facility: HOSPITAL | Age: 61
Discharge: HOME | End: 2021-05-11
Attending: INTERNAL MEDICINE
Payer: COMMERCIAL

## 2021-05-11 DIAGNOSIS — N20.1 CALCULUS OF URETER: ICD-10-CM

## 2021-05-11 DIAGNOSIS — R10.11 RIGHT UPPER QUADRANT ABDOMINAL PAIN: ICD-10-CM

## 2021-05-11 PROBLEM — K76.0 FATTY LIVER: Status: ACTIVE | Noted: 2021-05-11

## 2021-05-11 PROCEDURE — 76700 US ABDOMEN COMPLETE: ICD-10-PCS | Mod: 26,,, | Performed by: RADIOLOGY

## 2021-05-11 PROCEDURE — 76700 US EXAM ABDOM COMPLETE: CPT | Mod: 26,,, | Performed by: RADIOLOGY

## 2021-05-11 PROCEDURE — 76700 US EXAM ABDOM COMPLETE: CPT | Mod: TC

## 2021-05-24 ENCOUNTER — PATIENT OUTREACH (OUTPATIENT)
Dept: ADMINISTRATIVE | Facility: OTHER | Age: 61
End: 2021-05-24

## 2021-05-25 ENCOUNTER — OFFICE VISIT (OUTPATIENT)
Dept: UROLOGY | Facility: CLINIC | Age: 61
End: 2021-05-25
Payer: COMMERCIAL

## 2021-05-25 ENCOUNTER — HOSPITAL ENCOUNTER (OUTPATIENT)
Dept: RADIOLOGY | Facility: CLINIC | Age: 61
Discharge: HOME | End: 2021-05-25
Attending: PHYSICIAN ASSISTANT
Payer: COMMERCIAL

## 2021-05-25 VITALS
HEIGHT: 69 IN | DIASTOLIC BLOOD PRESSURE: 71 MMHG | HEART RATE: 63 BPM | WEIGHT: 151 LBS | BODY MASS INDEX: 22.36 KG/M2 | SYSTOLIC BLOOD PRESSURE: 112 MMHG

## 2021-05-25 DIAGNOSIS — N20.0 KIDNEY STONES: Primary | ICD-10-CM

## 2021-05-25 DIAGNOSIS — Z12.31 SCREENING MAMMOGRAM, ENCOUNTER FOR: ICD-10-CM

## 2021-05-25 LAB
BACTERIA #/AREA URNS HPF: ABNORMAL /HPF
BILIRUB SERPL-MCNC: ABNORMAL MG/DL
BLOOD URINE, POC: ABNORMAL
CAOX CRY URNS QL MICRO: ABNORMAL
CLARITY, POC UA: CLEAR
COLOR, POC UA: YELLOW
GLUCOSE UR QL STRIP: ABNORMAL
KETONES UR QL STRIP: ABNORMAL
LEUKOCYTE ESTERASE URINE, POC: ABNORMAL
MICROSCOPIC COMMENT: ABNORMAL
NITRITE, POC UA: ABNORMAL
PH, POC UA: 6
PROTEIN, POC: ABNORMAL
RBC #/AREA URNS HPF: 8 /HPF (ref 0–4)
SPECIFIC GRAVITY, POC UA: 1.03
SQUAMOUS #/AREA URNS HPF: 2 /HPF
UROBILINOGEN, POC UA: ABNORMAL
WBC #/AREA URNS HPF: 40 /HPF (ref 0–5)

## 2021-05-25 PROCEDURE — 81002 POCT URINE DIPSTICK WITHOUT MICROSCOPE: ICD-10-PCS | Mod: S$GLB,,, | Performed by: UROLOGY

## 2021-05-25 PROCEDURE — 3008F BODY MASS INDEX DOCD: CPT | Mod: CPTII,S$GLB,, | Performed by: UROLOGY

## 2021-05-25 PROCEDURE — 99204 PR OFFICE/OUTPT VISIT, NEW, LEVL IV, 45-59 MIN: ICD-10-PCS | Mod: S$GLB,,, | Performed by: UROLOGY

## 2021-05-25 PROCEDURE — 99999 PR PBB SHADOW E&M-EST. PATIENT-LVL III: ICD-10-PCS | Mod: PBBFAC,,, | Performed by: UROLOGY

## 2021-05-25 PROCEDURE — 81002 URINALYSIS NONAUTO W/O SCOPE: CPT | Mod: S$GLB,,, | Performed by: UROLOGY

## 2021-05-25 PROCEDURE — 81000 URINALYSIS NONAUTO W/SCOPE: CPT | Performed by: UROLOGY

## 2021-05-25 PROCEDURE — 77067 MAMMO DIGITAL SCREENING BILAT WITH TOMO: ICD-10-PCS | Mod: 26,,, | Performed by: RADIOLOGY

## 2021-05-25 PROCEDURE — 77063 MAMMO DIGITAL SCREENING BILAT WITH TOMO: ICD-10-PCS | Mod: 26,,, | Performed by: RADIOLOGY

## 2021-05-25 PROCEDURE — 3008F PR BODY MASS INDEX (BMI) DOCUMENTED: ICD-10-PCS | Mod: CPTII,S$GLB,, | Performed by: UROLOGY

## 2021-05-25 PROCEDURE — 87086 URINE CULTURE/COLONY COUNT: CPT | Performed by: UROLOGY

## 2021-05-25 PROCEDURE — 99204 OFFICE O/P NEW MOD 45 MIN: CPT | Mod: S$GLB,,, | Performed by: UROLOGY

## 2021-05-25 PROCEDURE — 99999 PR PBB SHADOW E&M-EST. PATIENT-LVL III: CPT | Mod: PBBFAC,,, | Performed by: UROLOGY

## 2021-05-25 PROCEDURE — 77063 BREAST TOMOSYNTHESIS BI: CPT | Mod: 26,,, | Performed by: RADIOLOGY

## 2021-05-25 PROCEDURE — 77067 SCR MAMMO BI INCL CAD: CPT | Mod: TC,PO

## 2021-05-25 PROCEDURE — 77067 SCR MAMMO BI INCL CAD: CPT | Mod: 26,,, | Performed by: RADIOLOGY

## 2021-05-26 LAB — BACTERIA UR CULT: NO GROWTH

## 2021-06-25 ENCOUNTER — HOSPITAL ENCOUNTER (OUTPATIENT)
Dept: RADIOLOGY | Facility: HOSPITAL | Age: 61
Discharge: HOME | End: 2021-06-25
Attending: UROLOGY
Payer: COMMERCIAL

## 2021-06-25 DIAGNOSIS — N20.0 KIDNEY STONES: ICD-10-CM

## 2021-06-25 PROCEDURE — 74176 CT ABD & PELVIS W/O CONTRAST: CPT | Mod: TC

## 2021-06-25 PROCEDURE — 74176 CT RENAL STONE STUDY ABD PELVIS WO: ICD-10-PCS | Mod: 26,,, | Performed by: RADIOLOGY

## 2021-06-25 PROCEDURE — 74176 CT ABD & PELVIS W/O CONTRAST: CPT | Mod: 26,,, | Performed by: RADIOLOGY

## 2021-06-29 ENCOUNTER — PATIENT MESSAGE (OUTPATIENT)
Dept: UROLOGY | Facility: CLINIC | Age: 61
End: 2021-06-29

## 2021-06-29 ENCOUNTER — TELEPHONE (OUTPATIENT)
Dept: UROLOGY | Facility: CLINIC | Age: 61
End: 2021-06-29

## 2021-07-13 ENCOUNTER — IMMUNIZATION (OUTPATIENT)
Dept: PHARMACY | Facility: CLINIC | Age: 61
End: 2021-07-13
Payer: COMMERCIAL

## 2021-10-05 ENCOUNTER — PATIENT MESSAGE (OUTPATIENT)
Dept: INTERNAL MEDICINE | Facility: CLINIC | Age: 61
End: 2021-10-05

## 2021-10-05 ENCOUNTER — OFFICE VISIT (OUTPATIENT)
Dept: UROLOGY | Facility: CLINIC | Age: 61
End: 2021-10-05
Payer: COMMERCIAL

## 2021-10-05 ENCOUNTER — LAB VISIT (OUTPATIENT)
Dept: LAB | Facility: HOSPITAL | Age: 61
End: 2021-10-05
Attending: UROLOGY
Payer: COMMERCIAL

## 2021-10-05 ENCOUNTER — TELEPHONE (OUTPATIENT)
Dept: INTERNAL MEDICINE | Facility: CLINIC | Age: 61
End: 2021-10-05

## 2021-10-05 ENCOUNTER — TELEPHONE (OUTPATIENT)
Dept: HEMATOLOGY/ONCOLOGY | Facility: CLINIC | Age: 61
End: 2021-10-05

## 2021-10-05 VITALS
HEART RATE: 63 BPM | SYSTOLIC BLOOD PRESSURE: 117 MMHG | WEIGHT: 148.5 LBS | HEIGHT: 69 IN | BODY MASS INDEX: 22 KG/M2 | DIASTOLIC BLOOD PRESSURE: 65 MMHG

## 2021-10-05 DIAGNOSIS — D69.6 THROMBOCYTOPENIA: ICD-10-CM

## 2021-10-05 DIAGNOSIS — N20.0 KIDNEY STONES: ICD-10-CM

## 2021-10-05 DIAGNOSIS — D64.9 ANEMIA, UNSPECIFIED TYPE: Primary | ICD-10-CM

## 2021-10-05 DIAGNOSIS — R82.992 HYPEROXALURIA: ICD-10-CM

## 2021-10-05 DIAGNOSIS — D70.0 CONGENITAL NEUTROPENIA: ICD-10-CM

## 2021-10-05 DIAGNOSIS — N20.0 KIDNEY STONES: Primary | ICD-10-CM

## 2021-10-05 LAB
BACTERIA #/AREA URNS HPF: ABNORMAL /HPF
BASOPHILS # BLD AUTO: 0.04 K/UL (ref 0–0.2)
BASOPHILS NFR BLD: 1.2 % (ref 0–1.9)
BILIRUB SERPL-MCNC: ABNORMAL MG/DL
BLOOD URINE, POC: ABNORMAL
CLARITY, POC UA: CLEAR
COLOR, POC UA: YELLOW
DIFFERENTIAL METHOD: ABNORMAL
EOSINOPHIL # BLD AUTO: 0.1 K/UL (ref 0–0.5)
EOSINOPHIL NFR BLD: 3.8 % (ref 0–8)
ERYTHROCYTE [DISTWIDTH] IN BLOOD BY AUTOMATED COUNT: 12.6 % (ref 11.5–14.5)
GLUCOSE UR QL STRIP: ABNORMAL
HCT VFR BLD AUTO: 36.1 % (ref 37–48.5)
HGB BLD-MCNC: 11.6 G/DL (ref 12–16)
IMM GRANULOCYTES # BLD AUTO: 0.01 K/UL (ref 0–0.04)
IMM GRANULOCYTES NFR BLD AUTO: 0.3 % (ref 0–0.5)
KETONES UR QL STRIP: ABNORMAL
LEUKOCYTE ESTERASE URINE, POC: ABNORMAL
LYMPHOCYTES # BLD AUTO: 1.3 K/UL (ref 1–4.8)
LYMPHOCYTES NFR BLD: 38 % (ref 18–48)
MCH RBC QN AUTO: 33 PG (ref 27–31)
MCHC RBC AUTO-ENTMCNC: 32.1 G/DL (ref 32–36)
MCV RBC AUTO: 103 FL (ref 82–98)
MICROSCOPIC COMMENT: ABNORMAL
MONOCYTES # BLD AUTO: 0.3 K/UL (ref 0.3–1)
MONOCYTES NFR BLD: 8.8 % (ref 4–15)
NEUTROPHILS # BLD AUTO: 1.6 K/UL (ref 1.8–7.7)
NEUTROPHILS NFR BLD: 47.9 % (ref 38–73)
NITRITE, POC UA: ABNORMAL
NRBC BLD-RTO: 0 /100 WBC
PH, POC UA: 6
PLATELET # BLD AUTO: 126 K/UL (ref 150–450)
PMV BLD AUTO: 11.4 FL (ref 9.2–12.9)
PROTEIN, POC: ABNORMAL
RBC # BLD AUTO: 3.52 M/UL (ref 4–5.4)
RBC #/AREA URNS HPF: 6 /HPF (ref 0–4)
SPECIFIC GRAVITY, POC UA: 1.02
URATE SERPL-MCNC: 3.6 MG/DL (ref 2.4–5.7)
UROBILINOGEN, POC UA: ABNORMAL
WBC # BLD AUTO: 3.42 K/UL (ref 3.9–12.7)
WBC #/AREA URNS HPF: 2 /HPF (ref 0–5)

## 2021-10-05 PROCEDURE — 3078F PR MOST RECENT DIASTOLIC BLOOD PRESSURE < 80 MM HG: ICD-10-PCS | Mod: CPTII,S$GLB,, | Performed by: UROLOGY

## 2021-10-05 PROCEDURE — 1159F PR MEDICATION LIST DOCUMENTED IN MEDICAL RECORD: ICD-10-PCS | Mod: CPTII,S$GLB,, | Performed by: UROLOGY

## 2021-10-05 PROCEDURE — 1160F RVW MEDS BY RX/DR IN RCRD: CPT | Mod: CPTII,S$GLB,, | Performed by: UROLOGY

## 2021-10-05 PROCEDURE — 3078F DIAST BP <80 MM HG: CPT | Mod: CPTII,S$GLB,, | Performed by: UROLOGY

## 2021-10-05 PROCEDURE — 99214 OFFICE O/P EST MOD 30 MIN: CPT | Mod: S$GLB,,, | Performed by: UROLOGY

## 2021-10-05 PROCEDURE — 81002 POCT URINE DIPSTICK WITHOUT MICROSCOPE: ICD-10-PCS | Mod: S$GLB,,, | Performed by: UROLOGY

## 2021-10-05 PROCEDURE — 81002 URINALYSIS NONAUTO W/O SCOPE: CPT | Mod: S$GLB,,, | Performed by: UROLOGY

## 2021-10-05 PROCEDURE — 99214 PR OFFICE/OUTPT VISIT, EST, LEVL IV, 30-39 MIN: ICD-10-PCS | Mod: S$GLB,,, | Performed by: UROLOGY

## 2021-10-05 PROCEDURE — 1160F PR REVIEW ALL MEDS BY PRESCRIBER/CLIN PHARMACIST DOCUMENTED: ICD-10-PCS | Mod: CPTII,S$GLB,, | Performed by: UROLOGY

## 2021-10-05 PROCEDURE — 36415 COLL VENOUS BLD VENIPUNCTURE: CPT | Performed by: UROLOGY

## 2021-10-05 PROCEDURE — 3008F PR BODY MASS INDEX (BMI) DOCUMENTED: ICD-10-PCS | Mod: CPTII,S$GLB,, | Performed by: UROLOGY

## 2021-10-05 PROCEDURE — 99999 PR PBB SHADOW E&M-EST. PATIENT-LVL IV: ICD-10-PCS | Mod: PBBFAC,,, | Performed by: UROLOGY

## 2021-10-05 PROCEDURE — 85025 COMPLETE CBC W/AUTO DIFF WBC: CPT | Performed by: INTERNAL MEDICINE

## 2021-10-05 PROCEDURE — 1159F MED LIST DOCD IN RCRD: CPT | Mod: CPTII,S$GLB,, | Performed by: UROLOGY

## 2021-10-05 PROCEDURE — 3008F BODY MASS INDEX DOCD: CPT | Mod: CPTII,S$GLB,, | Performed by: UROLOGY

## 2021-10-05 PROCEDURE — 81000 URINALYSIS NONAUTO W/SCOPE: CPT | Performed by: UROLOGY

## 2021-10-05 PROCEDURE — 3074F SYST BP LT 130 MM HG: CPT | Mod: CPTII,S$GLB,, | Performed by: UROLOGY

## 2021-10-05 PROCEDURE — 3074F PR MOST RECENT SYSTOLIC BLOOD PRESSURE < 130 MM HG: ICD-10-PCS | Mod: CPTII,S$GLB,, | Performed by: UROLOGY

## 2021-10-05 PROCEDURE — 99999 PR PBB SHADOW E&M-EST. PATIENT-LVL IV: CPT | Mod: PBBFAC,,, | Performed by: UROLOGY

## 2021-10-05 PROCEDURE — 84550 ASSAY OF BLOOD/URIC ACID: CPT | Performed by: UROLOGY

## 2021-10-16 ENCOUNTER — HOSPITAL ENCOUNTER (OUTPATIENT)
Dept: RADIOLOGY | Facility: HOSPITAL | Age: 61
Discharge: HOME | End: 2021-10-16
Attending: UROLOGY
Payer: COMMERCIAL

## 2021-10-16 DIAGNOSIS — N20.0 KIDNEY STONES: ICD-10-CM

## 2021-10-16 PROCEDURE — 74018 XR ABDOMEN AP 1 VIEW: ICD-10-PCS | Mod: 26,,, | Performed by: RADIOLOGY

## 2021-10-16 PROCEDURE — 74018 RADEX ABDOMEN 1 VIEW: CPT | Mod: 26,,, | Performed by: RADIOLOGY

## 2021-10-16 PROCEDURE — 76770 US EXAM ABDO BACK WALL COMP: CPT | Mod: 26,,, | Performed by: RADIOLOGY

## 2021-10-16 PROCEDURE — 76770 US EXAM ABDO BACK WALL COMP: CPT | Mod: TC

## 2021-10-16 PROCEDURE — 74018 RADEX ABDOMEN 1 VIEW: CPT | Mod: TC,FY

## 2021-10-16 PROCEDURE — 76770 US RETROPERITONEAL COMPLETE: ICD-10-PCS | Mod: 26,,, | Performed by: RADIOLOGY

## 2021-10-26 ENCOUNTER — OFFICE VISIT (OUTPATIENT)
Dept: OPTOMETRY | Facility: CLINIC | Age: 61
End: 2021-10-26
Payer: COMMERCIAL

## 2021-10-26 DIAGNOSIS — H52.7 REFRACTIVE ERROR: ICD-10-CM

## 2021-10-26 DIAGNOSIS — H25.13 NUCLEAR SCLEROSIS, BILATERAL: ICD-10-CM

## 2021-10-26 DIAGNOSIS — Z01.00 EXAMINATION OF EYES AND VISION: Primary | ICD-10-CM

## 2021-10-26 DIAGNOSIS — H04.123 DRY EYE SYNDROME, BILATERAL: ICD-10-CM

## 2021-10-26 PROCEDURE — 92015 DETERMINE REFRACTIVE STATE: CPT | Mod: S$GLB,,, | Performed by: OPTOMETRIST

## 2021-10-26 PROCEDURE — 92014 PR EYE EXAM, EST PATIENT,COMPREHESV: ICD-10-PCS | Mod: S$GLB,,, | Performed by: OPTOMETRIST

## 2021-10-26 PROCEDURE — 99999 PR PBB SHADOW E&M-EST. PATIENT-LVL III: ICD-10-PCS | Mod: PBBFAC,,, | Performed by: OPTOMETRIST

## 2021-10-26 PROCEDURE — 99999 PR PBB SHADOW E&M-EST. PATIENT-LVL III: CPT | Mod: PBBFAC,,, | Performed by: OPTOMETRIST

## 2021-10-26 PROCEDURE — 92014 COMPRE OPH EXAM EST PT 1/>: CPT | Mod: S$GLB,,, | Performed by: OPTOMETRIST

## 2021-10-26 PROCEDURE — 92015 PR REFRACTION: ICD-10-PCS | Mod: S$GLB,,, | Performed by: OPTOMETRIST

## 2021-11-02 ENCOUNTER — IMMUNIZATION (OUTPATIENT)
Dept: PRIMARY CARE CLINIC | Facility: CLINIC | Age: 61
End: 2021-11-02
Payer: COMMERCIAL

## 2021-11-02 DIAGNOSIS — Z23 NEED FOR VACCINATION: Primary | ICD-10-CM

## 2021-11-02 PROCEDURE — 0004A COVID-19, MRNA, LNP-S, PF, 30 MCG/0.3 ML DOSE VACCINE: CPT | Mod: CV19,S$GLB,, | Performed by: FAMILY MEDICINE

## 2021-11-02 PROCEDURE — 91300 COVID-19, MRNA, LNP-S, PF, 30 MCG/0.3 ML DOSE VACCINE: CPT | Mod: S$GLB,,, | Performed by: FAMILY MEDICINE

## 2021-11-02 PROCEDURE — 91300 COVID-19, MRNA, LNP-S, PF, 30 MCG/0.3 ML DOSE VACCINE: ICD-10-PCS | Mod: S$GLB,,, | Performed by: FAMILY MEDICINE

## 2021-11-02 PROCEDURE — 0004A COVID-19, MRNA, LNP-S, PF, 30 MCG/0.3 ML DOSE VACCINE: ICD-10-PCS | Mod: CV19,S$GLB,, | Performed by: FAMILY MEDICINE

## 2021-11-24 VITALS — BODY MASS INDEX: 22.3 KG/M2 | HEIGHT: 69 IN

## 2021-11-24 LAB
HDLC SERPL-MCNC: 45 MG/DL
POC CHOLESTEROL, LDL (DOCK): 82 MG/DL
POC CHOLESTEROL, TOTAL: 141 MG/DL
POC GLUCOSE, FASTING: 85 MG/DL (ref 60–110)
TRIGL SERPL-MCNC: 68 MG/DL

## 2021-11-30 ENCOUNTER — OFFICE VISIT (OUTPATIENT)
Dept: OBSTETRICS AND GYNECOLOGY | Facility: CLINIC | Age: 61
End: 2021-11-30
Payer: COMMERCIAL

## 2021-11-30 VITALS
DIASTOLIC BLOOD PRESSURE: 66 MMHG | SYSTOLIC BLOOD PRESSURE: 114 MMHG | WEIGHT: 145.06 LBS | BODY MASS INDEX: 21.49 KG/M2 | HEIGHT: 69 IN

## 2021-11-30 DIAGNOSIS — Z12.39 ENCOUNTER FOR SCREENING FOR MALIGNANT NEOPLASM OF BREAST, UNSPECIFIED SCREENING MODALITY: ICD-10-CM

## 2021-11-30 DIAGNOSIS — Z01.411 ENCOUNTER FOR GYNECOLOGICAL EXAMINATION (GENERAL) (ROUTINE) WITH ABNORMAL FINDINGS: Primary | ICD-10-CM

## 2021-11-30 DIAGNOSIS — Z87.42 HISTORY OF ENDOMETRIAL HYPERPLASIA: ICD-10-CM

## 2021-11-30 PROCEDURE — 99396 PREV VISIT EST AGE 40-64: CPT | Mod: 25,S$GLB,, | Performed by: OBSTETRICS & GYNECOLOGY

## 2021-11-30 PROCEDURE — 99999 PR PBB SHADOW E&M-EST. PATIENT-LVL III: ICD-10-PCS | Mod: PBBFAC,,, | Performed by: OBSTETRICS & GYNECOLOGY

## 2021-11-30 PROCEDURE — 99396 PR PREVENTIVE VISIT,EST,40-64: ICD-10-PCS | Mod: 25,S$GLB,, | Performed by: OBSTETRICS & GYNECOLOGY

## 2021-11-30 PROCEDURE — 99999 PR PBB SHADOW E&M-EST. PATIENT-LVL III: CPT | Mod: PBBFAC,,, | Performed by: OBSTETRICS & GYNECOLOGY

## 2021-12-01 ENCOUNTER — PATIENT MESSAGE (OUTPATIENT)
Dept: UROLOGY | Facility: CLINIC | Age: 61
End: 2021-12-01
Payer: COMMERCIAL

## 2021-12-02 ENCOUNTER — PATIENT MESSAGE (OUTPATIENT)
Dept: UROLOGY | Facility: CLINIC | Age: 61
End: 2021-12-02
Payer: COMMERCIAL

## 2021-12-03 ENCOUNTER — OFFICE VISIT (OUTPATIENT)
Dept: HEMATOLOGY/ONCOLOGY | Facility: CLINIC | Age: 61
End: 2021-12-03
Payer: COMMERCIAL

## 2021-12-03 VITALS
HEIGHT: 69 IN | OXYGEN SATURATION: 97 % | SYSTOLIC BLOOD PRESSURE: 124 MMHG | HEART RATE: 71 BPM | TEMPERATURE: 98 F | BODY MASS INDEX: 21.62 KG/M2 | WEIGHT: 145.94 LBS | DIASTOLIC BLOOD PRESSURE: 56 MMHG

## 2021-12-03 DIAGNOSIS — D69.6 THROMBOCYTOPENIA: ICD-10-CM

## 2021-12-03 DIAGNOSIS — D64.9 ANEMIA, UNSPECIFIED TYPE: ICD-10-CM

## 2021-12-03 DIAGNOSIS — N85.00 ENDOMETRIAL HYPERPLASIA, UNSPECIFIED: ICD-10-CM

## 2021-12-03 DIAGNOSIS — C50.911 MALIGNANT NEOPLASM OF RIGHT BREAST, STAGE 1, UNSPECIFIED ESTROGEN RECEPTOR STATUS: Primary | ICD-10-CM

## 2021-12-03 DIAGNOSIS — D68.51 FACTOR V LEIDEN MUTATION: ICD-10-CM

## 2021-12-03 PROCEDURE — 99204 PR OFFICE/OUTPT VISIT, NEW, LEVL IV, 45-59 MIN: ICD-10-PCS | Mod: S$GLB,,, | Performed by: INTERNAL MEDICINE

## 2021-12-03 PROCEDURE — 99999 PR PBB SHADOW E&M-EST. PATIENT-LVL IV: CPT | Mod: PBBFAC,,, | Performed by: INTERNAL MEDICINE

## 2021-12-03 PROCEDURE — 99204 OFFICE O/P NEW MOD 45 MIN: CPT | Mod: S$GLB,,, | Performed by: INTERNAL MEDICINE

## 2021-12-03 PROCEDURE — 99999 PR PBB SHADOW E&M-EST. PATIENT-LVL IV: ICD-10-PCS | Mod: PBBFAC,,, | Performed by: INTERNAL MEDICINE

## 2022-01-29 ENCOUNTER — LAB VISIT (OUTPATIENT)
Dept: LAB | Facility: HOSPITAL | Age: 62
End: 2022-01-29
Attending: INTERNAL MEDICINE
Payer: COMMERCIAL

## 2022-01-29 DIAGNOSIS — D64.9 ANEMIA, UNSPECIFIED TYPE: ICD-10-CM

## 2022-01-29 DIAGNOSIS — D69.6 THROMBOCYTOPENIA: ICD-10-CM

## 2022-01-29 LAB
ALBUMIN SERPL BCP-MCNC: 4.2 G/DL (ref 3.5–5.2)
ALP SERPL-CCNC: 71 U/L (ref 55–135)
ALT SERPL W/O P-5'-P-CCNC: 25 U/L (ref 10–44)
ANION GAP SERPL CALC-SCNC: 10 MMOL/L (ref 8–16)
AST SERPL-CCNC: 25 U/L (ref 10–40)
BASOPHILS # BLD AUTO: 0.05 K/UL (ref 0–0.2)
BASOPHILS NFR BLD: 1.1 % (ref 0–1.9)
BILIRUB SERPL-MCNC: 0.4 MG/DL (ref 0.1–1)
BUN SERPL-MCNC: 14 MG/DL (ref 6–20)
CALCIUM SERPL-MCNC: 9.7 MG/DL (ref 8.7–10.5)
CHLORIDE SERPL-SCNC: 105 MMOL/L (ref 95–110)
CO2 SERPL-SCNC: 26 MMOL/L (ref 23–29)
CREAT SERPL-MCNC: 0.8 MG/DL (ref 0.5–1.4)
DIFFERENTIAL METHOD: ABNORMAL
EOSINOPHIL # BLD AUTO: 0.3 K/UL (ref 0–0.5)
EOSINOPHIL NFR BLD: 6.1 % (ref 0–8)
ERYTHROCYTE [DISTWIDTH] IN BLOOD BY AUTOMATED COUNT: 12.9 % (ref 11.5–14.5)
EST. GFR  (AFRICAN AMERICAN): >60 ML/MIN/1.73 M^2
EST. GFR  (NON AFRICAN AMERICAN): >60 ML/MIN/1.73 M^2
FERRITIN SERPL-MCNC: 168 NG/ML (ref 20–300)
GLUCOSE SERPL-MCNC: 97 MG/DL (ref 70–110)
HCT VFR BLD AUTO: 36.9 % (ref 37–48.5)
HGB BLD-MCNC: 12.3 G/DL (ref 12–16)
IMM GRANULOCYTES # BLD AUTO: 0.01 K/UL (ref 0–0.04)
IMM GRANULOCYTES NFR BLD AUTO: 0.2 % (ref 0–0.5)
IRON SERPL-MCNC: 91 UG/DL (ref 30–160)
LYMPHOCYTES # BLD AUTO: 1.4 K/UL (ref 1–4.8)
LYMPHOCYTES NFR BLD: 31.2 % (ref 18–48)
MCH RBC QN AUTO: 33.2 PG (ref 27–31)
MCHC RBC AUTO-ENTMCNC: 33.3 G/DL (ref 32–36)
MCV RBC AUTO: 100 FL (ref 82–98)
MONOCYTES # BLD AUTO: 0.5 K/UL (ref 0.3–1)
MONOCYTES NFR BLD: 10.4 % (ref 4–15)
NEUTROPHILS # BLD AUTO: 2.3 K/UL (ref 1.8–7.7)
NEUTROPHILS NFR BLD: 51 % (ref 38–73)
NRBC BLD-RTO: 0 /100 WBC
PLATELET # BLD AUTO: 171 K/UL (ref 150–450)
PMV BLD AUTO: 11 FL (ref 9.2–12.9)
POTASSIUM SERPL-SCNC: 4.2 MMOL/L (ref 3.5–5.1)
PROT SERPL-MCNC: 7.4 G/DL (ref 6–8.4)
RBC # BLD AUTO: 3.71 M/UL (ref 4–5.4)
SATURATED IRON: 27 % (ref 20–50)
SODIUM SERPL-SCNC: 141 MMOL/L (ref 136–145)
TOTAL IRON BINDING CAPACITY: 332 UG/DL (ref 250–450)
TRANSFERRIN SERPL-MCNC: 224 MG/DL (ref 200–375)
WBC # BLD AUTO: 4.43 K/UL (ref 3.9–12.7)

## 2022-01-29 PROCEDURE — 36415 COLL VENOUS BLD VENIPUNCTURE: CPT | Performed by: INTERNAL MEDICINE

## 2022-01-29 PROCEDURE — 82728 ASSAY OF FERRITIN: CPT | Performed by: INTERNAL MEDICINE

## 2022-01-29 PROCEDURE — 85025 COMPLETE CBC W/AUTO DIFF WBC: CPT | Performed by: INTERNAL MEDICINE

## 2022-01-29 PROCEDURE — 84466 ASSAY OF TRANSFERRIN: CPT | Performed by: INTERNAL MEDICINE

## 2022-01-29 PROCEDURE — 80053 COMPREHEN METABOLIC PANEL: CPT | Performed by: INTERNAL MEDICINE

## 2022-02-01 ENCOUNTER — OFFICE VISIT (OUTPATIENT)
Dept: HEMATOLOGY/ONCOLOGY | Facility: CLINIC | Age: 62
End: 2022-02-01
Payer: COMMERCIAL

## 2022-02-01 ENCOUNTER — OFFICE VISIT (OUTPATIENT)
Dept: UROLOGY | Facility: CLINIC | Age: 62
End: 2022-02-01
Payer: COMMERCIAL

## 2022-02-01 VITALS
BODY MASS INDEX: 22.43 KG/M2 | HEART RATE: 68 BPM | HEIGHT: 69 IN | DIASTOLIC BLOOD PRESSURE: 66 MMHG | WEIGHT: 151.44 LBS | SYSTOLIC BLOOD PRESSURE: 110 MMHG

## 2022-02-01 VITALS
RESPIRATION RATE: 17 BRPM | OXYGEN SATURATION: 97 % | WEIGHT: 151.44 LBS | HEART RATE: 76 BPM | DIASTOLIC BLOOD PRESSURE: 65 MMHG | TEMPERATURE: 97 F | SYSTOLIC BLOOD PRESSURE: 111 MMHG | HEIGHT: 69 IN | BODY MASS INDEX: 22.43 KG/M2

## 2022-02-01 DIAGNOSIS — D69.6 THROMBOCYTOPENIA: ICD-10-CM

## 2022-02-01 DIAGNOSIS — D70.0 CONGENITAL NEUTROPENIA: Primary | ICD-10-CM

## 2022-02-01 DIAGNOSIS — R82.992 HYPEROXALURIA: ICD-10-CM

## 2022-02-01 DIAGNOSIS — D68.51 FACTOR V LEIDEN MUTATION: ICD-10-CM

## 2022-02-01 DIAGNOSIS — N20.0 KIDNEY STONES: Primary | ICD-10-CM

## 2022-02-01 LAB
BILIRUB SERPL-MCNC: NORMAL MG/DL
BLOOD URINE, POC: NORMAL
CLARITY, POC UA: CLEAR
COLOR, POC UA: YELLOW
GLUCOSE UR QL STRIP: NORMAL
KETONES UR QL STRIP: NORMAL
LEUKOCYTE ESTERASE URINE, POC: NORMAL
NITRITE, POC UA: NORMAL
PH, POC UA: 6.5
PROTEIN, POC: NORMAL
SPECIFIC GRAVITY, POC UA: 1.02
UROBILINOGEN, POC UA: 0.2

## 2022-02-01 PROCEDURE — 99999 PR PBB SHADOW E&M-EST. PATIENT-LVL IV: CPT | Mod: PBBFAC,,, | Performed by: UROLOGY

## 2022-02-01 PROCEDURE — 3008F PR BODY MASS INDEX (BMI) DOCUMENTED: ICD-10-PCS | Mod: CPTII,S$GLB,, | Performed by: INTERNAL MEDICINE

## 2022-02-01 PROCEDURE — 1160F RVW MEDS BY RX/DR IN RCRD: CPT | Mod: CPTII,S$GLB,, | Performed by: UROLOGY

## 2022-02-01 PROCEDURE — 99999 PR PBB SHADOW E&M-EST. PATIENT-LVL IV: ICD-10-PCS | Mod: PBBFAC,,, | Performed by: UROLOGY

## 2022-02-01 PROCEDURE — 3074F PR MOST RECENT SYSTOLIC BLOOD PRESSURE < 130 MM HG: ICD-10-PCS | Mod: CPTII,S$GLB,, | Performed by: UROLOGY

## 2022-02-01 PROCEDURE — 99214 PR OFFICE/OUTPT VISIT, EST, LEVL IV, 30-39 MIN: ICD-10-PCS | Mod: S$GLB,,, | Performed by: INTERNAL MEDICINE

## 2022-02-01 PROCEDURE — 3008F BODY MASS INDEX DOCD: CPT | Mod: CPTII,S$GLB,, | Performed by: INTERNAL MEDICINE

## 2022-02-01 PROCEDURE — 81002 URINALYSIS NONAUTO W/O SCOPE: CPT | Mod: S$GLB,,, | Performed by: UROLOGY

## 2022-02-01 PROCEDURE — 1159F PR MEDICATION LIST DOCUMENTED IN MEDICAL RECORD: ICD-10-PCS | Mod: CPTII,S$GLB,, | Performed by: UROLOGY

## 2022-02-01 PROCEDURE — 3074F SYST BP LT 130 MM HG: CPT | Mod: CPTII,S$GLB,, | Performed by: INTERNAL MEDICINE

## 2022-02-01 PROCEDURE — 1160F PR REVIEW ALL MEDS BY PRESCRIBER/CLIN PHARMACIST DOCUMENTED: ICD-10-PCS | Mod: CPTII,S$GLB,, | Performed by: UROLOGY

## 2022-02-01 PROCEDURE — 1159F MED LIST DOCD IN RCRD: CPT | Mod: CPTII,S$GLB,, | Performed by: UROLOGY

## 2022-02-01 PROCEDURE — 99214 OFFICE O/P EST MOD 30 MIN: CPT | Mod: S$GLB,,, | Performed by: UROLOGY

## 2022-02-01 PROCEDURE — 3074F SYST BP LT 130 MM HG: CPT | Mod: CPTII,S$GLB,, | Performed by: UROLOGY

## 2022-02-01 PROCEDURE — 99999 PR PBB SHADOW E&M-EST. PATIENT-LVL IV: CPT | Mod: PBBFAC,,, | Performed by: INTERNAL MEDICINE

## 2022-02-01 PROCEDURE — 3008F BODY MASS INDEX DOCD: CPT | Mod: CPTII,S$GLB,, | Performed by: UROLOGY

## 2022-02-01 PROCEDURE — 3078F PR MOST RECENT DIASTOLIC BLOOD PRESSURE < 80 MM HG: ICD-10-PCS | Mod: CPTII,S$GLB,, | Performed by: UROLOGY

## 2022-02-01 PROCEDURE — 81002 POCT URINE DIPSTICK WITHOUT MICROSCOPE: ICD-10-PCS | Mod: S$GLB,,, | Performed by: UROLOGY

## 2022-02-01 PROCEDURE — 3078F PR MOST RECENT DIASTOLIC BLOOD PRESSURE < 80 MM HG: ICD-10-PCS | Mod: CPTII,S$GLB,, | Performed by: INTERNAL MEDICINE

## 2022-02-01 PROCEDURE — 3074F PR MOST RECENT SYSTOLIC BLOOD PRESSURE < 130 MM HG: ICD-10-PCS | Mod: CPTII,S$GLB,, | Performed by: INTERNAL MEDICINE

## 2022-02-01 PROCEDURE — 3078F DIAST BP <80 MM HG: CPT | Mod: CPTII,S$GLB,, | Performed by: UROLOGY

## 2022-02-01 PROCEDURE — 99214 OFFICE O/P EST MOD 30 MIN: CPT | Mod: S$GLB,,, | Performed by: INTERNAL MEDICINE

## 2022-02-01 PROCEDURE — 99214 PR OFFICE/OUTPT VISIT, EST, LEVL IV, 30-39 MIN: ICD-10-PCS | Mod: S$GLB,,, | Performed by: UROLOGY

## 2022-02-01 PROCEDURE — 3008F PR BODY MASS INDEX (BMI) DOCUMENTED: ICD-10-PCS | Mod: CPTII,S$GLB,, | Performed by: UROLOGY

## 2022-02-01 PROCEDURE — 3078F DIAST BP <80 MM HG: CPT | Mod: CPTII,S$GLB,, | Performed by: INTERNAL MEDICINE

## 2022-02-01 PROCEDURE — 99999 PR PBB SHADOW E&M-EST. PATIENT-LVL IV: ICD-10-PCS | Mod: PBBFAC,,, | Performed by: INTERNAL MEDICINE

## 2022-02-01 NOTE — PROGRESS NOTES
24 hour urine     [facility] [place]  PCP: Emelyn Bernardo MD  Referred by: No ref. provider found  DOS: 2/1/2022    CC: stone      Subjective:      Initial consult by me in hospital on 5/25/21    Noelle Rivas is a 61 y.o. female     The patient has been referred to urology for left  Renal stone(s)  The stone(s)x 2 LUP and LLP (7mm, 9mm)  is not obstructing seen on kub and abd us. The patient does not have left flank pain.  Last CT in 2009 when she was passing a 2mm stone in the LUVJ. Had a LUP 5mm stone at that time, which is the one that has increased to 7mm.     The patient does not have a history of back pain.     The patient has seen a urologist before (NP when she passed a stone a few years ago)  The patient has not had a procedure for a stone in the past. . The patient does have a family history of stones, father with stones. The pt has not had a uti. Previous urines show MO.     She says she lost 40 pounds in the past year but was eating a lot of protein before this.     The patient does not have any other urologic issues.       Previous urines reviewed.   She has factor V Leiden on asa 81mg only.     Interval history 10/5/21:  · ctrss 6/25/21 showed LLP 1cm stone (1200 HU, 6cm)  and LUP 8mm stone (1000 HU, 6cm). No other stone. Discussed stone treatment but she's terrified of procedures.   · 24 hour urine on 8/27/21 showed UOP 1.67L, oxalate 110, citrate 1105. Severe hyperoxaluria.   · Denies any bowel disease. Likely due to diet. Mostly vegan/nut/veggie diet which she feels great on and she's lost 40 pounds on.   · 6/25/21  uric acid: 7.4, pth 55.4    Interval history 2/1/22:  · rbus 10/16/21: LUP 9mm stone. LLP 1.2cm stone. No L hydro.  · kub 10/16/21:  and kub to see if stone growing   · 24 hour 1/12/22:  47 oxalate (improved from 110). Admits to diet changes. Urine volume increased from 1.67 to 2.41.    · On ASA 81MG                             Urine history:    2/1/22   Void: tr leuk  10/5/21  Void: small bld/tr leuk  Component Urine Culture, Routine   5/25/21 4/23/21 Tr wbc- no uti's  Tr leuk, 3 rbc/6 wbc/mod bact/many ca    4/18/2021 Mixed gen dariel   4/25/2012 NO SIGNIFICANT GROWTH, tr leuk/1 squames   7/2/2010 >100,000 ORGANISMS/ML -GRAM POSITIVE JOSE MORPHOLOGICALLY COMPATIBLE WITH LACTOBACILLUS SPECIES, neg/<1 sq   10/12/2009 MULTIPLE ORGANISMS ISOLATED. NONE IN PREDOMINANCE REPEAT IF CLINICALLY NECESSARY.   9/17/2007 MULTIPLE ORGANISMS ISOLATED. NONE IN PREDOMINANCE REPEAT IF CLINICALLY NECESSARY.       Current REVIEW OF SYSTEMS:  Negative for the remaining 12 points of ROS except for as stated above      Fam Hx:  Reviewed- pertinent family hx as above    Allergies:  Adhesive; Iodine and iodide containing products; and Latex, natural rubber    Objective:     Vitals:    02/01/22 1400   BP: 110/66   Pulse: 68           Assessment:     Noelle Rivas is a 61 y.o. female with     1. Kidney stones    2. Hyperoxaluria       She has a big a left upper pole and left lower pole stone. She is still hesitant to have a procedure done due to coordinating with work and taking care of her elderly father.    Offered shockwave therapy. LLP 1200 HU so hard but small SSD so likely susceptible. LUP stone 8mm, 1000 HU with short SSD. Would only be able to treat 1 stone at one settings.     We would likely treat 1 stone and then treat another stone or offer a stent if the stone does not break up and then return 2 weeks later to remove all the stones.    At some point if she has >2cm stone burden can no longer offer retrograde minimally invasive procedure (shock wave lithotripsy or ureteroscopy) instead would need percutaneous nephrolithotomy.     Explained could become an emergency and would be better to have treated in a controlled fashion    Repeat 24 hour urine still shows some hyperoxaluria but significantly improved. Continue to decrease oxalate intake.           Plan:     rbus  and kub in July (last imaging October 2021- risk of stones being larger but on new diet may halt growth).     Would recommend eswl on 3rd wed  Would need cath urine 10d prior  Doesn't need preop anesthesia appt  Labs only if not updated   ekg and cxr if none done within the year prior    Option: Stone breaks up.  Return a few months later and do shock wave again    Option: if stone doesn't break up:  Place stent to stretch ureter  2 weeks later would need  Ureteroscopy and stone extraction of both stones    Option: watch to see if stones enlarge  Wait and see, and if stone enlarges then would need pcnl (percutaneous neprolithotomy)    Diet changes: review oxalate foods. Lower.  Repeat 24 hour urine in a few months.  Repeat uric acid, previously 7.4 - if remains elevated should see pcp     At minimum f/u in 6 months (with rbus and kub beforehand).     Radha Singh MD

## 2022-02-01 NOTE — PATIENT INSTRUCTIONS
Noelle Rivas is a 61 y.o. female with     1. Kidney stones    2. Hyperoxaluria       She has a big a left upper pole and left lower pole stone. She is still hesitant to have a procedure done due to coordinating with work and taking care of her elderly father.    Offered shockwave therapy. LLP 1200 HU so hard but small SSD so likely susceptible. LUP stone 8mm, 1000 HU with short SSD. Would only be able to treat 1 stone at one settings.     We would likely treat 1 stone and then treat another stone or offer a stent if the stone does not break up and then return 2 weeks later to remove all the stones.    At some point if she has >2cm stone burden can no longer offer retrograde minimally invasive procedure (shock wave lithotripsy or ureteroscopy) instead would need percutaneous nephrolithotomy.     Explained could become an emergency and would be better to have treated in a controlled fashion    Repeat 24 hour urine still shows some hyperoxaluria but significantly improved. Continue to decrease oxalate intake.           Plan:     rbus and kub in July (last imaging October 2021- risk of stones being larger but on new diet may halt growth).     Would recommend eswl on 3rd wed  Would need cath urine 10d prior  Doesn't need preop anesthesia appt  Labs only if not updated   ekg and cxr if none done within the year prior    Option: Stone breaks up.  Return a few months later and do shock wave again    Option: if stone doesn't break up:  Place stent to stretch ureter  2 weeks later would need  Ureteroscopy and stone extraction of both stones    Option: watch to see if stones enlarge  Wait and see, and if stone enlarges then would need pcnl (percutaneous neprolithotomy)    Diet changes: review oxalate foods. Lower.  Repeat 24 hour urine in a few months.  Repeat uric acid, previously 7.4 - if remains elevated should see pcp     At minimum f/u in 6 months (with rbus and kub beforehand).   
yesterday

## 2022-02-01 NOTE — PROGRESS NOTES
"Subjective:       Patient ID: Noelle Rivas is a 61 y.o. female.    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 The sentinel lymph node was negative. Her tumor was strongly ER positive, [place] positive, and HER-2 negative. She received adjuvant radiation therapy but decided AGAINST tamoxifen therapy because she has documented factor V Leiden mutation and a questionable history of TIA. She was on low-dose aspirin.    she followed with [facility]ology till 2016  had an endometrial biopsy that showed "complex hyperplasia without atypia'.  Dr. Sheehan recommended that she consider a hysterectomy, while Dr. Foy who also saw her talked to her about hysterectomy and also about low dose progesterone, which was recommended against at the time by oncology         pt worries about being told to get a bone marrow bx due to her long history of pancytopnenia  , here to establish care      REVIEW OF SYSTEMS:     CONSTITUTIONAL: The patient denies any weight change. There is no apparent    change in appetite, fever, night sweats, headaches, fatigue, dizziness, or    weakness.      SKIN: Denies rash, issues with nails, non-healing sores, bleeding, blotching    skin or abnormal bruising. Denies new moles or changes to existing moles.      BREASTS: There is no swelling around breasts or nipple discharge.    EYES: Denies eye pain, blurred vision, swelling, redness or discharge.      ENT AND MOUTH: Denies runny nose, stuffiness, sinus trouble or sores. Denies    nosebleeds. Denies, hoarseness, change in voice or swelling in front of the    neck.      CARDIOVASCULAR: Denies chest pain, discomfort or palpitations. Denies neck    swelling or episodes of passing out. "      RESPIRATORY: Denies cough, sputum production, blood in sputum, and denies    shortness of breath.      GI: Denies trouble swallowing, indigestion, heartburn, abdominal pain, nausea,    vomiting, diarrhea, altered bowel habits, blood in stool, discoloration of    stools, change in nature of stool, bloating, increased abdominal girth.      GENITOURINARY: No discharge. No pelvic pain or lumps. No rash around groin or  lesions. No urinary frequency, hesitation, painful urination or blood in    urine. Denies incontinence. No problems with intercourse.      MUSCULOSKELETAL: Denies neck or back pain. Denies weakness in arms or legs,    joint problems or distended inflamed veins in legs. Denies swelling or abnormal  glands.      NEUROLOGICAL: Denies tingling, numbness, altered mentation changes to nerve    function in the face, weakness to one or both of the body. Denies changes to    gait and denies multiple falls or accidents.      PSYCHIATRIC: Denies nervousness, anxiety, hallucinations, depression, suicidal    ideation, trouble sleeping or changes in behavior noticed by family.      The patient denies recent foreign travel or recent exposure to chemicals or    products of concern or infectious diseases.       Past Medical History:   Diagnosis Date    Abnormal TSH 6/5/2018    Allergy     Anxiety     Anxiety 12/17/2012    Breast cancer 2012    right    Calcium nephrolithiasis     Complex endometrial hyperplasia without atypia 6/16/2016    Degenerative disc disease     DJD (degenerative joint disease) of knee     Endometrial hyperplasia, unspecified: see evaluation 2017 5/22/2018    Factor V Leiden mutation No h/o DVT    Kidney stones     Leukopenia 12/19/2012    PMB (postmenopausal bleeding) 3/6/2017    Stroke     questionable  -     Vitamin B 12 deficiency      Past Surgical History:   Procedure Laterality Date    BREAST BIOPSY Right 2012    malignant, radiation    BREAST BIOPSY Right 2014     benign    BREAST LUMPECTOMY Right 12    w/ radiation    BREAST LUMPECTOMY W/ NEEDLE LOCALIZATION      DILATION AND CURETTAGE OF UTERUS       Family History   Problem Relation Age of Onset    Hypertension Father     Coronary artery disease Father     Cancer Father         prostate ca, metastatic    Heart disease Father         stenting    Kidney disease Father         stones    Retinal detachment Father     Colon cancer Paternal Grandfather     Melanoma Mother     Cancer Mother         melanoma    Hyperlipidemia Brother     Keratoconus Brother     No Known Problems Brother     Stroke Maternal Grandmother     Lung cancer Maternal Grandfather     Breast cancer Paternal Grandmother          at age 62    Ovarian cancer Neg Hx     Uterine cancer Neg Hx     Psoriasis Neg Hx     Lupus Neg Hx     Eczema Neg Hx       Social History     Socioeconomic History    Marital status: Single   Occupational History    Occupation: Nurse       Employer: [employer]   Tobacco Use    Smoking status: Never Smoker    Smokeless tobacco: Never Used   Substance and Sexual Activity    Alcohol use: Yes     Comment: Minimal    Drug use: No    Sexual activity: Not Currently     Birth control/protection: None   Other Topics Concern    Are you pregnant or think you may be? No    Breast-feeding No     Social Determinants of Health     Financial Resource Strain: Low Risk     Difficulty of Paying Living Expenses: Not hard at all   Food Insecurity: No Food Insecurity    Worried About Running Out of Food in the Last Year: Never true    Ran Out of Food in the Last Year: Never true   Transportation Needs: No Transportation Needs    Lack of Transportation (Medical): No    Lack of Transportation (Non-Medical): No   Physical Activity: Insufficiently Active    Days of Exercise per Week: 2 days    Minutes of Exercise per Session: 70 min   Stress: No Stress Concern Present    Feeling of Stress  : Only a little   Social Connections: Unknown    Frequency of Communication with Friends and Family: More than three times a week    Frequency of Social Gatherings with Friends and Family: More than three times a week    Active Member of Clubs or Organizations: Yes    Attends Club or Organization Meetings: More than 4 times per year    Marital Status: Never      Review of patient's allergies indicates:   Allergen Reactions    Adhesive Itching and Rash     Micropore/Medipore Tape - paper tape is ok    Iodine and iodide containing products Itching    Latex, natural rubber Itching       Current Outpatient Medications:     cholecalciferol, vitamin D3, (VITAMIN D3) 25 mcg (1,000 unit) capsule, Take 1 capsule (1,000 Units total) by mouth once daily., Disp: 30 capsule, Rfl: 12    cyanocobalamin (VITAMIN B-12) 1000 MCG tablet, Take 1,000 mcg by mouth every other day. , Disp: , Rfl:     loratadine (CLARITIN) 10 mg tablet, Take 10 mg by mouth daily as needed., Disp: , Rfl:     multivitamin (THERAGRAN) per tablet, Take 1 tablet by mouth once daily., Disp: , Rfl:     aspirin (ECOTRIN) 81 MG EC tablet, Take 81 mg by mouth once daily., Disp: , Rfl:     Physical Exam    Wt Readings from Last 3 Encounters:   02/01/22 68.7 kg (151 lb 7.3 oz)   12/03/21 66.2 kg (145 lb 15.1 oz)   11/30/21 65.8 kg (145 lb 1 oz)     Temp Readings from Last 3 Encounters:   02/01/22 96.8 °F (36 °C) (Temporal)   12/03/21 97.7 °F (36.5 °C) (Temporal)   04/18/21 98 °F (36.7 °C) (Oral)     BP Readings from Last 3 Encounters:   02/01/22 111/65   12/03/21 (!) 124/56   11/30/21 114/66     Pulse Readings from Last 3 Encounters:   02/01/22 76   12/03/21 71   10/05/21 63   VITAL SIGNS:  as above   GENERAL: appears well-built, well-nourished.  No anxiety, no agitation, and in no distress.  Patient is awake, alert, oriented and cooperative.  HEENT:  Showed no congestion. Trachea is central no obvious icterus or pallor noted no hoarseness. no  obvious JVD   NECK:  Supple.  No JVD. No obvious cervical submental or supraclavicular adenopathy.  RS:the visualized portion of  Chest expands well. chest appears symmetric, no audible wheezes.  No dyspnea recognized  ABDOMEN:  abdomen appears undistended.  EXTREMITIES:  Without edema.  NEUROLOGICAL:  The patient is appropriate, higher functions are normal.  No  obvious neurological deficits.  normal judgement normal thought content  No confusion, no speech impediment. Cranial nerves are intact and show no deficit. No gross motor deficits noted   SKIN MUSCULOSKELETAL: no joint or skeletal deformity, no clubbing of nails.  No visible rash ecchymosis or petechiae    Lab Results   Component Value Date    WBC 4.43 01/29/2022    HGB 12.3 01/29/2022    HCT 36.9 (L) 01/29/2022     (H) 01/29/2022     01/29/2022       BMP  Lab Results   Component Value Date     01/29/2022    K 4.2 01/29/2022     01/29/2022    CO2 26 01/29/2022    BUN 14 01/29/2022    CREATININE 0.8 01/29/2022    CALCIUM 9.7 01/29/2022    ANIONGAP 10 01/29/2022    ESTGFRAFRICA >60 01/29/2022    EGFRNONAA >60 01/29/2022     Lab Results   Component Value Date    IRON 91 01/29/2022    TIBC 332 01/29/2022    FERRITIN 168 01/29/2022         Patient Active Problem List   Diagnosis    Factor V Leiden mutation    Calculus of ureter: 2009; also 2021    Estrogen receptor positive status (ER+)    Anxiety    Vitamin B 12 deficiency    Degenerative disc disease    Mild vitamin D deficiency    Congenital neutropenia    Malignant neoplasm of right breast, stage 1    Thrombocytopenia    Endometrial hyperplasia, unspecified: see evaluation [id number]    Family history of malignant melanoma    Ganglion cyst of foot    Fatty liver:  See ultrasound May 2021        Assessment and Plan     Mild anemia, macrocytosis, thrombocytopenia and neutropenia: pt prefers not to have bone marrow and due to chronic stable cytopenias , possibly can be  observed till progression is identified   she will follow with pcp and rtc as needed [place] if cytopenias become progressive   pt taking good self care with diet      Hx of 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 The sentinel lymph node was negative. Her tumor was strongly ER positive, CT positive, and HER-2 negative. She received adjuvant radiation therapy but decided AGAINST tamoxifen therapy because she has documented factor V Leiden mutation and a questionable history of TIA. She was on low-dose aspirin. - NAD   most recnt mammogram 5/21 next due 5/22

## 2022-03-08 ENCOUNTER — CLINICAL SUPPORT (OUTPATIENT)
Dept: OTHER | Facility: CLINIC | Age: 62
End: 2022-03-08
Payer: COMMERCIAL

## 2022-03-08 DIAGNOSIS — Z00.8 ENCOUNTER FOR OTHER GENERAL EXAMINATION: ICD-10-CM

## 2022-03-09 VITALS — HEIGHT: 69 IN | BODY MASS INDEX: 22.37 KG/M2

## 2022-03-09 LAB
HDLC SERPL-MCNC: 74 MG/DL
POC CHOLESTEROL, LDL (DOCK): 89 MG/DL
POC CHOLESTEROL, TOTAL: 172 MG/DL
POC GLUCOSE, FASTING: 89 MG/DL (ref 60–110)
TRIGL SERPL-MCNC: 45 MG/DL

## 2022-04-06 ENCOUNTER — TELEPHONE (OUTPATIENT)
Dept: DERMATOLOGY | Facility: CLINIC | Age: 62
End: 2022-04-06
Payer: COMMERCIAL

## 2022-04-06 NOTE — TELEPHONE ENCOUNTER
Spoke with patient and tried to reschedule appointment - patient was very upset and stated she made this appointment months ago and has scheduled her day around this appointment and she needs to be seen on this day preferable in the am.    Where can I double book her that morning?

## 2022-04-28 ENCOUNTER — IMMUNIZATION (OUTPATIENT)
Dept: PRIMARY CARE CLINIC | Facility: CLINIC | Age: 62
End: 2022-04-28
Payer: COMMERCIAL

## 2022-04-28 DIAGNOSIS — Z23 NEED FOR VACCINATION: Primary | ICD-10-CM

## 2022-04-28 PROCEDURE — 0054A COVID-19, MRNA, LNP-S, PF, 30 MCG/0.3 ML DOSE VACCINE (PFIZER): CPT | Mod: S$GLB,,, | Performed by: FAMILY MEDICINE

## 2022-04-28 PROCEDURE — 0054A COVID-19, MRNA, LNP-S, PF, 30 MCG/0.3 ML DOSE VACCINE (PFIZER): ICD-10-PCS | Mod: S$GLB,,, | Performed by: FAMILY MEDICINE

## 2022-04-28 PROCEDURE — 91305 COVID-19, MRNA, LNP-S, PF, 30 MCG/0.3 ML DOSE VACCINE (PFIZER): ICD-10-PCS | Mod: S$GLB,,, | Performed by: FAMILY MEDICINE

## 2022-04-28 PROCEDURE — 91305 COVID-19, MRNA, LNP-S, PF, 30 MCG/0.3 ML DOSE VACCINE (PFIZER): CPT | Mod: S$GLB,,, | Performed by: FAMILY MEDICINE

## 2022-05-31 ENCOUNTER — HOSPITAL ENCOUNTER (OUTPATIENT)
Dept: RADIOLOGY | Facility: CLINIC | Age: 62
Discharge: HOME | End: 2022-05-31
Attending: OBSTETRICS & GYNECOLOGY
Payer: COMMERCIAL

## 2022-05-31 ENCOUNTER — OFFICE VISIT (OUTPATIENT)
Dept: DERMATOLOGY | Facility: CLINIC | Age: 62
End: 2022-05-31
Payer: COMMERCIAL

## 2022-05-31 VITALS — BODY MASS INDEX: 22.36 KG/M2 | HEIGHT: 69 IN | WEIGHT: 151 LBS

## 2022-05-31 DIAGNOSIS — D22.9 MULTIPLE BENIGN NEVI: ICD-10-CM

## 2022-05-31 DIAGNOSIS — Z12.39 ENCOUNTER FOR SCREENING FOR MALIGNANT NEOPLASM OF BREAST, UNSPECIFIED SCREENING MODALITY: ICD-10-CM

## 2022-05-31 DIAGNOSIS — Z80.8 FAMILY HISTORY OF MALIGNANT MELANOMA: ICD-10-CM

## 2022-05-31 DIAGNOSIS — L81.4 SOLAR LENTIGO: ICD-10-CM

## 2022-05-31 DIAGNOSIS — L82.1 SEBORRHEIC KERATOSES: Primary | ICD-10-CM

## 2022-05-31 DIAGNOSIS — D18.01 CHERRY ANGIOMA: ICD-10-CM

## 2022-05-31 DIAGNOSIS — D23.9 DERMATOFIBROMA: ICD-10-CM

## 2022-05-31 PROCEDURE — 99213 OFFICE O/P EST LOW 20 MIN: CPT | Mod: S$GLB,,, | Performed by: DERMATOLOGY

## 2022-05-31 PROCEDURE — 99213 PR OFFICE/OUTPT VISIT, EST, LEVL III, 20-29 MIN: ICD-10-PCS | Mod: S$GLB,,, | Performed by: DERMATOLOGY

## 2022-05-31 PROCEDURE — 99999 PR PBB SHADOW E&M-EST. PATIENT-LVL III: ICD-10-PCS | Mod: PBBFAC,,, | Performed by: DERMATOLOGY

## 2022-05-31 PROCEDURE — 77063 MAMMO DIGITAL SCREENING BILAT WITH TOMO: ICD-10-PCS | Mod: 26,,, | Performed by: RADIOLOGY

## 2022-05-31 PROCEDURE — 1160F RVW MEDS BY RX/DR IN RCRD: CPT | Mod: CPTII,S$GLB,, | Performed by: DERMATOLOGY

## 2022-05-31 PROCEDURE — 3008F BODY MASS INDEX DOCD: CPT | Mod: CPTII,S$GLB,, | Performed by: DERMATOLOGY

## 2022-05-31 PROCEDURE — 77067 SCR MAMMO BI INCL CAD: CPT | Mod: 26,,, | Performed by: RADIOLOGY

## 2022-05-31 PROCEDURE — 1160F PR REVIEW ALL MEDS BY PRESCRIBER/CLIN PHARMACIST DOCUMENTED: ICD-10-PCS | Mod: CPTII,S$GLB,, | Performed by: DERMATOLOGY

## 2022-05-31 PROCEDURE — 77067 SCR MAMMO BI INCL CAD: CPT | Mod: TC,PO

## 2022-05-31 PROCEDURE — 99999 PR PBB SHADOW E&M-EST. PATIENT-LVL III: CPT | Mod: PBBFAC,,, | Performed by: DERMATOLOGY

## 2022-05-31 PROCEDURE — 77067 MAMMO DIGITAL SCREENING BILAT WITH TOMO: ICD-10-PCS | Mod: 26,,, | Performed by: RADIOLOGY

## 2022-05-31 PROCEDURE — 77063 BREAST TOMOSYNTHESIS BI: CPT | Mod: 26,,, | Performed by: RADIOLOGY

## 2022-05-31 PROCEDURE — 3008F PR BODY MASS INDEX (BMI) DOCUMENTED: ICD-10-PCS | Mod: CPTII,S$GLB,, | Performed by: DERMATOLOGY

## 2022-05-31 PROCEDURE — 1159F PR MEDICATION LIST DOCUMENTED IN MEDICAL RECORD: ICD-10-PCS | Mod: CPTII,S$GLB,, | Performed by: DERMATOLOGY

## 2022-05-31 PROCEDURE — 1159F MED LIST DOCD IN RCRD: CPT | Mod: CPTII,S$GLB,, | Performed by: DERMATOLOGY

## 2022-05-31 NOTE — PROGRESS NOTES
Subjective:       Patient ID:  Noelle Rivas is a 61 y.o. female who presents for   Chief Complaint   Patient presents with    Skin Check     TBSC       LOV- 3/26/21- sk, nevi, DF, lentigo    Here today for a TBSC    Has no hx of NMSC  Has fhx of MM- mother     Current Outpatient Medications:     aspirin (ECOTRIN) 81 MG EC tablet, Take 81 mg by mouth once daily., Disp: , Rfl:     cholecalciferol, vitamin D3, (VITAMIN D3) 25 mcg (1,000 unit) capsule, Take 1 capsule (1,000 Units total) by mouth once daily., Disp: 30 capsule, Rfl: 12    cyanocobalamin (VITAMIN B-12) 1000 MCG tablet, Take 1,000 mcg by mouth every other day. , Disp: , Rfl:     loratadine (CLARITIN) 10 mg tablet, Take 10 mg by mouth daily as needed., Disp: , Rfl:     multivitamin (THERAGRAN) per tablet, Take 1 tablet by mouth once daily., Disp: , Rfl:         Review of Systems   Constitutional: Negative for fever, chills and fatigue.   Skin: Positive for activity-related sunscreen use. Negative for daily sunscreen use and wears hat.   Hematologic/Lymphatic: Does not bruise/bleed easily.        Objective:    Physical Exam   Constitutional: She appears well-developed and well-nourished. No distress.   Neurological: She is alert and oriented to person, place, and time. She is not disoriented.   Psychiatric: She has a normal mood and affect.   Skin:   Areas Examined (abnormalities noted in diagram):   Scalp / Hair Palpated and Inspected  Head / Face Inspection Performed  Neck Inspection Performed  Chest / Axilla Inspection Performed  Abdomen Inspection Performed  Genitals / Buttocks / Groin Inspection Performed  Back Inspection Performed  RUE Inspected  LUE Inspection Performed  RLE Inspected  LLE Inspection Performed  Nails and Digits Inspection Performed                   Diagram Legend     Erythematous scaling macule/papule c/w actinic keratosis       Vascular papule c/w angioma      Pigmented verrucoid papule/plaque c/w seborrheic  keratosis      Yellow umbilicated papule c/w sebaceous hyperplasia      Irregularly shaped tan macule c/w lentigo     1-2 mm smooth white papules consistent with Milia      Movable subcutaneous cyst with punctum c/w epidermal inclusion cyst      Subcutaneous movable cyst c/w pilar cyst      Firm pink to brown papule c/w dermatofibroma      Pedunculated fleshy papule(s) c/w skin tag(s)      Evenly pigmented macule c/w junctional nevus     Mildly variegated pigmented, slightly irregular-bordered macule c/w mildly atypical nevus      Flesh colored to evenly pigmented papule c/w intradermal nevus       Pink pearly papule/plaque c/w basal cell carcinoma      Erythematous hyperkeratotic cursted plaque c/w SCC      Surgical scar with no sign of skin cancer recurrence      Open and closed comedones      Inflammatory papules and pustules      Verrucoid papule consistent consistent with wart     Erythematous eczematous patches and plaques     Dystrophic onycholytic nail with subungual debris c/w onychomycosis     Umbilicated papule    Erythematous-base heme-crusted tan verrucoid plaque consistent with inflamed seborrheic keratosis     Erythematous Silvery Scaling Plaque c/w Psoriasis     See annotation      Assessment / Plan:        Seborrheic keratoses  These are benign inherited growths without a malignant potential. Reassurance given to patient. No treatment is necessary.     Solar lentigo  This is a benign hyperpigmented sun induced lesion. Daily sun protection will reduce the number of new lesions. Treatment of these benign lesions are considered cosmetic.    Family history of malignant melanoma, mother  Total body skin examination performed today including at least 12 points as noted in physical examination. No lesions suspicious for malignancy noted.  Sun protection, self exams, annual TBSE with board certified dermatologist    Cherry angioma  This is a benign vascular lesion. Reassurance given. No treatment required.      Dermatofibroma  This is a benign scar-like lesion secondary to minor trauma. No treatment required.     Multiple benign nevi  Careful dermoscopy evaluation of nevi performed with none identified as needing biopsy today  Monitor for new mole or moles that are becoming bigger, darker, irritated, or developing irregular borders.     Patient instructed in importance in daily broad spectrum sun protection of at least spf 30. Mineral sunscreen ingredients preferred (Zinc +/- Titanium) and can be found OTC.   Recommend Elta MD for daily use on face and neck.  Patient encouraged to wear hat for all outdoor exposure.   Also discussed sun avoidance and use of protective clothing.               No follow-ups on file.

## 2022-07-12 ENCOUNTER — LAB VISIT (OUTPATIENT)
Dept: LAB | Facility: HOSPITAL | Age: 62
End: 2022-07-12
Attending: INTERNAL MEDICINE
Payer: COMMERCIAL

## 2022-07-12 ENCOUNTER — OFFICE VISIT (OUTPATIENT)
Dept: INTERNAL MEDICINE | Facility: CLINIC | Age: 62
End: 2022-07-12
Payer: COMMERCIAL

## 2022-07-12 VITALS
DIASTOLIC BLOOD PRESSURE: 70 MMHG | HEIGHT: 69 IN | BODY MASS INDEX: 22.51 KG/M2 | SYSTOLIC BLOOD PRESSURE: 120 MMHG | WEIGHT: 152 LBS

## 2022-07-12 DIAGNOSIS — D68.51 FACTOR V LEIDEN MUTATION: ICD-10-CM

## 2022-07-12 DIAGNOSIS — Z00.00 ANNUAL PHYSICAL EXAM: Primary | ICD-10-CM

## 2022-07-12 DIAGNOSIS — N85.00 ENDOMETRIAL HYPERPLASIA, UNSPECIFIED: ICD-10-CM

## 2022-07-12 DIAGNOSIS — E55.9 MILD VITAMIN D DEFICIENCY: ICD-10-CM

## 2022-07-12 DIAGNOSIS — N20.1 CALCULUS OF URETER: ICD-10-CM

## 2022-07-12 DIAGNOSIS — E53.8 VITAMIN B 12 DEFICIENCY: ICD-10-CM

## 2022-07-12 DIAGNOSIS — Z80.8 FAMILY HISTORY OF MALIGNANT MELANOMA: ICD-10-CM

## 2022-07-12 DIAGNOSIS — C50.911 MALIGNANT NEOPLASM OF RIGHT BREAST, STAGE 1, ESTROGEN RECEPTOR POSITIVE: ICD-10-CM

## 2022-07-12 DIAGNOSIS — Z17.0 MALIGNANT NEOPLASM OF RIGHT BREAST, STAGE 1, ESTROGEN RECEPTOR POSITIVE: ICD-10-CM

## 2022-07-12 DIAGNOSIS — D70.0 CONGENITAL NEUTROPENIA: ICD-10-CM

## 2022-07-12 DIAGNOSIS — Z00.00 ANNUAL PHYSICAL EXAM: ICD-10-CM

## 2022-07-12 DIAGNOSIS — Z17.0 ESTROGEN RECEPTOR POSITIVE STATUS (ER+): ICD-10-CM

## 2022-07-12 LAB
25(OH)D3+25(OH)D2 SERPL-MCNC: 47 NG/ML (ref 30–96)
ALBUMIN SERPL BCP-MCNC: 4.3 G/DL (ref 3.5–5.2)
ALP SERPL-CCNC: 58 U/L (ref 55–135)
ALT SERPL W/O P-5'-P-CCNC: 20 U/L (ref 10–44)
ANION GAP SERPL CALC-SCNC: 9 MMOL/L (ref 8–16)
AST SERPL-CCNC: 26 U/L (ref 10–40)
BASOPHILS # BLD AUTO: 0.05 K/UL (ref 0–0.2)
BASOPHILS NFR BLD: 1.3 % (ref 0–1.9)
BILIRUB SERPL-MCNC: 0.6 MG/DL (ref 0.1–1)
BUN SERPL-MCNC: 12 MG/DL (ref 6–20)
CALCIUM SERPL-MCNC: 9.8 MG/DL (ref 8.7–10.5)
CHLORIDE SERPL-SCNC: 105 MMOL/L (ref 95–110)
CO2 SERPL-SCNC: 25 MMOL/L (ref 23–29)
CREAT SERPL-MCNC: 1 MG/DL (ref 0.5–1.4)
DIFFERENTIAL METHOD: ABNORMAL
EOSINOPHIL # BLD AUTO: 0.2 K/UL (ref 0–0.5)
EOSINOPHIL NFR BLD: 5.5 % (ref 0–8)
ERYTHROCYTE [DISTWIDTH] IN BLOOD BY AUTOMATED COUNT: 13.2 % (ref 11.5–14.5)
EST. GFR  (AFRICAN AMERICAN): >60 ML/MIN/1.73 M^2
EST. GFR  (NON AFRICAN AMERICAN): >60 ML/MIN/1.73 M^2
GLUCOSE SERPL-MCNC: 85 MG/DL (ref 70–110)
HCT VFR BLD AUTO: 39.1 % (ref 37–48.5)
HGB BLD-MCNC: 12.7 G/DL (ref 12–16)
IMM GRANULOCYTES # BLD AUTO: 0.01 K/UL (ref 0–0.04)
IMM GRANULOCYTES NFR BLD AUTO: 0.3 % (ref 0–0.5)
LYMPHOCYTES # BLD AUTO: 1.4 K/UL (ref 1–4.8)
LYMPHOCYTES NFR BLD: 36 % (ref 18–48)
MCH RBC QN AUTO: 33.2 PG (ref 27–31)
MCHC RBC AUTO-ENTMCNC: 32.5 G/DL (ref 32–36)
MCV RBC AUTO: 102 FL (ref 82–98)
MONOCYTES # BLD AUTO: 0.4 K/UL (ref 0.3–1)
MONOCYTES NFR BLD: 10.4 % (ref 4–15)
NEUTROPHILS # BLD AUTO: 1.8 K/UL (ref 1.8–7.7)
NEUTROPHILS NFR BLD: 46.5 % (ref 38–73)
NRBC BLD-RTO: 0 /100 WBC
PLATELET # BLD AUTO: 167 K/UL (ref 150–450)
PMV BLD AUTO: 10.4 FL (ref 9.2–12.9)
POTASSIUM SERPL-SCNC: 3.7 MMOL/L (ref 3.5–5.1)
PROT SERPL-MCNC: 6.7 G/DL (ref 6–8.4)
RBC # BLD AUTO: 3.82 M/UL (ref 4–5.4)
SODIUM SERPL-SCNC: 139 MMOL/L (ref 136–145)
TSH SERPL DL<=0.005 MIU/L-ACNC: 1.78 UIU/ML (ref 0.4–4)
VIT B12 SERPL-MCNC: 778 PG/ML (ref 210–950)
WBC # BLD AUTO: 3.83 K/UL (ref 3.9–12.7)

## 2022-07-12 PROCEDURE — 99999 PR PBB SHADOW E&M-EST. PATIENT-LVL IV: CPT | Mod: PBBFAC,,, | Performed by: INTERNAL MEDICINE

## 2022-07-12 PROCEDURE — 99999 PR PBB SHADOW E&M-EST. PATIENT-LVL IV: ICD-10-PCS | Mod: PBBFAC,,, | Performed by: INTERNAL MEDICINE

## 2022-07-12 PROCEDURE — 3078F DIAST BP <80 MM HG: CPT | Mod: CPTII,S$GLB,, | Performed by: INTERNAL MEDICINE

## 2022-07-12 PROCEDURE — 3074F SYST BP LT 130 MM HG: CPT | Mod: CPTII,S$GLB,, | Performed by: INTERNAL MEDICINE

## 2022-07-12 PROCEDURE — 3074F PR MOST RECENT SYSTOLIC BLOOD PRESSURE < 130 MM HG: ICD-10-PCS | Mod: CPTII,S$GLB,, | Performed by: INTERNAL MEDICINE

## 2022-07-12 PROCEDURE — 85025 COMPLETE CBC W/AUTO DIFF WBC: CPT | Performed by: INTERNAL MEDICINE

## 2022-07-12 PROCEDURE — 1159F MED LIST DOCD IN RCRD: CPT | Mod: CPTII,S$GLB,, | Performed by: INTERNAL MEDICINE

## 2022-07-12 PROCEDURE — 99396 PR PREVENTIVE VISIT,EST,40-64: ICD-10-PCS | Mod: S$GLB,,, | Performed by: INTERNAL MEDICINE

## 2022-07-12 PROCEDURE — 82607 VITAMIN B-12: CPT | Performed by: INTERNAL MEDICINE

## 2022-07-12 PROCEDURE — 84443 ASSAY THYROID STIM HORMONE: CPT | Performed by: INTERNAL MEDICINE

## 2022-07-12 PROCEDURE — 36415 COLL VENOUS BLD VENIPUNCTURE: CPT | Performed by: INTERNAL MEDICINE

## 2022-07-12 PROCEDURE — 99396 PREV VISIT EST AGE 40-64: CPT | Mod: S$GLB,,, | Performed by: INTERNAL MEDICINE

## 2022-07-12 PROCEDURE — 1159F PR MEDICATION LIST DOCUMENTED IN MEDICAL RECORD: ICD-10-PCS | Mod: CPTII,S$GLB,, | Performed by: INTERNAL MEDICINE

## 2022-07-12 PROCEDURE — 82306 VITAMIN D 25 HYDROXY: CPT | Performed by: INTERNAL MEDICINE

## 2022-07-12 PROCEDURE — 3008F PR BODY MASS INDEX (BMI) DOCUMENTED: ICD-10-PCS | Mod: CPTII,S$GLB,, | Performed by: INTERNAL MEDICINE

## 2022-07-12 PROCEDURE — 3078F PR MOST RECENT DIASTOLIC BLOOD PRESSURE < 80 MM HG: ICD-10-PCS | Mod: CPTII,S$GLB,, | Performed by: INTERNAL MEDICINE

## 2022-07-12 PROCEDURE — 80053 COMPREHEN METABOLIC PANEL: CPT | Performed by: INTERNAL MEDICINE

## 2022-07-12 PROCEDURE — 3008F BODY MASS INDEX DOCD: CPT | Mod: CPTII,S$GLB,, | Performed by: INTERNAL MEDICINE

## 2022-07-12 NOTE — PROGRESS NOTES
Subjective:       Patient ID: Noelle Rivas is a 61 y.o. female.    Chief Complaint: Annual Exam    Annual exam    Due for Pap smear.  Recall she formally followed in gyn Oncology but apparently has been discharged not thought to need GYN Oncology.  Was last seen in GYN November of 2021.    History of breast cancer, has been discharged from the Breast Clinic.      Cytopenias, has followed in Hematology periodically.    Renal stones, following in Urology, may need ESWL- due for ultrasound.    Feels great, exercising, eating really healthily.    Dermatology May 2022  Urology February 2022  Hematology-Oncology February 2022  Gyn November 2021  Optometry October 2021  Gyn Oncology November 2020    Patient Active Problem List:     Factor V Leiden mutation     Calculus of ureter: 2009; also 2021     Estrogen receptor positive status (ER+)     Anxiety     Vitamin B 12 deficiency     Degenerative disc disease     Mild vitamin D deficiency     Congenital neutropenia     Malignant neoplasm of right breast, stage 1, estrogen receptor positive     Endometrial hyperplasia, unspecified: see evaluation [id number]     Family history of malignant melanoma     Ganglion cyst of foot     Fatty liver:  See ultrasound May 2021                   Review of Systems   Constitutional: Negative for activity change and unexpected weight change.   HENT: Negative for hearing loss, rhinorrhea and trouble swallowing.    Eyes: Negative for discharge and visual disturbance.   Respiratory: Negative for chest tightness and wheezing.    Cardiovascular: Negative for chest pain and palpitations.   Gastrointestinal: Negative for blood in stool, constipation, diarrhea and vomiting.   Endocrine: Negative for polydipsia and polyuria.   Genitourinary: Negative for difficulty urinating, dysuria, hematuria and menstrual problem.   Musculoskeletal: Negative for arthralgias, joint swelling and neck pain.   Neurological: Negative for weakness and headaches.    Psychiatric/Behavioral: Negative for confusion and dysphoric mood.       Objective:      Physical Exam  Vitals and nursing note reviewed.   Constitutional:       Appearance: She is well-developed.   HENT:      Head: Normocephalic and atraumatic.      Right Ear: External ear normal.      Left Ear: External ear normal.      Nose: Nose normal.      Mouth/Throat:      Pharynx: No oropharyngeal exudate.   Eyes:      General: No scleral icterus.     Extraocular Movements: Extraocular movements intact.      Conjunctiva/sclera: Conjunctivae normal.   Neck:      Thyroid: No thyromegaly.      Vascular: No JVD.   Cardiovascular:      Rate and Rhythm: Normal rate and regular rhythm.      Heart sounds: Normal heart sounds. No murmur heard.    No gallop.   Pulmonary:      Effort: Pulmonary effort is normal. No respiratory distress.      Breath sounds: Normal breath sounds. No wheezing.   Abdominal:      General: Bowel sounds are normal. There is no distension.      Palpations: Abdomen is soft. There is no mass.      Tenderness: There is no abdominal tenderness. There is no guarding or rebound.   Musculoskeletal:         General: No tenderness. Normal range of motion.      Cervical back: Normal range of motion and neck supple.   Lymphadenopathy:      Cervical: No cervical adenopathy.   Skin:     General: Skin is warm.      Findings: No erythema or rash.   Neurological:      General: No focal deficit present.      Mental Status: She is alert and oriented to person, place, and time.      Cranial Nerves: No cranial nerve deficit.      Coordination: Coordination normal.   Psychiatric:         Behavior: Behavior normal.         Thought Content: Thought content normal.         Judgment: Judgment normal.         Assessment:       1. Annual physical exam    2. Vitamin B 12 deficiency    3. Mild vitamin D deficiency    4. Endometrial hyperplasia, unspecified: see evaluation [id number]    5. Calculus of ureter: 2009; also 2021    6. Factor  V Leiden mutation    7. Malignant neoplasm of right breast, stage 1, estrogen receptor positive    8. Family history of malignant melanoma    9. Estrogen receptor positive status (ER+)    10. Congenital neutropenia        Plan:           The 10-year ASCVD risk score (Roland MEI Jr., et al., 2013) is: 2.3%    Values used to calculate the score:      Age: 61 years      Sex: Female      Is Non- : No      Diabetic: No      Tobacco smoker: No      Systolic Blood Pressure: 120 mmHg      Is BP treated: No      HDL Cholesterol: 53 mg/dL      Total Cholesterol: 145 mg/dL     Noelle was seen today for annual exam.    Diagnoses and all orders for this visit:    Annual physical exam  -     CBC Auto Differential; Future  -     Comprehensive Metabolic Panel; Future  -     TSH; Future  -     Vitamin B12; Future  -     Vitamin D; Future  -     Ambulatory referral/consult to Gynecology; Future    Vitamin B 12 deficiency    Mild vitamin D deficiency    Endometrial hyperplasia, unspecified: see evaluation [id number]  -     US Pelvis Complete Non OB; Future    Calculus of ureter: 2009; also 2021: keep Urology follow up    Factor V Leiden mutation    Malignant neoplasm of right breast, stage 1, estrogen receptor positive: mammogram acceptable 2022; keep GYN follow up    Family history of malignant melanoma: keep DERM follow up    Estrogen receptor positive status (ER+): mammogram acceptable 2022; keep GYN follow up    Congenital neutropenia    Labs and review  Schedule GYN  Continue with exercise and healthy habits  Urology follow up for stones

## 2022-07-13 ENCOUNTER — TELEPHONE (OUTPATIENT)
Dept: UROLOGY | Facility: CLINIC | Age: 62
End: 2022-07-13
Payer: COMMERCIAL

## 2022-07-13 NOTE — TELEPHONE ENCOUNTER
Spoke with patient to reschedule appointment on 8/11 with . patient states she is on a zoom call and will need a call back. Asked patient if appointment can be rescheduled and she review on portal and if appointment doesn't work can send a portal message. Patient declined and disconnected call.

## 2022-07-30 ENCOUNTER — HOSPITAL ENCOUNTER (OUTPATIENT)
Dept: RADIOLOGY | Facility: HOSPITAL | Age: 62
Discharge: HOME | End: 2022-07-30
Attending: UROLOGY
Payer: COMMERCIAL

## 2022-07-30 DIAGNOSIS — N20.0 KIDNEY STONES: ICD-10-CM

## 2022-07-30 PROCEDURE — 74018 RADEX ABDOMEN 1 VIEW: CPT | Mod: TC,FY

## 2022-07-30 PROCEDURE — 76770 US EXAM ABDO BACK WALL COMP: CPT | Mod: 26,,, | Performed by: RADIOLOGY

## 2022-07-30 PROCEDURE — 74018 RADEX ABDOMEN 1 VIEW: CPT | Mod: 26,,, | Performed by: RADIOLOGY

## 2022-07-30 PROCEDURE — 74018 XR ABDOMEN AP 1 VIEW: ICD-10-PCS | Mod: 26,,, | Performed by: RADIOLOGY

## 2022-07-30 PROCEDURE — 76770 US EXAM ABDO BACK WALL COMP: CPT | Mod: TC

## 2022-07-30 PROCEDURE — 76770 US RETROPERITONEAL COMPLETE: ICD-10-PCS | Mod: 26,,, | Performed by: RADIOLOGY

## 2022-08-02 ENCOUNTER — PATIENT MESSAGE (OUTPATIENT)
Dept: OPTOMETRY | Facility: CLINIC | Age: 62
End: 2022-08-02
Payer: COMMERCIAL

## 2022-08-03 ENCOUNTER — OFFICE VISIT (OUTPATIENT)
Dept: OPHTHALMOLOGY | Facility: CLINIC | Age: 62
End: 2022-08-03
Payer: COMMERCIAL

## 2022-08-03 DIAGNOSIS — H43.812 POSTERIOR VITREOUS DETACHMENT, LEFT EYE: Primary | ICD-10-CM

## 2022-08-03 DIAGNOSIS — H25.13 AGE-RELATED NUCLEAR CATARACT OF BOTH EYES: ICD-10-CM

## 2022-08-03 DIAGNOSIS — H43.821 VITREOMACULAR ADHESION, RIGHT EYE: ICD-10-CM

## 2022-08-03 PROCEDURE — 1159F MED LIST DOCD IN RCRD: CPT | Mod: CPTII,S$GLB,, | Performed by: OPHTHALMOLOGY

## 2022-08-03 PROCEDURE — 1160F RVW MEDS BY RX/DR IN RCRD: CPT | Mod: CPTII,S$GLB,, | Performed by: OPHTHALMOLOGY

## 2022-08-03 PROCEDURE — 99999 PR PBB SHADOW E&M-EST. PATIENT-LVL III: CPT | Mod: PBBFAC,,, | Performed by: OPHTHALMOLOGY

## 2022-08-03 PROCEDURE — 99999 PR PBB SHADOW E&M-EST. PATIENT-LVL III: ICD-10-PCS | Mod: PBBFAC,,, | Performed by: OPHTHALMOLOGY

## 2022-08-03 PROCEDURE — 92201 OPSCPY EXTND RTA DRAW UNI/BI: CPT | Mod: S$GLB,,, | Performed by: OPHTHALMOLOGY

## 2022-08-03 PROCEDURE — 99204 PR OFFICE/OUTPT VISIT, NEW, LEVL IV, 45-59 MIN: ICD-10-PCS | Mod: S$GLB,,, | Performed by: OPHTHALMOLOGY

## 2022-08-03 PROCEDURE — 92134 CPTRZ OPH DX IMG PST SGM RTA: CPT | Mod: S$GLB,,, | Performed by: OPHTHALMOLOGY

## 2022-08-03 PROCEDURE — 1159F PR MEDICATION LIST DOCUMENTED IN MEDICAL RECORD: ICD-10-PCS | Mod: CPTII,S$GLB,, | Performed by: OPHTHALMOLOGY

## 2022-08-03 PROCEDURE — 99204 OFFICE O/P NEW MOD 45 MIN: CPT | Mod: S$GLB,,, | Performed by: OPHTHALMOLOGY

## 2022-08-03 PROCEDURE — 92134 OCT, RETINA - OU - BOTH EYES: ICD-10-PCS | Mod: S$GLB,,, | Performed by: OPHTHALMOLOGY

## 2022-08-03 PROCEDURE — 1160F PR REVIEW ALL MEDS BY PRESCRIBER/CLIN PHARMACIST DOCUMENTED: ICD-10-PCS | Mod: CPTII,S$GLB,, | Performed by: OPHTHALMOLOGY

## 2022-08-03 PROCEDURE — 92201 PR OPHTHALMOSCOPY, EXT, W/RET DRAW/SCLERAL DEPR, I&R, UNI/BI: ICD-10-PCS | Mod: S$GLB,,, | Performed by: OPHTHALMOLOGY

## 2022-08-03 NOTE — PROGRESS NOTES
HPI     Pt states was doing yoga on Monday. States she started seeing FOL OS that   evening. Does a lot of heavy lifting while taking care of elderly parent.   Pt states now seeing spider webs and a lot of floaters. States feels like   something is in eye. Using Refresh PRN.   States thinks it is getting better some.     Last edited by Jolene Issa on 8/3/2022 10:28 AM. (History)         A/P    ICD-10-CM ICD-9-CM   1. Posterior vitreous detachment, left eye  H43.812 379.21   2. Vitreomacular adhesion, right eye  H43.821 379.27   3. Age-related nuclear cataract of both eyes  H25.13 366.16       1. Posterior vitreous detachment, left eye  2. Vitreomacular adhesion, right eye   Eval for new floaters, occ flash OS  Started Monday after yoga , no trauma    Exam notable for VMA OD, PVD OS, no RT/RD with  OU  Plan: Observation  Pathology of PVD, Retinal Tear, Retinal Detachment reviewed in great detail  RD precautions discussed in detail, patient expressed understanding  RTC immediately PRN (especially ANY change flashes, floaters, vision, visual field)     Avoid heavy lifting, straining or bending    3. Age-related nuclear cataract of both eyes  Mild NS, NVS  Plan: Observation    RTC 1 mo DFE/OCTm OS      I saw and examined the patient and reviewed in detail the findings documented. The final examination findings, image interpretations, and plan as documented in the record represent my personal judgment and conclusions.    Felice Phillips MD  Vitreoretinal Surgery   [facility]

## 2022-08-04 ENCOUNTER — PATIENT MESSAGE (OUTPATIENT)
Dept: OPHTHALMOLOGY | Facility: CLINIC | Age: 62
End: 2022-08-04
Payer: COMMERCIAL

## 2022-08-05 DIAGNOSIS — N20.0 KIDNEY STONES: Primary | ICD-10-CM

## 2022-08-06 NOTE — PROGRESS NOTES
Let pt know the results of their findings.   We will review any questions the patient has at their follow up  Let them know the follow up date  Also let the patient know the results of the other tests that were done and ordered by me when you call  If they have any questions regarding if any other tests need to be done review the plan from the last note by me

## 2022-08-27 ENCOUNTER — HOSPITAL ENCOUNTER (OUTPATIENT)
Dept: RADIOLOGY | Facility: HOSPITAL | Age: 62
Discharge: HOME | End: 2022-08-27
Attending: UROLOGY
Payer: COMMERCIAL

## 2022-08-27 DIAGNOSIS — N20.0 KIDNEY STONES: ICD-10-CM

## 2022-08-27 PROCEDURE — 74176 CT RENAL STONE STUDY ABD PELVIS WO: ICD-10-PCS | Mod: 26,,, | Performed by: RADIOLOGY

## 2022-08-27 PROCEDURE — 74176 CT ABD & PELVIS W/O CONTRAST: CPT | Mod: 26,,, | Performed by: RADIOLOGY

## 2022-08-27 PROCEDURE — 74176 CT ABD & PELVIS W/O CONTRAST: CPT | Mod: TC

## 2022-08-30 ENCOUNTER — OFFICE VISIT (OUTPATIENT)
Dept: UROLOGY | Facility: CLINIC | Age: 62
End: 2022-08-30
Payer: COMMERCIAL

## 2022-08-30 VITALS
HEART RATE: 74 BPM | HEIGHT: 69 IN | DIASTOLIC BLOOD PRESSURE: 66 MMHG | BODY MASS INDEX: 22.07 KG/M2 | WEIGHT: 149 LBS | SYSTOLIC BLOOD PRESSURE: 112 MMHG

## 2022-08-30 DIAGNOSIS — N20.0 NEPHROLITHIASIS: ICD-10-CM

## 2022-08-30 DIAGNOSIS — N22 CALCULUS OF URINARY TRACT IN DISEASES CLASSIFIED ELSEWHERE: Primary | ICD-10-CM

## 2022-08-30 DIAGNOSIS — N13.30 HYDRONEPHROSIS, UNSPECIFIED HYDRONEPHROSIS TYPE: ICD-10-CM

## 2022-08-30 LAB
BACTERIA #/AREA URNS HPF: NORMAL /HPF
BILIRUB SERPL-MCNC: NEGATIVE MG/DL
BLOOD URINE, POC: NEGATIVE
CLARITY, POC UA: CLEAR
COLOR, POC UA: YELLOW
GLUCOSE UR QL STRIP: NEGATIVE
KETONES UR QL STRIP: NEGATIVE
LEUKOCYTE ESTERASE URINE, POC: ABNORMAL
MICROSCOPIC COMMENT: NORMAL
NITRITE, POC UA: NEGATIVE
PH, POC UA: 7
PROTEIN, POC: NEGATIVE
SPECIFIC GRAVITY, POC UA: 1.02
UROBILINOGEN, POC UA: 1
WBC #/AREA URNS HPF: 4 /HPF (ref 0–5)

## 2022-08-30 PROCEDURE — 1160F PR REVIEW ALL MEDS BY PRESCRIBER/CLIN PHARMACIST DOCUMENTED: ICD-10-PCS | Mod: CPTII,S$GLB,, | Performed by: UROLOGY

## 2022-08-30 PROCEDURE — 87086 URINE CULTURE/COLONY COUNT: CPT | Performed by: UROLOGY

## 2022-08-30 PROCEDURE — 1159F PR MEDICATION LIST DOCUMENTED IN MEDICAL RECORD: ICD-10-PCS | Mod: CPTII,S$GLB,, | Performed by: UROLOGY

## 2022-08-30 PROCEDURE — 99215 PR OFFICE/OUTPT VISIT, EST, LEVL V, 40-54 MIN: ICD-10-PCS | Mod: S$GLB,,, | Performed by: UROLOGY

## 2022-08-30 PROCEDURE — 99999 PR PBB SHADOW E&M-EST. PATIENT-LVL IV: ICD-10-PCS | Mod: PBBFAC,,, | Performed by: UROLOGY

## 2022-08-30 PROCEDURE — 3078F PR MOST RECENT DIASTOLIC BLOOD PRESSURE < 80 MM HG: ICD-10-PCS | Mod: CPTII,S$GLB,, | Performed by: UROLOGY

## 2022-08-30 PROCEDURE — 3078F DIAST BP <80 MM HG: CPT | Mod: CPTII,S$GLB,, | Performed by: UROLOGY

## 2022-08-30 PROCEDURE — 3074F PR MOST RECENT SYSTOLIC BLOOD PRESSURE < 130 MM HG: ICD-10-PCS | Mod: CPTII,S$GLB,, | Performed by: UROLOGY

## 2022-08-30 PROCEDURE — 3074F SYST BP LT 130 MM HG: CPT | Mod: CPTII,S$GLB,, | Performed by: UROLOGY

## 2022-08-30 PROCEDURE — 99215 OFFICE O/P EST HI 40 MIN: CPT | Mod: S$GLB,,, | Performed by: UROLOGY

## 2022-08-30 PROCEDURE — 3008F PR BODY MASS INDEX (BMI) DOCUMENTED: ICD-10-PCS | Mod: CPTII,S$GLB,, | Performed by: UROLOGY

## 2022-08-30 PROCEDURE — 1159F MED LIST DOCD IN RCRD: CPT | Mod: CPTII,S$GLB,, | Performed by: UROLOGY

## 2022-08-30 PROCEDURE — 81002 POCT URINE DIPSTICK WITHOUT MICROSCOPE: ICD-10-PCS | Mod: S$GLB,,, | Performed by: UROLOGY

## 2022-08-30 PROCEDURE — 81002 URINALYSIS NONAUTO W/O SCOPE: CPT | Mod: S$GLB,,, | Performed by: UROLOGY

## 2022-08-30 PROCEDURE — 81000 URINALYSIS NONAUTO W/SCOPE: CPT | Performed by: UROLOGY

## 2022-08-30 PROCEDURE — 99999 PR PBB SHADOW E&M-EST. PATIENT-LVL IV: CPT | Mod: PBBFAC,,, | Performed by: UROLOGY

## 2022-08-30 PROCEDURE — 1160F RVW MEDS BY RX/DR IN RCRD: CPT | Mod: CPTII,S$GLB,, | Performed by: UROLOGY

## 2022-08-30 PROCEDURE — 3008F BODY MASS INDEX DOCD: CPT | Mod: CPTII,S$GLB,, | Performed by: UROLOGY

## 2022-08-30 RX ORDER — DIPHENHYDRAMINE HCL 50 MG
50 CAPSULE ORAL
Qty: 5 CAPSULE | Refills: 0 | Status: SHIPPED | OUTPATIENT
Start: 2022-08-30 | End: 2022-09-27 | Stop reason: ALTCHOICE

## 2022-08-30 RX ORDER — PREDNISONE 50 MG/1
50 TABLET ORAL
Qty: 3 TABLET | Refills: 0 | Status: SHIPPED | OUTPATIENT
Start: 2022-08-30 | End: 2022-09-27 | Stop reason: ALTCHOICE

## 2022-08-30 NOTE — Clinical Note
·Send voided urine for ua jitendra and culture. If MO needs cath urine prior to her kidney stone procedure. ·Schedule ctu ·Make f/u with  to discuss pcnl after ctu

## 2022-08-30 NOTE — PROGRESS NOTES
24 hour urine     [facility] [place]  PCP: Emelyn Bernardo MD  Referred by: Self, Aaareferral  DOS: 8/30/2022    CC: stone      Subjective:      Initial consult by me in hospital on 5/25/21    Noelle Rivas is a 61 y.o. female     The patient has been referred to urology for left  Renal stone(s)  The stone(s)x 2 LUP and LLP (7mm, 9mm)  is not obstructing seen on kub and abd us. The patient does not have left flank pain.  Last CT in 2009 when she was passing a 2mm stone in the LUVJ. Had a LUP 5mm stone at that time, which is the one that has increased to 7mm.   The patient does not have a history of back pain.   The patient has seen a urologist before (NP when she passed a stone a few years ago)  The patient has not had a procedure for a stone in the past. . The patient does have a family history of stones, father with stones. The pt has not had a uti. Previous urines show MO.   She says she lost 40 pounds in the past year but was eating a lot of protein before this.   Previous urines reviewed.   She has factor V Leiden on asa 81mg only.     Interval history 10/5/21:  ctrss 6/25/21 showed LLP 1cm stone (1200 HU, 6cm)  and LUP 8mm stone (1000 HU, 6cm). No other stone. Discussed stone treatment but she's terrified of procedures.   24 hour urine on 8/27/21 showed UOP 1.67L, oxalate 110, citrate 1105. Severe hyperoxaluria.   Denies any bowel disease. Likely due to diet. Mostly vegan/nut/veggie diet which she feels great on and she's lost 40 pounds on.   6/25/21  uric acid: 7.4, pth 55.4    Interval history 2/1/22:  rbus 10/16/21: LUP 9mm stone. LLP 1.2cm stone. No L hydro.  kub 10/16/21:  and kub to see if stone growing   24 hour 1/12/22:  47 oxalate (improved from 110). Admits to diet changes. Urine volume increased from 1.67 to 2.41.    On ASA 81MG   Extensive discussion of tx options. Pt wanted to wait.     Interval history 8/30/22:  Rbus 7/30/22 No mass. Chronic nonobstructing renal  calculi measuring 10 and 9 mm.  Mild dilatation of the renal pelvis and calyces consistent with mild hydronephrosis.  Kub 7/30/22 stable left renal stones.   Ctrss 8/27/22: Kidneys are normal in size and attenuation.  There are prominent nonobstructing left renal calculi measuring 8 in 15 mm.  There are no right renal calculi.  There is mild left-sided hydronephrosis.  There is mild urothelial wall thickening of the left UPJ which is of uncertain etiology.  No perinephric inflammatory change.  Ua today: tr leuk. Denies any dysuria, frequency, urgency. No cloudy urine. Gave clean sample. Denies flank pain.             Urine history:   8/30/22  Void: tr leuk  2/1/22   Void: tr leuk  10/5/21  Void: small bld/tr leuk  Component Urine Culture, Routine   5/25/21 4/23/21 Tr wbc- no uti's  Tr leuk, 3 rbc/6 wbc/mod bact/many ca    4/18/2021 Mixed gen dariel   4/25/2012 NO SIGNIFICANT GROWTH, tr leuk/1 squames   7/2/2010 >100,000 ORGANISMS/ML -GRAM POSITIVE JOSE MORPHOLOGICALLY COMPATIBLE WITH LACTOBACILLUS SPECIES, neg/<1 sq   10/12/2009 MULTIPLE ORGANISMS ISOLATED. NONE IN PREDOMINANCE REPEAT IF CLINICALLY NECESSARY.   9/17/2007 MULTIPLE ORGANISMS ISOLATED. NONE IN PREDOMINANCE REPEAT IF CLINICALLY NECESSARY.       Current REVIEW OF SYSTEMS:  Negative for the remaining 12 points of ROS except for as stated above    Allergies:  Adhesive; Iodine and iodide containing products; and Latex, natural rubber    Objective:     Vitals:    08/30/22 1157   BP: 112/66   Pulse: 74           Assessment:     Noelle Rivas is a 61 y.o. female with     1. Calculus of urinary tract in diseases classified elsewhere    2. Nephrolithiasis    3. Hydronephrosis, unspecified hydronephrosis type      She has either new hydronephrosis or parapelvic cyst that could be growing. Would like to get a ctu to evaluate for this, evaluate for any filling defects (low risk of cancer- no smoking hx and no blood in urine) and evaluate anatomy - crossing  vessel which could cause hydronephrosis/upj obstruction- could explain why she has large stones that are increasing in size in just over a year. Previous ct's did not show hydro.     LLP stone almost double in size. 1.7cm. LUP stone is 8mm still. Overall stone burden and location now warrants pcnl. Healthy pt. On asa 81mg. Understands she will likely need a pcnl now (unless upj found-then may warrant further tx eval). Ok with this. Referring to . need ctu done first.     Plan:     Send voided urine for ua jitendra and culture. If MO needs cath urine prior to her kidney stone procedure.  Schedule ctu. If shows hydro to a upj o may need renal scan.   Make f/u with  to discuss pcnl after ctu     Radha Singh MD

## 2022-08-30 NOTE — PATIENT INSTRUCTIONS
Noelle Rivas is a 61 y.o. female with     1. Calculus of urinary tract in diseases classified elsewhere    2. Nephrolithiasis    3. Hydronephrosis, unspecified hydronephrosis type      She has either new hydronephrosis or parapelvic cyst that could be growing. Would like to get a ctu to evaluate for this, evaluate for any filling defects (low risk of cancer- no smoking hx and no blood in urine) and evaluate anatomy - crossing vessel which could cause hydronephrosis/upj obstruction- could explain why she has large stones that are increasing in size in just over a year. Previous ct's did not show hydro.     LLP stone almost double in size. 1.7cm. LUP stone is 8mm still. Overall stone burden and location now warrants pcnl. Healthy pt. On asa 81mg. Understands she will likely need a pcnl now (unless upj found-then may warrant further tx eval). Ok with this. Referring to . need ctu done first.         Plan:     Send voided urine for ua jitendra and culture. If MO needs cath urine prior to her kidney stone procedure.  Schedule ctu. If shows hydro to a upj o may need renal scan.   Make f/u with  to discuss pcnl after ctu

## 2022-08-31 ENCOUNTER — TELEPHONE (OUTPATIENT)
Dept: UROLOGY | Facility: CLINIC | Age: 62
End: 2022-08-31
Payer: COMMERCIAL

## 2022-08-31 NOTE — TELEPHONE ENCOUNTER
----- Message from Ileana Souza MD sent at 8/30/2022  8:18 PM CDT -----  Regarding: FW: pcnl patient  Please schedule follow up with me    ----- Message -----  From: Radha Singh MD  Sent: 8/30/2022   1:00 PM CDT  To: Ileana Souza MD  Subject: pcnl patient

## 2022-08-31 NOTE — TELEPHONE ENCOUNTER
Spoke with patient, informed calling to make an appt with the provider to discuss PCNL, appt made and directions given, patient verbally understood.

## 2022-09-01 ENCOUNTER — TELEPHONE (OUTPATIENT)
Dept: UROLOGY | Facility: CLINIC | Age: 62
End: 2022-09-01
Payer: COMMERCIAL

## 2022-09-01 LAB — BACTERIA UR CULT: NO GROWTH

## 2022-09-01 NOTE — TELEPHONE ENCOUNTER
----- Message from Rosa Vanessa sent at 9/1/2022  4:17 PM CDT -----  Contact: Patient  Type:  Needs Medical Advice    Who Called: Patient     Would the patient rather a call back or a response via [facility]? Call     Best Call Back Number: [phone] (home) [phone] (work)     Additional Information: Patient would like to speak to nurse about getting some details about the test that she has a test on Saturday.     Please call to advise

## 2022-09-01 NOTE — TELEPHONE ENCOUNTER
Spoke with patient all questions answered. Patient will not take benadryl prior to ct due to patient not having anyone to drive her home. Patient will take prednisone prep prior. And take benadryl at home if needed once she completes ct. Patient verbally voiced understanding.

## 2022-09-03 ENCOUNTER — HOSPITAL ENCOUNTER (OUTPATIENT)
Dept: RADIOLOGY | Facility: HOSPITAL | Age: 62
Discharge: HOME | End: 2022-09-03
Attending: UROLOGY
Payer: COMMERCIAL

## 2022-09-03 DIAGNOSIS — N22 CALCULUS OF URINARY TRACT IN DISEASES CLASSIFIED ELSEWHERE: ICD-10-CM

## 2022-09-03 PROCEDURE — 74178 CT ABD&PLV WO CNTR FLWD CNTR: CPT | Mod: 26,,, | Performed by: RADIOLOGY

## 2022-09-03 PROCEDURE — 74178 CT UROGRAM ABD PELVIS W WO: ICD-10-PCS | Mod: 26,,, | Performed by: RADIOLOGY

## 2022-09-03 PROCEDURE — 74178 CT ABD&PLV WO CNTR FLWD CNTR: CPT | Mod: TC

## 2022-09-03 PROCEDURE — 25500020 PHARM REV CODE 255

## 2022-09-03 RX ADMIN — IOHEXOL 100 ML: 350 INJECTION, SOLUTION INTRAVENOUS at 11:09

## 2022-09-05 DIAGNOSIS — Q62.11 HYDRONEPHROSIS WITH URETEROPELVIC JUNCTION (UPJ) OBSTRUCTION: Primary | ICD-10-CM

## 2022-09-06 ENCOUNTER — PATIENT MESSAGE (OUTPATIENT)
Dept: UROLOGY | Facility: CLINIC | Age: 62
End: 2022-09-06
Payer: COMMERCIAL

## 2022-09-06 NOTE — PROGRESS NOTES
Let her know that it looks like the kidney on the left doesn't drain as well on the right bc a blood vessel that supplies the kidney appears to be kinking the tube that drains the kidney. The delay in drainage can cause kidney stones to appear or become larger. The best way to test for this is a renal scan. Can she have this done before her visit with  on 9/13? If not would schedule renal scan and then f/u with  after

## 2022-09-07 ENCOUNTER — TELEPHONE (OUTPATIENT)
Dept: UROLOGY | Facility: CLINIC | Age: 62
End: 2022-09-07
Payer: COMMERCIAL

## 2022-09-07 ENCOUNTER — OFFICE VISIT (OUTPATIENT)
Dept: OPHTHALMOLOGY | Facility: CLINIC | Age: 62
End: 2022-09-07
Payer: COMMERCIAL

## 2022-09-07 DIAGNOSIS — H43.812 POSTERIOR VITREOUS DETACHMENT, LEFT EYE: Primary | ICD-10-CM

## 2022-09-07 DIAGNOSIS — H43.821 VITREOMACULAR ADHESION, RIGHT EYE: ICD-10-CM

## 2022-09-07 DIAGNOSIS — H25.13 AGE-RELATED NUCLEAR CATARACT OF BOTH EYES: ICD-10-CM

## 2022-09-07 PROCEDURE — 99214 OFFICE O/P EST MOD 30 MIN: CPT | Mod: S$GLB,,, | Performed by: OPHTHALMOLOGY

## 2022-09-07 PROCEDURE — 1159F MED LIST DOCD IN RCRD: CPT | Mod: CPTII,S$GLB,, | Performed by: OPHTHALMOLOGY

## 2022-09-07 PROCEDURE — 1160F PR REVIEW ALL MEDS BY PRESCRIBER/CLIN PHARMACIST DOCUMENTED: ICD-10-PCS | Mod: CPTII,S$GLB,, | Performed by: OPHTHALMOLOGY

## 2022-09-07 PROCEDURE — 92134 CPTRZ OPH DX IMG PST SGM RTA: CPT | Mod: S$GLB,,, | Performed by: OPHTHALMOLOGY

## 2022-09-07 PROCEDURE — 92134 OCT, RETINA - OU - BOTH EYES: ICD-10-PCS | Mod: S$GLB,,, | Performed by: OPHTHALMOLOGY

## 2022-09-07 PROCEDURE — 99214 PR OFFICE/OUTPT VISIT, EST, LEVL IV, 30-39 MIN: ICD-10-PCS | Mod: S$GLB,,, | Performed by: OPHTHALMOLOGY

## 2022-09-07 PROCEDURE — 1159F PR MEDICATION LIST DOCUMENTED IN MEDICAL RECORD: ICD-10-PCS | Mod: CPTII,S$GLB,, | Performed by: OPHTHALMOLOGY

## 2022-09-07 PROCEDURE — 99999 PR PBB SHADOW E&M-EST. PATIENT-LVL III: CPT | Mod: PBBFAC,,, | Performed by: OPHTHALMOLOGY

## 2022-09-07 PROCEDURE — 1160F RVW MEDS BY RX/DR IN RCRD: CPT | Mod: CPTII,S$GLB,, | Performed by: OPHTHALMOLOGY

## 2022-09-07 PROCEDURE — 99999 PR PBB SHADOW E&M-EST. PATIENT-LVL III: ICD-10-PCS | Mod: PBBFAC,,, | Performed by: OPHTHALMOLOGY

## 2022-09-07 NOTE — PROGRESS NOTES
HPI    DLS: 8/3/2022- pt here for f/u PVD OS     Pt states feels like things are improving with OS   Eye pain OU   Headaches that come and go.   FOL / floaters OS  Last edited by Jolene Issa on 9/7/2022  2:24 PM.         A/P    ICD-10-CM ICD-9-CM   1. Posterior vitreous detachment, left eye  H43.812 379.21   2. Vitreomacular adhesion, right eye  H43.821 379.27   3. Age-related nuclear cataract of both eyes  H25.13 366.16       1. Posterior vitreous detachment, left eye  2. Vitreomacular adhesion, right eye  F/u for new floaters, occ flash OS  Started prior visit after yoga , no trauma    Exam notable for VMA OD, PVD OS, no RT/RD OU  Plan: Observation  Pathology of PVD, Retinal Tear, Retinal Detachment reviewed in great detail  RD precautions discussed in detail, patient expressed understanding  RTC immediately PRN (especially ANY change flashes, floaters, vision, visual field)     3. Age-related nuclear cataract of both eyes  Mild NS, NVS  Plan: Observation    RTC 3 mo DFE/OCTm OU monitor floaters OS, if ok annual      I saw and examined the patient and reviewed in detail the findings documented. The final examination findings, image interpretations, and plan as documented in the record represent my personal judgment and conclusions.    Felice Phillips MD  Vitreoretinal Surgery   [facility]

## 2022-09-13 ENCOUNTER — IMMUNIZATION (OUTPATIENT)
Dept: PRIMARY CARE CLINIC | Facility: CLINIC | Age: 62
End: 2022-09-13
Payer: COMMERCIAL

## 2022-09-13 DIAGNOSIS — Z23 NEED FOR VACCINATION: Primary | ICD-10-CM

## 2022-09-13 PROCEDURE — 91312 COVID-19, MRNA, LNP-S, BIVALENT BOOSTER, PF, 30 MCG/0.3 ML DOSE: CPT | Mod: S$GLB,,, | Performed by: FAMILY MEDICINE

## 2022-09-13 PROCEDURE — 91312 COVID-19, MRNA, LNP-S, BIVALENT BOOSTER, PF, 30 MCG/0.3 ML DOSE: ICD-10-PCS | Mod: S$GLB,,, | Performed by: FAMILY MEDICINE

## 2022-09-23 ENCOUNTER — HOSPITAL ENCOUNTER (OUTPATIENT)
Dept: RADIOLOGY | Facility: HOSPITAL | Age: 62
Discharge: HOME | End: 2022-09-23
Attending: UROLOGY
Payer: COMMERCIAL

## 2022-09-23 DIAGNOSIS — Q62.11 HYDRONEPHROSIS WITH URETEROPELVIC JUNCTION (UPJ) OBSTRUCTION: ICD-10-CM

## 2022-09-23 PROCEDURE — 78708 K FLOW/FUNCT IMAGE W/DRUG: CPT | Mod: TC

## 2022-09-23 PROCEDURE — 63600175 PHARM REV CODE 636 W HCPCS: Performed by: UROLOGY

## 2022-09-23 RX ORDER — FUROSEMIDE 10 MG/ML
20 INJECTION INTRAMUSCULAR; INTRAVENOUS ONCE
Status: COMPLETED | OUTPATIENT
Start: 2022-09-23 | End: 2022-09-23

## 2022-09-23 RX ADMIN — FUROSEMIDE 20 MG: 10 INJECTION, SOLUTION INTRAMUSCULAR; INTRAVENOUS at 02:09

## 2022-09-27 ENCOUNTER — OFFICE VISIT (OUTPATIENT)
Dept: UROLOGY | Facility: CLINIC | Age: 62
End: 2022-09-27
Payer: COMMERCIAL

## 2022-09-27 VITALS
SYSTOLIC BLOOD PRESSURE: 106 MMHG | RESPIRATION RATE: 18 BRPM | DIASTOLIC BLOOD PRESSURE: 68 MMHG | WEIGHT: 149.06 LBS | HEIGHT: 69 IN | HEART RATE: 77 BPM | BODY MASS INDEX: 22.08 KG/M2

## 2022-09-27 DIAGNOSIS — D68.51 FACTOR V LEIDEN MUTATION: ICD-10-CM

## 2022-09-27 DIAGNOSIS — N20.0 KIDNEY STONES: ICD-10-CM

## 2022-09-27 DIAGNOSIS — N22 CALCULUS OF URINARY TRACT IN DISEASES CLASSIFIED ELSEWHERE: Primary | ICD-10-CM

## 2022-09-27 DIAGNOSIS — Z17.0 MALIGNANT NEOPLASM OF RIGHT BREAST, STAGE 1, ESTROGEN RECEPTOR POSITIVE: ICD-10-CM

## 2022-09-27 DIAGNOSIS — Q62.11 HYDRONEPHROSIS WITH URETEROPELVIC JUNCTION (UPJ) OBSTRUCTION: ICD-10-CM

## 2022-09-27 DIAGNOSIS — C50.911 MALIGNANT NEOPLASM OF RIGHT BREAST, STAGE 1, ESTROGEN RECEPTOR POSITIVE: ICD-10-CM

## 2022-09-27 LAB
BILIRUB SERPL-MCNC: NORMAL MG/DL
BLOOD URINE, POC: NORMAL
CLARITY, POC UA: CLEAR
COLOR, POC UA: YELLOW
GLUCOSE UR QL STRIP: NORMAL
KETONES UR QL STRIP: NORMAL
LEUKOCYTE ESTERASE URINE, POC: NORMAL
NITRITE, POC UA: NORMAL
PH, POC UA: 5.5
PROTEIN, POC: NORMAL
SPECIFIC GRAVITY, POC UA: 1.03
UROBILINOGEN, POC UA: 0.2

## 2022-09-27 PROCEDURE — 99215 OFFICE O/P EST HI 40 MIN: CPT | Mod: S$GLB,,, | Performed by: STUDENT IN AN ORGANIZED HEALTH CARE EDUCATION/TRAINING PROGRAM

## 2022-09-27 PROCEDURE — 99999 PR PBB SHADOW E&M-EST. PATIENT-LVL IV: ICD-10-PCS | Mod: PBBFAC,,, | Performed by: STUDENT IN AN ORGANIZED HEALTH CARE EDUCATION/TRAINING PROGRAM

## 2022-09-27 PROCEDURE — 3008F BODY MASS INDEX DOCD: CPT | Mod: CPTII,S$GLB,, | Performed by: STUDENT IN AN ORGANIZED HEALTH CARE EDUCATION/TRAINING PROGRAM

## 2022-09-27 PROCEDURE — 81002 URINALYSIS NONAUTO W/O SCOPE: CPT | Mod: S$GLB,,, | Performed by: STUDENT IN AN ORGANIZED HEALTH CARE EDUCATION/TRAINING PROGRAM

## 2022-09-27 PROCEDURE — 99999 PR PBB SHADOW E&M-EST. PATIENT-LVL IV: CPT | Mod: PBBFAC,,, | Performed by: STUDENT IN AN ORGANIZED HEALTH CARE EDUCATION/TRAINING PROGRAM

## 2022-09-27 PROCEDURE — 87086 URINE CULTURE/COLONY COUNT: CPT | Performed by: STUDENT IN AN ORGANIZED HEALTH CARE EDUCATION/TRAINING PROGRAM

## 2022-09-27 PROCEDURE — 99215 PR OFFICE/OUTPT VISIT, EST, LEVL V, 40-54 MIN: ICD-10-PCS | Mod: S$GLB,,, | Performed by: STUDENT IN AN ORGANIZED HEALTH CARE EDUCATION/TRAINING PROGRAM

## 2022-09-27 PROCEDURE — 81002 POCT URINE DIPSTICK WITHOUT MICROSCOPE: ICD-10-PCS | Mod: S$GLB,,, | Performed by: STUDENT IN AN ORGANIZED HEALTH CARE EDUCATION/TRAINING PROGRAM

## 2022-09-27 PROCEDURE — 3008F PR BODY MASS INDEX (BMI) DOCUMENTED: ICD-10-PCS | Mod: CPTII,S$GLB,, | Performed by: STUDENT IN AN ORGANIZED HEALTH CARE EDUCATION/TRAINING PROGRAM

## 2022-09-27 PROCEDURE — 3078F DIAST BP <80 MM HG: CPT | Mod: CPTII,S$GLB,, | Performed by: STUDENT IN AN ORGANIZED HEALTH CARE EDUCATION/TRAINING PROGRAM

## 2022-09-27 PROCEDURE — 3078F PR MOST RECENT DIASTOLIC BLOOD PRESSURE < 80 MM HG: ICD-10-PCS | Mod: CPTII,S$GLB,, | Performed by: STUDENT IN AN ORGANIZED HEALTH CARE EDUCATION/TRAINING PROGRAM

## 2022-09-27 PROCEDURE — 1159F MED LIST DOCD IN RCRD: CPT | Mod: CPTII,S$GLB,, | Performed by: STUDENT IN AN ORGANIZED HEALTH CARE EDUCATION/TRAINING PROGRAM

## 2022-09-27 PROCEDURE — 1159F PR MEDICATION LIST DOCUMENTED IN MEDICAL RECORD: ICD-10-PCS | Mod: CPTII,S$GLB,, | Performed by: STUDENT IN AN ORGANIZED HEALTH CARE EDUCATION/TRAINING PROGRAM

## 2022-09-27 PROCEDURE — 3074F PR MOST RECENT SYSTOLIC BLOOD PRESSURE < 130 MM HG: ICD-10-PCS | Mod: CPTII,S$GLB,, | Performed by: STUDENT IN AN ORGANIZED HEALTH CARE EDUCATION/TRAINING PROGRAM

## 2022-09-27 PROCEDURE — 3074F SYST BP LT 130 MM HG: CPT | Mod: CPTII,S$GLB,, | Performed by: STUDENT IN AN ORGANIZED HEALTH CARE EDUCATION/TRAINING PROGRAM

## 2022-09-27 NOTE — H&P (VIEW-ONLY)
[facility] Urology Clinic Note    PCP: Emelyn Bernardo MD    Chief Complaint: kidney stones    SUBJECTIVE:       History of Present Illness:  Noelle Rivas is a 61 y.o. female who presents to clinic for kidney stones. She is Established  to our clinic.     Patient of Dr. Singh, presents to discuss PCNL.   On most recent Urogram she has a left 8 mm upper pole stone and 10 mm lower pole stone (on CTRSS from a month prior the stone measured 1.5 cm however on most recent scan appears to be only 1 cm which it was also in June 2021). She has mild hydro on the left secondary to what appears to be a lower pole crossing vessel. She had a MAG 3 renal scan however which shows equal function of the kidneys with a T1/2 of 7 on the left and 9.5 on the right.     She does not have a significant stone history. Passed a stone in 2009.  No prior stone procedures.   No left flank pain.   No hematuria.   No hx of recurrent UTIs.     Last urine culture: no documented UTIs    Lab Results   Component Value Date    CREATININE 0.9 09/03/2022     Hx of stroke, factor v leiden, breast cancer    Past medical, family, and social history reviewed as documented in chart with pertinent positive medical, family, and social history detailed in HPI.    Review of patient's allergies indicates:   Allergen Reactions    Adhesive Itching and Rash     Micropore/Medipore Tape - paper tape is ok    Iodine and iodide containing products Itching    Latex, natural rubber Itching       Past Medical History:   Diagnosis Date    Abnormal TSH 06/05/2018    Age-related nuclear cataract of both eyes 8/3/2022    Allergy     Anxiety     Anxiety 12/17/2012    Breast cancer 2012    right    Calcium nephrolithiasis     Complex endometrial hyperplasia without atypia 06/16/2016    Degenerative disc disease     DJD (degenerative joint disease) of knee     Endometrial hyperplasia, unspecified: see evaluation 2017 05/22/2018    Factor V Leiden mutation No  "h/o DVT    Kidney stones     Leukopenia 2012    PMB (postmenopausal bleeding) 2017    Stroke     questionable  -     Vitamin B 12 deficiency      Past Surgical History:   Procedure Laterality Date    BREAST BIOPSY Right     malignant, radiation    BREAST BIOPSY Right 2014    benign    BREAST LUMPECTOMY Right 12    w/ radiation    BREAST LUMPECTOMY W/ NEEDLE LOCALIZATION      DILATION AND CURETTAGE OF UTERUS       Family History   Problem Relation Age of Onset    Hypertension Father     Coronary artery disease Father     Cancer Father         prostate ca, metastatic    Heart disease Father         stenting    Kidney disease Father         stones    Retinal detachment Father     Colon cancer Paternal Grandfather     Melanoma Mother     Cancer Mother         melanoma    Hyperlipidemia Brother     Keratoconus Brother     No Known Problems Brother     Stroke Maternal Grandmother     Lung cancer Maternal Grandfather     Breast cancer Paternal Grandmother          at age 62    Ovarian cancer Neg Hx     Uterine cancer Neg Hx     Psoriasis Neg Hx     Lupus Neg Hx     Eczema Neg Hx      Social History     Tobacco Use    Smoking status: Never    Smokeless tobacco: Never   Substance Use Topics    Alcohol use: Yes     Comment: Minimal    Drug use: No        Review of Systems   Genitourinary:  Negative for difficulty urinating, dysuria, flank pain, hematuria, nocturia and urgency.     OBJECTIVE:     Anticoagulation: aspirin 81 mg     Estimated body mass index is 22.01 kg/m² as calculated from the following:    Height as of this encounter: 5' 9" (1.753 m).    Weight as of this encounter: 67.6 kg (149 lb 0.5 oz).    Vital Signs (Most Recent)  Pulse: 77 (22 0951)  Resp: 18 (22 0951)  BP: 106/68 (22 0951)    Physical Exam  Vitals reviewed.   Constitutional:       General: She is not in acute distress.     Appearance: Normal appearance. She is not ill-appearing.   HENT:      Head: " Normocephalic and atraumatic.   Eyes:      General: No scleral icterus.  Cardiovascular:      Rate and Rhythm: Normal rate and regular rhythm.   Pulmonary:      Effort: Pulmonary effort is normal. No respiratory distress.   Abdominal:      Palpations: Abdomen is soft.   Skin:     Coloration: Skin is not jaundiced.   Neurological:      General: No focal deficit present.      Mental Status: She is alert and oriented to person, place, and time.   Psychiatric:         Mood and Affect: Mood normal.         Behavior: Behavior normal.       BMP  Lab Results   Component Value Date     07/12/2022    K 3.7 07/12/2022     07/12/2022    CO2 25 07/12/2022    BUN 12 07/12/2022    CREATININE 0.9 09/03/2022    CALCIUM 9.8 07/12/2022    ANIONGAP 9 07/12/2022    ESTGFRAFRICA >60.0 07/12/2022    EGFRNONAA >60.0 07/12/2022       Lab Results   Component Value Date    WBC 3.83 (L) 07/12/2022    HGB 12.7 07/12/2022    HCT 39.1 07/12/2022     (H) 07/12/2022     07/12/2022       Imaging:  Per HPI    ASSESSMENT     1. Calculus of urinary tract in diseases classified elsewhere    2. Hydronephrosis with ureteropelvic junction (UPJ) obstruction    3. Factor V Leiden mutation    4. Malignant neoplasm of right breast, stage 1, estrogen receptor positive        PLAN:     - We reviewed her imaging in detail  - Her MAG 3 scan shows good drainage and equal function of the kidneys. Therefore would not recommend intervention for her mild left hydro at this point. She is asymptomatic.   - Reviewed all her CT scans, appears to have a 1 cm LLP stone however on CTRSS measures 1.5 cm  - Assuming stone is 1 cm which it has also measured on previous imaging I recommended URS, which may need to be staged.   - Scheduled for 10/14  - I have explained the indication, risks, benefits, and alternatives of the procedure in detail.  Risks including but not limited to bleeding, infection, injury to the urethra, bladder, ureter, need for  additional treatments, inability or incomplete removal of kidney stones, pain, and discomfort related to the stent were discussed in depth with the patient.  The patient voices understanding and all questions have been answered.  The patient agrees to proceed as planned with left ureteroscopy, laser lithotripsy, and ureteral stent placement.  - Urine today for culture       I spent 40 minutes with the patient of which more than half was spent in direct consultation with the patient in regards to our treatment and plan.      Ileana Souza MD

## 2022-09-29 LAB — BACTERIA UR CULT: NO GROWTH

## 2022-10-10 PROCEDURE — 99900103 DSU ONLY-NO CHARGE-INITIAL HR (STAT)

## 2022-10-13 ENCOUNTER — ANESTHESIA EVENT (OUTPATIENT)
Dept: SURGERY | Facility: HOSPITAL | Age: 62
End: 2022-10-13
Payer: COMMERCIAL

## 2022-10-14 ENCOUNTER — ANESTHESIA (OUTPATIENT)
Dept: SURGERY | Facility: HOSPITAL | Age: 62
End: 2022-10-14
Payer: COMMERCIAL

## 2022-10-14 ENCOUNTER — HOSPITAL ENCOUNTER (OUTPATIENT)
Facility: HOSPITAL | Age: 62
Discharge: HOME | End: 2022-10-14
Attending: STUDENT IN AN ORGANIZED HEALTH CARE EDUCATION/TRAINING PROGRAM | Admitting: STUDENT IN AN ORGANIZED HEALTH CARE EDUCATION/TRAINING PROGRAM
Payer: COMMERCIAL

## 2022-10-14 DIAGNOSIS — Z01.818 PREOPERATIVE TESTING: ICD-10-CM

## 2022-10-14 DIAGNOSIS — N20.0 KIDNEY STONE: Primary | ICD-10-CM

## 2022-10-14 LAB
ALBUMIN SERPL BCP-MCNC: 4.2 G/DL (ref 3.5–5.2)
ALP SERPL-CCNC: 64 U/L (ref 55–135)
ALT SERPL W/O P-5'-P-CCNC: 18 U/L (ref 10–44)
ANION GAP SERPL CALC-SCNC: 10 MMOL/L (ref 8–16)
AST SERPL-CCNC: 25 U/L (ref 10–40)
BASOPHILS # BLD AUTO: 0.04 K/UL (ref 0–0.2)
BASOPHILS NFR BLD: 0.8 % (ref 0–1.9)
BILIRUB SERPL-MCNC: 0.5 MG/DL (ref 0.1–1)
BUN SERPL-MCNC: 12 MG/DL (ref 6–20)
CALCIUM SERPL-MCNC: 9.9 MG/DL (ref 8.7–10.5)
CHLORIDE SERPL-SCNC: 108 MMOL/L (ref 95–110)
CO2 SERPL-SCNC: 25 MMOL/L (ref 23–29)
CREAT SERPL-MCNC: 0.9 MG/DL (ref 0.5–1.4)
DIFFERENTIAL METHOD: ABNORMAL
EOSINOPHIL # BLD AUTO: 0.3 K/UL (ref 0–0.5)
EOSINOPHIL NFR BLD: 6.7 % (ref 0–8)
ERYTHROCYTE [DISTWIDTH] IN BLOOD BY AUTOMATED COUNT: 13 % (ref 11.5–14.5)
EST. GFR  (NO RACE VARIABLE): >60 ML/MIN/1.73 M^2
GLUCOSE SERPL-MCNC: 94 MG/DL (ref 70–110)
HCT VFR BLD AUTO: 37.6 % (ref 37–48.5)
HGB BLD-MCNC: 12.7 G/DL (ref 12–16)
IMM GRANULOCYTES # BLD AUTO: 0.01 K/UL (ref 0–0.04)
IMM GRANULOCYTES NFR BLD AUTO: 0.2 % (ref 0–0.5)
LYMPHOCYTES # BLD AUTO: 1.8 K/UL (ref 1–4.8)
LYMPHOCYTES NFR BLD: 37.2 % (ref 18–48)
MCH RBC QN AUTO: 33 PG (ref 27–31)
MCHC RBC AUTO-ENTMCNC: 33.8 G/DL (ref 32–36)
MCV RBC AUTO: 98 FL (ref 82–98)
MONOCYTES # BLD AUTO: 0.4 K/UL (ref 0.3–1)
MONOCYTES NFR BLD: 8.6 % (ref 4–15)
NEUTROPHILS # BLD AUTO: 2.2 K/UL (ref 1.8–7.7)
NEUTROPHILS NFR BLD: 46.5 % (ref 38–73)
NRBC BLD-RTO: 0 /100 WBC
PLATELET # BLD AUTO: 169 K/UL (ref 150–450)
PMV BLD AUTO: 10.4 FL (ref 9.2–12.9)
POTASSIUM SERPL-SCNC: 4 MMOL/L (ref 3.5–5.1)
PROT SERPL-MCNC: 7 G/DL (ref 6–8.4)
RBC # BLD AUTO: 3.85 M/UL (ref 4–5.4)
SODIUM SERPL-SCNC: 143 MMOL/L (ref 136–145)
WBC # BLD AUTO: 4.76 K/UL (ref 3.9–12.7)

## 2022-10-14 PROCEDURE — 25000003 PHARM REV CODE 250: Performed by: NURSE ANESTHETIST, CERTIFIED REGISTERED

## 2022-10-14 PROCEDURE — 63600175 PHARM REV CODE 636 W HCPCS: Performed by: NURSE ANESTHETIST, CERTIFIED REGISTERED

## 2022-10-14 PROCEDURE — 36000707: Performed by: STUDENT IN AN ORGANIZED HEALTH CARE EDUCATION/TRAINING PROGRAM

## 2022-10-14 PROCEDURE — D9220A PRA ANESTHESIA: Mod: ANES,,, | Performed by: ANESTHESIOLOGY

## 2022-10-14 PROCEDURE — 71000016 HC POSTOP RECOV ADDL HR: Performed by: STUDENT IN AN ORGANIZED HEALTH CARE EDUCATION/TRAINING PROGRAM

## 2022-10-14 PROCEDURE — 36415 COLL VENOUS BLD VENIPUNCTURE: CPT | Performed by: ANESTHESIOLOGY

## 2022-10-14 PROCEDURE — 52332 CYSTOSCOPY AND TREATMENT: CPT | Mod: LT,,, | Performed by: STUDENT IN AN ORGANIZED HEALTH CARE EDUCATION/TRAINING PROGRAM

## 2022-10-14 PROCEDURE — 93010 ELECTROCARDIOGRAM REPORT: CPT | Mod: ,,, | Performed by: SPECIALIST

## 2022-10-14 PROCEDURE — 94760 N-INVAS EAR/PLS OXIMETRY 1: CPT

## 2022-10-14 PROCEDURE — 71000015 HC POSTOP RECOV 1ST HR: Performed by: STUDENT IN AN ORGANIZED HEALTH CARE EDUCATION/TRAINING PROGRAM

## 2022-10-14 PROCEDURE — 36000706: Performed by: STUDENT IN AN ORGANIZED HEALTH CARE EDUCATION/TRAINING PROGRAM

## 2022-10-14 PROCEDURE — C2617 STENT, NON-COR, TEM W/O DEL: HCPCS | Performed by: STUDENT IN AN ORGANIZED HEALTH CARE EDUCATION/TRAINING PROGRAM

## 2022-10-14 PROCEDURE — 80053 COMPREHEN METABOLIC PANEL: CPT | Performed by: ANESTHESIOLOGY

## 2022-10-14 PROCEDURE — C1894 INTRO/SHEATH, NON-LASER: HCPCS | Performed by: STUDENT IN AN ORGANIZED HEALTH CARE EDUCATION/TRAINING PROGRAM

## 2022-10-14 PROCEDURE — 37000009 HC ANESTHESIA EA ADD 15 MINS: Performed by: STUDENT IN AN ORGANIZED HEALTH CARE EDUCATION/TRAINING PROGRAM

## 2022-10-14 PROCEDURE — D9220A PRA ANESTHESIA: ICD-10-PCS | Mod: ANES,,, | Performed by: ANESTHESIOLOGY

## 2022-10-14 PROCEDURE — 71000033 HC RECOVERY, INTIAL HOUR: Performed by: STUDENT IN AN ORGANIZED HEALTH CARE EDUCATION/TRAINING PROGRAM

## 2022-10-14 PROCEDURE — 85025 COMPLETE CBC W/AUTO DIFF WBC: CPT | Performed by: ANESTHESIOLOGY

## 2022-10-14 PROCEDURE — 94799 UNLISTED PULMONARY SVC/PX: CPT

## 2022-10-14 PROCEDURE — C1769 GUIDE WIRE: HCPCS | Performed by: STUDENT IN AN ORGANIZED HEALTH CARE EDUCATION/TRAINING PROGRAM

## 2022-10-14 PROCEDURE — 99900104 DSU ONLY-NO CHARGE-EA ADD'L HR (STAT): Performed by: STUDENT IN AN ORGANIZED HEALTH CARE EDUCATION/TRAINING PROGRAM

## 2022-10-14 PROCEDURE — D9220A PRA ANESTHESIA: ICD-10-PCS | Mod: CRNA,,, | Performed by: NURSE ANESTHETIST, CERTIFIED REGISTERED

## 2022-10-14 PROCEDURE — 99900103 DSU ONLY-NO CHARGE-INITIAL HR (STAT): Performed by: STUDENT IN AN ORGANIZED HEALTH CARE EDUCATION/TRAINING PROGRAM

## 2022-10-14 PROCEDURE — C1758 CATHETER, URETERAL: HCPCS | Performed by: STUDENT IN AN ORGANIZED HEALTH CARE EDUCATION/TRAINING PROGRAM

## 2022-10-14 PROCEDURE — 93010 EKG 12-LEAD: ICD-10-PCS | Mod: ,,, | Performed by: SPECIALIST

## 2022-10-14 PROCEDURE — 93005 ELECTROCARDIOGRAM TRACING: CPT

## 2022-10-14 PROCEDURE — D9220A PRA ANESTHESIA: Mod: CRNA,,, | Performed by: NURSE ANESTHETIST, CERTIFIED REGISTERED

## 2022-10-14 PROCEDURE — 37000008 HC ANESTHESIA 1ST 15 MINUTES: Performed by: STUDENT IN AN ORGANIZED HEALTH CARE EDUCATION/TRAINING PROGRAM

## 2022-10-14 PROCEDURE — 52332 PR CYSTOSCOPY,INSERT URETERAL STENT: ICD-10-PCS | Mod: LT,,, | Performed by: STUDENT IN AN ORGANIZED HEALTH CARE EDUCATION/TRAINING PROGRAM

## 2022-10-14 PROCEDURE — 63600175 PHARM REV CODE 636 W HCPCS: Performed by: STUDENT IN AN ORGANIZED HEALTH CARE EDUCATION/TRAINING PROGRAM

## 2022-10-14 PROCEDURE — 27200651 HC AIRWAY, LMA: Performed by: ANESTHESIOLOGY

## 2022-10-14 DEVICE — STENT SET URETERAL 6X26CM: Type: IMPLANTABLE DEVICE | Site: URETER | Status: FUNCTIONAL

## 2022-10-14 RX ORDER — LIDOCAINE HCL/PF 100 MG/5ML
SYRINGE (ML) INTRAVENOUS
Status: DISCONTINUED | OUTPATIENT
Start: 2022-10-14 | End: 2022-10-14

## 2022-10-14 RX ORDER — PHENAZOPYRIDINE HYDROCHLORIDE 100 MG/1
100 TABLET, FILM COATED ORAL 3 TIMES DAILY PRN
Qty: 30 TABLET | Refills: 3 | Status: SHIPPED | OUTPATIENT
Start: 2022-10-14 | End: 2022-10-24

## 2022-10-14 RX ORDER — PROPOFOL 10 MG/ML
VIAL (ML) INTRAVENOUS
Status: DISCONTINUED | OUTPATIENT
Start: 2022-10-14 | End: 2022-10-14

## 2022-10-14 RX ORDER — ONDANSETRON HYDROCHLORIDE 2 MG/ML
INJECTION, SOLUTION INTRAMUSCULAR; INTRAVENOUS
Status: DISCONTINUED | OUTPATIENT
Start: 2022-10-14 | End: 2022-10-14

## 2022-10-14 RX ORDER — ACETAMINOPHEN 10 MG/ML
INJECTION, SOLUTION INTRAVENOUS
Status: DISCONTINUED | OUTPATIENT
Start: 2022-10-14 | End: 2022-10-14

## 2022-10-14 RX ORDER — DEXAMETHASONE SODIUM PHOSPHATE 4 MG/ML
INJECTION, SOLUTION INTRA-ARTICULAR; INTRALESIONAL; INTRAMUSCULAR; INTRAVENOUS; SOFT TISSUE
Status: DISCONTINUED | OUTPATIENT
Start: 2022-10-14 | End: 2022-10-14

## 2022-10-14 RX ORDER — MIDAZOLAM HYDROCHLORIDE 1 MG/ML
INJECTION INTRAMUSCULAR; INTRAVENOUS
Status: DISCONTINUED | OUTPATIENT
Start: 2022-10-14 | End: 2022-10-14

## 2022-10-14 RX ORDER — PHENYLEPHRINE HYDROCHLORIDE 10 MG/ML
INJECTION INTRAVENOUS
Status: DISCONTINUED | OUTPATIENT
Start: 2022-10-14 | End: 2022-10-14

## 2022-10-14 RX ORDER — TAMSULOSIN HYDROCHLORIDE 0.4 MG/1
0.4 CAPSULE ORAL DAILY
Qty: 30 CAPSULE | Refills: 0 | Status: ON HOLD | OUTPATIENT
Start: 2022-10-14 | End: 2022-10-28 | Stop reason: SDUPTHER

## 2022-10-14 RX ORDER — OXYCODONE HYDROCHLORIDE 5 MG/1
5 TABLET ORAL
Status: DISCONTINUED | OUTPATIENT
Start: 2022-10-14 | End: 2022-10-14 | Stop reason: HOSPADM

## 2022-10-14 RX ORDER — FENTANYL CITRATE 50 UG/ML
INJECTION, SOLUTION INTRAMUSCULAR; INTRAVENOUS
Status: DISCONTINUED | OUTPATIENT
Start: 2022-10-14 | End: 2022-10-14

## 2022-10-14 RX ORDER — SULFAMETHOXAZOLE AND TRIMETHOPRIM 800; 160 MG/1; MG/1
1 TABLET ORAL DAILY
Qty: 13 TABLET | Refills: 0 | Status: ON HOLD | OUTPATIENT
Start: 2022-10-14 | End: 2022-10-28 | Stop reason: HOSPADM

## 2022-10-14 RX ORDER — CEFAZOLIN SODIUM 2 G/50ML
2 SOLUTION INTRAVENOUS
Status: COMPLETED | OUTPATIENT
Start: 2022-10-14 | End: 2022-10-14

## 2022-10-14 RX ORDER — HYDROCODONE BITARTRATE AND ACETAMINOPHEN 5; 325 MG/1; MG/1
1 TABLET ORAL EVERY 4 HOURS PRN
Status: DISCONTINUED | OUTPATIENT
Start: 2022-10-14 | End: 2022-10-14 | Stop reason: HOSPADM

## 2022-10-14 RX ORDER — MEPERIDINE HYDROCHLORIDE 50 MG/ML
12.5 INJECTION INTRAMUSCULAR; INTRAVENOUS; SUBCUTANEOUS ONCE
Status: DISCONTINUED | OUTPATIENT
Start: 2022-10-14 | End: 2022-10-14 | Stop reason: HOSPADM

## 2022-10-14 RX ORDER — FENTANYL CITRATE 50 UG/ML
25 INJECTION, SOLUTION INTRAMUSCULAR; INTRAVENOUS EVERY 5 MIN PRN
Status: DISCONTINUED | OUTPATIENT
Start: 2022-10-14 | End: 2022-10-14 | Stop reason: HOSPADM

## 2022-10-14 RX ORDER — DIPHENHYDRAMINE HYDROCHLORIDE 50 MG/ML
25 INJECTION INTRAMUSCULAR; INTRAVENOUS EVERY 6 HOURS PRN
Status: DISCONTINUED | OUTPATIENT
Start: 2022-10-14 | End: 2022-10-14 | Stop reason: HOSPADM

## 2022-10-14 RX ORDER — ONDANSETRON 4 MG/1
8 TABLET, ORALLY DISINTEGRATING ORAL EVERY 8 HOURS PRN
Status: DISCONTINUED | OUTPATIENT
Start: 2022-10-14 | End: 2022-10-14 | Stop reason: HOSPADM

## 2022-10-14 RX ORDER — ONDANSETRON 2 MG/ML
4 INJECTION INTRAMUSCULAR; INTRAVENOUS ONCE
Status: DISCONTINUED | OUTPATIENT
Start: 2022-10-14 | End: 2022-10-14 | Stop reason: HOSPADM

## 2022-10-14 RX ORDER — HYDROMORPHONE HYDROCHLORIDE 2 MG/ML
0.2 INJECTION, SOLUTION INTRAMUSCULAR; INTRAVENOUS; SUBCUTANEOUS EVERY 5 MIN PRN
Status: DISCONTINUED | OUTPATIENT
Start: 2022-10-14 | End: 2022-10-14 | Stop reason: HOSPADM

## 2022-10-14 RX ADMIN — PROPOFOL 150 MG: 10 INJECTION, EMULSION INTRAVENOUS at 09:10

## 2022-10-14 RX ADMIN — SODIUM CHLORIDE, SODIUM GLUCONATE, SODIUM ACETATE, POTASSIUM CHLORIDE, MAGNESIUM CHLORIDE, SODIUM PHOSPHATE, DIBASIC, AND POTASSIUM PHOSPHATE: .53; .5; .37; .037; .03; .012; .00082 INJECTION, SOLUTION INTRAVENOUS at 09:10

## 2022-10-14 RX ADMIN — PHENYLEPHRINE HYDROCHLORIDE 200 MCG: 10 INJECTION INTRAVENOUS at 09:10

## 2022-10-14 RX ADMIN — FENTANYL CITRATE 25 MCG: 50 INJECTION, SOLUTION INTRAMUSCULAR; INTRAVENOUS at 09:10

## 2022-10-14 RX ADMIN — MIDAZOLAM HYDROCHLORIDE 2 MG: 1 INJECTION, SOLUTION INTRAMUSCULAR; INTRAVENOUS at 09:10

## 2022-10-14 RX ADMIN — DEXAMETHASONE SODIUM PHOSPHATE 4 MG: 4 INJECTION, SOLUTION INTRA-ARTICULAR; INTRALESIONAL; INTRAMUSCULAR; INTRAVENOUS; SOFT TISSUE at 09:10

## 2022-10-14 RX ADMIN — ONDANSETRON 4 MG: 2 INJECTION, SOLUTION INTRAMUSCULAR; INTRAVENOUS at 09:10

## 2022-10-14 RX ADMIN — ACETAMINOPHEN 1000 MG: 10 INJECTION, SOLUTION INTRAVENOUS at 09:10

## 2022-10-14 RX ADMIN — FENTANYL CITRATE 50 MCG: 50 INJECTION, SOLUTION INTRAMUSCULAR; INTRAVENOUS at 09:10

## 2022-10-14 RX ADMIN — CEFAZOLIN SODIUM 2 G: 2 SOLUTION INTRAVENOUS at 09:10

## 2022-10-14 RX ADMIN — LIDOCAINE HYDROCHLORIDE 50 MG: 20 INJECTION INTRAVENOUS at 09:10

## 2022-10-14 NOTE — OP NOTE
[facility] - [place]  Operative Note    Date: 10/14/2022    Pre-Op Diagnosis:   - Left renal stone x 2    Patient Active Problem List   Diagnosis    Factor V Leiden mutation    Calculus of ureter: 2009; also 2021    Estrogen receptor positive status (ER+)    Anxiety    Vitamin B 12 deficiency    Degenerative disc disease    Mild vitamin D deficiency    Congenital neutropenia    Malignant neoplasm of right breast, stage 1, estrogen receptor positive    Endometrial hyperplasia, unspecified: see evaluation [id number]    Family history of malignant melanoma    Ganglion cyst of foot    Fatty liver:  See ultrasound May 2021    Posterior vitreous detachment, left eye    Vitreomacular adhesion, right eye    Age-related nuclear cataract of both eyes    Kidney stones       Post-Op Diagnosis: same    Procedure(s) Performed:   1.  Left ureteroscopy  2.  Cystoscopy  3.  Left ureteral stent placement   4.  Fluoro < 1 h    Specimen(s): none    Staff Surgeon: Ileana Souza MD    Anesthesia: General LMA anesthesia    Indications: Noelle Rivas is a 61 y.o. female with a left renal stones, presenting for definitive stone management.  She currently does not have a JJ ureteral stent in place.      Findings:   - unable to advance rigid ureteroscopy through proximal ureter  - attempted to pass dual lumen catheter as well as access sheath without success  - attempted to pass flexible ureteroscope, unable to advance up to UPJ    Estimated Blood Loss: min    Drains: 6 Fr x 26 cm JJ ureteral stent without strings    Procedure in detail:  After informed consent was obtained, the patient was brought the the cystoscopy suite and placed in the supine position.  SCDs were applied and working.  Anesthesia was administered.  The patient was then placed in the dorsal lithotomy position and prepped and draped in the usual sterile fashion.      A rigid cystoscope in a 22 Fr sheath was introduced into the patient's urethra.  This  passed easily.  The entire urethra was visualized which showed no strictures or masses.  Formal cystoscopy was performed which revealed no masses or lesions suspicious for malignancy, no bladder stones, no bladder diverticuli, no trabeculations.  The ureteral orifices were visualized in the normal anatomic position bilaterally.      A motion wire was passed up the left ureteral orifice and up into the kidney.  This passed easily and placement was confirmed using fluoro.  The cystoscope was removed.    An 8 Fr rigid ureteroscope was passed into the patient's bladder alongside the wire under direct vision.  It was then passed through the left ureteral orifice alongside the wire.  There were no ureteral stones, however the scope was only able to be advanced into the proximal ureter. A second super stiff wire was placed and the scope was removed.     I then attempted to pass a 10/12 Fr access sheath over the super stiff wire, however this would not go past the mid ureter. I then attempted to pass a dual lumen catheter with the same result.     I attempted to pass the flexible ureteroscope over the super stiff as well as a glide wire, however was not able to advance through the proximal ureter. Decision was made to place a stent.      A 6 Fr x 26 cm JJ ureteral stent without strings was passed over the wire and up into the renal pelvis using fluoro.  When the coil appeared to be in good position in the kidney and the radio-opaque marker of the pusher was at the inferior pubis, the wire was removed under continuous fluoro.  Good coils were seen in the kidney and the bladder using fluoro.      The patient tolerated the procedure well and was transferred to the recovery room in stable condition.      Disposition:  The patient will follow up in 2 weeks for ureteroscopy.      Ileana Souza MD

## 2022-10-14 NOTE — PLAN OF CARE
Pt awake, alert, oriented. Vital signs stable. Dr. Souza came to bedside to follow-up with pt. Discharge instructions reviewed with pt. Pt verbalized understanding. IV removed, catheter intact. All belongings returned to patient. Pt transferred to car via wheelchair per FESTUS Castillo. Safety maintained.

## 2022-10-14 NOTE — TRANSFER OF CARE
"Anesthesia Transfer of Care Note    Patient: Noelle Rivas    Procedure(s) Performed: Procedure(s) (LRB):  REMOVAL, CALCULUS, URETER, URETEROSCOPIC (Left)  INSERTION, STENT, URETER (N/A)    Patient location: PACU    Anesthesia Type: general    Transport from OR: Transported from OR on 2-3 L/min O2 by NC with adequate spontaneous ventilation    Post pain: adequate analgesia    Post assessment: no apparent anesthetic complications and tolerated procedure well    Post vital signs: stable    Level of consciousness: sedated and responds to stimulation    Nausea/Vomiting: no nausea/vomiting    Complications: none    Transfer of care protocol was followed      Last vitals:   Visit Vitals  /60 (BP Location: Left arm, Patient Position: Lying)   Pulse 61   Temp 36.3 °C (97.4 °F) (Skin)   Resp 18   Ht 5' 9" (1.753 m)   Wt 67.4 kg (148 lb 11.2 oz)   LMP 04/01/2017 (Exact Date)   SpO2 99%   Breastfeeding No   BMI 21.96 kg/m²     "

## 2022-10-14 NOTE — ANESTHESIA PREPROCEDURE EVALUATION
10/14/2022  Noelle Rivas is a 61 y.o., female.      Pre-op Assessment    I have reviewed the Patient Summary Reports.     I have reviewed the Nursing Notes. I have reviewed the NPO Status.   I have reviewed the Medications.     Review of Systems  Anesthesia Hx:  No problems with previous Anesthesia    Social:  Non-Smoker    Hematology/Oncology:        Hematology Comments: Factor V leiden mutation  --  Cancer in past history:  Breast right   EENT/Dental:EENT/Dental Normal   Cardiovascular:  Cardiovascular Normal     Pulmonary:  Pulmonary Normal    Renal/:   Chronic Renal Disease renal calculi    Hepatic/GI:   Liver Disease,    Musculoskeletal:   Arthritis     Neurological:   CVA    Psych:   anxiety          Physical Exam  General: Well nourished, Cooperative, Alert and Oriented    Airway:  Mallampati: II   Mouth Opening: Small, but > 3cm  TM Distance: Normal  Neck ROM: Normal ROM    Dental:  Intact        Anesthesia Plan  Type of Anesthesia, risks & benefits discussed:    Anesthesia Type: Gen Supraglottic Airway  Intra-op Monitoring Plan: Standard ASA Monitors  Post Op Pain Control Plan: multimodal analgesia and IV/PO Opioids PRN  Induction:  IV  Airway Plan: Direct  Informed Consent: Informed consent signed with the Patient and all parties understand the risks and agree with anesthesia plan.  All questions answered.   ASA Score: 2  Day of Surgery Review of History & Physical: H&P Update referred to the surgeon/provider.    Ready For Surgery From Anesthesia Perspective.     .

## 2022-10-14 NOTE — DISCHARGE INSTRUCTIONS
"Discharge Instructions: After Your Surgery/Procedure  Youve just had surgery. During surgery you were given medicine called anesthesia to keep you relaxed and free of pain. After surgery you may have some pain or nausea. This is common. Here are some tips for feeling better and getting well after surgery.     Stay on schedule with your medication.   Going home  Your doctor or nurse will show you how to take care of yourself when you go home. He or she will also answer your questions. Have an adult family member or friend drive you home.      For your safety we recommend these precaution for the first 24 hours after your procedure:  Do not drive or use heavy equipment.  Do not make important decisions or sign legal papers.  Do not drink alcohol.  Have someone stay with you, if needed. He or she can watch for problems and help keep you safe.  Your concentration, balance, coordination, and judgement may be impaired for many hours after anesthesia.  Use caution when ambulating or standing up.     You may feel weak and "washed out" after anesthesia and surgery.      Subtle residual effects of general anesthesia or sedation with regional / local anesthesia can last more than 24 hours.  Rest for the remainder of the day or longer if your Doctor/Surgeon has advised you to do so.  Although you may feel normal within the first 24 hours, your reflexes and mental ability may be impaired without you realizing it.  You may feel dizzy, lightheaded or sleepy for 24 hours or longer.      Be sure to go to all follow-up visits with your doctor. And rest after your surgery for as long as your doctor tells you to.  Coping with pain  If you have pain after surgery, pain medicine will help you feel better. Take it as told, before pain becomes severe. Also, ask your doctor or pharmacist about other ways to control pain. This might be with heat, ice, or relaxation. And follow any other instructions your surgeon or nurse gives you.  Tips " for taking pain medicine  To get the best relief possible, remember these points:  Pain medicines can upset your stomach. Taking them with a little food may help.  Most pain relievers taken by mouth need at least 20 to 30 minutes to start to work.  Taking medicine on a schedule can help you remember to take it. Try to time your medicine so that you can take it before starting an activity. This might be before you get dressed, go for a walk, or sit down for dinner.  Constipation is a common side effect of pain medicines. Call your doctor before taking any medicines such as laxatives or stool softeners to help ease constipation. Also ask if you should skip any foods. Drinking lots of fluids and eating foods such as fruits and vegetables that are high in fiber can also help. Remember, do not take laxatives unless your surgeon has prescribed them.  Drinking alcohol and taking pain medicine can cause dizziness and slow your breathing. It can even be deadly. Do not drink alcohol while taking pain medicine.  Pain medicine can make you react more slowly to things. Do not drive or run machinery while taking pain medicine.  Your health care provider may tell you to take acetaminophen to help ease your pain. Ask him or her how much you are supposed to take each day. Acetaminophen or other pain relievers may interact with your prescription medicines or other over-the-counter (OTC) drugs. Some prescription medicines have acetaminophen and other ingredients. Using both prescription and OTC acetaminophen for pain can cause you to overdose. Read the labels on your OTC medicines with care. This will help you to clearly know the list of ingredients, how much to take, and any warnings. It may also help you not take too much acetaminophen. If you have questions or do not understand the information, ask your pharmacist or health care provider to explain it to you before you take the OTC medicine.  Managing nausea  Some people have an  upset stomach after surgery. This is often because of anesthesia, pain, or pain medicine, or the stress of surgery. These tips will help you handle nausea and eat healthy foods as you get better. If you were on a special food plan before surgery, ask your doctor if you should follow it while you get better. These tips may help:  Do not push yourself to eat. Your body will tell you when to eat and how much.  Start off with clear liquids and soup. They are easier to digest.  Next try semi-solid foods, such as mashed potatoes, applesauce, and gelatin, as you feel ready.  Slowly move to solid foods. Dont eat fatty, rich, or spicy foods at first.  Do not force yourself to have 3 large meals a day. Instead eat smaller amounts more often.  Take pain medicines with a small amount of solid food, such as crackers or toast, to avoid nausea.     Call your surgeon if  You still have pain an hour after taking medicine. The medicine may not be strong enough.  You feel too sleepy, dizzy, or groggy. The medicine may be too strong.  You have side effects like nausea, vomiting, or skin changes, such as rash, itching, or hives.       If you have obstructive sleep apnea  You were given anesthesia medicine during surgery to keep you comfortable and free of pain. After surgery, you may have more apnea spells because of this medicine and other medicines you were given. The spells may last longer than usual.   At home:  Keep using the continuous positive airway pressure (CPAP) device when you sleep. Unless your health care provider tells you not to, use it when you sleep, day or night. CPAP is a common device used to treat obstructive sleep apnea.  Talk with your provider before taking any pain medicine, muscle relaxants, or sedatives. Your provider will tell you about the possible dangers of taking these medicines.  © 9150-9708 [employer]. [address] [place] [address], [place] [address]. All rights reserved. This information is  not intended as a substitute for professional medical care. Always follow your healthcare professional's instructions.           Using an Incentive Spirometer    An incentive spirometer is a device that helps you do deep breathing exercises. These exercises expand your lungs, aid in circulation, and help prevent pneumonia. Deep breathing exercises also help you breathe better and improve the function of your lungs by:  Keeping your lungs clear  Strengthening your breathing muscles  Helping prevent respiratory complications or problems  The incentive spirometer gives you a way to take an active part in recover. A nurse or therapist will teach you breathing exercises. To do these exercises, you will breathe in through your mouth and not your nose. The incentive spirometer only works correctly if you breathe in through your mouth.  Steps to clear lungs  Step 1. Exhale normally. Then, inhale normally.  Relax and breathe out.  Step 2. Place your lips tightly around the mouthpiece.  Make sure the device is upright and not tilted.  Step 3. Inhale as much air as you can through the mouthpiece (don't breath through your nose).  Inhale slowly and deeply.  Hold your breath long enough to keep the balls or disk raised for at least 3 to 5 seconds, or as instructed by your healthcare provider.  Some spirometers have an indicator to let you know that you are breathing in too fast. If the indicator goes off, breathe in more slowly.  Step 4. Repeat the exercise regularly.  Do this exercise every hour while you're awake, or as instructed by your healthcare provider.  If you were taught deep breathing and coughing exercises, do them regularly as instructed by your healthcare provider.              Post op instructions for prevention of DVT  What is deep vein thrombosis?  Deep vein thrombosis (DVT) is the medical term for blood clots in the deep veins of the leg.  These blood clots can be dangerous.  A DVT can block a blood vessel and  keep blood from getting where it needs to go.  Another problem is that the clot can travel to other parts of the body such as the lungs.  A clot that travels to the lungs is called a pulmonary embolus (PE) and can cause serious problems with breathing which can lead to death.  Am I at risk for DVT/PE?  If you are not very active, you are at risk of DVT.  Anyone confined to bed, sitting for long periods of time, recovering from surgery, etc. increases the risk of DVT.  Other risk factors are cancer diagnosis, certain medications, estrogen replacement in any form,older age, obesity, pregnancy, smoking, history of clotting disorders, and dehydration.  How will I know if I have a DVT?  Swelling in the lower leg  Pain  Warmth, redness, hardness or bulging of the vein  If you have any of these symptoms, call your doctors office right away.  Some people will not have any symptoms until the clot moves to the lungs.  What are the symptoms of a PE?  Panting, shortness of breath, or trouble breathing  Sharp, knife-like chest pain when you breathe  Coughing or coughing up blood  Rapid heartbeat  If you have any of these symptoms or get worse quickly, call 911 for emergency treatment.  How can I prevent a DVT?  Avoid long periods of inactivity and dont cross your legs--get up and walk around every hour or so.  Stay active--walking after surgery is highly encouraged.  This means you should get out of the house and walk in the neighborhood.  Going up and down stairs will not impair healing (unless advised against such activity by your doctor).    Drink plenty of noncaffeinated, nonalcoholic fluids each day to prevent dehydration.  Wear special support stockings, if they have been advised by your doctor.  If you travel, stop at least once an hour and walk around.  Avoid smoking (assistance with stopping is available through your healthcare provider)  Always notify your doctor if you are not able to follow the post operative  instructions that are given to you at the time of discharge.  It may be necessary to prescribe one of the medications available to prevent DVT.           We hope your stay was comfortable as you heal now, mend and rest.    For we have enjoyed taking care of you by giving your our best.    And as you get better, by regaining your health and strength;   We count it as a privilege to have served you and hope your time at [facility] was well spent.      Thank  You!!!

## 2022-10-14 NOTE — ANESTHESIA PROCEDURE NOTES
Intubation    Date/Time: 10/14/2022 9:19 AM  Performed by: Aaron Eugene CRNA  Authorized by: Ladarius Francis MD     Intubation:     Induction:  Intravenous    Intubated:  Postinduction    Mask Ventilation:  N/a    Attempts:  1    Attempted By:  CRNA    Difficult Airway Encountered?: No      Complications:  None    Airway Device:  Supraglottic airway/LMA    Airway Device Size:  3.0    Style/Cuff Inflation:  Cuffed (inflated to minimal occlusive pressure)    Placement Verified By:  Capnometry    Complicating Factors:  None    Findings Post-Intubation:  BS equal bilateral and atraumatic/condition of teeth unchanged

## 2022-10-14 NOTE — DISCHARGE SUMMARY
[facility]  Discharge Note  Short Stay    Admit Date: 10/14/2022    Discharge Date and Time: 10/14/2022 9:52 AM      Attending Physician: Ileana Souza MD     Discharge Provider: Ileana Souza    Diagnoses:  Active Hospital Problems    Diagnosis  POA    *Kidney stones [N20.0]  Yes    Malignant neoplasm of right breast, stage 1, estrogen receptor positive [C50.911, Z17.0]  Not Applicable    Factor V Leiden mutation [D68.51]  Yes      Resolved Hospital Problems   No resolved problems to display.       Discharged Condition: good    Hospital Course: Patient was admitted for outpatient procedure and tolerated the procedure well with no complications. The patient was discharged home in good condition on the same day.       Final Diagnoses: Same as principal problem.    Disposition: Home or Self Care    Follow up/Patient Instructions:    Medications:  Reconciled Home Medications:   Current Discharge Medication List        START taking these medications    Details   phenazopyridine (PYRIDIUM) 100 MG tablet Take 1 tablet (100 mg total) by mouth 3 (three) times daily as needed for Pain.  Qty: 30 tablet, Refills: 3      tamsulosin (FLOMAX) 0.4 mg Cap Take 1 capsule (0.4 mg total) by mouth once daily.  Qty: 30 capsule, Refills: 0           CONTINUE these medications which have NOT CHANGED    Details   aspirin (ECOTRIN) 81 MG EC tablet Take 81 mg by mouth once daily.      cholecalciferol, vitamin D3, (VITAMIN D3) 25 mcg (1,000 unit) capsule Take 1 capsule (1,000 Units total) by mouth once daily.  Qty: 30 capsule, Refills: 12      cyanocobalamin (VITAMIN B-12) 1000 MCG tablet Take 1,000 mcg by mouth every other day.       loratadine (CLARITIN) 10 mg tablet Take 10 mg by mouth daily as needed.      multivitamin (THERAGRAN) per tablet Take 1 tablet by mouth once daily.           Discharge Procedure Orders   Diet general     Call MD for:  temperature >100.4     Call MD for:  persistent nausea and vomiting      Call MD for:  severe uncontrolled pain     No dressing needed     Activity as tolerated      Follow-up Information       Ileana Souza MD Follow up on 10/28/2022.    Specialty: Urology  Why: stone follow up  Contact information:  [address] [place] [address] DRIVE  [address]  [facility] [id number] [phone]                             Ileana Souza MD  Urology Department

## 2022-10-14 NOTE — ANESTHESIA POSTPROCEDURE EVALUATION
Anesthesia Post Evaluation    Patient: Noelle Rivas    Procedure(s) Performed: Procedure(s) (LRB):  REMOVAL, CALCULUS, URETER, URETEROSCOPIC (Left)  INSERTION, STENT, URETER (N/A)    Final Anesthesia Type: general      Patient location during evaluation: PACU  Patient participation: Yes- Able to Participate  Level of consciousness: awake and alert  Post-procedure vital signs: reviewed and stable  Pain management: adequate  Airway patency: patent    PONV status at discharge: No PONV  Anesthetic complications: no      Cardiovascular status: hemodynamically stable  Respiratory status: unassisted and room air  Hydration status: euvolemic  Follow-up not needed.          Vitals Value Taken Time   /57 10/14/22 1048   Temp 36.3 °C (97.3 °F) 10/14/22 1000   Pulse 70 10/14/22 1048   Resp 16 10/14/22 1048   SpO2 98 % 10/14/22 1048         Event Time   Out of Recovery 10:48:00         Pain/Emeli Score: Emeli Score: 10 (10/14/2022 10:45 AM)         [facility] [place] [facility] [place]  03/27/22 [id number]

## 2022-10-17 ENCOUNTER — TELEPHONE (OUTPATIENT)
Dept: UROLOGY | Facility: CLINIC | Age: 62
End: 2022-10-17
Payer: COMMERCIAL

## 2022-10-17 VITALS
DIASTOLIC BLOOD PRESSURE: 65 MMHG | TEMPERATURE: 97 F | BODY MASS INDEX: 22.02 KG/M2 | HEART RATE: 68 BPM | WEIGHT: 148.69 LBS | HEIGHT: 69 IN | RESPIRATION RATE: 16 BRPM | OXYGEN SATURATION: 100 % | SYSTOLIC BLOOD PRESSURE: 144 MMHG

## 2022-10-17 DIAGNOSIS — N22 CALCULUS OF URINARY TRACT IN DISEASES CLASSIFIED ELSEWHERE: Primary | ICD-10-CM

## 2022-10-17 NOTE — PROGRESS NOTES
Lots of anxiety, constipated and has questions about what to do, worked in the yard Sunday.  Feels more comfortable moving around, not taking pyridium regularly..  recommended stool softener bid daily or miralax daily. She will call MD office.

## 2022-10-17 NOTE — TELEPHONE ENCOUNTER
Spoke with patient, doing fine, some constipation issues, will take med to get kick started. Had some questions about upcoming procedure. Nothing too pressing, if she has further questions she will send a portal message.

## 2022-10-27 ENCOUNTER — ANESTHESIA EVENT (OUTPATIENT)
Dept: SURGERY | Facility: HOSPITAL | Age: 62
End: 2022-10-27
Payer: COMMERCIAL

## 2022-10-28 ENCOUNTER — HOSPITAL ENCOUNTER (OUTPATIENT)
Facility: HOSPITAL | Age: 62
Discharge: HOME | End: 2022-10-28
Attending: STUDENT IN AN ORGANIZED HEALTH CARE EDUCATION/TRAINING PROGRAM | Admitting: STUDENT IN AN ORGANIZED HEALTH CARE EDUCATION/TRAINING PROGRAM
Payer: COMMERCIAL

## 2022-10-28 ENCOUNTER — ANESTHESIA (OUTPATIENT)
Dept: SURGERY | Facility: HOSPITAL | Age: 62
End: 2022-10-28
Payer: COMMERCIAL

## 2022-10-28 DIAGNOSIS — N20.0 KIDNEY STONES: Primary | ICD-10-CM

## 2022-10-28 DIAGNOSIS — N20.0 KIDNEY STONE: ICD-10-CM

## 2022-10-28 LAB
APTT BLDCRRT: 25.1 SEC (ref 21–32)
INR PPP: 0.9 (ref 0.8–1.2)
PROTHROMBIN TIME: 9.5 SEC (ref 9–12.5)

## 2022-10-28 PROCEDURE — 25000003 PHARM REV CODE 250: Performed by: NURSE ANESTHETIST, CERTIFIED REGISTERED

## 2022-10-28 PROCEDURE — 74420 PR  X-RAY RETROGRADE PYELOGRAM: ICD-10-PCS | Mod: 26,,, | Performed by: STUDENT IN AN ORGANIZED HEALTH CARE EDUCATION/TRAINING PROGRAM

## 2022-10-28 PROCEDURE — 52356 CYSTO/URETERO W/LITHOTRIPSY: CPT | Mod: LT,,, | Performed by: STUDENT IN AN ORGANIZED HEALTH CARE EDUCATION/TRAINING PROGRAM

## 2022-10-28 PROCEDURE — 25000003 PHARM REV CODE 250: Performed by: ANESTHESIOLOGY

## 2022-10-28 PROCEDURE — 52356 PR CYSTO/URETERO W/LITHOTRIPSY: ICD-10-PCS | Mod: LT,,, | Performed by: STUDENT IN AN ORGANIZED HEALTH CARE EDUCATION/TRAINING PROGRAM

## 2022-10-28 PROCEDURE — D9220A PRA ANESTHESIA: Mod: CRNA,,, | Performed by: NURSE ANESTHETIST, CERTIFIED REGISTERED

## 2022-10-28 PROCEDURE — 99900104 DSU ONLY-NO CHARGE-EA ADD'L HR (STAT): Performed by: STUDENT IN AN ORGANIZED HEALTH CARE EDUCATION/TRAINING PROGRAM

## 2022-10-28 PROCEDURE — 85610 PROTHROMBIN TIME: CPT | Performed by: ANESTHESIOLOGY

## 2022-10-28 PROCEDURE — 71000033 HC RECOVERY, INTIAL HOUR: Performed by: STUDENT IN AN ORGANIZED HEALTH CARE EDUCATION/TRAINING PROGRAM

## 2022-10-28 PROCEDURE — 71000039 HC RECOVERY, EACH ADD'L HOUR: Performed by: STUDENT IN AN ORGANIZED HEALTH CARE EDUCATION/TRAINING PROGRAM

## 2022-10-28 PROCEDURE — 63600175 PHARM REV CODE 636 W HCPCS: Performed by: STUDENT IN AN ORGANIZED HEALTH CARE EDUCATION/TRAINING PROGRAM

## 2022-10-28 PROCEDURE — 99900103 DSU ONLY-NO CHARGE-INITIAL HR (STAT): Performed by: STUDENT IN AN ORGANIZED HEALTH CARE EDUCATION/TRAINING PROGRAM

## 2022-10-28 PROCEDURE — 71000016 HC POSTOP RECOV ADDL HR: Performed by: STUDENT IN AN ORGANIZED HEALTH CARE EDUCATION/TRAINING PROGRAM

## 2022-10-28 PROCEDURE — 63600175 PHARM REV CODE 636 W HCPCS: Performed by: NURSE ANESTHETIST, CERTIFIED REGISTERED

## 2022-10-28 PROCEDURE — 36415 COLL VENOUS BLD VENIPUNCTURE: CPT | Performed by: ANESTHESIOLOGY

## 2022-10-28 PROCEDURE — 74420 UROGRAPHY RTRGR +-KUB: CPT | Mod: 26,,, | Performed by: STUDENT IN AN ORGANIZED HEALTH CARE EDUCATION/TRAINING PROGRAM

## 2022-10-28 PROCEDURE — D9220A PRA ANESTHESIA: Mod: ANES,,, | Performed by: ANESTHESIOLOGY

## 2022-10-28 PROCEDURE — 36000707: Performed by: STUDENT IN AN ORGANIZED HEALTH CARE EDUCATION/TRAINING PROGRAM

## 2022-10-28 PROCEDURE — 37000008 HC ANESTHESIA 1ST 15 MINUTES: Performed by: STUDENT IN AN ORGANIZED HEALTH CARE EDUCATION/TRAINING PROGRAM

## 2022-10-28 PROCEDURE — C2617 STENT, NON-COR, TEM W/O DEL: HCPCS | Performed by: STUDENT IN AN ORGANIZED HEALTH CARE EDUCATION/TRAINING PROGRAM

## 2022-10-28 PROCEDURE — 27201423 OPTIME MED/SURG SUP & DEVICES STERILE SUPPLY: Performed by: STUDENT IN AN ORGANIZED HEALTH CARE EDUCATION/TRAINING PROGRAM

## 2022-10-28 PROCEDURE — 71000015 HC POSTOP RECOV 1ST HR: Performed by: STUDENT IN AN ORGANIZED HEALTH CARE EDUCATION/TRAINING PROGRAM

## 2022-10-28 PROCEDURE — 85730 THROMBOPLASTIN TIME PARTIAL: CPT | Performed by: ANESTHESIOLOGY

## 2022-10-28 PROCEDURE — D9220A PRA ANESTHESIA: ICD-10-PCS | Mod: CRNA,,, | Performed by: NURSE ANESTHETIST, CERTIFIED REGISTERED

## 2022-10-28 PROCEDURE — C1769 GUIDE WIRE: HCPCS | Performed by: STUDENT IN AN ORGANIZED HEALTH CARE EDUCATION/TRAINING PROGRAM

## 2022-10-28 PROCEDURE — 37000009 HC ANESTHESIA EA ADD 15 MINS: Performed by: STUDENT IN AN ORGANIZED HEALTH CARE EDUCATION/TRAINING PROGRAM

## 2022-10-28 PROCEDURE — 36000706: Performed by: STUDENT IN AN ORGANIZED HEALTH CARE EDUCATION/TRAINING PROGRAM

## 2022-10-28 PROCEDURE — C1894 INTRO/SHEATH, NON-LASER: HCPCS | Performed by: STUDENT IN AN ORGANIZED HEALTH CARE EDUCATION/TRAINING PROGRAM

## 2022-10-28 PROCEDURE — D9220A PRA ANESTHESIA: ICD-10-PCS | Mod: ANES,,, | Performed by: ANESTHESIOLOGY

## 2022-10-28 PROCEDURE — 82365 CALCULUS SPECTROSCOPY: CPT | Performed by: STUDENT IN AN ORGANIZED HEALTH CARE EDUCATION/TRAINING PROGRAM

## 2022-10-28 DEVICE — STENT SET URETERAL 6X26CM: Type: IMPLANTABLE DEVICE | Site: URETER | Status: FUNCTIONAL

## 2022-10-28 RX ORDER — PROMETHAZINE HYDROCHLORIDE 25 MG/ML
INJECTION, SOLUTION INTRAMUSCULAR; INTRAVENOUS
Status: DISCONTINUED | OUTPATIENT
Start: 2022-10-28 | End: 2022-10-28

## 2022-10-28 RX ORDER — MIDAZOLAM HYDROCHLORIDE 1 MG/ML
INJECTION INTRAMUSCULAR; INTRAVENOUS
Status: DISCONTINUED | OUTPATIENT
Start: 2022-10-28 | End: 2022-10-28

## 2022-10-28 RX ORDER — ACETAMINOPHEN 10 MG/ML
INJECTION, SOLUTION INTRAVENOUS
Status: DISCONTINUED | OUTPATIENT
Start: 2022-10-28 | End: 2022-10-28

## 2022-10-28 RX ORDER — PHENAZOPYRIDINE HYDROCHLORIDE 100 MG/1
100 TABLET, FILM COATED ORAL 3 TIMES DAILY PRN
Qty: 30 TABLET | Refills: 3 | Status: SHIPPED | OUTPATIENT
Start: 2022-10-28 | End: 2022-11-07

## 2022-10-28 RX ORDER — FENTANYL CITRATE 50 UG/ML
25 INJECTION, SOLUTION INTRAMUSCULAR; INTRAVENOUS EVERY 5 MIN PRN
Status: ACTIVE | OUTPATIENT
Start: 2022-10-28

## 2022-10-28 RX ORDER — KETOROLAC TROMETHAMINE 30 MG/ML
INJECTION, SOLUTION INTRAMUSCULAR; INTRAVENOUS
Status: DISCONTINUED | OUTPATIENT
Start: 2022-10-28 | End: 2022-10-28

## 2022-10-28 RX ORDER — SCOLOPAMINE TRANSDERMAL SYSTEM 1 MG/1
1 PATCH, EXTENDED RELEASE TRANSDERMAL
Status: DISCONTINUED | OUTPATIENT
Start: 2022-10-28 | End: 2022-10-28 | Stop reason: HOSPADM

## 2022-10-28 RX ORDER — HYDROCODONE BITARTRATE AND ACETAMINOPHEN 5; 325 MG/1; MG/1
1 TABLET ORAL EVERY 4 HOURS PRN
Status: DISCONTINUED | OUTPATIENT
Start: 2022-10-28 | End: 2022-10-28 | Stop reason: HOSPADM

## 2022-10-28 RX ORDER — DIPHENHYDRAMINE HYDROCHLORIDE 50 MG/ML
12.5 INJECTION INTRAMUSCULAR; INTRAVENOUS ONCE AS NEEDED
Status: ACTIVE | OUTPATIENT
Start: 2022-10-28 | End: 2034-03-26

## 2022-10-28 RX ORDER — HYDROMORPHONE HYDROCHLORIDE 2 MG/ML
0.2 INJECTION, SOLUTION INTRAMUSCULAR; INTRAVENOUS; SUBCUTANEOUS EVERY 5 MIN PRN
Status: ACTIVE | OUTPATIENT
Start: 2022-10-28

## 2022-10-28 RX ORDER — LIDOCAINE HYDROCHLORIDE 10 MG/ML
1 INJECTION, SOLUTION EPIDURAL; INFILTRATION; INTRACAUDAL; PERINEURAL ONCE
Status: COMPLETED | OUTPATIENT
Start: 2022-10-28 | End: 2022-10-28

## 2022-10-28 RX ORDER — FENTANYL CITRATE 50 UG/ML
INJECTION, SOLUTION INTRAMUSCULAR; INTRAVENOUS
Status: DISCONTINUED | OUTPATIENT
Start: 2022-10-28 | End: 2022-10-28

## 2022-10-28 RX ORDER — ONDANSETRON HYDROCHLORIDE 2 MG/ML
INJECTION, SOLUTION INTRAMUSCULAR; INTRAVENOUS
Status: DISCONTINUED | OUTPATIENT
Start: 2022-10-28 | End: 2022-10-28

## 2022-10-28 RX ORDER — ONDANSETRON 4 MG/1
8 TABLET, ORALLY DISINTEGRATING ORAL EVERY 8 HOURS PRN
Status: DISCONTINUED | OUTPATIENT
Start: 2022-10-28 | End: 2022-10-28 | Stop reason: HOSPADM

## 2022-10-28 RX ORDER — SODIUM CHLORIDE, SODIUM LACTATE, POTASSIUM CHLORIDE, CALCIUM CHLORIDE 600; 310; 30; 20 MG/100ML; MG/100ML; MG/100ML; MG/100ML
10 INJECTION, SOLUTION INTRAVENOUS CONTINUOUS
Status: ACTIVE | OUTPATIENT
Start: 2022-10-28

## 2022-10-28 RX ORDER — CEFAZOLIN SODIUM 2 G/50ML
2 SOLUTION INTRAVENOUS
Status: COMPLETED | OUTPATIENT
Start: 2022-10-28 | End: 2022-10-28

## 2022-10-28 RX ORDER — MEPERIDINE HYDROCHLORIDE 50 MG/ML
12.5 INJECTION INTRAMUSCULAR; INTRAVENOUS; SUBCUTANEOUS ONCE AS NEEDED
Status: ACTIVE | OUTPATIENT
Start: 2022-10-28 | End: 2022-10-29

## 2022-10-28 RX ORDER — LIDOCAINE HCL/PF 100 MG/5ML
SYRINGE (ML) INTRAVENOUS
Status: DISCONTINUED | OUTPATIENT
Start: 2022-10-28 | End: 2022-10-28

## 2022-10-28 RX ORDER — OXYCODONE HYDROCHLORIDE 5 MG/1
5 TABLET ORAL ONCE AS NEEDED
Status: ACTIVE | OUTPATIENT
Start: 2022-10-28 | End: 2034-03-26

## 2022-10-28 RX ORDER — PROCHLORPERAZINE EDISYLATE 5 MG/ML
5 INJECTION INTRAMUSCULAR; INTRAVENOUS EVERY 30 MIN PRN
Status: ACTIVE | OUTPATIENT
Start: 2022-10-28

## 2022-10-28 RX ORDER — ONDANSETRON 2 MG/ML
4 INJECTION INTRAMUSCULAR; INTRAVENOUS ONCE AS NEEDED
Status: ACTIVE | OUTPATIENT
Start: 2022-10-28 | End: 2034-03-26

## 2022-10-28 RX ORDER — PROMETHAZINE HYDROCHLORIDE 25 MG/1
25 TABLET ORAL ONCE
Status: COMPLETED | OUTPATIENT
Start: 2022-10-28 | End: 2022-10-28

## 2022-10-28 RX ORDER — SODIUM CHLORIDE, SODIUM LACTATE, POTASSIUM CHLORIDE, CALCIUM CHLORIDE 600; 310; 30; 20 MG/100ML; MG/100ML; MG/100ML; MG/100ML
500 INJECTION, SOLUTION INTRAVENOUS ONCE
Status: ACTIVE | OUTPATIENT
Start: 2022-10-28

## 2022-10-28 RX ORDER — PROPOFOL 10 MG/ML
VIAL (ML) INTRAVENOUS
Status: DISCONTINUED | OUTPATIENT
Start: 2022-10-28 | End: 2022-10-28

## 2022-10-28 RX ORDER — OXYCODONE AND ACETAMINOPHEN 5; 325 MG/1; MG/1
1 TABLET ORAL EVERY 4 HOURS PRN
Qty: 10 TABLET | Refills: 0 | Status: SHIPPED | OUTPATIENT
Start: 2022-10-28 | End: 2023-02-14

## 2022-10-28 RX ORDER — SODIUM CHLORIDE 0.9 % (FLUSH) 0.9 %
3 SYRINGE (ML) INJECTION EVERY 8 HOURS
Status: ACTIVE | OUTPATIENT
Start: 2022-10-28

## 2022-10-28 RX ORDER — TAMSULOSIN HYDROCHLORIDE 0.4 MG/1
0.4 CAPSULE ORAL DAILY
Qty: 30 CAPSULE | Refills: 0 | Status: SHIPPED | OUTPATIENT
Start: 2022-10-28 | End: 2023-02-14

## 2022-10-28 RX ORDER — DEXAMETHASONE SODIUM PHOSPHATE 4 MG/ML
INJECTION, SOLUTION INTRA-ARTICULAR; INTRALESIONAL; INTRAMUSCULAR; INTRAVENOUS; SOFT TISSUE
Status: DISCONTINUED | OUTPATIENT
Start: 2022-10-28 | End: 2022-10-28

## 2022-10-28 RX ADMIN — ONDANSETRON 4 MG: 2 INJECTION, SOLUTION INTRAMUSCULAR; INTRAVENOUS at 07:10

## 2022-10-28 RX ADMIN — PROMETHAZINE HYDROCHLORIDE 25 MG: 25 TABLET ORAL at 11:10

## 2022-10-28 RX ADMIN — SCOPALAMINE 1 PATCH: 1 PATCH, EXTENDED RELEASE TRANSDERMAL at 07:10

## 2022-10-28 RX ADMIN — PROMETHAZINE HYDROCHLORIDE 6.25 MG: 25 INJECTION INTRAMUSCULAR; INTRAVENOUS at 08:10

## 2022-10-28 RX ADMIN — LIDOCAINE HYDROCHLORIDE 10 MG: 10 INJECTION, SOLUTION EPIDURAL; INFILTRATION; INTRACAUDAL; PERINEURAL at 07:10

## 2022-10-28 RX ADMIN — CEFAZOLIN SODIUM 2 G: 2 SOLUTION INTRAVENOUS at 07:10

## 2022-10-28 RX ADMIN — FENTANYL CITRATE 50 MCG: 50 INJECTION, SOLUTION INTRAMUSCULAR; INTRAVENOUS at 07:10

## 2022-10-28 RX ADMIN — MIDAZOLAM HYDROCHLORIDE 2 MG: 1 INJECTION, SOLUTION INTRAMUSCULAR; INTRAVENOUS at 07:10

## 2022-10-28 RX ADMIN — DEXAMETHASONE SODIUM PHOSPHATE 8 MG: 4 INJECTION, SOLUTION INTRA-ARTICULAR; INTRALESIONAL; INTRAMUSCULAR; INTRAVENOUS; SOFT TISSUE at 07:10

## 2022-10-28 RX ADMIN — PROPOFOL 150 MG: 10 INJECTION, EMULSION INTRAVENOUS at 07:10

## 2022-10-28 RX ADMIN — SODIUM CHLORIDE, SODIUM GLUCONATE, SODIUM ACETATE, POTASSIUM CHLORIDE, MAGNESIUM CHLORIDE, SODIUM PHOSPHATE, DIBASIC, AND POTASSIUM PHOSPHATE: .53; .5; .37; .037; .03; .012; .00082 INJECTION, SOLUTION INTRAVENOUS at 07:10

## 2022-10-28 RX ADMIN — ONDANSETRON 4 MG: 2 INJECTION, SOLUTION INTRAMUSCULAR; INTRAVENOUS at 08:10

## 2022-10-28 RX ADMIN — FENTANYL CITRATE 25 MCG: 50 INJECTION, SOLUTION INTRAMUSCULAR; INTRAVENOUS at 08:10

## 2022-10-28 RX ADMIN — GLYCOPYRROLATE 0.1 MG: 0.2 INJECTION, SOLUTION INTRAMUSCULAR; INTRAVITREAL at 07:10

## 2022-10-28 RX ADMIN — KETOROLAC TROMETHAMINE 15 MG: 30 INJECTION, SOLUTION INTRAMUSCULAR; INTRAVENOUS at 08:10

## 2022-10-28 RX ADMIN — LIDOCAINE HYDROCHLORIDE 75 MG: 20 INJECTION INTRAVENOUS at 07:10

## 2022-10-28 RX ADMIN — ACETAMINOPHEN 1000 MG: 10 INJECTION, SOLUTION INTRAVENOUS at 08:10

## 2022-10-28 NOTE — ANESTHESIA POSTPROCEDURE EVALUATION
Anesthesia Post Evaluation    Patient: Noelle Rivas    Procedure(s) Performed: Procedure(s) (LRB):  REMOVAL, CALCULUS, URETER, URETEROSCOPIC (Left)  CYSTOSCOPY, WITH URETERAL STENT REMOVAL (Left)  CYSTOSCOPY, WITH URETERAL STENT INSERTION (Left)    Final Anesthesia Type: general      Patient location during evaluation: PACU  Patient participation: Yes- Able to Participate  Level of consciousness: awake and alert and oriented  Post-procedure vital signs: reviewed and stable  Pain management: adequate  Airway patency: patent    PONV status at discharge: No PONV  Anesthetic complications: no      Cardiovascular status: blood pressure returned to baseline and stable  Respiratory status: unassisted and spontaneous ventilation  Hydration status: euvolemic  Follow-up not needed.          Vitals Value Taken Time   /93 10/28/22 1057   Temp 36.3 °C (97.3 °F) 10/28/22 0910   Pulse 65 10/28/22 1057   Resp 18 10/28/22 1057   SpO2 99 % 10/28/22 1057         Event Time   Out of Recovery 10:31:00         Pain/Emeli Score: Emeli Score: 10 (10/28/2022 10:15 AM)  Modified Emeli Score: 18 (10/28/2022 10:50 AM)

## 2022-10-28 NOTE — TRANSFER OF CARE
"Anesthesia Transfer of Care Note    Patient: Noelle Rivas    Procedure(s) Performed: Procedure(s) (LRB):  REMOVAL, CALCULUS, URETER, URETEROSCOPIC (Left)  CYSTOSCOPY, WITH URETERAL STENT REMOVAL (Left)  CYSTOSCOPY, WITH URETERAL STENT INSERTION (Left)    Patient location: PACU    Anesthesia Type: general    Transport from OR: Transported from OR on 2-3 L/min O2 by NC with adequate spontaneous ventilation    Post pain: adequate analgesia    Post assessment: no apparent anesthetic complications and tolerated procedure well    Post vital signs: stable    Level of consciousness: sedated    Nausea/Vomiting: no nausea/vomiting    Complications: none    Transfer of care protocol was followed      Last vitals:   Visit Vitals  BP (!) 112/58 (BP Location: Left arm, Patient Position: Lying)   Pulse 64   Temp 36.3 °C (97.4 °F) (Skin)   Resp 16   Ht 5' 9" (1.753 m)   Wt 67.1 kg (148 lb)   LMP 04/01/2017 (Exact Date)   SpO2 100%   Breastfeeding No   BMI 21.86 kg/m²     "

## 2022-10-28 NOTE — PROGRESS NOTES
Patient vomiting 100 ml of clear emesis. Notified anesthesiologist Dr. Howe. Order received for 25 mg of oral Phenergan.

## 2022-10-28 NOTE — H&P
[facility] Clinic Note    PCP: Emelyn Bernardo MD    Chief Complaint: kidney stones    SUBJECTIVE:       History of Present Illness:  Noelle Rivas is a 61 y.o. female who presents to clinic for kidney stones. She is Established  to our clinic.     Patient of Dr. Singh, presents to discuss PCNL.   On most recent Urogram she has a left 8 mm upper pole stone and 10 mm lower pole stone (on CTRSS from a month prior the stone measured 1.5 cm however on most recent scan appears to be only 1 cm which it was also in June 2021). She has mild hydro on the left secondary to what appears to be a lower pole crossing vessel. She had a MAG 3 renal scan however which shows equal function of the kidneys with a T1/2 of 7 on the left and 9.5 on the right.     She does not have a significant stone history. Passed a stone in 2009.  No prior stone procedures.   No left flank pain.   No hematuria.   No hx of recurrent UTIs.     Last urine culture: no documented UTIs    Lab Results   Component Value Date    CREATININE 0.9 10/14/2022     Hx of stroke, factor v leiden, breast cancer    Past medical, family, and social history reviewed as documented in chart with pertinent positive medical, family, and social history detailed in HPI.    Review of patient's allergies indicates:   Allergen Reactions    Adhesive Itching and Rash     Micropore/Medipore Tape - paper tape is ok    Iodine and iodide containing products Itching    Latex, natural rubber Itching       Past Medical History:   Diagnosis Date    Abnormal TSH 06/05/2018    Age-related nuclear cataract of both eyes 08/03/2022    Allergy     Anxiety     Anxiety 12/17/2012    Breast cancer 2012    right    Calcium nephrolithiasis     Complex endometrial hyperplasia without atypia 06/16/2016    Degenerative disc disease     DJD (degenerative joint disease) of knee     Endometrial hyperplasia, unspecified: see evaluation 2017 05/22/2018    Factor V Leiden mutation No  h/o DVT    Kidney stones     Leukopenia 2012    PMB (postmenopausal bleeding) 2017    Stroke     questionable  - TIA    Vitamin B 12 deficiency      Past Surgical History:   Procedure Laterality Date    BREAST BIOPSY Right     malignant, radiation    BREAST BIOPSY Right 2014    benign    BREAST LUMPECTOMY Right 12    w/ radiation    BREAST LUMPECTOMY W/ NEEDLE LOCALIZATION      DILATION AND CURETTAGE OF UTERUS      URETERAL STENT PLACEMENT N/A 10/14/2022    Procedure: INSERTION, STENT, URETER;  Surgeon: Ileana Souza MD;  Location: [facility] OR;  Service: Urology;  Laterality: N/A;  6x26 soft    URETEROSCOPIC REMOVAL OF URETERIC CALCULUS Left 10/14/2022    Procedure: REMOVAL, CALCULUS, URETER, URETEROSCOPIC;  Surgeon: Ileana Souza MD;  Location: [facility] OR;  Service: Urology;  Laterality: Left;     Family History   Problem Relation Age of Onset    Hypertension Father     Coronary artery disease Father     Cancer Father         prostate ca, metastatic    Heart disease Father         stenting    Kidney disease Father         stones    Retinal detachment Father     Colon cancer Paternal Grandfather     Melanoma Mother     Cancer Mother         melanoma    Hyperlipidemia Brother     Keratoconus Brother     No Known Problems Brother     Stroke Maternal Grandmother     Lung cancer Maternal Grandfather     Breast cancer Paternal Grandmother          at age 62    Ovarian cancer Neg Hx     Uterine cancer Neg Hx     Psoriasis Neg Hx     Lupus Neg Hx     Eczema Neg Hx      Social History     Tobacco Use    Smoking status: Never    Smokeless tobacco: Never   Substance Use Topics    Alcohol use: Yes     Comment: Minimal    Drug use: No        Review of Systems   Genitourinary:  Negative for difficulty urinating, dysuria, flank pain, hematuria, nocturia and urgency.     OBJECTIVE:     Anticoagulation: aspirin 81 mg     Estimated body mass index is 21.86 kg/m² as calculated from the following:     "Height as of this encounter: 5' 9" (1.753 m).    Weight as of this encounter: 67.1 kg (148 lb).    Vital Signs (Most Recent)  Temp: 97.4 °F (36.3 °C) (10/28/22 0635)  Pulse: 64 (10/28/22 0635)  Resp: 16 (10/28/22 0635)  BP: (!) 112/58 (10/28/22 0635)  SpO2: 100 % (10/28/22 0635)    Physical Exam  Vitals reviewed.   Constitutional:       General: She is not in acute distress.     Appearance: Normal appearance. She is not ill-appearing.   HENT:      Head: Normocephalic and atraumatic.   Eyes:      General: No scleral icterus.  Cardiovascular:      Rate and Rhythm: Normal rate and regular rhythm.   Pulmonary:      Effort: Pulmonary effort is normal. No respiratory distress.   Abdominal:      Palpations: Abdomen is soft.   Skin:     Coloration: Skin is not jaundiced.   Neurological:      General: No focal deficit present.      Mental Status: She is alert and oriented to person, place, and time.   Psychiatric:         Mood and Affect: Mood normal.         Behavior: Behavior normal.       BMP  Lab Results   Component Value Date     10/14/2022    K 4.0 10/14/2022     10/14/2022    CO2 25 10/14/2022    BUN 12 10/14/2022    CREATININE 0.9 10/14/2022    CALCIUM 9.9 10/14/2022    ANIONGAP 10 10/14/2022    ESTGFRAFRICA >60.0 07/12/2022    EGFRNONAA >60.0 07/12/2022       Lab Results   Component Value Date    WBC 4.76 10/14/2022    HGB 12.7 10/14/2022    HCT 37.6 10/14/2022    MCV 98 10/14/2022     10/14/2022       Imaging:  Per HPI    ASSESSMENT     1. Kidney stones    2. Kidney stone        PLAN:     - Left ureteroscopy today  - I have explained the indication, risks, benefits, and alternatives of the procedure in detail.  Risks including but not limited to bleeding, infection, injury to the urethra, bladder, ureter, need for additional treatments, inability or incomplete removal of kidney stones, pain, and discomfort related to the stent were discussed in depth with the patient.  The patient voices " understanding and all questions have been answered.  The patient agrees to proceed as planned with left ureteroscopy, laser lithotripsy, and ureteral stent placement.        Ileana Souza MD

## 2022-10-28 NOTE — PROGRESS NOTES
Reviewed discharge instructions with patient and when to follow up. Educated on how to remove stent and when. Assisted patient with getting dressed.

## 2022-10-28 NOTE — DISCHARGE SUMMARY
[facility]  Discharge Note  Short Stay    Admit Date: 10/28/2022    Discharge Date and Time: 10/28/2022 9:10 AM      Attending Physician: Ileana Souza MD     Discharge Provider: Ileana Souza    Diagnoses:  Active Hospital Problems    Diagnosis  POA    *Kidney stones [N20.0]  Yes    Malignant neoplasm of right breast, stage 1, estrogen receptor positive [C50.911, Z17.0]  Not Applicable    Factor V Leiden mutation [D68.51]  Yes    Estrogen receptor positive status (ER+) [Z17.0]  Not Applicable    Calculus of ureter: 2009; also 2021 [N20.1]  Yes      Resolved Hospital Problems   No resolved problems to display.       Discharged Condition: good    Hospital Course: Patient was admitted for outpatient procedure and tolerated the procedure well with no complications. The patient was discharged home in good condition on the same day.       Final Diagnoses: Same as principal problem.    Disposition: Home or Self Care    Follow up/Patient Instructions:    Medications:  Reconciled Home Medications:   Current Discharge Medication List        START taking these medications    Details   oxyCODONE-acetaminophen (PERCOCET) 5-325 mg per tablet Take 1 tablet by mouth every 4 (four) hours as needed for Pain.  Qty: 10 tablet, Refills: 0    Comments: Quantity prescribed more than 7 day supply? No           CONTINUE these medications which have CHANGED    Details   phenazopyridine (PYRIDIUM) 100 MG tablet Take 1 tablet (100 mg total) by mouth 3 (three) times daily as needed for Pain.  Qty: 30 tablet, Refills: 3      tamsulosin (FLOMAX) 0.4 mg Cap Take 1 capsule (0.4 mg total) by mouth once daily.  Qty: 30 capsule, Refills: 0           CONTINUE these medications which have NOT CHANGED    Details   aspirin (ECOTRIN) 81 MG EC tablet Take 81 mg by mouth once daily.      cholecalciferol, vitamin D3, (VITAMIN D3) 25 mcg (1,000 unit) capsule Take 1 capsule (1,000 Units total) by mouth once daily.  Qty: 30 capsule,  Refills: 12      cyanocobalamin (VITAMIN B-12) 1000 MCG tablet Take 1,000 mcg by mouth every other day.       loratadine (CLARITIN) 10 mg tablet Take 10 mg by mouth daily as needed.      multivitamin (THERAGRAN) per tablet Take 1 tablet by mouth once daily.           STOP taking these medications       sulfamethoxazole-trimethoprim 800-160mg (BACTRIM DS) 800-160 mg Tab Comments:   Reason for Stopping:             Discharge Procedure Orders   US Kidney   Standing Status: Future Standing Exp. Date: 10/28/23     Order Specific Question Answer Comments   May the Radiologist modify the order per protocol to meet the clinical needs of the patient? Yes    Release to patient Immediate      Diet general     Call MD for:  temperature >100.4     Call MD for:  persistent nausea and vomiting     Call MD for:  severe uncontrolled pain     No dressing needed     Activity as tolerated      Follow-up Information       Ileana Souza MD Follow up in 3 month(s).    Specialty: Urology  Why: stone follow up  Contact information:  [address] [place] [address]  [address] [place] [address] [id number] [phone]                               Ileana Souza MD  Urology Department

## 2022-10-28 NOTE — OP NOTE
[facility]  Urology Department  Operative Note    Date: 10/28/2022    Pre-Op Diagnosis:   - Left renal stone     Patient Active Problem List    Diagnosis Date Noted    Kidney stones 09/27/2022    Posterior vitreous detachment, left eye 08/03/2022    Vitreomacular adhesion, right eye 08/03/2022    Age-related nuclear cataract of both eyes 08/03/2022    Fatty liver:  See ultrasound May 2021 05/11/2021    Endometrial hyperplasia, unspecified: see evaluation [id number] 05/22/2018    Family history of malignant melanoma 05/22/2018    Ganglion cyst of foot 05/22/2018    Malignant neoplasm of right breast, stage 1, estrogen receptor positive 03/26/2013    Congenital neutropenia 12/19/2012    Anxiety 12/17/2012    Mild vitamin D deficiency 12/17/2012    Vitamin B 12 deficiency     Degenerative disc disease     Factor V Leiden mutation 10/01/2012    Estrogen receptor positive status (ER+) 05/28/2012    Calculus of ureter: 2009; also 2021 09/26/2010       Post-Op Diagnosis: same    Procedure(s) Performed:   1.  Left ureteroscopy  2.  Cystoscopy  3.  Laser lithotripsy   4.  Stone basket extraction   5.  Left ureteral stent placement   6.  Left retrograde pyelogram   4.  Fluoro < 1 hr    Specimen(s): stone for analysis     Staff Surgeon:  Ileana Souza MD    Anesthesia: General LMA anesthesia    Indications: Noelle Rivas is a 61 y.o. female with a left renal stone, presenting for definitive stone management.     Findings:   - lower pole stone fragmented and removed  - upper pole stones within diverticulum, this was unroofed and stones removed    Estimated Blood Loss: min    Drains: 6 Fr x 26 cm JJ ureteral stent with strings    Procedure in detail:  After informed consent was obtained, the patient was brought the the cystoscopy suite and placed in the supine position.  SCDs were applied and working.  Anesthesia was administered.  The patient was then placed in the dorsal lithotomy position and  prepped and draped in the usual sterile fashion.      A rigid cystoscope in a 22 Fr sheath was introduced into the patient's urethra.  This passed easily.      The previously placed stent was pulled to the meatus. A motion wire was passed up the left ureteral orifice and up into the kidney through the stent.  This passed easily and placement was confirmed using fluoro.  A second super stiff wire was then passed up the left ureteral orifice again confirmed using fluoro.  The cystoscope was removed keeping the wires in place.    A 12/14 ureteral access sheath was then passed over the free wire under fluoroscopic guidance.  Subsequently, the flexible ureteroscope was passed into the patient's ureter through the access sheath under direct vision. Flexible pyeloscopy was performed systematically.  A stone was encountered at the level of the lower pole. In the upper pole there was a pocket of stone in a calyceal.      A 200 micron laser fiber was passed through the ureteroscope.  The stone was fragmented using the laser.  The laser fiber was removed and a Nitinol tipless basket was introduced through the ureteroscope.  Stone fragments were removed and collected for specimen analysis. The upper pole diverticulum was unroofed and stone fragments removed.  A retrograde pyelogram was performed through the ureteroscope ensuring that all calices had been evaluated. The ureteroscope was removed keeping the motion wire in place.  The entire course of the ureter was visualized as the ureteroscope and access sheath were simultaneously removed.  There were no ureteral fragments left behind.     A 6 Fr x 26 cm JJ ureteral stent with strings was passed over the wire and up into the renal pelvis using fluoro.  When the coil appeared to be in good position in the kidney, the wire was removed under continuous fluoro.  Good coils were seen in the kidney and the bladder using fluoro.      The patient tolerated the procedure well and was  transferred to the recovery room in stable condition.      Disposition:  The patient will follow up in 3 months with a renal US. She may remove her stent on Tuesday, 11/1.      Ileana Souza MD

## 2022-10-28 NOTE — ANESTHESIA PREPROCEDURE EVALUATION
10/28/2022  Noelle Rivas is a 61 y.o., female.      Pre-op Assessment    I have reviewed the Patient Summary Reports.     I have reviewed the Nursing Notes. I have reviewed the NPO Status.   I have reviewed the Medications.     Review of Systems  Anesthesia Hx:  No problems with previous Anesthesia    Social:  Non-Smoker    Hematology/Oncology:        Hematology Comments: Factor V Leiden  Congenital neutropenia   --  Cancer in past history:  Breast   Cardiovascular:  Cardiovascular Normal     Pulmonary:  Pulmonary Normal    Renal/:   Chronic Renal Disease renal calculi    Hepatic/GI:   Liver Disease,    Musculoskeletal:   Arthritis     Neurological:   CVA, no residual symptoms    Endocrine:  Endocrine Normal    Psych:   Psychiatric History anxiety          Physical Exam  General: Well nourished, Cooperative, Alert and Oriented    Airway:  Mallampati: II   Mouth Opening: Normal  TM Distance: Normal  Neck ROM: Normal ROM        Anesthesia Plan  Type of Anesthesia, risks & benefits discussed:    Anesthesia Type: Gen ETT, Gen Supraglottic Airway, Gen Natural Airway, MAC  Intra-op Monitoring Plan: Standard ASA Monitors  Post Op Pain Control Plan: multimodal analgesia  Induction:  IV  Airway Plan: Direct, Video and Fiberoptic, Post-Induction  Informed Consent: Informed consent signed with the Patient and all parties understand the risks and agree with anesthesia plan.  All questions answered.   ASA Score: 3    Ready For Surgery From Anesthesia Perspective.     .

## 2022-10-28 NOTE — DISCHARGE INSTRUCTIONS
"Discharge Instructions: After Your Surgery/Procedure  Youve just had surgery. During surgery you were given medicine called anesthesia to keep you relaxed and free of pain. After surgery you may have some pain or nausea. This is common. Here are some tips for feeling better and getting well after surgery.     Stay on schedule with your medication.   Going home  Your doctor or nurse will show you how to take care of yourself when you go home. He or she will also answer your questions. Have an adult family member or friend drive you home.      For your safety we recommend these precaution for the first 24 hours after your procedure:  Do not drive or use heavy equipment.  Do not make important decisions or sign legal papers.  Do not drink alcohol.  Have someone stay with you, if needed. He or she can watch for problems and help keep you safe.  Your concentration, balance, coordination, and judgement may be impaired for many hours after anesthesia.  Use caution when ambulating or standing up.     You may feel weak and "washed out" after anesthesia and surgery.      Subtle residual effects of general anesthesia or sedation with regional / local anesthesia can last more than 24 hours.  Rest for the remainder of the day or longer if your Doctor/Surgeon has advised you to do so.  Although you may feel normal within the first 24 hours, your reflexes and mental ability may be impaired without you realizing it.  You may feel dizzy, lightheaded or sleepy for 24 hours or longer.      Be sure to go to all follow-up visits with your doctor. And rest after your surgery for as long as your doctor tells you to.  Coping with pain  If you have pain after surgery, pain medicine will help you feel better. Take it as told, before pain becomes severe. Also, ask your doctor or pharmacist about other ways to control pain. This might be with heat, ice, or relaxation. And follow any other instructions your surgeon or nurse gives you.  Tips " for taking pain medicine  To get the best relief possible, remember these points:  Pain medicines can upset your stomach. Taking them with a little food may help.  Most pain relievers taken by mouth need at least 20 to 30 minutes to start to work.  Taking medicine on a schedule can help you remember to take it. Try to time your medicine so that you can take it before starting an activity. This might be before you get dressed, go for a walk, or sit down for dinner.  Constipation is a common side effect of pain medicines. Call your doctor before taking any medicines such as laxatives or stool softeners to help ease constipation. Also ask if you should skip any foods. Drinking lots of fluids and eating foods such as fruits and vegetables that are high in fiber can also help. Remember, do not take laxatives unless your surgeon has prescribed them.  Drinking alcohol and taking pain medicine can cause dizziness and slow your breathing. It can even be deadly. Do not drink alcohol while taking pain medicine.  Pain medicine can make you react more slowly to things. Do not drive or run machinery while taking pain medicine.  Your health care provider may tell you to take acetaminophen to help ease your pain. Ask him or her how much you are supposed to take each day. Acetaminophen or other pain relievers may interact with your prescription medicines or other over-the-counter (OTC) drugs. Some prescription medicines have acetaminophen and other ingredients. Using both prescription and OTC acetaminophen for pain can cause you to overdose. Read the labels on your OTC medicines with care. This will help you to clearly know the list of ingredients, how much to take, and any warnings. It may also help you not take too much acetaminophen. If you have questions or do not understand the information, ask your pharmacist or health care provider to explain it to you before you take the OTC medicine.  Managing nausea  Some people have an  upset stomach after surgery. This is often because of anesthesia, pain, or pain medicine, or the stress of surgery. These tips will help you handle nausea and eat healthy foods as you get better. If you were on a special food plan before surgery, ask your doctor if you should follow it while you get better. These tips may help:  Do not push yourself to eat. Your body will tell you when to eat and how much.  Start off with clear liquids and soup. They are easier to digest.  Next try semi-solid foods, such as mashed potatoes, applesauce, and gelatin, as you feel ready.  Slowly move to solid foods. Dont eat fatty, rich, or spicy foods at first.  Do not force yourself to have 3 large meals a day. Instead eat smaller amounts more often.  Take pain medicines with a small amount of solid food, such as crackers or toast, to avoid nausea.     Call your surgeon if  You still have pain an hour after taking medicine. The medicine may not be strong enough.  You feel too sleepy, dizzy, or groggy. The medicine may be too strong.  You have side effects like nausea, vomiting, or skin changes, such as rash, itching, or hives.       If you have obstructive sleep apnea  You were given anesthesia medicine during surgery to keep you comfortable and free of pain. After surgery, you may have more apnea spells because of this medicine and other medicines you were given. The spells may last longer than usual.   At home:  Keep using the continuous positive airway pressure (CPAP) device when you sleep. Unless your health care provider tells you not to, use it when you sleep, day or night. CPAP is a common device used to treat obstructive sleep apnea.  Talk with your provider before taking any pain medicine, muscle relaxants, or sedatives. Your provider will tell you about the possible dangers of taking these medicines.  © 1700-9171 [employer]. [address] [place] [address], [place] [address]. All rights reserved. This information is  not intended as a substitute for professional medical care. Always follow your healthcare professional's instructions.     We hope your stay was comfortable as you heal now, mend and rest.    For we have enjoyed taking care of you by giving your our best.    And as you get better, by regaining your health and strength;   We count it as a privilege to have served you and hope your time at [facility] was well spent.      Thank  You!!!

## 2022-10-31 VITALS
RESPIRATION RATE: 18 BRPM | WEIGHT: 148 LBS | OXYGEN SATURATION: 99 % | SYSTOLIC BLOOD PRESSURE: 141 MMHG | HEIGHT: 69 IN | TEMPERATURE: 97 F | BODY MASS INDEX: 21.92 KG/M2 | HEART RATE: 65 BPM | DIASTOLIC BLOOD PRESSURE: 93 MMHG

## 2022-11-03 LAB
COMPN STONE: NORMAL
SPECIMEN SOURCE: NORMAL
STONE ANALYSIS IR-IMP: NORMAL

## 2022-11-30 ENCOUNTER — PATIENT MESSAGE (OUTPATIENT)
Dept: OPHTHALMOLOGY | Facility: CLINIC | Age: 62
End: 2022-11-30
Payer: COMMERCIAL

## 2022-12-07 ENCOUNTER — OFFICE VISIT (OUTPATIENT)
Dept: OPHTHALMOLOGY | Facility: CLINIC | Age: 62
End: 2022-12-07
Payer: COMMERCIAL

## 2022-12-07 DIAGNOSIS — H25.13 AGE-RELATED NUCLEAR CATARACT OF BOTH EYES: ICD-10-CM

## 2022-12-07 DIAGNOSIS — H43.821 VITREOMACULAR ADHESION, RIGHT EYE: ICD-10-CM

## 2022-12-07 DIAGNOSIS — H43.812 POSTERIOR VITREOUS DETACHMENT, LEFT EYE: Primary | ICD-10-CM

## 2022-12-07 PROCEDURE — 1160F PR REVIEW ALL MEDS BY PRESCRIBER/CLIN PHARMACIST DOCUMENTED: ICD-10-PCS | Mod: CPTII,S$GLB,, | Performed by: OPHTHALMOLOGY

## 2022-12-07 PROCEDURE — 1159F PR MEDICATION LIST DOCUMENTED IN MEDICAL RECORD: ICD-10-PCS | Mod: CPTII,S$GLB,, | Performed by: OPHTHALMOLOGY

## 2022-12-07 PROCEDURE — 99214 PR OFFICE/OUTPT VISIT, EST, LEVL IV, 30-39 MIN: ICD-10-PCS | Mod: S$GLB,,, | Performed by: OPHTHALMOLOGY

## 2022-12-07 PROCEDURE — 1159F MED LIST DOCD IN RCRD: CPT | Mod: CPTII,S$GLB,, | Performed by: OPHTHALMOLOGY

## 2022-12-07 PROCEDURE — 1160F RVW MEDS BY RX/DR IN RCRD: CPT | Mod: CPTII,S$GLB,, | Performed by: OPHTHALMOLOGY

## 2022-12-07 PROCEDURE — 92134 OCT, RETINA - OU - BOTH EYES: ICD-10-PCS | Mod: S$GLB,,, | Performed by: OPHTHALMOLOGY

## 2022-12-07 PROCEDURE — 99999 PR PBB SHADOW E&M-EST. PATIENT-LVL III: CPT | Mod: PBBFAC,,, | Performed by: OPHTHALMOLOGY

## 2022-12-07 PROCEDURE — 99214 OFFICE O/P EST MOD 30 MIN: CPT | Mod: S$GLB,,, | Performed by: OPHTHALMOLOGY

## 2022-12-07 PROCEDURE — 92134 CPTRZ OPH DX IMG PST SGM RTA: CPT | Mod: S$GLB,,, | Performed by: OPHTHALMOLOGY

## 2022-12-07 PROCEDURE — 99999 PR PBB SHADOW E&M-EST. PATIENT-LVL III: ICD-10-PCS | Mod: PBBFAC,,, | Performed by: OPHTHALMOLOGY

## 2022-12-07 NOTE — PROGRESS NOTES
HPI    Pt presents for 3 mo dfe/oct    States vision seems back to normal, states she sees floaters and flashes   occasionally, but not as much as before.     Refresh PRN OU       Last edited by Tika Jha on 12/7/2022  1:39 PM.             A/P    ICD-10-CM ICD-9-CM   1. Posterior vitreous detachment, left eye  H43.812 379.21   2. Vitreomacular adhesion, right eye  H43.821 379.27   3. Age-related nuclear cataract of both eyes  H25.13 366.16       1. Posterior vitreous detachment, left eye  2. Vitreomacular adhesion, right eye  F/u for floaters, occ flash OS  Started prior visit after yoga     Recovering from kidney stone removal since last visit     Exam stable, floaters better, VMA OD, PVD OS, no RT/RD OU    Plan: Observation  Pathology of PVD, Retinal Tear, Retinal Detachment reviewed in great detail  RD precautions discussed in detail, patient expressed understanding  RTC immediately PRN (especially ANY change flashes, floaters, vision, visual field)     3. Age-related nuclear cataract of both eyes  Mild NS, NVS  Plan: Observation    RTC 12 mo DFE/OCTm OU       I saw and examined the patient and reviewed in detail the findings documented. The final examination findings, image interpretations, and plan as documented in the record represent my personal judgment and conclusions.    Felice Phillips MD  Vitreoretinal Surgery   [facility]

## 2023-01-31 ENCOUNTER — OFFICE VISIT (OUTPATIENT)
Dept: OPTOMETRY | Facility: CLINIC | Age: 63
End: 2023-01-31
Payer: COMMERCIAL

## 2023-01-31 DIAGNOSIS — Z01.00 EXAMINATION OF EYES AND VISION: Primary | ICD-10-CM

## 2023-01-31 DIAGNOSIS — H43.821 VITREOMACULAR ADHESION OF RIGHT EYE: ICD-10-CM

## 2023-01-31 DIAGNOSIS — H52.7 REFRACTIVE ERROR: ICD-10-CM

## 2023-01-31 DIAGNOSIS — H43.812 POSTERIOR VITREOUS DETACHMENT OF LEFT EYE: ICD-10-CM

## 2023-01-31 DIAGNOSIS — H25.13 NUCLEAR SCLEROSIS, BILATERAL: ICD-10-CM

## 2023-01-31 PROCEDURE — 99999 PR PBB SHADOW E&M-EST. PATIENT-LVL III: CPT | Mod: PBBFAC,,, | Performed by: OPTOMETRIST

## 2023-01-31 PROCEDURE — 99999 PR PBB SHADOW E&M-EST. PATIENT-LVL III: ICD-10-PCS | Mod: PBBFAC,,, | Performed by: OPTOMETRIST

## 2023-01-31 PROCEDURE — 92015 DETERMINE REFRACTIVE STATE: CPT | Mod: S$GLB,,, | Performed by: OPTOMETRIST

## 2023-01-31 PROCEDURE — 92015 PR REFRACTION: ICD-10-PCS | Mod: S$GLB,,, | Performed by: OPTOMETRIST

## 2023-01-31 PROCEDURE — 92014 COMPRE OPH EXAM EST PT 1/>: CPT | Mod: S$GLB,,, | Performed by: OPTOMETRIST

## 2023-01-31 PROCEDURE — 92014 PR EYE EXAM, EST PATIENT,COMPREHESV: ICD-10-PCS | Mod: S$GLB,,, | Performed by: OPTOMETRIST

## 2023-01-31 NOTE — PROGRESS NOTES
HPI    61 YO female presents today for new glasses prescription. Patient states   that she is doing well, would like a new prescription today if it has   changed.     Refresh PRN OU   Last edited by Billie Hua on 1/31/2023 10:48 AM.            Assessment /Plan     For exam results, see Encounter Report.    Examination of eyes and vision    Nuclear sclerosis, bilateral    Refractive error    Posterior vitreous detachment of left eye    Vitreomacular adhesion of right eye      1. Examination of eyes and vision    2. Nuclear sclerosis, bilateral  Mild, not yet significant. Discussed possible ocular affects of cataracts. Acceptable BCVA OU. Discussed treatment options. Surgery not recommended at this time. Monitor yearly.     3. Refractive error  Dispensed updated spectacle Rx, mild change from previous. Discussed various spectacle lens options. Discussed adaptation period to new specs.   Demonstrated new spec Rx vs current specs in phoropter with patient satisfaction    4. Posterior vitreous detachment of left eye  5. Vitreomacular adhesion of right eye  Continue f/u as directed with Dr. Phillips

## 2023-02-11 ENCOUNTER — HOSPITAL ENCOUNTER (OUTPATIENT)
Dept: RADIOLOGY | Facility: HOSPITAL | Age: 63
Discharge: HOME | End: 2023-02-11
Attending: STUDENT IN AN ORGANIZED HEALTH CARE EDUCATION/TRAINING PROGRAM
Payer: COMMERCIAL

## 2023-02-11 DIAGNOSIS — N20.0 KIDNEY STONES: ICD-10-CM

## 2023-02-11 PROCEDURE — 76770 US EXAM ABDO BACK WALL COMP: CPT | Mod: 26,,, | Performed by: RADIOLOGY

## 2023-02-11 PROCEDURE — 76770 US EXAM ABDO BACK WALL COMP: CPT | Mod: TC

## 2023-02-11 PROCEDURE — 76770 US RETROPERITONEAL COMPLETE: ICD-10-PCS | Mod: 26,,, | Performed by: RADIOLOGY

## 2023-02-12 NOTE — PROGRESS NOTES
[facility]    PCP: Emelyn Bernardo MD    Chief Complaint: kidney stones    SUBJECTIVE:       History of Present Illness:  Noelle Rivas is a 62 y.o. female who presents to clinic for kidney stones. She is Established  to our clinic.     S/P left ureteroscopy on 10/28/22.  Stone analysis: 100% Calcium oxalate monohydrate   US: possible mild lower pole caliectasis (drainage of left kidney has been previously evaluated and is good)    Feeling well, no issues following surgery.   No flank pain.   No hematuria.       9/27/22  Patient of Dr. Singh, presents to discuss PCNL.   On most recent Urogram she has a left 8 mm upper pole stone and 10 mm lower pole stone (on CTRSS from a month prior the stone measured 1.5 cm however on most recent scan appears to be only 1 cm which it was also in June 2021). She has mild hydro on the left secondary to what appears to be a lower pole crossing vessel. She had a MAG 3 renal scan however which shows equal function of the kidneys with a T1/2 of 7 on the left and 9.5 on the right.     She does not have a significant stone history. Passed a stone in 2009.  No prior stone procedures.   No left flank pain.   No hematuria.   No hx of recurrent UTIs.     Last urine culture: no documented UTIs    Lab Results   Component Value Date    CREATININE 0.9 10/14/2022     Hx of stroke, factor v leiden, breast cancer    Past medical, family, and social history reviewed as documented in chart with pertinent positive medical, family, and social history detailed in HPI.    Review of patient's allergies indicates:   Allergen Reactions    Adhesive Itching and Rash     Micropore/Medipore Tape - paper tape is ok    Iodine and iodide containing products Itching    Latex, natural rubber Itching       Past Medical History:   Diagnosis Date    Abnormal TSH 06/05/2018    Age-related nuclear cataract of both eyes 08/03/2022    Allergy     Anxiety     Anxiety 12/17/2012     Breast cancer 2012    right    Calcium nephrolithiasis     Complex endometrial hyperplasia without atypia 06/16/2016    Degenerative disc disease     DJD (degenerative joint disease) of knee     Endometrial hyperplasia, unspecified: see evaluation 2017 05/22/2018    Factor V Leiden mutation No h/o DVT    Kidney stones     Leukopenia 12/19/2012    PMB (postmenopausal bleeding) 03/06/2017    Stroke     questionable  - TIA    Vitamin B 12 deficiency      Past Surgical History:   Procedure Laterality Date    BREAST BIOPSY Right 2012    malignant, radiation    BREAST BIOPSY Right 2014    benign    BREAST LUMPECTOMY Right 06/11/12    w/ radiation    BREAST LUMPECTOMY W/ NEEDLE LOCALIZATION      CYSTOSCOPY W/ URETERAL STENT PLACEMENT Left 10/28/2022    Procedure: CYSTOSCOPY, WITH URETERAL STENT INSERTION;  Surgeon: Ileana Souza MD;  Location: [facility] OR;  Service: Urology;  Laterality: Left;  6x26    CYSTOSCOPY W/ URETERAL STENT REMOVAL Left 10/28/2022    Procedure: CYSTOSCOPY, WITH URETERAL STENT REMOVAL;  Surgeon: Ileana Souza MD;  Location: [facility] OR;  Service: Urology;  Laterality: Left;    DILATION AND CURETTAGE OF UTERUS      URETERAL STENT PLACEMENT N/A 10/14/2022    Procedure: INSERTION, STENT, URETER;  Surgeon: Ileana Souza MD;  Location: [facility] OR;  Service: Urology;  Laterality: N/A;  6x26 soft    URETEROSCOPIC REMOVAL OF URETERIC CALCULUS Left 10/14/2022    Procedure: REMOVAL, CALCULUS, URETER, URETEROSCOPIC;  Surgeon: Ileana Souza MD;  Location: [facility] OR;  Service: Urology;  Laterality: Left;    URETEROSCOPIC REMOVAL OF URETERIC CALCULUS Left 10/28/2022    Procedure: REMOVAL, CALCULUS, URETER, URETEROSCOPIC;  Surgeon: Ileana Souza MD;  Location: [facility] OR;  Service: Urology;  Laterality: Left;     Family History   Problem Relation Age of Onset    Hypertension Father     Coronary artery disease Father     Cancer Father         prostate ca, metastatic    Heart disease Father          "stenting    Kidney disease Father         stones    Retinal detachment Father     Colon cancer Paternal Grandfather     Melanoma Mother     Cancer Mother         melanoma    Hyperlipidemia Brother     Keratoconus Brother     No Known Problems Brother     Stroke Maternal Grandmother     Lung cancer Maternal Grandfather     Breast cancer Paternal Grandmother          at age 62    Ovarian cancer Neg Hx     Uterine cancer Neg Hx     Psoriasis Neg Hx     Lupus Neg Hx     Eczema Neg Hx      Social History     Tobacco Use    Smoking status: Never    Smokeless tobacco: Never   Substance Use Topics    Alcohol use: Yes     Comment: Minimal    Drug use: No        Review of Systems   Genitourinary:  Negative for difficulty urinating, dysuria, flank pain, hematuria, nocturia and urgency.     OBJECTIVE:     Anticoagulation: aspirin 81 mg     Estimated body mass index is 21.85 kg/m² as calculated from the following:    Height as of this encounter: 5' 9" (1.753 m).    Weight as of this encounter: 67.1 kg (147 lb 14.9 oz).    Vital Signs (Most Recent)  Pulse: 72 (23 1056)  Resp: 18 (23 1056)  BP: 115/60 (23 1056)    Physical Exam  Vitals reviewed.   Constitutional:       General: She is not in acute distress.     Appearance: Normal appearance. She is not ill-appearing.   HENT:      Head: Normocephalic and atraumatic.   Eyes:      General: No scleral icterus.  Cardiovascular:      Rate and Rhythm: Normal rate and regular rhythm.   Pulmonary:      Effort: Pulmonary effort is normal. No respiratory distress.   Abdominal:      Palpations: Abdomen is soft.   Skin:     Coloration: Skin is not jaundiced.   Neurological:      General: No focal deficit present.      Mental Status: She is alert and oriented to person, place, and time.   Psychiatric:         Mood and Affect: Mood normal.         Behavior: Behavior normal.       BMP  Lab Results   Component Value Date     10/14/2022    K 4.0 10/14/2022     " 10/14/2022    CO2 25 10/14/2022    BUN 12 10/14/2022    CREATININE 0.9 10/14/2022    CALCIUM 9.9 10/14/2022    ANIONGAP 10 10/14/2022    ESTGFRAFRICA >60.0 07/12/2022    EGFRNONAA >60.0 07/12/2022       Lab Results   Component Value Date    WBC 4.76 10/14/2022    HGB 12.7 10/14/2022    HCT 37.6 10/14/2022    MCV 98 10/14/2022     10/14/2022       Imaging:  Per HPI    ASSESSMENT     1. Kidney stones    2. Factor V Leiden mutation    3. Malignant neoplasm of right breast, stage 1, estrogen receptor positive      PLAN:     - Wants to manage conservatively   - General risk factors for kidney stones and the conservative measures to prevent kidney stones in the future were discussed with the patient in detail.  The patient was encouraged to drink 2-3 liters of water a day, limit iced tea and monica as well as foods high in oxalate.  They were cautioned to try to limit salt and red meat intake.  We also discussed adding citrate to the diet with the addition of sarah or lemon juice to their water or alternatively with crystal light.   - Discussed Litholyte  - Follow up in 1 year with KUB and renal US      Ileana Souza MD

## 2023-02-14 ENCOUNTER — OFFICE VISIT (OUTPATIENT)
Dept: UROLOGY | Facility: CLINIC | Age: 63
End: 2023-02-14
Payer: COMMERCIAL

## 2023-02-14 ENCOUNTER — CLINICAL SUPPORT (OUTPATIENT)
Dept: OTHER | Facility: CLINIC | Age: 63
End: 2023-02-14

## 2023-02-14 VITALS
SYSTOLIC BLOOD PRESSURE: 115 MMHG | DIASTOLIC BLOOD PRESSURE: 60 MMHG | RESPIRATION RATE: 18 BRPM | HEIGHT: 69 IN | HEART RATE: 72 BPM | BODY MASS INDEX: 21.91 KG/M2 | WEIGHT: 147.94 LBS

## 2023-02-14 DIAGNOSIS — Z00.8 ENCOUNTER FOR OTHER GENERAL EXAMINATION: ICD-10-CM

## 2023-02-14 DIAGNOSIS — D68.51 FACTOR V LEIDEN MUTATION: ICD-10-CM

## 2023-02-14 DIAGNOSIS — C50.911 MALIGNANT NEOPLASM OF RIGHT BREAST, STAGE 1, ESTROGEN RECEPTOR POSITIVE: ICD-10-CM

## 2023-02-14 DIAGNOSIS — Z17.0 MALIGNANT NEOPLASM OF RIGHT BREAST, STAGE 1, ESTROGEN RECEPTOR POSITIVE: ICD-10-CM

## 2023-02-14 DIAGNOSIS — N20.0 KIDNEY STONES: Primary | ICD-10-CM

## 2023-02-14 PROCEDURE — 99999 PR PBB SHADOW E&M-EST. PATIENT-LVL III: ICD-10-PCS | Mod: PBBFAC,,, | Performed by: STUDENT IN AN ORGANIZED HEALTH CARE EDUCATION/TRAINING PROGRAM

## 2023-02-14 PROCEDURE — 99999 PR PBB SHADOW E&M-EST. PATIENT-LVL III: CPT | Mod: PBBFAC,,, | Performed by: STUDENT IN AN ORGANIZED HEALTH CARE EDUCATION/TRAINING PROGRAM

## 2023-02-14 PROCEDURE — 3008F PR BODY MASS INDEX (BMI) DOCUMENTED: ICD-10-PCS | Mod: CPTII,S$GLB,, | Performed by: STUDENT IN AN ORGANIZED HEALTH CARE EDUCATION/TRAINING PROGRAM

## 2023-02-14 PROCEDURE — 1159F MED LIST DOCD IN RCRD: CPT | Mod: CPTII,S$GLB,, | Performed by: STUDENT IN AN ORGANIZED HEALTH CARE EDUCATION/TRAINING PROGRAM

## 2023-02-14 PROCEDURE — 3074F PR MOST RECENT SYSTOLIC BLOOD PRESSURE < 130 MM HG: ICD-10-PCS | Mod: CPTII,S$GLB,, | Performed by: STUDENT IN AN ORGANIZED HEALTH CARE EDUCATION/TRAINING PROGRAM

## 2023-02-14 PROCEDURE — 1159F PR MEDICATION LIST DOCUMENTED IN MEDICAL RECORD: ICD-10-PCS | Mod: CPTII,S$GLB,, | Performed by: STUDENT IN AN ORGANIZED HEALTH CARE EDUCATION/TRAINING PROGRAM

## 2023-02-14 PROCEDURE — 3078F PR MOST RECENT DIASTOLIC BLOOD PRESSURE < 80 MM HG: ICD-10-PCS | Mod: CPTII,S$GLB,, | Performed by: STUDENT IN AN ORGANIZED HEALTH CARE EDUCATION/TRAINING PROGRAM

## 2023-02-14 PROCEDURE — 99213 OFFICE O/P EST LOW 20 MIN: CPT | Mod: S$GLB,,, | Performed by: STUDENT IN AN ORGANIZED HEALTH CARE EDUCATION/TRAINING PROGRAM

## 2023-02-14 PROCEDURE — 3008F BODY MASS INDEX DOCD: CPT | Mod: CPTII,S$GLB,, | Performed by: STUDENT IN AN ORGANIZED HEALTH CARE EDUCATION/TRAINING PROGRAM

## 2023-02-14 PROCEDURE — 99213 PR OFFICE/OUTPT VISIT, EST, LEVL III, 20-29 MIN: ICD-10-PCS | Mod: S$GLB,,, | Performed by: STUDENT IN AN ORGANIZED HEALTH CARE EDUCATION/TRAINING PROGRAM

## 2023-02-14 PROCEDURE — 3074F SYST BP LT 130 MM HG: CPT | Mod: CPTII,S$GLB,, | Performed by: STUDENT IN AN ORGANIZED HEALTH CARE EDUCATION/TRAINING PROGRAM

## 2023-02-14 PROCEDURE — 3078F DIAST BP <80 MM HG: CPT | Mod: CPTII,S$GLB,, | Performed by: STUDENT IN AN ORGANIZED HEALTH CARE EDUCATION/TRAINING PROGRAM

## 2023-02-15 VITALS
BODY MASS INDEX: 22.96 KG/M2 | DIASTOLIC BLOOD PRESSURE: 84 MMHG | SYSTOLIC BLOOD PRESSURE: 122 MMHG | HEIGHT: 69 IN | WEIGHT: 155 LBS

## 2023-02-15 LAB
GLUCOSE SERPL-MCNC: 86 MG/DL (ref 60–140)
HDLC SERPL-MCNC: 76 MG/DL
POC CHOLESTEROL, LDL (DOCK): 92 MG/DL
POC CHOLESTEROL, TOTAL: 178 MG/DL
TRIGL SERPL-MCNC: 53 MG/DL

## 2023-03-11 ENCOUNTER — HOSPITAL ENCOUNTER (OUTPATIENT)
Dept: RADIOLOGY | Facility: HOSPITAL | Age: 63
Discharge: HOME | End: 2023-03-11
Attending: INTERNAL MEDICINE
Payer: COMMERCIAL

## 2023-03-11 DIAGNOSIS — N85.00 ENDOMETRIAL HYPERPLASIA, UNSPECIFIED: ICD-10-CM

## 2023-03-11 PROCEDURE — 76830 TRANSVAGINAL US NON-OB: CPT | Mod: 26,,, | Performed by: RADIOLOGY

## 2023-03-11 PROCEDURE — 76830 US PELVIS COMP WITH TRANSVAG NON-OB (XPD): ICD-10-PCS | Mod: 26,,, | Performed by: RADIOLOGY

## 2023-03-11 PROCEDURE — 76856 US PELVIS COMP WITH TRANSVAG NON-OB (XPD): ICD-10-PCS | Mod: 26,,, | Performed by: RADIOLOGY

## 2023-03-11 PROCEDURE — 76856 US EXAM PELVIC COMPLETE: CPT | Mod: 26,,, | Performed by: RADIOLOGY

## 2023-03-11 PROCEDURE — 76856 US EXAM PELVIC COMPLETE: CPT | Mod: TC

## 2023-03-14 ENCOUNTER — OFFICE VISIT (OUTPATIENT)
Dept: OBSTETRICS AND GYNECOLOGY | Facility: CLINIC | Age: 63
End: 2023-03-14
Payer: COMMERCIAL

## 2023-03-14 VITALS
BODY MASS INDEX: 23.94 KG/M2 | DIASTOLIC BLOOD PRESSURE: 62 MMHG | HEIGHT: 69 IN | WEIGHT: 161.63 LBS | SYSTOLIC BLOOD PRESSURE: 118 MMHG

## 2023-03-14 DIAGNOSIS — Z12.4 ENCOUNTER FOR PAPANICOLAOU SMEAR FOR CERVICAL CANCER SCREENING: ICD-10-CM

## 2023-03-14 DIAGNOSIS — Z12.31 BREAST CANCER SCREENING BY MAMMOGRAM: ICD-10-CM

## 2023-03-14 DIAGNOSIS — Z01.411 ENCOUNTER FOR GYNECOLOGICAL EXAMINATION (GENERAL) (ROUTINE) WITH ABNORMAL FINDINGS: Primary | ICD-10-CM

## 2023-03-14 DIAGNOSIS — N85.00 ENDOMETRIAL HYPERPLASIA, UNSPECIFIED: ICD-10-CM

## 2023-03-14 PROCEDURE — 1159F PR MEDICATION LIST DOCUMENTED IN MEDICAL RECORD: ICD-10-PCS | Mod: CPTII,S$GLB,, | Performed by: OBSTETRICS & GYNECOLOGY

## 2023-03-14 PROCEDURE — 3008F PR BODY MASS INDEX (BMI) DOCUMENTED: ICD-10-PCS | Mod: CPTII,S$GLB,, | Performed by: OBSTETRICS & GYNECOLOGY

## 2023-03-14 PROCEDURE — 88175 CYTOPATH C/V AUTO FLUID REDO: CPT | Performed by: OBSTETRICS & GYNECOLOGY

## 2023-03-14 PROCEDURE — 99999 PR PBB SHADOW E&M-EST. PATIENT-LVL III: CPT | Mod: PBBFAC,,, | Performed by: OBSTETRICS & GYNECOLOGY

## 2023-03-14 PROCEDURE — 3074F SYST BP LT 130 MM HG: CPT | Mod: CPTII,S$GLB,, | Performed by: OBSTETRICS & GYNECOLOGY

## 2023-03-14 PROCEDURE — 99213 PR OFFICE/OUTPT VISIT, EST, LEVL III, 20-29 MIN: ICD-10-PCS | Mod: 25,S$GLB,, | Performed by: OBSTETRICS & GYNECOLOGY

## 2023-03-14 PROCEDURE — 1159F MED LIST DOCD IN RCRD: CPT | Mod: CPTII,S$GLB,, | Performed by: OBSTETRICS & GYNECOLOGY

## 2023-03-14 PROCEDURE — 99213 OFFICE O/P EST LOW 20 MIN: CPT | Mod: 25,S$GLB,, | Performed by: OBSTETRICS & GYNECOLOGY

## 2023-03-14 PROCEDURE — 3074F PR MOST RECENT SYSTOLIC BLOOD PRESSURE < 130 MM HG: ICD-10-PCS | Mod: CPTII,S$GLB,, | Performed by: OBSTETRICS & GYNECOLOGY

## 2023-03-14 PROCEDURE — 3008F BODY MASS INDEX DOCD: CPT | Mod: CPTII,S$GLB,, | Performed by: OBSTETRICS & GYNECOLOGY

## 2023-03-14 PROCEDURE — 99396 PR PREVENTIVE VISIT,EST,40-64: ICD-10-PCS | Mod: S$GLB,,, | Performed by: OBSTETRICS & GYNECOLOGY

## 2023-03-14 PROCEDURE — 99999 PR PBB SHADOW E&M-EST. PATIENT-LVL III: ICD-10-PCS | Mod: PBBFAC,,, | Performed by: OBSTETRICS & GYNECOLOGY

## 2023-03-14 PROCEDURE — 3078F PR MOST RECENT DIASTOLIC BLOOD PRESSURE < 80 MM HG: ICD-10-PCS | Mod: CPTII,S$GLB,, | Performed by: OBSTETRICS & GYNECOLOGY

## 2023-03-14 PROCEDURE — 87624 HPV HI-RISK TYP POOLED RSLT: CPT | Performed by: OBSTETRICS & GYNECOLOGY

## 2023-03-14 PROCEDURE — 99396 PREV VISIT EST AGE 40-64: CPT | Mod: S$GLB,,, | Performed by: OBSTETRICS & GYNECOLOGY

## 2023-03-14 PROCEDURE — 3078F DIAST BP <80 MM HG: CPT | Mod: CPTII,S$GLB,, | Performed by: OBSTETRICS & GYNECOLOGY

## 2023-03-14 NOTE — PROGRESS NOTES
History and Physical:  Noelle Rivas is a 62 y.o. F who presents today for her routine annual GYN exam. The patient has  no Gynecology complaints.    History of endometrial hyperplasia:   History of atypical endometrial hyperplasia. She declined hysterectomy and unable to take progestins because of progesterone receptor positive breast cancer. She has been followed conservatively. Last saw GynOnc 11/2021. Plan for yearly ultrasound  11/2017 Atypical endometrial hyperplasia   3/2023 EMS 3mm  She denies pain, bleeding, or discharge    Annual:   Patient's last menstrual period was 04/01/2017 (exact date).  Menopause: yes, 57yr, FSH 96 2019  Last Pap: 3/2019 NILM  History of abnormal pap: never  Last Mammogram: 5/2022 BIRADS 2; history of breast cancer ER+, BRCA neg  Colonosocpy: 3/2013, repeat 10yr  PCP: Emelyn Bernardo MD orders routine labs 2/2023  Immunization status: up to date and documented. Status post Zoster & Pneumococcal     Allergies:   Review of patient's allergies indicates:   Allergen Reactions    Adhesive Itching and Rash     Micropore/Medipore Tape - paper tape is ok    Iodine and iodide containing products Itching    Latex, natural rubber Itching     Past Medical History:   Diagnosis Date    Abnormal TSH 06/05/2018    Age-related nuclear cataract of both eyes 08/03/2022    Allergy     Anxiety     Anxiety 12/17/2012    Breast cancer 2012    right    Calcium nephrolithiasis     Complex endometrial hyperplasia without atypia 06/16/2016    Degenerative disc disease     DJD (degenerative joint disease) of knee     Endometrial hyperplasia, unspecified: see evaluation 2017 05/22/2018    Factor V Leiden mutation No h/o DVT    Kidney stones     Leukopenia 12/19/2012    PMB (postmenopausal bleeding) 03/06/2017    Stroke     questionable  - TIA    Vitamin B 12 deficiency      Past Surgical History:   Procedure Laterality Date    BREAST BIOPSY Right 2012    malignant, radiation    BREAST BIOPSY Right 2014     benign    BREAST LUMPECTOMY Right 06/11/12    w/ radiation    BREAST LUMPECTOMY W/ NEEDLE LOCALIZATION      CYSTOSCOPY W/ URETERAL STENT PLACEMENT Left 10/28/2022    Procedure: CYSTOSCOPY, WITH URETERAL STENT INSERTION;  Surgeon: Ileana Souza MD;  Location: [facility] OR;  Service: Urology;  Laterality: Left;  6x26    CYSTOSCOPY W/ URETERAL STENT REMOVAL Left 10/28/2022    Procedure: CYSTOSCOPY, WITH URETERAL STENT REMOVAL;  Surgeon: Ileana Souza MD;  Location: [facility] OR;  Service: Urology;  Laterality: Left;    DILATION AND CURETTAGE OF UTERUS      URETERAL STENT PLACEMENT N/A 10/14/2022    Procedure: INSERTION, STENT, URETER;  Surgeon: Ileana Souza MD;  Location: [facility] OR;  Service: Urology;  Laterality: N/A;  6x26 soft    URETEROSCOPIC REMOVAL OF URETERIC CALCULUS Left 10/14/2022    Procedure: REMOVAL, CALCULUS, URETER, URETEROSCOPIC;  Surgeon: Ileana Souza MD;  Location: [facility] OR;  Service: Urology;  Laterality: Left;    URETEROSCOPIC REMOVAL OF URETERIC CALCULUS Left 10/28/2022    Procedure: REMOVAL, CALCULUS, URETER, URETEROSCOPIC;  Surgeon: Ileana Souza MD;  Location: [facility] OR;  Service: Urology;  Laterality: Left;     MEDS:   Current Outpatient Medications on File Prior to Visit   Medication Sig Dispense Refill    aspirin (ECOTRIN) 81 MG EC tablet Take 81 mg by mouth once daily.      cholecalciferol, vitamin D3, (VITAMIN D3) 25 mcg (1,000 unit) capsule Take 1 capsule (1,000 Units total) by mouth once daily. 30 capsule 12    cyanocobalamin (VITAMIN B-12) 1000 MCG tablet Take 1,000 mcg by mouth every other day.       loratadine (CLARITIN) 10 mg tablet Take 10 mg by mouth daily as needed.      multivitamin (THERAGRAN) per tablet Take 1 tablet by mouth once daily.       Current Facility-Administered Medications on File Prior to Visit   Medication Dose Route Frequency Provider Last Rate Last Admin    diphenhydrAMINE injection 12.5 mg  12.5 mg Intravenous Once PRN Santiago Loving MD         electrolyte-S (ISOLYTE)   Intravenous Continuous Santiago Loving MD   Stopped at 10/28/22 1050    fentaNYL 50 mcg/mL injection 25 mcg  25 mcg Intravenous Q5 Min PRN Santiago Loving MD        HYDROmorphone (PF) injection 0.2 mg  0.2 mg Intravenous Q5 Min PRN Santiago Loving MD        lactated ringers infusion  10 mL/hr Intravenous Continuous Santiago Loving MD        lactated ringers infusion  500 mL Intravenous Once Santiago Loving MD        ondansetron injection 4 mg  4 mg Intravenous Once PRN Santiago Loving MD        oxyCODONE immediate release tablet 5 mg  5 mg Oral Once PRN Santiago Loving MD        prochlorperazine injection Soln 5 mg  5 mg Intravenous Q30 Min PRN Santiago Loving MD        sodium chloride 0.9% flush 3 mL  3 mL Intravenous Q8H Santiago Loving MD         OB History          0    Para        Term   0            AB        Living             SAB        IAB        Ectopic        Multiple        Live Births                   Social History     Socioeconomic History    Marital status: Single   Occupational History    Occupation: Nurse       Employer: [employer]   Tobacco Use    Smoking status: Never    Smokeless tobacco: Never   Substance and Sexual Activity    Alcohol use: Yes     Comment: Minimal    Drug use: No    Sexual activity: Not Currently     Birth control/protection: None   Other Topics Concern    Are you pregnant or think you may be? No    Breast-feeding No     Family History   Problem Relation Age of Onset    Hypertension Father     Coronary artery disease Father     Cancer Father         prostate ca, metastatic    Heart disease Father         stenting    Kidney disease Father         stones    Retinal detachment Father     Colon cancer Paternal Grandfather     Melanoma Mother     Cancer Mother         melanoma    Hyperlipidemia Brother     Keratoconus Brother     No Known Problems Brother     Stroke  "Maternal Grandmother     Lung cancer Maternal Grandfather     Breast cancer Paternal Grandmother          at age 62    Ovarian cancer Neg Hx     Uterine cancer Neg Hx     Psoriasis Neg Hx     Lupus Neg Hx     Eczema Neg Hx        Past medical and surgical history reviewed.   I have reviewed the patient's medical history in detail and updated the computerized patient record.    Review of System:   General: no chills, fever, night sweats, weight gain or weight loss  Breasts: no new or changing breast lumps, nipple discharge or masses.  Gastrointestinal: no abdominal pain, change in bowel habits, or black or bloody stools  Genito-Urinary: no incontinence, urinary frequency/urgency or vulvar/vaginal symptoms, pelvic pain or abnormal vaginal bleeding.    Physical Exam:   /62   Ht 5' 9" (1.753 m)   Wt 73.3 kg (161 lb 9.6 oz)   LMP 2017 (Exact Date)   BMI 23.86 kg/m²   Body mass index is 23.86 kg/m².  Constitutional: She appears alert and responsive. She appears well-developed, well-groomed, and well-nourished. No distress.  Breasts: are symmetrical.  Right breast exhibits no inverted nipple, no mass, no nipple discharge, no skin change and no tenderness.  Left breast exhibits no inverted nipple, no mass, no nipple discharge, no skin change and no tenderness.  Abdominal: Soft. She exhibits no distension, hernias or masses. There is no tenderness. No enlargement of liver edge or spleen.  There is no rebound and no guarding.   Genitourinary:    External rectal exam shows no thrombosed external hemorrhoids, no lesions.     Pelvic exam was performed with patient supine.   No labial fusion, and symmetrical.    There is no rash, lesion or injury on the right labia.   There is no rash, lesion or injury on the left labia.   No bleeding and no signs of injury around the vaginal introitus, urethral meatus is normal size and without prolapse or lesions, urethra well supported. The cervix is visualized with no " discharge, lesions or friability.   No vaginal discharge found.    No significant Cystocele, Enterocele or rectocele, and cervix and uterus well supported.   Bimanual exam:   The urethra is normal to palpation and there are no palpable vaginal wall masses.   Uterus is not deviated, not enlarged, not fixed, normal shape and not tender.   Cervix exhibits no motion tenderness.    Right adnexum displays no mass or nodularity and no tenderness.   Left adnexum displays no mass or nodularity and no tenderness.    Assessment/Plan:   Encounter for gynecological examination (general) (routine) with abnormal findings    Endometrial hyperplasia, unspecified: see evaluation [id number]  -     US Pelvis Comp with Transvag NON-OB (xpd; Future; Expected date: 04/14/2024    Encounter for Papanicolaou smear for cervical cancer screening  -     Liquid-Based Pap Smear, Screening  -     HPV High Risk Genotypes, PCR    Breast cancer screening by mammogram  -     Mammo Digital Screening Bilyaya w/ Vlad; Future; Expected date: 03/14/2023        Follow up in 1 year.

## 2023-03-20 LAB
FINAL PATHOLOGIC DIAGNOSIS: NORMAL
HPV HR 12 DNA SPEC QL NAA+PROBE: NEGATIVE
HPV16 AG SPEC QL: NEGATIVE
HPV18 DNA SPEC QL NAA+PROBE: NEGATIVE
Lab: NORMAL

## 2023-04-21 ENCOUNTER — PATIENT MESSAGE (OUTPATIENT)
Dept: ADMINISTRATIVE | Facility: HOSPITAL | Age: 63
End: 2023-04-21
Payer: COMMERCIAL

## 2023-06-06 ENCOUNTER — HOSPITAL ENCOUNTER (OUTPATIENT)
Dept: RADIOLOGY | Facility: CLINIC | Age: 63
Discharge: HOME | End: 2023-06-06
Attending: OBSTETRICS & GYNECOLOGY
Payer: COMMERCIAL

## 2023-06-06 DIAGNOSIS — R92.8 ABNORMAL MAMMOGRAM: Primary | ICD-10-CM

## 2023-06-06 DIAGNOSIS — Z12.31 BREAST CANCER SCREENING BY MAMMOGRAM: ICD-10-CM

## 2023-06-06 PROCEDURE — 77063 BREAST TOMOSYNTHESIS BI: CPT | Mod: 26,,, | Performed by: RADIOLOGY

## 2023-06-06 PROCEDURE — 77067 SCR MAMMO BI INCL CAD: CPT | Mod: 26,,, | Performed by: RADIOLOGY

## 2023-06-06 PROCEDURE — 77067 MAMMO DIGITAL SCREENING BILAT WITH TOMO: ICD-10-PCS | Mod: 26,,, | Performed by: RADIOLOGY

## 2023-06-06 PROCEDURE — 77063 MAMMO DIGITAL SCREENING BILAT WITH TOMO: ICD-10-PCS | Mod: 26,,, | Performed by: RADIOLOGY

## 2023-06-06 PROCEDURE — 77067 SCR MAMMO BI INCL CAD: CPT | Mod: TC,PO

## 2023-06-13 ENCOUNTER — HOSPITAL ENCOUNTER (OUTPATIENT)
Dept: RADIOLOGY | Facility: HOSPITAL | Age: 63
Discharge: HOME | End: 2023-06-13
Attending: OBSTETRICS & GYNECOLOGY
Payer: COMMERCIAL

## 2023-06-13 ENCOUNTER — TELEPHONE (OUTPATIENT)
Dept: OBSTETRICS AND GYNECOLOGY | Facility: CLINIC | Age: 63
End: 2023-06-13
Payer: COMMERCIAL

## 2023-06-13 DIAGNOSIS — R92.8 ABNORMAL MAMMOGRAM: Primary | ICD-10-CM

## 2023-06-13 DIAGNOSIS — R92.8 ABNORMAL MAMMOGRAM: ICD-10-CM

## 2023-06-13 PROCEDURE — 77065 DX MAMMO INCL CAD UNI: CPT | Mod: 26,RT,, | Performed by: RADIOLOGY

## 2023-06-13 PROCEDURE — 76642 ULTRASOUND BREAST LIMITED: CPT | Mod: 26,RT,, | Performed by: RADIOLOGY

## 2023-06-13 PROCEDURE — 77061 BREAST TOMOSYNTHESIS UNI: CPT | Mod: 26,RT,, | Performed by: RADIOLOGY

## 2023-06-13 PROCEDURE — 77061 MAMMO DIGITAL DIAGNOSTIC RIGHT WITH TOMO: ICD-10-PCS | Mod: 26,RT,, | Performed by: RADIOLOGY

## 2023-06-13 PROCEDURE — 77065 MAMMO DIGITAL DIAGNOSTIC RIGHT WITH TOMO: ICD-10-PCS | Mod: 26,RT,, | Performed by: RADIOLOGY

## 2023-06-13 PROCEDURE — 77061 BREAST TOMOSYNTHESIS UNI: CPT | Mod: TC,RT

## 2023-06-13 PROCEDURE — 76642 ULTRASOUND BREAST LIMITED: CPT | Mod: TC,RT

## 2023-06-13 PROCEDURE — 76642 US BREAST RIGHT LIMITED: ICD-10-PCS | Mod: 26,RT,, | Performed by: RADIOLOGY

## 2023-06-13 NOTE — TELEPHONE ENCOUNTER
Attempted to contact pt, but no answer.  Detailed VM left with request to call office to let us know she got message.

## 2023-06-13 NOTE — TELEPHONE ENCOUNTER
----- Message from Rossi Mcclelland MD sent at 6/13/2023  4:01 PM CDT -----  Please let the patient know: Your recent mammogram showed some findings that are High Suspicion for Malignancy. It is recommended that you have a biopsy to better evaluate the area.  A referral to a breast surgeon has been sent.     Rossi Mcclelland MD

## 2023-06-14 ENCOUNTER — TELEPHONE (OUTPATIENT)
Dept: RADIOLOGY | Facility: HOSPITAL | Age: 63
End: 2023-06-14
Payer: COMMERCIAL

## 2023-06-14 NOTE — TELEPHONE ENCOUNTER
Spoke with patient. Reviewed breast biopsy procedure and reviewed instructions for breast biopsy. Patient expressed understanding and all questions were answered. Provided patient with my phone number to call for any further concerns or questions.   Patient scheduled breast biopsy at the [facility] on 6/27/2023.

## 2023-06-27 ENCOUNTER — HOSPITAL ENCOUNTER (OUTPATIENT)
Dept: RADIOLOGY | Facility: HOSPITAL | Age: 63
Discharge: HOME | End: 2023-06-27
Attending: OBSTETRICS & GYNECOLOGY
Payer: COMMERCIAL

## 2023-06-27 DIAGNOSIS — R92.8 ABNORMAL FINDING ON BREAST IMAGING: ICD-10-CM

## 2023-06-27 PROCEDURE — 88360 PR  TUMOR IMMUNOHISTOCHEM/MANUAL: ICD-10-PCS | Mod: 26,,, | Performed by: PATHOLOGY

## 2023-06-27 PROCEDURE — 19083 BX BREAST 1ST LESION US IMAG: CPT | Mod: RT,,, | Performed by: RADIOLOGY

## 2023-06-27 PROCEDURE — 88360 TUMOR IMMUNOHISTOCHEM/MANUAL: CPT | Performed by: PATHOLOGY

## 2023-06-27 PROCEDURE — 77065 DX MAMMO INCL CAD UNI: CPT | Mod: TC,RT

## 2023-06-27 PROCEDURE — A4648 IMPLANTABLE TISSUE MARKER: HCPCS

## 2023-06-27 PROCEDURE — 77065 MAMMO DIGITAL DIAGNOSTIC RIGHT: ICD-10-PCS | Mod: 26,RT,, | Performed by: RADIOLOGY

## 2023-06-27 PROCEDURE — 27201068 US BREAST BIOPSY WITH IMAGING 1ST SITE RIGHT

## 2023-06-27 PROCEDURE — 88305 TISSUE EXAM BY PATHOLOGIST: ICD-10-PCS | Mod: 26,,, | Performed by: PATHOLOGY

## 2023-06-27 PROCEDURE — 88305 TISSUE EXAM BY PATHOLOGIST: CPT | Mod: 26,,, | Performed by: PATHOLOGY

## 2023-06-27 PROCEDURE — 77065 DX MAMMO INCL CAD UNI: CPT | Mod: 26,RT,, | Performed by: RADIOLOGY

## 2023-06-27 PROCEDURE — 19083 US BREAST BIOPSY WITH IMAGING 1ST SITE RIGHT: ICD-10-PCS | Mod: RT,,, | Performed by: RADIOLOGY

## 2023-06-27 PROCEDURE — 88360 TUMOR IMMUNOHISTOCHEM/MANUAL: CPT | Mod: 26,,, | Performed by: PATHOLOGY

## 2023-06-27 PROCEDURE — 88305 TISSUE EXAM BY PATHOLOGIST: CPT | Performed by: PATHOLOGY

## 2023-06-27 PROCEDURE — 25000003 PHARM REV CODE 250: Performed by: OBSTETRICS & GYNECOLOGY

## 2023-06-27 RX ORDER — BUPIVACAINE HCL/EPINEPHRINE 0.25-.0005
8 VIAL (ML) INJECTION ONCE
Status: COMPLETED | OUTPATIENT
Start: 2023-06-27 | End: 2023-06-27

## 2023-06-27 RX ORDER — LIDOCAINE HYDROCHLORIDE 10 MG/ML
2 INJECTION, SOLUTION EPIDURAL; INFILTRATION; INTRACAUDAL; PERINEURAL ONCE
Status: COMPLETED | OUTPATIENT
Start: 2023-06-27 | End: 2023-06-27

## 2023-06-27 RX ORDER — SODIUM BICARBONATE 42 MG/ML
3 INJECTION, SOLUTION INTRAVENOUS ONCE
Status: COMPLETED | OUTPATIENT
Start: 2023-06-27 | End: 2023-06-27

## 2023-06-27 RX ADMIN — LIDOCAINE HYDROCHLORIDE 20 MG: 10 INJECTION, SOLUTION EPIDURAL; INFILTRATION; INTRACAUDAL; PERINEURAL at 11:06

## 2023-06-27 RX ADMIN — SODIUM BICARBONATE 3 ML: 42 INJECTION, SOLUTION INTRAVENOUS at 11:06

## 2023-06-27 RX ADMIN — BUPIVACAINE HYDROCHLORIDE AND EPINEPHRINE BITARTRATE 8 ML: 2.5; .005 INJECTION, SOLUTION INFILTRATION; PERINEURAL at 11:06

## 2023-07-06 ENCOUNTER — TELEPHONE (OUTPATIENT)
Dept: HEMATOLOGY/ONCOLOGY | Facility: CLINIC | Age: 63
End: 2023-07-06
Payer: COMMERCIAL

## 2023-07-06 NOTE — TELEPHONE ENCOUNTER
Pt called asking about breast biopsy results, she is anxiously waiting. Explained that the biopsy is still in process but assured her that as soon as results are final, someone would be reaching out to her. Will also ask pathology if there is any delays so that we can update her.

## 2023-07-11 ENCOUNTER — DOCUMENTATION ONLY (OUTPATIENT)
Dept: SURGERY | Facility: CLINIC | Age: 63
End: 2023-07-11
Payer: COMMERCIAL

## 2023-07-12 LAB
FINAL PATHOLOGIC DIAGNOSIS: ABNORMAL
GROSS: ABNORMAL
Lab: ABNORMAL
SUPPLEMENTAL DIAGNOSIS: ABNORMAL

## 2023-07-12 NOTE — NURSING
"Called Patient with results of breast biopsy from 6/27/2023. Spent an hour on phone discussing that  the biopsy showed invasive ductal carcinoma. Discussed what this means and that the next step is to meet with a breast surgeon. Receptors are still pending and additional appts may be needed. Offered first available appt on 7/13/2023. Patient stated she could not do an appt this week. She also stated she only wanted a female surgeon, not a male. Offered next available with Dr. De Leon on 7/18/2023. Patient stated she had a dentist appt and she can only do one appt a day. Offered the 20th, patient stated Tuesday's work better for her. Next available Tuesday was the 25th. Patient asked me to hold the appt date and time for her. Discussed that once receptors were back she may need a multi d visit. Patient stated she hated this visit it is to long and overwhelming. Stressed to patient that the visit may be necessary to form a plan for surgery since patient already received treatment for a previous breast cancer. Patient  very stressed about the travel to [place] and not having a support system and that the results took so long to come back. Offered all supportive services including integrative oncology, psychology, social work, financial assistance and transportation assistance. Patient stated she "does not need any of that crap or money". Navigator asked what could she do to help her make an appt and make this a little less stressful. Patient continued about all the stressors associated with the diagnosis and being alone with no family. Patient finally agreeable to make appt when receptors are back. Navigator will call patient back once receptors are back and patient will make appropriate appts. Patient verbalized understanding.  Oncology Navigation   Intake  Date of Diagnosis: 06/27/23  Cancer Type: Breast  Internal / External Referral: Internal  Date of Referral: 07/11/23  Initial Nurse Navigator Contact: " 07/11/23  Referral to Initial Contact Timeline (days): 0  Date Worked: 07/11/23  First Appointment Available: 07/13/23     Treatment  Current Status: Staging work-up    Surgical Oncologist: Dr. De Leon          Procedures: Biopsy  Biopsy Schedule Date: 06/27/23             Barriers of Care: Transportation; Other  Other Barriers: No family in town, lives alone     Acuity      Follow Up  No follow-ups on file.

## 2023-07-13 ENCOUNTER — DOCUMENTATION ONLY (OUTPATIENT)
Dept: SURGERY | Facility: CLINIC | Age: 63
End: 2023-07-13
Payer: COMMERCIAL

## 2023-07-13 NOTE — NURSING
Called patient with receptors, discussed all options again in detail. Offered patient appt on 7/20 with Dr. De Leon and Dr. Pisano. Patient chose 7/27/2023. Tentatively scheduled appt with Dr. Jensen on 8/3/2023. Reviewed appt location. Patient verbalized understanding.  Oncology Navigation   Intake  Date of Diagnosis: 06/27/23  Cancer Type: Breast  Internal / External Referral: Internal  Date of Referral: 07/11/23  Initial Nurse Navigator Contact: 07/11/23  Referral to Initial Contact Timeline (days): 0  Date Worked: 07/13/23  First Appointment Available: 07/13/23     Treatment  Current Status: Staging work-up    Surgical Oncologist: Dr. De Leon  Consult Date: 07/27/23       Radiation Oncologist: Dr. Pisano    Procedures: Biopsy  Biopsy Schedule Date: 06/27/23       ER: Positive  WV: Positive  Her2: Negative    Radiation Oncologist: Dr. Pisano    Barriers of Care: Transportation; Other  Other Barriers: No family in town, lives alone     Acuity      Follow Up  No follow-ups on file.

## 2023-07-25 NOTE — PROGRESS NOTES
PATIENT IDENTIFICATION:  Patient Name: Noelle Rivas  MRN: [id number]  : 1960    DIAGNOSIS:  Cancer Staging   No matching staging information was found for the patient.    HISTORY OF PRESENT ILLNESS:   The patient is a 62-year-old woman with a clinical stage IA invasive carcinoma of the right breast.  She presents to multidisciplinary breast Clinic to discuss treatment options.      The patient was found to have an abnormality on screening mammogram.      She underwent core needle biopsy of the right breast mass which revealed a grade 1 invasive ductal carcinoma.  On immunohistochemistry, tumor cells were strongly ER positive in nearly 100% of cells, VA moderate to strong positive in 15% of cells and HER2 negative.  The Ki-67 proliferative index was 20%.    Of note, the patient has a prior history of Stage I right breast cancer treated with breast conservation in .  She underwent lumpectomy and sentinel node biopsy on 2012. The pathology revealed invasive ductal carcinoma with mucin production, measuring 1.3 cm with the closest margin at 0.1 cm posteriorly. One out of one sentinel lymph node was negative for involvement. The lesion was ER positive, VA positive, and HER-2 negative. She received postoperative radiation under the care of Dr. Mayo.  Ms. Rivas completed radiation to the right breast on 2012. She received 5040 cGy +1600 cGy boost.  Patient declined adjuvant enodcrine therapy.    Oncology History   Malignant neoplasm of right breast, stage 1, estrogen receptor positive   2012 Initial Diagnosis    Malignant neoplasm of right breast, stage 1, estrogen receptor positive     2012 Surgery    Surgeon: Dr. Chavarria    She underwent lumpectomy and sentinel node biopsy. The pathology revealed invasive ductal carcinoma with mucin production, measuring 1.3 cm with the closest margin at 0.1 cm posteriorly. One out of one sentinel lymph node was negative for  involvement. The lesion was ER positive, NY positive, and HER-2 negative.      - 9/27/2012 Radiation Therapy    Treating physician: Dr. Mayo  Total Dose: 66.4 Gy  Fractions: 36          REVIEW OF SYSTEMS:   Review of Systems   Constitutional:  Negative for fever, malaise/fatigue and weight loss.   HENT:  Negative for ear pain, hearing loss, sinus pain and sore throat.    Eyes:  Negative for blurred vision, double vision and pain.   Respiratory:  Negative for cough, hemoptysis, shortness of breath and wheezing.    Cardiovascular:  Negative for chest pain, palpitations and leg swelling.   Gastrointestinal:  Negative for abdominal pain, blood in stool, constipation, diarrhea, heartburn, nausea and vomiting.   Genitourinary:  Negative for dysuria, frequency, hematuria and urgency.   Musculoskeletal:  Negative for back pain and joint pain.   Skin:  Negative for itching and rash.   Neurological:  Negative for tingling, focal weakness, seizures and headaches.   Psychiatric/Behavioral:  Negative for depression. The patient is not nervous/anxious.      PAST MEDICAL HISTORY:  Past Medical History:   Diagnosis Date    Abnormal TSH 06/05/2018    Age-related nuclear cataract of both eyes 08/03/2022    Allergy     Anxiety     Anxiety 12/17/2012    BRCA1 negative 07/23/19    BRCA2 negative 07/23/19    Breast cancer 2012    right    Calcium nephrolithiasis     Complex endometrial hyperplasia without atypia 06/16/2016    Degenerative disc disease     DJD (degenerative joint disease) of knee     Endometrial hyperplasia, unspecified: see evaluation 2017 05/22/2018    Factor V Leiden mutation No h/o DVT    Kidney stones     Leukopenia 12/19/2012    PMB (postmenopausal bleeding) 03/06/2017    Stroke     questionable  - TIA    Vitamin B 12 deficiency        PAST SURGICAL HISTORY:  Past Surgical History:   Procedure Laterality Date    BREAST BIOPSY Right 2012    malignant, radiation    BREAST BIOPSY Right 2014    benign    BREAST  LUMPECTOMY Right 06/11/2012    w/ radiation    BREAST LUMPECTOMY W/ NEEDLE LOCALIZATION      CYSTOSCOPY W/ URETERAL STENT PLACEMENT Left 10/28/2022    Procedure: CYSTOSCOPY, WITH URETERAL STENT INSERTION;  Surgeon: Ileana Souza MD;  Location: [facility] OR;  Service: Urology;  Laterality: Left;  6x26    CYSTOSCOPY W/ URETERAL STENT REMOVAL Left 10/28/2022    Procedure: CYSTOSCOPY, WITH URETERAL STENT REMOVAL;  Surgeon: Ileana Souza MD;  Location: [facility] OR;  Service: Urology;  Laterality: Left;    DILATION AND CURETTAGE OF UTERUS      URETERAL STENT PLACEMENT N/A 10/14/2022    Procedure: INSERTION, STENT, URETER;  Surgeon: Ileana Souza MD;  Location: [facility] OR;  Service: Urology;  Laterality: N/A;  6x26 soft    URETEROSCOPIC REMOVAL OF URETERIC CALCULUS Left 10/14/2022    Procedure: REMOVAL, CALCULUS, URETER, URETEROSCOPIC;  Surgeon: Ileana Souza MD;  Location: [facility] OR;  Service: Urology;  Laterality: Left;    URETEROSCOPIC REMOVAL OF URETERIC CALCULUS Left 10/28/2022    Procedure: REMOVAL, CALCULUS, URETER, URETEROSCOPIC;  Surgeon: Ileana Souza MD;  Location: [facility] OR;  Service: Urology;  Laterality: Left;       ALLERGIES:   Review of patient's allergies indicates:   Allergen Reactions    Adhesive Itching and Rash     Micropore/Medipore Tape - paper tape is ok    Iodine and iodide containing products Itching    Latex, natural rubber Itching       MEDICATIONS:  Current Outpatient Medications   Medication Sig    aspirin (ECOTRIN) 81 MG EC tablet Take 81 mg by mouth once daily.    cholecalciferol, vitamin D3, (VITAMIN D3) 25 mcg (1,000 unit) capsule Take 1 capsule (1,000 Units total) by mouth once daily.    cyanocobalamin (VITAMIN B-12) 1000 MCG tablet Take 1,000 mcg by mouth every other day.     loratadine (CLARITIN) 10 mg tablet Take 10 mg by mouth daily as needed.    multivitamin (THERAGRAN) per tablet Take 1 tablet by mouth once daily.     No current facility-administered medications for  this visit.     Facility-Administered Medications Ordered in Other Visits   Medication    diphenhydrAMINE injection 12.5 mg    electrolyte-S (ISOLYTE)    fentaNYL 50 mcg/mL injection 25 mcg    HYDROmorphone (PF) injection 0.2 mg    lactated ringers infusion    lactated ringers infusion    ondansetron injection 4 mg    oxyCODONE immediate release tablet 5 mg    prochlorperazine injection Soln 5 mg    sodium chloride 0.9% flush 3 mL       SOCIAL HISTORY:  Social History     Socioeconomic History    Marital status: Single   Occupational History    Occupation: Nurse       Employer: [employer]   Tobacco Use    Smoking status: Never    Smokeless tobacco: Never   Substance and Sexual Activity    Alcohol use: Yes     Comment: Minimal    Drug use: No    Sexual activity: Not Currently     Birth control/protection: None   Other Topics Concern    Are you pregnant or think you may be? No    Breast-feeding No     Social Determinants of Health     Financial Resource Strain: Low Risk     Difficulty of Paying Living Expenses: Not hard at all   Food Insecurity: No Food Insecurity    Worried About Running Out of Food in the Last Year: Never true    Ran Out of Food in the Last Year: Never true   Transportation Needs: No Transportation Needs    Lack of Transportation (Medical): No    Lack of Transportation (Non-Medical): No   Physical Activity: Sufficiently Active    Days of Exercise per Week: 3 days    Minutes of Exercise per Session: 70 min   Stress: No Stress Concern Present    Feeling of Stress : Only a little   Social Connections: Unknown    Frequency of Communication with Friends and Family: More than three times a week    Frequency of Social Gatherings with Friends and Family: More than three times a week    Active Member of Clubs or Organizations: Yes    Attends Club or Organization Meetings: More than 4 times per year    Marital Status: Never    Housing Stability: Low Risk     Unable to Pay for  Housing in the Last Year: No    Number of Places Lived in the Last Year: 1    Unstable Housing in the Last Year: No       FAMILY HISTORY:  Family History   Problem Relation Age of Onset    Hypertension Father     Coronary artery disease Father     Cancer Father         prostate ca, metastatic    Heart disease Father         stenting    Kidney disease Father         stones    Retinal detachment Father     Colon cancer Paternal Grandfather     Melanoma Mother     Cancer Mother         melanoma    Hyperlipidemia Brother     Keratoconus Brother     No Known Problems Brother     Stroke Maternal Grandmother     Lung cancer Maternal Grandfather     Breast cancer Paternal Grandmother          at age 62    Ovarian cancer Neg Hx     Uterine cancer Neg Hx     Psoriasis Neg Hx     Lupus Neg Hx     Eczema Neg Hx          PHYSICAL EXAMINATION:  Vitals:    23 1323   BP: 109/68   Pulse: 72     Body mass index is 24.06 kg/m².    ECO  Physical Exam  Constitutional:       Appearance: Normal appearance.   HENT:      Head: Normocephalic and atraumatic.      Nose: Nose normal.      Mouth/Throat:      Mouth: Mucous membranes are moist.   Cardiovascular:      Rate and Rhythm: Normal rate.   Pulmonary:      Effort: Pulmonary effort is normal.   Chest:          Comments: Right breast smaller than left.  Good cosmesis.  Abdominal:      General: Abdomen is flat.      Palpations: Abdomen is soft.   Musculoskeletal:         General: Normal range of motion.      Cervical back: Normal range of motion.   Skin:     General: Skin is warm and dry.   Neurological:      General: No focal deficit present.      Mental Status: She is alert. Mental status is at baseline.           ASSESSMENT/PLAN:  Noelle was seen today for breast cancer.    Diagnoses and all orders for this visit:    Malignant neoplasm of right breast, stage 1, estrogen receptor positive    The patient is a 62 year old woman with recurrent Stage I breast cancer.  She  underwent breast conservation with lumpectomy and radiation in 2012.  She is recommended mastectomy for this recurrence.  I explained to her that breast conservation with re-irradiation is not generally recommended for someone who is otherwise healthy and capable of undergoing definitive surgery.  The patient seemed understanding of the fact that she would not be a good candidate for breast conservation again on the right breast.      She is interested in pursuing mastectomy with reconstruction.  She will meet with Plastics next week.    The risks and benefits of treatment have been discussed with the patient and she expressed full understanding. she understands the treatment plan and willing to proceed accordingly.    I spent approximately 60 minutes reviewing the available records and evaluating the patient, out of which over 50% of the time was spent face to face with the patient in counseling and coordinating this patient's care.

## 2023-07-27 ENCOUNTER — OFFICE VISIT (OUTPATIENT)
Dept: SURGERY | Facility: CLINIC | Age: 63
End: 2023-07-27
Payer: COMMERCIAL

## 2023-07-27 ENCOUNTER — OFFICE VISIT (OUTPATIENT)
Dept: RADIATION ONCOLOGY | Facility: CLINIC | Age: 63
End: 2023-07-27
Payer: COMMERCIAL

## 2023-07-27 VITALS
HEART RATE: 72 BPM | WEIGHT: 163 LBS | WEIGHT: 162.94 LBS | HEART RATE: 72 BPM | DIASTOLIC BLOOD PRESSURE: 68 MMHG | HEIGHT: 69 IN | SYSTOLIC BLOOD PRESSURE: 109 MMHG | BODY MASS INDEX: 24.13 KG/M2 | HEIGHT: 69 IN | OXYGEN SATURATION: 96 % | BODY MASS INDEX: 24.14 KG/M2 | DIASTOLIC BLOOD PRESSURE: 68 MMHG | SYSTOLIC BLOOD PRESSURE: 109 MMHG | OXYGEN SATURATION: 96 %

## 2023-07-27 DIAGNOSIS — Z01.818 PREOP TESTING: ICD-10-CM

## 2023-07-27 DIAGNOSIS — Z17.0 MALIGNANT NEOPLASM OF OVERLAPPING SITES OF RIGHT BREAST IN FEMALE, ESTROGEN RECEPTOR POSITIVE: Primary | ICD-10-CM

## 2023-07-27 DIAGNOSIS — C50.911 MALIGNANT NEOPLASM OF RIGHT BREAST, STAGE 1, ESTROGEN RECEPTOR POSITIVE: Primary | ICD-10-CM

## 2023-07-27 DIAGNOSIS — Z17.0 MALIGNANT NEOPLASM OF RIGHT BREAST, STAGE 1, ESTROGEN RECEPTOR POSITIVE: Primary | ICD-10-CM

## 2023-07-27 DIAGNOSIS — R92.8 ABNORMAL MAMMOGRAM: ICD-10-CM

## 2023-07-27 DIAGNOSIS — C50.811 MALIGNANT NEOPLASM OF OVERLAPPING SITES OF RIGHT BREAST IN FEMALE, ESTROGEN RECEPTOR POSITIVE: Primary | ICD-10-CM

## 2023-07-27 PROCEDURE — 1159F PR MEDICATION LIST DOCUMENTED IN MEDICAL RECORD: ICD-10-PCS | Mod: CPTII,S$GLB,, | Performed by: SURGERY

## 2023-07-27 PROCEDURE — 99999 PR PBB SHADOW E&M-EST. PATIENT-LVL III: ICD-10-PCS | Mod: PBBFAC,,, | Performed by: RADIOLOGY

## 2023-07-27 PROCEDURE — 3008F BODY MASS INDEX DOCD: CPT | Mod: CPTII,S$GLB,, | Performed by: SURGERY

## 2023-07-27 PROCEDURE — 3074F PR MOST RECENT SYSTOLIC BLOOD PRESSURE < 130 MM HG: ICD-10-PCS | Mod: CPTII,S$GLB,, | Performed by: SURGERY

## 2023-07-27 PROCEDURE — 3074F SYST BP LT 130 MM HG: CPT | Mod: CPTII,S$GLB,, | Performed by: SURGERY

## 2023-07-27 PROCEDURE — 99999 PR PBB SHADOW E&M-EST. PATIENT-LVL IV: CPT | Mod: PBBFAC,,, | Performed by: SURGERY

## 2023-07-27 PROCEDURE — 3008F PR BODY MASS INDEX (BMI) DOCUMENTED: ICD-10-PCS | Mod: CPTII,S$GLB,, | Performed by: RADIOLOGY

## 2023-07-27 PROCEDURE — 99999 PR PBB SHADOW E&M-EST. PATIENT-LVL IV: ICD-10-PCS | Mod: PBBFAC,,, | Performed by: SURGERY

## 2023-07-27 PROCEDURE — 3078F DIAST BP <80 MM HG: CPT | Mod: CPTII,S$GLB,, | Performed by: RADIOLOGY

## 2023-07-27 PROCEDURE — 3074F SYST BP LT 130 MM HG: CPT | Mod: CPTII,S$GLB,, | Performed by: RADIOLOGY

## 2023-07-27 PROCEDURE — 3008F BODY MASS INDEX DOCD: CPT | Mod: CPTII,S$GLB,, | Performed by: RADIOLOGY

## 2023-07-27 PROCEDURE — 1159F MED LIST DOCD IN RCRD: CPT | Mod: CPTII,S$GLB,, | Performed by: SURGERY

## 2023-07-27 PROCEDURE — 3078F PR MOST RECENT DIASTOLIC BLOOD PRESSURE < 80 MM HG: ICD-10-PCS | Mod: CPTII,S$GLB,, | Performed by: SURGERY

## 2023-07-27 PROCEDURE — 99999 PR PBB SHADOW E&M-EST. PATIENT-LVL III: CPT | Mod: PBBFAC,,, | Performed by: RADIOLOGY

## 2023-07-27 PROCEDURE — 3008F PR BODY MASS INDEX (BMI) DOCUMENTED: ICD-10-PCS | Mod: CPTII,S$GLB,, | Performed by: SURGERY

## 2023-07-27 PROCEDURE — 3074F PR MOST RECENT SYSTOLIC BLOOD PRESSURE < 130 MM HG: ICD-10-PCS | Mod: CPTII,S$GLB,, | Performed by: RADIOLOGY

## 2023-07-27 PROCEDURE — 99205 PR OFFICE/OUTPT VISIT, NEW, LEVL V, 60-74 MIN: ICD-10-PCS | Mod: S$GLB,,, | Performed by: RADIOLOGY

## 2023-07-27 PROCEDURE — 3078F PR MOST RECENT DIASTOLIC BLOOD PRESSURE < 80 MM HG: ICD-10-PCS | Mod: CPTII,S$GLB,, | Performed by: RADIOLOGY

## 2023-07-27 PROCEDURE — 99205 OFFICE O/P NEW HI 60 MIN: CPT | Mod: S$GLB,,, | Performed by: SURGERY

## 2023-07-27 PROCEDURE — 99205 PR OFFICE/OUTPT VISIT, NEW, LEVL V, 60-74 MIN: ICD-10-PCS | Mod: S$GLB,,, | Performed by: SURGERY

## 2023-07-27 PROCEDURE — 99205 OFFICE O/P NEW HI 60 MIN: CPT | Mod: S$GLB,,, | Performed by: RADIOLOGY

## 2023-07-27 PROCEDURE — 3078F DIAST BP <80 MM HG: CPT | Mod: CPTII,S$GLB,, | Performed by: SURGERY

## 2023-07-27 NOTE — PROGRESS NOTES
Breast Surgery  [facility]  Department of Surgery      REFERRING PROVIDER: Rossi Mcclelland MD  [id number]  [place] [address]    Chief Complaint: Breast Cancer (New Patient Muti-D Right Breast Ductal Carcinoma .)      Subjective:      Patient ID: Noelle Rivas is a 62 y.o. female who presents with invasive ductal carcinoma of the right breast after a screening mammogram showed abnormal findings.    Follow-up mammogram (23) showed there is a focal asymmetry seen in the upper outer quadrant of the right breast in the middle depth. The asymmetry correlates with the screening mammogram finding. Associated features include architectural distortion. Ultrasound (23) showed there is a 3 mm x 3 mm x 4 mm irregularly shaped, hypoechoic mass with angular margins seen in the right breast at 9 o'clock, 9 cm from the nipple. The mass correlates with the asymmetry seen on today's mammogram. Compared to the previous study, the mass is more defined. The axilla is normal. . A ultrasound guided biopsy was performed on 23 with pathology revealing infiltrating ductal carcinoma of the breast.     Patient does routinely do self breast exams.  Patient has not noted a change on breast exam.  Patient denies nipple discharge. Patient admits to to previous breast biopsy. Patient admits to a personal history of breast cancer.  It was IDC gr 2, 1.3cm with clear margins following lumpectomy and SLNB.  Declined AI with Dr. Layne.  Completed XRT.    Findings at that time were the following:   Tumor size: 0.4 cm   Tumor ndgndrndanddndend:nd nd2nd Estrogen Receptor: + (nearly 100%, strong)  Progesterone Receptor: + (15% moderate to strong)  Her-2 yumiko: -   Lymph node status: clinically negative    Lymphatic invasion: not identified     GYN History:  Age of menarche was 14. Age of menopause was 58, natural. Patient denies hormonal therapy. Patient is .    Past Medical History:   Diagnosis Date    Abnormal TSH 2018     Age-related nuclear cataract of both eyes 08/03/2022    Allergy     Anxiety     Anxiety 12/17/2012    BRCA1 negative 07/23/19    BRCA2 negative 07/23/19    Breast cancer 2012    right    Calcium nephrolithiasis     Complex endometrial hyperplasia without atypia 06/16/2016    Degenerative disc disease     DJD (degenerative joint disease) of knee     Endometrial hyperplasia, unspecified: see evaluation 2017 05/22/2018    Factor V Leiden mutation No h/o DVT    Kidney stones     Leukopenia 12/19/2012    PMB (postmenopausal bleeding) 03/06/2017    Stroke     questionable  - TIA    Vitamin B 12 deficiency      Past Surgical History:   Procedure Laterality Date    BREAST BIOPSY Right 2012    malignant, radiation    BREAST BIOPSY Right 2014    benign    BREAST LUMPECTOMY Right 06/11/2012    w/ radiation    BREAST LUMPECTOMY W/ NEEDLE LOCALIZATION      CYSTOSCOPY W/ URETERAL STENT PLACEMENT Left 10/28/2022    Procedure: CYSTOSCOPY, WITH URETERAL STENT INSERTION;  Surgeon: Ileana Souza MD;  Location: [facility] OR;  Service: Urology;  Laterality: Left;  6x26    CYSTOSCOPY W/ URETERAL STENT REMOVAL Left 10/28/2022    Procedure: CYSTOSCOPY, WITH URETERAL STENT REMOVAL;  Surgeon: Ileana Souza MD;  Location: [facility] OR;  Service: Urology;  Laterality: Left;    DILATION AND CURETTAGE OF UTERUS      URETERAL STENT PLACEMENT N/A 10/14/2022    Procedure: INSERTION, STENT, URETER;  Surgeon: Ileana Souza MD;  Location: [facility] OR;  Service: Urology;  Laterality: N/A;  6x26 soft    URETEROSCOPIC REMOVAL OF URETERIC CALCULUS Left 10/14/2022    Procedure: REMOVAL, CALCULUS, URETER, URETEROSCOPIC;  Surgeon: Ileana Souza MD;  Location: [facility] OR;  Service: Urology;  Laterality: Left;    URETEROSCOPIC REMOVAL OF URETERIC CALCULUS Left 10/28/2022    Procedure: REMOVAL, CALCULUS, URETER, URETEROSCOPIC;  Surgeon: Ileana Souza MD;  Location: [facility] OR;  Service: Urology;  Laterality: Left;     Current Outpatient Medications on  File Prior to Visit   Medication Sig Dispense Refill    aspirin (ECOTRIN) 81 MG EC tablet Take 81 mg by mouth once daily.      cholecalciferol, vitamin D3, (VITAMIN D3) 25 mcg (1,000 unit) capsule Take 1 capsule (1,000 Units total) by mouth once daily. 30 capsule 12    cyanocobalamin (VITAMIN B-12) 1000 MCG tablet Take 1,000 mcg by mouth every other day.       loratadine (CLARITIN) 10 mg tablet Take 10 mg by mouth daily as needed.      multivitamin (THERAGRAN) per tablet Take 1 tablet by mouth once daily.       Current Facility-Administered Medications on File Prior to Visit   Medication Dose Route Frequency Provider Last Rate Last Admin    diphenhydrAMINE injection 12.5 mg  12.5 mg Intravenous Once PRN Santiago Loving MD        electrolyte-S (ISOLYTE)   Intravenous Continuous Santiago Loving MD   Stopped at 10/28/22 1050    fentaNYL 50 mcg/mL injection 25 mcg  25 mcg Intravenous Q5 Min PRN Santiago Loving MD        HYDROmorphone (PF) injection 0.2 mg  0.2 mg Intravenous Q5 Min PRN Santiago Loving MD        lactated ringers infusion  10 mL/hr Intravenous Continuous Santiago Loving MD        lactated ringers infusion  500 mL Intravenous Once Santiago Loving MD        ondansetron injection 4 mg  4 mg Intravenous Once PRN Santiago Loving MD        oxyCODONE immediate release tablet 5 mg  5 mg Oral Once PRN Santiago Loving MD        prochlorperazine injection Soln 5 mg  5 mg Intravenous Q30 Min PRN Santiago Loving MD        sodium chloride 0.9% flush 3 mL  3 mL Intravenous Q8H Santiago Loving MD         Social History     Socioeconomic History    Marital status: Single   Occupational History    Occupation: Nurse       Employer: [employer]   Tobacco Use    Smoking status: Never    Smokeless tobacco: Never   Substance and Sexual Activity    Alcohol use: Yes     Comment: Minimal    Drug use: No    Sexual activity: Not Currently     Birth  control/protection: None   Other Topics Concern    Are you pregnant or think you may be? No    Breast-feeding No     Social Determinants of Health     Financial Resource Strain: Low Risk  (7/20/2023)    Overall Financial Resource Strain (CARDIA)     Difficulty of Paying Living Expenses: Not hard at all   Food Insecurity: No Food Insecurity (7/20/2023)    Hunger Vital Sign     Worried About Running Out of Food in the Last Year: Never true     Ran Out of Food in the Last Year: Never true   Transportation Needs: No Transportation Needs (7/20/2023)    PRAPARE - Transportation     Lack of Transportation (Medical): No     Lack of Transportation (Non-Medical): No   Physical Activity: Sufficiently Active (7/20/2023)    Exercise Vital Sign     Days of Exercise per Week: 3 days     Minutes of Exercise per Session: 70 min   Stress: No Stress Concern Present (7/20/2023)    [employer] - Occupational Stress Questionnaire     Feeling of Stress : Only a little   Social Connections: Unknown (7/20/2023)    Social Connection and Isolation Panel [NHANES]     Frequency of Communication with Friends and Family: More than three times a week     Frequency of Social Gatherings with Friends and Family: More than three times a week     Active Member of Clubs or Organizations: Yes     Attends Club or Organization Meetings: More than 4 times per year     Marital Status: Never    Housing Stability: Low Risk  (7/20/2023)    Housing Stability Vital Sign     Unable to Pay for Housing in the Last Year: No     Number of Places Lived in the Last Year: 1     Unstable Housing in the Last Year: No     Family History   Problem Relation Age of Onset    Hypertension Father     Coronary artery disease Father     Cancer Father         prostate ca, metastatic    Heart disease Father         stenting    Kidney disease Father         stones    Retinal detachment Father     Colon cancer Paternal Grandfather     Melanoma Mother  "    Cancer Mother         melanoma    Hyperlipidemia Brother     Keratoconus Brother     No Known Problems Brother     Stroke Maternal Grandmother     Lung cancer Maternal Grandfather     Breast cancer Paternal Grandmother          at age 62    Ovarian cancer Neg Hx     Uterine cancer Neg Hx     Psoriasis Neg Hx     Lupus Neg Hx     Eczema Neg Hx         Review of Systems   Constitutional:  Negative for appetite change, chills, fever and unexpected weight change.   HENT:  Negative for facial swelling, postnasal drip and sore throat.    Eyes:  Negative for redness and itching.   Respiratory:  Negative for chest tightness and shortness of breath.    Cardiovascular:  Negative for chest pain and palpitations.   Gastrointestinal:  Negative for blood in stool, diarrhea, nausea and vomiting.   Genitourinary:  Negative for difficulty urinating and dysuria.   Musculoskeletal:  Negative for arthralgias and joint swelling.   Skin:  Negative for rash and wound.   Neurological:  Negative for dizziness and syncope.   Hematological:  Negative for adenopathy.   Psychiatric/Behavioral:  Negative for agitation. The patient is not nervous/anxious.      Objective:   /68 (BP Location: Right arm, Patient Position: Sitting, BP Method: Medium (Automatic))   Pulse 72   Ht 5' 9" (1.753 m)   Wt 73.9 kg (163 lb)   LMP 2017 (Exact Date)   SpO2 96%   BMI 24.07 kg/m²     Physical Exam   Vitals reviewed.  Constitutional: She is oriented to person, place, and time.   HENT:   Head: Normocephalic and atraumatic.   Nose: Nose normal.   Eyes: Pupils are equal, round, and reactive to light. Right eye exhibits no discharge. Left eye exhibits no discharge.   Pulmonary/Chest: Effort normal and breath sounds normal. No stridor. No respiratory distress. She exhibits no mass, no tenderness and no edema. Right breast exhibits no inverted nipple, no mass, no nipple discharge, no skin change and no tenderness. Left breast exhibits no " inverted nipple, no mass, no nipple discharge, no skin change and no tenderness. No breast swelling or bleeding. Breasts are symmetrical.       Abdominal: Normal appearance.   Genitourinary: No breast swelling or bleeding.   Neurological: She is alert and oriented to person, place, and time.   Skin: Skin is warm and dry.     Psychiatric: Her behavior is normal. Mood, judgment and thought content normal.       Radiology review: Images personally reviewed by me in the clinic.     6/6/23 Bilateral Screening Mammo:    Findings:  This procedure was performed using tomosynthesis. Computer-aided detection was utilized in the interpretation of this examination.  The breasts have scattered areas of fibroglandular density.      Right  There are post-surgical findings from a previous lumpectomy seen in the right breast at 12 o'clock. Compared to the previous study, there are no significant changes. There has been no interval development of a suspicious mass, microcalcification, or architectural distortion.      There is a focal asymmetry seen in the upper outer quadrant of the right breast in the middle depth. Compared to the previous study, the asymmetry is more defined; there are possible associated faint calcifications. This is located anterior to a group of benign calcifications with a biopsy clip.      Left  There is no evidence of suspicious masses, calcifications, or other abnormal findings in the left breast.     Impression:  Right  Focal Asymmetry: Right breast focal asymmetry at the upper outer middle position. Assessment: 0 - Incomplete. Diagnostic Mammogram with magnification views, and an ultrasound is recommended.  Left  There is no mammographic evidence of malignancy in the left breast.     BI-RADS Category:   Overall: 0 - Incomplete: Needs Additional Imaging Evaluation        Recommendation:  Right Diagnostic mammogram and ultrasound is recommended.      6/13/23 Right Diagnostic Mammo/US:    Findings:  This  procedure was performed using tomosynthesis. Computer-aided detection was utilized in the interpretation of this examination.  Mammo Digital Diagnostic Right with Vlad  The right breast has scattered areas of fibroglandular density.     There is a focal asymmetry seen in the upper outer quadrant of the right breast in the middle depth. The asymmetry correlates with the screening mammogram finding. Associated features include architectural distortion.      US Breast Right Limited  There is a 3 mm x 3 mm x 4 mm irregularly shaped, hypoechoic mass with angular margins seen in the right breast at 9 o'clock, 9 cm from the nipple. The mass correlates with the asymmetry seen on today's mammogram. Compared to the previous study, the mass is more defined. The axilla is normal.       Impression:  Right  Mass: Right breast 3 mm x 3 mm x 4 mm mass at the 9 o'clock position. Assessment: 4 - Suspicious finding. Ultrasound-guided biopsy is recommended.       I discussed the findings and recommendations for today's exam in detail with the patient.       BI-RADS Category:   Overall: 4 - Suspicious        Recommendation:  Ultrasound-guided biopsy is recommended.         Assessment:       1. Malignant neoplasm of overlapping sites of right breast in female, estrogen receptor positive    2. Abnormal mammogram    3. Preop testing        Plan:     Options for management were discussed with the patient and her family. We reviewed the existing data noting the equivalency of breast conserving surgery with radiation therapy and mastectomy. We also reviewed the guidelines of the National Comprehensive Cancer Network for Stage I breast carcinoma. We discussed the need for lumpectomy margins to be negative for carcinoma, the necessity for postoperative radiation therapy after breast conservation in most cases, the possibility of a failed or false negative sentinel lymph node biopsy and the potential need for complete lymphadenectomy for a  failed or positive sentinel lymph node biopsy were fully discussed. In the setting of mastectomy, delayed or immediate reconstruction options are available and were discussed.     In the setting of lumpectomy, radiation therapy would be recommended majority of the time.  The duration and treatment side effects were discussed with the patient.  This will coordinated with the radiation oncologist pending final pathology.    We also discussed the role of systemic therapy in the treatment of early stage breast cancer.  We discussed that this is based on tumor biology and steve status and will be determined based on final pathology.  We discussed that if the cancer is hormone positive, endocrine therapy would be recommended in most cases and its use can reduce the risk of recurrence as well as improve survival. Side effects of treatment were briefly discussed. We also discussed the potential role for chemotherapy based on a number of factors such as tumor phenotype (ER+ vs. triple negative vs. Mdx4syz+) and this would be determined in coordination with the medical oncologist.    The patient has elected to proceed with right total mastectomy, possibly bilateral, and right sentinel lymph node biopsy. We discussed potential for axillary dissection since she has had prior radiation and SLNB before.  I think there is low benefit to this being that the tumor is very small and I am unsure if this is a recurrence or a new cancer.  Will present at tumor board.  We discussed the contralateral breast and recon options in great detail.  She is considering aesthetic flat closure, but wants to definitely explore recon options.  Will meet plastics.The operative risks of bleeding, infection, recurrence, scarring, and anesthetic complications and the possibility of requiring further surgery were all noted and informed consent obtained.  We will check on her genetic panel to make sure it is complete.  Leaning towards NAKIA    Surgery  scheduled. Follow-up in clinic roughly 14 days after surgery.     Patient was educated on breast cancer, receptors, wire localization lumpectomy, mastectomy, sentinel lymph node mapping and biopsy, axillary lymph node dissection, reconstruction, breast prosthesis with post-mastectomy bra and radiation therapy. Patient was given patient information binder including [facility] breast cancer treatment brochure.  All her questions were answered.    Total time spent with the patient: 65 minutes.  45 minutes of face to face consultation and 20 minutes of chart review and coordination of care.

## 2023-07-27 NOTE — Clinical Note
Hi,  I am seeing Noelle for a new diagnosis of breast cancer.  It is extremely small and favorable so should have an excellent prognosis.  She is having difficulty in decision making in regards to her plan, but we will keep working closely on this together.  She will see plastic surgery and we will have a tumor board discussion about her case.  I'll let you know if any major changes, Thanks! Giana De Leon MD Breast Surgical Oncology

## 2023-07-31 ENCOUNTER — DOCUMENTATION ONLY (OUTPATIENT)
Dept: HEMATOLOGY/ONCOLOGY | Facility: CLINIC | Age: 63
End: 2023-07-31
Payer: COMMERCIAL

## 2023-07-31 PROBLEM — Z17.0 MALIGNANT NEOPLASM OF OVERLAPPING SITES OF RIGHT BREAST IN FEMALE, ESTROGEN RECEPTOR POSITIVE: Status: ACTIVE | Noted: 2023-07-31

## 2023-07-31 PROBLEM — C50.811 MALIGNANT NEOPLASM OF OVERLAPPING SITES OF RIGHT BREAST IN FEMALE, ESTROGEN RECEPTOR POSITIVE: Status: ACTIVE | Noted: 2023-07-31

## 2023-07-31 NOTE — PROGRESS NOTES
Nurse Navigator Note:     Met with patient during her consult with Dr. De Leon.  Patient and I reviewed the information she discussed with Dr. De Leon, including treatment options, diagnosis, and future plans for workup. Patient and I went through the new patient booklet, explained some of the information and why it is provided.     Also offered patient consults with our other specialty clinics: Integrative Oncology, Survivorship and/or Women's Gynecologic needs, our breast physical therapy department for pre-op and post-operative assessments, Oncologic Psychology for psychological support, and Oncologic Nutrition for nutritional counseling. Explained to patient that all of these support services are completely optional. Discussed that physical therapy may call patient to offer pre-op appt, and what that appt would entail.     Patient was given a copy of her appointments, Dr. De Leon's card, and my card. Encouraged her to call me if she has any questions or concerns or would like to schedule any additional appointments. Verbalized understanding of all information.     Oncology Navigation   Intake  Date of Diagnosis: 06/27/23  Cancer Type: Breast  Internal / External Referral: Internal  Date of Referral: 07/11/23  Initial Nurse Navigator Contact: 07/11/23  Referral to Initial Contact Timeline (days): 0  Date Worked: 07/13/23  First Appointment Available: 07/13/23     Treatment  Current Status: Staging work-up    Surgical Oncologist: Dr. De Leon  Consult Date: 07/27/23       Radiation Oncologist: Dr. Pisano    Procedures: Biopsy  Biopsy Schedule Date: 06/27/23       ER: Positive  AK: Positive  Her2: Negative    Radiation Oncologist: Dr. Pisano    Barriers of Care: Transportation; Other  Other Barriers: No family in town, lives alone     Acuity      Follow Up  No follow-ups on file.

## 2023-08-02 DIAGNOSIS — Z01.818 PRE-OP EXAM: Primary | ICD-10-CM

## 2023-08-03 ENCOUNTER — OFFICE VISIT (OUTPATIENT)
Dept: PLASTIC SURGERY | Facility: CLINIC | Age: 63
End: 2023-08-03
Payer: COMMERCIAL

## 2023-08-03 VITALS
DIASTOLIC BLOOD PRESSURE: 71 MMHG | SYSTOLIC BLOOD PRESSURE: 115 MMHG | HEART RATE: 73 BPM | HEIGHT: 69 IN | BODY MASS INDEX: 24.06 KG/M2

## 2023-08-03 DIAGNOSIS — D68.51 FACTOR V LEIDEN MUTATION: Primary | ICD-10-CM

## 2023-08-03 DIAGNOSIS — C50.911 MALIGNANT NEOPLASM OF RIGHT BREAST, STAGE 1, ESTROGEN RECEPTOR POSITIVE: ICD-10-CM

## 2023-08-03 DIAGNOSIS — Z17.0 MALIGNANT NEOPLASM OF RIGHT BREAST, STAGE 1, ESTROGEN RECEPTOR POSITIVE: ICD-10-CM

## 2023-08-03 PROCEDURE — 99999 PR PBB SHADOW E&M-EST. PATIENT-LVL III: CPT | Mod: PBBFAC,,, | Performed by: SURGERY

## 2023-08-03 PROCEDURE — 99999 PR PBB SHADOW E&M-EST. PATIENT-LVL III: ICD-10-PCS | Mod: PBBFAC,,, | Performed by: SURGERY

## 2023-08-03 PROCEDURE — 3074F PR MOST RECENT SYSTOLIC BLOOD PRESSURE < 130 MM HG: ICD-10-PCS | Mod: CPTII,S$GLB,, | Performed by: SURGERY

## 2023-08-03 PROCEDURE — 99205 PR OFFICE/OUTPT VISIT, NEW, LEVL V, 60-74 MIN: ICD-10-PCS | Mod: S$GLB,,, | Performed by: SURGERY

## 2023-08-03 PROCEDURE — 3078F DIAST BP <80 MM HG: CPT | Mod: CPTII,S$GLB,, | Performed by: SURGERY

## 2023-08-03 PROCEDURE — 3078F PR MOST RECENT DIASTOLIC BLOOD PRESSURE < 80 MM HG: ICD-10-PCS | Mod: CPTII,S$GLB,, | Performed by: SURGERY

## 2023-08-03 PROCEDURE — 1159F PR MEDICATION LIST DOCUMENTED IN MEDICAL RECORD: ICD-10-PCS | Mod: CPTII,S$GLB,, | Performed by: SURGERY

## 2023-08-03 PROCEDURE — 3008F PR BODY MASS INDEX (BMI) DOCUMENTED: ICD-10-PCS | Mod: CPTII,S$GLB,, | Performed by: SURGERY

## 2023-08-03 PROCEDURE — 99205 OFFICE O/P NEW HI 60 MIN: CPT | Mod: S$GLB,,, | Performed by: SURGERY

## 2023-08-03 PROCEDURE — 3074F SYST BP LT 130 MM HG: CPT | Mod: CPTII,S$GLB,, | Performed by: SURGERY

## 2023-08-03 PROCEDURE — 3008F BODY MASS INDEX DOCD: CPT | Mod: CPTII,S$GLB,, | Performed by: SURGERY

## 2023-08-03 PROCEDURE — 1159F MED LIST DOCD IN RCRD: CPT | Mod: CPTII,S$GLB,, | Performed by: SURGERY

## 2023-08-04 NOTE — ASSESSMENT & PLAN NOTE
The patient had a lot of questions and we discussed these at length    Began with discussion of implant based reconstruction. This included both direct-to-implant, tissue expander based reconstruction, the possible use of ADM, drains and expected post-operative course.  Risks of implant based recon including: hematoma, seroma, infection, extrusion, capsular contracture, risks of rupture, need for replacement later in life and BII/ART-ALCL. Discussed basics of revision procedures including: tailoring of the skin envelope, repositioning of the implant, liposuction and fat grafting. Any questions answered.    Next we discussed autologous reconstruction with the most common donor sites (abdomen, thigh, back). Explained that this procedure will reconstruction the breast with the patients own living tissue, without the need for an implant, but in exchange for a longer initial procedure and on average three day hospital stay afterwards.  Discussed flap monitoring, risks of take back/flap failure/ expected hospital course, use of drains, and recovery.  Risks including flap loss, fat necrosis, infection, wound breakdown, seroma, hematoma, scarring, need for reoperation, blood clots were all covered.  Typical revision procedures including lifting the breast, tailoring the skin, liposuction and fat grafting for contour were also discussed.    Finally we discussed going flat, and no recon breast reconstruction.  Also discussed the use of breasts prosthesis and the fit of her clothes.  This also gave us an opportunity to discuss delayed breast reconstruction techniques both with an implant and with her own tissue and how the skin envelope and skin patterns of her reconstruction would very.  I was able to illustrate different skin paddles on pictures breasts and also illustrate the process of NAKIA flap breast reconstruction     The patient was very worried about having to have multiple surgeries.  She also does not know if the  recovery breast reconstruction would be worth it but it is hesitant that she may not being reconstructed in the future.  She is currently undecided though I think she is leaning towards foregoing breast reconstruction in the immediate setting.  Think she would be a good candidate for NAKIA flap reconstruction, especially if she proceeds with unilateral reconstruction but we also discussed unilateral implant based reconstruction with symmetry procedures.  Also presented the option of immediate tissue expander placement converting to autologous or implant based reconstruction at a later time.  We will be available if she proceeds with breast reconstruction.  With a history of radiation probably best suited for delayed NAKIA flap reconstruction with initial tissue expander placement

## 2023-08-04 NOTE — PROGRESS NOTES
Plastic Surgery History & Physical    SUBJECTIVE:   Chief complaint:  Discuss breast reconstruction    History of Present Illness:  62 y.o. female with a history of right breast cancer.  She underwent lumpectomy with adjuvant radiation therapy in 2012.  This was done with Dr. Hendrickson.  New mass was discovered on screening mammogram in June.  This is reportedly in a different area than her 1st cancer.  It was likely a 2nd primary and not a recurrence.  She was a patient was Dr. De Leon.  She was also considering 2nd opinion on the [facility] since she lives in [place].  She is no palpable mass.  She maybe notices some more pain towards her axilla now that she thinks he may be sensitive since her diagnosis.  She understands that she is already had radiation that she requires mastectomy.  She was done fair amount of research on reconstruction and also going flat and breast prostheses.  She was very undecided what she would like to do is concerned about the recovery.  She has some asymmetry prefer her previous treatment though this is not bothersome to her.    Past medical history includes factor 5 Leiden and kidney stones.    Past surgical history includes lumpectomy, sentinel lymph node biopsy, and sister for kidney stones.  She does not use any nicotine products.    She was [employer] employee.  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  Her father passed earlier this year and she is very active participating in yoga    Past Medical History:   Diagnosis Date    Abnormal TSH 06/05/2018    Age-related nuclear cataract of both eyes 08/03/2022    Allergy     Anxiety     Anxiety 12/17/2012    BRCA1 negative 07/23/19    BRCA2 negative 07/23/19    Breast cancer 2012    right    Calcium nephrolithiasis     Complex endometrial hyperplasia without atypia 06/16/2016    Degenerative disc disease     DJD (degenerative joint disease) of knee     Endometrial hyperplasia, unspecified: see evaluation 2017 05/22/2018     Factor V Leiden mutation No h/o DVT    Kidney stones     Leukopenia 12/19/2012    PMB (postmenopausal bleeding) 03/06/2017    Stroke     questionable  - TIA    Vitamin B 12 deficiency        Past Surgical History:   Procedure Laterality Date    BREAST BIOPSY Right 2012    malignant, radiation    BREAST BIOPSY Right 2014    benign    BREAST LUMPECTOMY Right 06/11/2012    w/ radiation    BREAST LUMPECTOMY W/ NEEDLE LOCALIZATION      CYSTOSCOPY W/ URETERAL STENT PLACEMENT Left 10/28/2022    Procedure: CYSTOSCOPY, WITH URETERAL STENT INSERTION;  Surgeon: Ileana Souza MD;  Location: [facility] OR;  Service: Urology;  Laterality: Left;  6x26    CYSTOSCOPY W/ URETERAL STENT REMOVAL Left 10/28/2022    Procedure: CYSTOSCOPY, WITH URETERAL STENT REMOVAL;  Surgeon: Ileana Souza MD;  Location: [facility] OR;  Service: Urology;  Laterality: Left;    DILATION AND CURETTAGE OF UTERUS      URETERAL STENT PLACEMENT N/A 10/14/2022    Procedure: INSERTION, STENT, URETER;  Surgeon: Ileana Souza MD;  Location: [facility] OR;  Service: Urology;  Laterality: N/A;  6x26 soft    URETEROSCOPIC REMOVAL OF URETERIC CALCULUS Left 10/14/2022    Procedure: REMOVAL, CALCULUS, URETER, URETEROSCOPIC;  Surgeon: Ileana Souza MD;  Location: [facility] OR;  Service: Urology;  Laterality: Left;    URETEROSCOPIC REMOVAL OF URETERIC CALCULUS Left 10/28/2022    Procedure: REMOVAL, CALCULUS, URETER, URETEROSCOPIC;  Surgeon: Ileana Souza MD;  Location: [facility] OR;  Service: Urology;  Laterality: Left;       Family History   Problem Relation Age of Onset    Hypertension Father     Coronary artery disease Father     Cancer Father         prostate ca, metastatic    Heart disease Father         stenting    Kidney disease Father         stones    Retinal detachment Father     Colon cancer Paternal Grandfather     Melanoma Mother     Cancer Mother         melanoma    Hyperlipidemia Brother     Keratoconus Brother     No Known Problems Brother     Stroke  Maternal Grandmother     Lung cancer Maternal Grandfather     Breast cancer Paternal Grandmother          at age 62    Ovarian cancer Neg Hx     Uterine cancer Neg Hx     Psoriasis Neg Hx     Lupus Neg Hx     Eczema Neg Hx        Social History     Socioeconomic History    Marital status: Single   Occupational History    Occupation: Nurse       Employer: [employer]   Tobacco Use    Smoking status: Never    Smokeless tobacco: Never   Substance and Sexual Activity    Alcohol use: Not Currently     Comment: Minimal    Drug use: No    Sexual activity: Not Currently     Birth control/protection: None   Other Topics Concern    Are you pregnant or think you may be? No    Breast-feeding No     Social Determinants of Health     Financial Resource Strain: Low Risk  (2023)    Overall Financial Resource Strain (CARDIA)     Difficulty of Paying Living Expenses: Not hard at all   Food Insecurity: No Food Insecurity (2023)    Hunger Vital Sign     Worried About Running Out of Food in the Last Year: Never true     Ran Out of Food in the Last Year: Never true   Transportation Needs: No Transportation Needs (2023)    PRAPARE - Transportation     Lack of Transportation (Medical): No     Lack of Transportation (Non-Medical): No   Physical Activity: Sufficiently Active (2023)    Exercise Vital Sign     Days of Exercise per Week: 3 days     Minutes of Exercise per Session: 70 min   Stress: No Stress Concern Present (2023)    [place] [employer] - Occupational Stress Questionnaire     Feeling of Stress : Only a little   Social Connections: Unknown (2023)    Social Connection and Isolation Panel [NHANES]     Frequency of Communication with Friends and Family: More than three times a week     Frequency of Social Gatherings with Friends and Family: More than three times a week     Active Member of Clubs or Organizations: Yes     Attends Club or Organization  Meetings: More than 4 times per year     Marital Status: Never    Housing Stability: Low Risk  (7/20/2023)    Housing Stability Vital Sign     Unable to Pay for Housing in the Last Year: No     Number of Places Lived in the Last Year: 1     Unstable Housing in the Last Year: No       Current Outpatient Medications   Medication Sig Dispense Refill    aspirin (ECOTRIN) 81 MG EC tablet Take 81 mg by mouth once daily.      cholecalciferol, vitamin D3, (VITAMIN D3) 25 mcg (1,000 unit) capsule Take 1 capsule (1,000 Units total) by mouth once daily. 30 capsule 12    cyanocobalamin (VITAMIN B-12) 1000 MCG tablet Take 1,000 mcg by mouth every other day.       loratadine (CLARITIN) 10 mg tablet Take 10 mg by mouth daily as needed.      multivitamin (THERAGRAN) per tablet Take 1 tablet by mouth once daily.       No current facility-administered medications for this visit.     Facility-Administered Medications Ordered in Other Visits   Medication Dose Route Frequency Provider Last Rate Last Admin    diphenhydrAMINE injection 12.5 mg  12.5 mg Intravenous Once PRN Santiago Loving MD        electrolyte-S (ISOLYTE)   Intravenous Continuous Santiago Loving MD   Stopped at 10/28/22 1050    fentaNYL 50 mcg/mL injection 25 mcg  25 mcg Intravenous Q5 Min PRN Santiago Loving MD        HYDROmorphone (PF) injection 0.2 mg  0.2 mg Intravenous Q5 Min PRN Santiago Loving MD        lactated ringers infusion  10 mL/hr Intravenous Continuous Santiago Loving MD        lactated ringers infusion  500 mL Intravenous Once Santiago Loving MD        ondansetron injection 4 mg  4 mg Intravenous Once PRN Santiago Loving MD        oxyCODONE immediate release tablet 5 mg  5 mg Oral Once PRN Santiago Loving MD        prochlorperazine injection Soln 5 mg  5 mg Intravenous Q30 Min PRN Santiago Loving MD        sodium chloride 0.9% flush 3 mL  3 mL Intravenous Q8H Santiago Loving MD      "      Review of patient's allergies indicates:   Allergen Reactions    Adhesive Itching and Rash     Micropore/Medipore Tape - paper tape is ok    Iodine and iodide containing products Itching    Nickel Itching     Allergic reaction to nickel and other related metals    Latex, natural rubber Itching         Review of Systems:  Otherwise negative some tenderness at her biopsy site      OBJECTIVE:     /71   Pulse 73   Ht 5' 9" (1.753 m)   LMP 04/01/2017 (Exact Date)   BMI 24.06 kg/m²       Physical Exam:  Gen: NAD, appears stated age  Neuro: normal without focal findings, mental status and speech normal  HEENT: NCAT, neck supple, PEERL  CV: RRR  Pulm: Breathing non-labored, chest wall movement equal bilaterally   Breast:  Soft breasts grade 2 ptosis no palpable mass.  Biopsy site present at 9:00 a.m. on the right breast right breast is smaller than left horizontal scar at 12:00 p.m. on the right   Right Left   SN-N  30   N-IMF  15   N-N 24   BW 14 14       Abdomen: soft, nontender, no guarding  Adequate lower abdominal tissue for NAKIA flap reconstruction.  Gu: genitalia not examined  Extremity:normal strength, no cyanosis or edema  Skin: Skin color, texture, turgor normal. No rashes or lesions  Psych: oriented to time, place and person, mood and affect are within normal limits          ASSESSMENT/PLAN:     Factor V Leiden mutation  Discussed her factor 5 Leiden mutation.  This was confirmed with testing.  She was takes 81 mg aspirin daily.  Discussed in the perioperative.  She would be on therapeutic Lovenox followed by extended DVT prophylaxis with prophylactic dosing with Eliquis.    Malignant neoplasm of right breast, stage 1, estrogen receptor positive  The patient had a lot of questions and we discussed these at length    Began with discussion of implant based reconstruction. This included both direct-to-implant, tissue expander based reconstruction, the possible use of ADM, drains and expected " post-operative course.  Risks of implant based recon including: hematoma, seroma, infection, extrusion, capsular contracture, risks of rupture, need for replacement later in life and BII/ART-ALCL. Discussed basics of revision procedures including: tailoring of the skin envelope, repositioning of the implant, liposuction and fat grafting. Any questions answered.    Next we discussed autologous reconstruction with the most common donor sites (abdomen, thigh, back). Explained that this procedure will reconstruction the breast with the patients own living tissue, without the need for an implant, but in exchange for a longer initial procedure and on average three day hospital stay afterwards.  Discussed flap monitoring, risks of take back/flap failure/ expected hospital course, use of drains, and recovery.  Risks including flap loss, fat necrosis, infection, wound breakdown, seroma, hematoma, scarring, need for reoperation, blood clots were all covered.  Typical revision procedures including lifting the breast, tailoring the skin, liposuction and fat grafting for contour were also discussed.  If she wanted bilateral reconstruction her breasts would need to be smaller.  She may benefit from a stacked flap or at least extended flap across the midline to match the volume of the left breast    Finally we discussed going flat, and no recon breast reconstruction.  Also discussed the use of breasts prosthesis and the fit of her clothes.  This also gave us an opportunity to discuss delayed breast reconstruction techniques both with an implant and with her own tissue and how the skin envelope and skin patterns of her reconstruction would very.  I was able to illustrate different skin paddles on pictures breasts and also illustrate the process of NAKIA flap breast reconstruction     The patient was very worried about having to have multiple surgeries.  She also does not know if the recovery breast reconstruction would be worth it  but it is hesitant that she may not being reconstructed in the future.  She is currently undecided though I think she is leaning towards foregoing breast reconstruction in the immediate setting.  Think she would be a good candidate for NAKIA flap reconstruction, especially if she proceeds with unilateral reconstruction but we also discussed unilateral implant based reconstruction with symmetry procedures.  Also presented the option of immediate tissue expander placement converting to autologous or implant based reconstruction at a later time.  We will be available if she proceeds with breast reconstruction.  With a history of radiation probably best suited for delayed NAKIA flap reconstruction with initial tissue expander placement      Aguila Jensen,   Plastic and Reconstructive Surgery    I spent a total of 70 minutes on the day of the visit.  This includes face to face time and non-face to face time preparing to see the patient (eg, review of tests), obtaining and/or reviewing separately obtained history, documenting clinical information in the electronic or other health record, independently interpreting results and communicating results to the patient/family/caregiver, or care coordinator.

## 2023-08-04 NOTE — ASSESSMENT & PLAN NOTE
Discussed her factor 5 Leiden mutation.  This was confirmed with testing.  She was takes 81 mg aspirin daily.  Discussed in the perioperative.  She would be on therapeutic Lovenox followed by extended DVT prophylaxis with prophylactic dosing with Eliquis.

## 2023-08-09 ENCOUNTER — TELEPHONE (OUTPATIENT)
Dept: SURGERY | Facility: CLINIC | Age: 63
End: 2023-08-09
Payer: COMMERCIAL

## 2023-08-09 NOTE — TELEPHONE ENCOUNTER
Called patient to discuss surgical planning. Patient states after meeting with plastic surgery, that she is not interested in reconstruction at this time. Patient elects to proceed with a flat mastectomy, but is undecided between unilateral vs bilateral. All questions asked and answered.

## 2023-08-14 ENCOUNTER — PATIENT MESSAGE (OUTPATIENT)
Dept: SURGERY | Facility: CLINIC | Age: 63
End: 2023-08-14
Payer: COMMERCIAL

## 2023-08-15 ENCOUNTER — PATIENT MESSAGE (OUTPATIENT)
Dept: SURGERY | Facility: CLINIC | Age: 63
End: 2023-08-15
Payer: COMMERCIAL

## 2023-08-16 RX ORDER — CEFAZOLIN SODIUM 2 G/50ML
2 SOLUTION INTRAVENOUS
Status: CANCELLED | OUTPATIENT
Start: 2023-08-16

## 2023-08-16 RX ORDER — SODIUM CHLORIDE 9 MG/ML
INJECTION, SOLUTION INTRAVENOUS CONTINUOUS
Status: CANCELLED | OUTPATIENT
Start: 2023-08-16

## 2023-08-22 ENCOUNTER — CLINICAL SUPPORT (OUTPATIENT)
Dept: REHABILITATION | Facility: HOSPITAL | Age: 63
End: 2023-08-22
Attending: SURGERY
Payer: COMMERCIAL

## 2023-08-22 DIAGNOSIS — C50.811 MALIGNANT NEOPLASM OF OVERLAPPING SITES OF RIGHT BREAST IN FEMALE, ESTROGEN RECEPTOR POSITIVE: Primary | ICD-10-CM

## 2023-08-22 DIAGNOSIS — Z17.0 MALIGNANT NEOPLASM OF OVERLAPPING SITES OF RIGHT BREAST IN FEMALE, ESTROGEN RECEPTOR POSITIVE: Primary | ICD-10-CM

## 2023-08-22 PROCEDURE — 97161 PT EVAL LOW COMPLEX 20 MIN: CPT | Mod: PN

## 2023-08-22 PROCEDURE — 97530 THERAPEUTIC ACTIVITIES: CPT | Mod: PN

## 2023-08-22 NOTE — PLAN OF CARE
[facility]  Physical Therapy Initial Evaluation  Name: Noelle Rivas  Clinic Number: [id number]    Therapy Diagnosis:   Encounter Diagnosis   Name Primary?    Malignant neoplasm of overlapping sites of right breast in female, estrogen receptor positive Yes        Physician: Giana De Leon MD    Physician Orders: PT Eval and Treat   Medical Diagnosis from Referral: C50.811,Z17.0 (ICD-10-CM) - Malignant neoplasm of overlapping sites of right breast in female, estrogen receptor positive  Evaluation Date: 8/22/2023  Authorization Period Expiration: 7/31/23-7/30/24  Plan of Care Expiration: 8/30/23  Visit # / Visits authorized: 1/ 1     Precautions: Diabetes and cancer     Time In: 7641-9979 & 8187-6996  Total Appointment Time (timed & untimed codes): 120 minutes    Subjective   Date of onset: 6/13/23 planned right mastectomy 9/6/2023  History of current condition - Noelle reports: having lumpectomy in right breast in 2012, completed radiation. She is not on hormonal therapies due to Factor V Leiden mutation. She presents for a consultation regarding posture and exercises to do to return to independent PLOF post surgery. She is not considering reconstruction at this time, plans for flat chest procedure.    Per Dr. De Leon note:  Patient ID: Noelle Rivas is a 62 y.o. female who presents with invasive ductal carcinoma of the right breast after a screening mammogram showed abnormal findings.     Follow-up mammogram (6/13/23) showed there is a focal asymmetry seen in the upper outer quadrant of the right breast in the middle depth. The asymmetry correlates with the screening mammogram finding. Associated features include architectural distortion. Ultrasound (6/13/23) showed there is a 3 mm x 3 mm x 4 mm irregularly shaped, hypoechoic mass with angular margins seen in the right breast at 9 o'clock, 9 cm from the nipple. The mass correlates with the asymmetry seen on today's mammogram.  Compared to the previous study, the mass is more defined. The axilla is normal. . A ultrasound guided biopsy was performed on 6/27/23 with pathology revealing infiltrating ductal carcinoma of the breast.      Patient does routinely do self breast exams.  Patient has not noted a change on breast exam.  Patient denies nipple discharge. Patient admits to to previous breast biopsy. Patient admits to a personal history of breast cancer.  It was IDC gr 2, 1.3cm with clear margins following lumpectomy and SLNB.  Declined AI with Dr. Layne.  Completed XRT.     Findings at that time were the following:   Tumor size: 0.4 cm   Tumor ndgndrndanddndend:nd nd2nd Estrogen Receptor: + (nearly 100%, strong)  Progesterone Receptor: + (15% moderate to strong)  Her-2 yumiko: -   Lymph node status: clinically negative    Lymphatic invasion: not identified   Cancer Related Surgery and Date: 2012 lumpectomy, planned right mastectomy 9/6/2023    Chemotherapy: none  Radiation: completed in 2012     Medical History:   Past Medical History:   Diagnosis Date    Abnormal TSH 06/05/2018    Age-related nuclear cataract of both eyes 08/03/2022    Allergy     Anxiety     Anxiety 12/17/2012    BRCA1 negative 07/23/19    BRCA2 negative 07/23/19    Breast cancer 2012    right    Calcium nephrolithiasis     Complex endometrial hyperplasia without atypia 06/16/2016    Degenerative disc disease     DJD (degenerative joint disease) of knee     Endometrial hyperplasia, unspecified: see evaluation 2017 05/22/2018    Factor V Leiden mutation No h/o DVT    Kidney stones     Leukopenia 12/19/2012    PMB (postmenopausal bleeding) 03/06/2017    Stroke     questionable  - TIA    Vitamin B 12 deficiency        Surgical History:   Noelle Rivas  has a past surgical history that includes Dilation and curettage of uterus; Breast biopsy (Right, 2012); Breast biopsy (Right, 2014); Breast lumpectomy (Right, 06/11/2012); Breast lumpectomy w/ needle localization; Ureteroscopic removal of  "ureteric calculus (Left, 10/14/2022); Ureteral stent placement (N/A, 10/14/2022); Ureteroscopic removal of ureteric calculus (Left, 10/28/2022); Cystoscopy w/ ureteral stent removal (Left, 10/28/2022); and Cystoscopy w/ ureteral stent placement (Left, 10/28/2022).    Medications:   Noelle has a current medication list which includes the following prescription(s): aspirin, cholecalciferol (vitamin d3), cyanocobalamin, loratadine, and multivitamin, and the following Facility-Administered Medications: diphenhydramine, electrolyte-s (ph 7.4), fentanyl, hydromorphone (pf), lactated ringers, lactated ringers, ondansetron, oxycodone, prochlorperazine, and sodium chloride 0.9%.    Allergies:   Review of patient's allergies indicates:   Allergen Reactions    Adhesive Itching and Rash     Micropore/Medipore Tape - paper tape is ok    Iodine and iodide containing products Itching    Nickel Itching     Allergic reaction to nickel and other related metals    Latex, natural rubber Itching        Imaging, 6/13/23 mammogram; 8/16 sentinel lymph node mapping    Prior Therapy: healthy back PT  Social History: alone, sister in law coming in to help post-sx.   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  Prior Level of Function: independent  Current Level of Function: independent  Exercise Routine prior to onset: 3x/wk yoga  Dominant hand:  right     Pain:  Current 0/10, worst 0/10, best 0/10     Functional Mobility (Bed mobility, transfers)  Bed mobility: I  Supine to sit: I  Sit to supine: I  Transfers to bed: I  Sit to stand:  I  Stand pivot:  I  Car transfers: I    ADL's:  Feeding: I  Grooming: I  Hygiene: I  UB Dressing: I  LB Dressing: I  Toileting: I  Bathing: I    Pts goals: "I want to be able to participate in yoga again after surgery." "I want exercises to make sure I'm not crocked or heavy without having the right breast."      Objective   Mental status: alert, oriented to person, place, and time, normal mood, behavior, " speech, dress, motor activity, and thought processes, affect appropriate to mood  Appearance: Casually dressed  Behavior:  calm, cooperative, and adequate rapport can be established  Attention Span and Concentration:  Normal    Postural examination/scapula alignment: Rounded shoulder      Sensation:   Light Touch UEs  Intact  Light Touch LEs  Intact  Proprioception: Intact,            ROM:     Active/Passive ROM: (measured in degrees)     Girth Measurements (in centimeters) taken in sitting  LANDMARK Right upper extremity  8/22/23 Left upper extremity   8/22/23   E + 20 cm 30.5 cm 31 cm   E + 10 cm 27 cm 27 cm   E + 5 cm 24 cm 25 cm   Elbow 23.5 cm 24 cm   W+ 20 cm  23 cm 22 cm   W+ 15 cm 22 cm 20 cm   W +  10 cm 18 cm 17 cm   Wrist 15 cm 14.5 cm   Palm 19 cm 17.5 cm   DP Thumb 6 cm 5.5 cm   Total girth Measurement 208 203.5   Total Girth Difference +4.5    Total Girth Reduction       Upper extremity range of motion:  JOINT RIGHT UE  8/22/23 LEFT UE  8/22/23 NORMAL RANGE   Shoulder flexion 180 ° 175 ° 0-180 °   Shoulder abduction 180 ° 180 ° 0-180 °   Shoulder internal rotation 70 ° 70 ° 0-70 °   Shoulder external rotation 90 ° 90 ° 0-90 °   Elbow flexion WFL ° WFL ° 0-150 °       Strength: manual muscle test grades below     Upper Extremity Strength: grossly 5/5 bilaterally    TREATMENT     Total Treatment time separate from Evaluation: 65 minutes    Noelle participated in dynamic functional therapeutic activities to improve functional performance for 60 minutes, including:  Education regarding what to expect healing time should be post-surgery, need for external support from family, need for wearing prosthetic at home to offset weight of right breat removal, use of compression camisole, postural exercises and ergonomics at desk and crocheting. Patient expresses anxiety regarding post sugery healing and being able to return to yoga. Assured her, she will have physical therapy post surgery to assess scar mobility,  skin integrity and range of motion. Therapy's goal would be to have her return to yoga post surgery with full range of motion and pain-free.     Written Home Exercises Provided: yes.  Exercises were reviewed and Noelle was able to demonstrate them prior to the end of the session.  Noelle demonstrated good  understanding of the education provided.     See EMR under Media for exercises provided 8/22/2023.    Assessment   Noelle is a 62 y.o. female referred to outpatient Physical Therapy with a medical diagnosis of right breast cancer. Patient with no restrictions at this time. Plans for right mastectomy on 9/26/2023 with flat chest. Provided education and exercises to combat postural changes with surgery. Plan to assess patient post-surgery for healing and reduce restrictions and pain. Hold therapy until post surgery.     Pt prognosis is Excellent.   Pt will benefit from skilled outpatient Physical Therapy to address the deficits stated above and in the chart below, provide pt/family education, and to maximize pt's level of independence.     Plan of care discussed with patient: Yes  Pt's spiritual, cultural and educational needs considered and patient is agreeable to the plan of care and goals as stated below:     Anticipated Barriers for therapy: none    Medical Necessity is demonstrated by the following  History  Co-morbidities and personal factors that may impact the plan of care [x] LOW: no personal factors / co-morbidities  [] MODERATE: 1-2 personal factors / co-morbidities  [] HIGH: 3+ personal factors / co-morbidities    Moderate / High Support Documentation:   Co-morbidities affecting plan of care: cancer    Personal Factors:   no deficits     Examination  Body Structures and Functions, activity limitations and participation restrictions that may impact the plan of care [x] LOW: addressing 1-2 elements  [] MODERATE: 3+ elements  [] HIGH: 4+ elements (please support below)    Moderate / High Support Documentation:       Clinical Presentation [x] LOW: stable  [] MODERATE: Evolving  [] HIGH: Unstable     Decision Making/ Complexity Score: low         Goals:  No goals identified at this time. Will assess post-surgery    Plan   Plan of care Certification: 8/22/2023    Hold therapy until post-surgery    Fide Bobo PT, DPT, CLT  8/25/2023

## 2023-08-25 NOTE — PROGRESS NOTES
Thank you for consult. Please refer to Plan of Care for complete note and goals.  Fide Bobo PT, DPT, CLT  8/25/2023

## 2023-08-25 NOTE — PATIENT INSTRUCTIONS
PRE/POST OP PATIENT EDUCATION    Post Operative Instructions     Range of Motion/lifting Precautions post surgery  The following activities should be avoided until your drain(s) have been removed  - do not lift affected arm above 90 degrees of shoulder flexion  - do not lift over 5 lbs  - do not pull or push heavy objects  - do not sleep on your stomach or surgery side       After surgery, you may begin self-care tasks including grooming, dressing, feeding and simple hygiene as soon as you feel up to it.    Schedule your post-op therapy follow-up after your drains have been removed or after your first post-surgery doctor visit.    When to call your doctor   - if any part of your affected arm or axilla feels hot, is reddened or has increased swelling   - if you develop a temperature over 101 degrees Fahrenheit      Lymphedema - Identification and Prevention     Lymphedema - is the swelling of a body area or extremity caused by the accumulation of lymphatic fluid.  There is a risk for lymphedema with the removal of lymph nodes, trauma or radiation therapy.  Treatment of breast cancer often involves surgery: mastectomy or lumpectomy. Some of the lymph nodes in the underarm (called axillary lymph nodes) may be removed and checked to see if they contain cancer cells.     During breast surgery when axillary lymph nodes are removed (with sentinel node biopsy or axillary dissection) or are treated with radiation therapy, the lymphatic system may become impaired. This may prevent lymphatic fluid from leaving the area therefore, causing lymphedema.     Lymphatic fluid is a normal part of the circulatory system. Its function is to remove waste products and to produce cells vital to fighting infection. Swelling occurs when the vessels become restricted and the lymphatic fluid is unable to freely flow through them.  If lymphedema is left untreated, the affected limb could progressively  become more swollen, which could lead to hardening of the skin, bulkiness in the limb, infection and impaired wound healing.         There are things you can do to decrease the chance of developing lymphedema.                                          [email or web address]                                                                                                                                                  The information presented is intended for general information and educational purposes. It is not intended to replace the  advice of your health care provider. Contact your health care provider if you believe you have a health problem.                                                    POST OP EXERCISES - SAFE TO DO THE FIRST 2 WEEKS AFTER SURGERY UNTIL YOUR FOLLOW UP APPOINTMENT WITH PHYSICAL/OCCUPATIONAL THERAPY    Scapular Retraction (Standing)    With arms at sides, squeeze shoulder blades together. Do not shrug shoulders and do not hold your breath. Hold 5 seconds. Repeat 10 times 1sessions 1-2 x day.       Exaggerated Breathing and Relaxation      Practice deep breathing frequently in the first few days following surgery even before you begin exercising. This exercise helps with tissue extensibility in the chest wall.  Inhale slowly and deeply through the nose and exhale through pursed lips. Concentrate on relaxing as you let the air out of your lungs. Repeat three (3) to four (4) times, remembering to breath in deeply and then relaxing. This exercise helps to ease the sensation of pulling and discomfort that may be experienced while exercising.      Ball Squeeze OR Hand pumps       Perform this exercise three (3) times a day for 10 reps.    The ball squeeze or hand pumps helps to prevent or reduce temporary swelling that may occur in the affected arm. This exercise may be performed standing, sitting or while lying in bed. During this exercise the affected arm should be slightly bent and  held upward. Support your arm with a pillow if you are uncomfortable holding it up.            AROM: Elbow Flexion / Extension        With left hand palm up, gently bend elbow as far as possible. Then straighten arm as far as possible. Do this in standing.   Repeat 10 times per set. Do 1 sets per session. Do 1-3 sessions per day.

## 2023-08-29 ENCOUNTER — OFFICE VISIT (OUTPATIENT)
Dept: SURGERY | Facility: CLINIC | Age: 63
End: 2023-08-29
Payer: COMMERCIAL

## 2023-08-29 VITALS
HEART RATE: 71 BPM | SYSTOLIC BLOOD PRESSURE: 115 MMHG | BODY MASS INDEX: 24.44 KG/M2 | WEIGHT: 165 LBS | DIASTOLIC BLOOD PRESSURE: 73 MMHG | HEIGHT: 69 IN

## 2023-08-29 DIAGNOSIS — Z17.0 MALIGNANT NEOPLASM OF RIGHT BREAST, STAGE 1, ESTROGEN RECEPTOR POSITIVE: Primary | ICD-10-CM

## 2023-08-29 DIAGNOSIS — Z17.0 MALIGNANT NEOPLASM OF OVERLAPPING SITES OF RIGHT BREAST IN FEMALE, ESTROGEN RECEPTOR POSITIVE: ICD-10-CM

## 2023-08-29 DIAGNOSIS — C50.911 MALIGNANT NEOPLASM OF RIGHT BREAST, STAGE 1, ESTROGEN RECEPTOR POSITIVE: Primary | ICD-10-CM

## 2023-08-29 DIAGNOSIS — C50.811 MALIGNANT NEOPLASM OF OVERLAPPING SITES OF RIGHT BREAST IN FEMALE, ESTROGEN RECEPTOR POSITIVE: ICD-10-CM

## 2023-08-29 PROCEDURE — 3074F SYST BP LT 130 MM HG: CPT | Mod: CPTII,S$GLB,, | Performed by: SURGERY

## 2023-08-29 PROCEDURE — 99999 PR PBB SHADOW E&M-EST. PATIENT-LVL III: ICD-10-PCS | Mod: PBBFAC,,, | Performed by: SURGERY

## 2023-08-29 PROCEDURE — 99215 PR OFFICE/OUTPT VISIT, EST, LEVL V, 40-54 MIN: ICD-10-PCS | Mod: S$GLB,,, | Performed by: SURGERY

## 2023-08-29 PROCEDURE — 3074F PR MOST RECENT SYSTOLIC BLOOD PRESSURE < 130 MM HG: ICD-10-PCS | Mod: CPTII,S$GLB,, | Performed by: SURGERY

## 2023-08-29 PROCEDURE — 3078F DIAST BP <80 MM HG: CPT | Mod: CPTII,S$GLB,, | Performed by: SURGERY

## 2023-08-29 PROCEDURE — 99999 PR PBB SHADOW E&M-EST. PATIENT-LVL III: CPT | Mod: PBBFAC,,, | Performed by: SURGERY

## 2023-08-29 PROCEDURE — 3008F BODY MASS INDEX DOCD: CPT | Mod: CPTII,S$GLB,, | Performed by: SURGERY

## 2023-08-29 PROCEDURE — 99215 OFFICE O/P EST HI 40 MIN: CPT | Mod: S$GLB,,, | Performed by: SURGERY

## 2023-08-29 PROCEDURE — 3078F PR MOST RECENT DIASTOLIC BLOOD PRESSURE < 80 MM HG: ICD-10-PCS | Mod: CPTII,S$GLB,, | Performed by: SURGERY

## 2023-08-29 PROCEDURE — 3008F PR BODY MASS INDEX (BMI) DOCUMENTED: ICD-10-PCS | Mod: CPTII,S$GLB,, | Performed by: SURGERY

## 2023-08-29 NOTE — PROGRESS NOTES
Breast Surgery  [facility]  Department of Surgery      REFERRING PROVIDER: No referring provider defined for this encounter.    Chief Complaint: surgery planning      Subjective:      Patient ID: Noelle Rivas is a 62 y.o. female with a PMHx of R breast IDC s/p lumpectomy and SLNB (Madhavi, 2012) who presents with new abnormality on screening mammogram, diagnosed with infiltrating ductal carcinoma of the R breast . She presents today for surgical planning and preadmission p/t surgery.    Follow-up mammogram (6/13/23) showed there is a focal asymmetry seen in the upper outer quadrant of the right breast in the middle depth. The asymmetry correlates with the screening mammogram finding. Associated features include architectural distortion. Ultrasound (6/13/23) showed there is a 3 mm x 3 mm x 4 mm irregularly shaped, hypoechoic mass with angular margins seen in the right breast at 9 o'clock, 9 cm from the nipple. The mass correlates with the asymmetry seen on today's mammogram. Compared to the previous study, the mass is more defined. The axilla is normal. . A ultrasound guided biopsy was performed on 6/27/23 with pathology revealing infiltrating ductal carcinoma of the breast.     R breast mass 9OC biopsy:   Tumor size: 0.4 cm   Tumor ndgndrndanddndend:nd nd2nd Estrogen Receptor: + (nearly 100%, strong)  Progesterone Receptor: + (15% moderate to strong)  Her-2 yumiko: -   Lymph node status: clinically negative    Lymphatic invasion: not identified      Patient does routinely do self breast exams.  Patient has not noted a change on breast exam.  Patient denies nipple discharge. Patient admits to to previous breast biopsy.Patient admits to a personal history of breast cancer.  It was IDC gr 2, 1.3cm with clear margins following lumpectomy and SLNB.  Declined AI with Dr. Layne. Completed XRT.    Past Medical History: Factor V Leiden - She would be on therapeutic Lovenox followed by extended DVT prophylaxis with prophylactic  dosing with Eliquis, kidney stones    Interval History 8/29/23:  Previously discussed proceed with right total mastectomy, possibly bilateral, and right sentinel lymph node biopsy. We discussed potential for axillary dissection since she has had prior radiation and SLNB before.  I think there is low benefit to this being that the tumor is very small and I am unsure if this is a recurrence or a new cancer.  Will present at tumor board.  We discussed the contralateral breast and recon options in great detail.  She is considering aesthetic flat closure, but wants to definitely explore recon options.  Will meet plastics.The operative risks of bleeding, infection, recurrence, scarring, and anesthetic complications and the possibility of requiring further surgery were all noted and informed consent obtained.  We will check on her genetic panel to make sure it is complete.  Leaning towards NAKIA    Met with Dr. Jensen, per documentation, discussed  The patient was very worried about having to have multiple surgeries.  She also does not know if the recovery breast reconstruction would be worth it but it is hesitant that she may not being reconstructed in the future.  She is currently undecided though I think she is leaning towards foregoing breast reconstruction in the immediate setting.  Think she would be a good candidate for NAKIA flap reconstruction, especially if she proceeds with unilateral reconstruction but we also discussed unilateral implant based reconstruction with symmetry procedures.  Also presented the option of immediate tissue expander placement converting to autologous or implant based reconstruction at a later time.  We will be available if she proceeds with breast reconstruction.  With a history of radiation probably best suited for delayed NAKIA flap reconstruction with initial tissue expander placement.    Per patient, she is now deciding between unilateral versus bilateral flat mastectomy.    Genetics was  negative     Past Medical History:   Diagnosis Date    Abnormal TSH 06/05/2018    Age-related nuclear cataract of both eyes 08/03/2022    Allergy     Anxiety     Anxiety 12/17/2012    BRCA1 negative 07/23/19    BRCA2 negative 07/23/19    Breast cancer 2012    right    Calcium nephrolithiasis     Complex endometrial hyperplasia without atypia 06/16/2016    Degenerative disc disease     DJD (degenerative joint disease) of knee     Endometrial hyperplasia, unspecified: see evaluation 2017 05/22/2018    Factor V Leiden mutation No h/o DVT    Kidney stones     Leukopenia 12/19/2012    PMB (postmenopausal bleeding) 03/06/2017    Stroke     questionable  - TIA    Vitamin B 12 deficiency      Past Surgical History:   Procedure Laterality Date    BREAST BIOPSY Right 2012    malignant, radiation    BREAST BIOPSY Right 2014    benign    BREAST LUMPECTOMY Right 06/11/2012    w/ radiation    BREAST LUMPECTOMY W/ NEEDLE LOCALIZATION      CYSTOSCOPY W/ URETERAL STENT PLACEMENT Left 10/28/2022    Procedure: CYSTOSCOPY, WITH URETERAL STENT INSERTION;  Surgeon: Ileana Souza MD;  Location: [facility] OR;  Service: Urology;  Laterality: Left;  6x26    CYSTOSCOPY W/ URETERAL STENT REMOVAL Left 10/28/2022    Procedure: CYSTOSCOPY, WITH URETERAL STENT REMOVAL;  Surgeon: Ileana Souza MD;  Location: [facility] OR;  Service: Urology;  Laterality: Left;    DILATION AND CURETTAGE OF UTERUS      URETERAL STENT PLACEMENT N/A 10/14/2022    Procedure: INSERTION, STENT, URETER;  Surgeon: Ileana Souza MD;  Location: [facility] OR;  Service: Urology;  Laterality: N/A;  6x26 soft    URETEROSCOPIC REMOVAL OF URETERIC CALCULUS Left 10/14/2022    Procedure: REMOVAL, CALCULUS, URETER, URETEROSCOPIC;  Surgeon: Ileana Souza MD;  Location: [facility] OR;  Service: Urology;  Laterality: Left;    URETEROSCOPIC REMOVAL OF URETERIC CALCULUS Left 10/28/2022    Procedure: REMOVAL, CALCULUS, URETER, URETEROSCOPIC;  Surgeon: Ileana Souza MD;  Location:  [facility] OR;  Service: Urology;  Laterality: Left;     Current Outpatient Medications on File Prior to Visit   Medication Sig Dispense Refill    aspirin (ECOTRIN) 81 MG EC tablet Take 81 mg by mouth once daily.      cholecalciferol, vitamin D3, (VITAMIN D3) 25 mcg (1,000 unit) capsule Take 1 capsule (1,000 Units total) by mouth once daily. 30 capsule 12    cyanocobalamin (VITAMIN B-12) 1000 MCG tablet Take 1,000 mcg by mouth every other day.       loratadine (CLARITIN) 10 mg tablet Take 10 mg by mouth daily as needed.      multivitamin (THERAGRAN) per tablet Take 1 tablet by mouth once daily.       Current Facility-Administered Medications on File Prior to Visit   Medication Dose Route Frequency Provider Last Rate Last Admin    diphenhydrAMINE injection 12.5 mg  12.5 mg Intravenous Once PRN Santiago Loving MD        electrolyte-S (ISOLYTE)   Intravenous Continuous Santiago Loving MD   Stopped at 10/28/22 1050    fentaNYL 50 mcg/mL injection 25 mcg  25 mcg Intravenous Q5 Min PRN Santiago Loving MD        HYDROmorphone (PF) injection 0.2 mg  0.2 mg Intravenous Q5 Min PRN Santiago Loving MD        lactated ringers infusion  10 mL/hr Intravenous Continuous Santiago Loving MD        lactated ringers infusion  500 mL Intravenous Once Santiago Loving MD        ondansetron injection 4 mg  4 mg Intravenous Once PRN Santiago Loving MD        oxyCODONE immediate release tablet 5 mg  5 mg Oral Once PRN Santiago Loving MD        prochlorperazine injection Soln 5 mg  5 mg Intravenous Q30 Min PRN Santiago Loving MD        sodium chloride 0.9% flush 3 mL  3 mL Intravenous Q8H Santiago Loving MD         Social History     Socioeconomic History    Marital status: Single   Occupational History    Occupation: Nurse       Employer: [employer]   Tobacco Use    Smoking status: Never    Smokeless tobacco: Never   Substance and Sexual Activity    Alcohol use: Not  Currently     Comment: Minimal    Drug use: No    Sexual activity: Not Currently     Birth control/protection: None   Other Topics Concern    Are you pregnant or think you may be? No    Breast-feeding No     Social Determinants of Health     Financial Resource Strain: Low Risk  (7/20/2023)    Overall Financial Resource Strain (CARDIA)     Difficulty of Paying Living Expenses: Not hard at all   Food Insecurity: No Food Insecurity (7/20/2023)    Hunger Vital Sign     Worried About Running Out of Food in the Last Year: Never true     Ran Out of Food in the Last Year: Never true   Transportation Needs: No Transportation Needs (7/20/2023)    PRAPARE - Transportation     Lack of Transportation (Medical): No     Lack of Transportation (Non-Medical): No   Physical Activity: Sufficiently Active (7/20/2023)    Exercise Vital Sign     Days of Exercise per Week: 3 days     Minutes of Exercise per Session: 70 min   Stress: No Stress Concern Present (7/20/2023)    [employer] [employer] - Occupational Stress Questionnaire     Feeling of Stress : Only a little   Social Connections: Unknown (7/20/2023)    Social Connection and Isolation Panel [NHANES]     Frequency of Communication with Friends and Family: More than three times a week     Frequency of Social Gatherings with Friends and Family: More than three times a week     Active Member of Clubs or Organizations: Yes     Attends Club or Organization Meetings: More than 4 times per year     Marital Status: Never    Housing Stability: Low Risk  (7/20/2023)    Housing Stability Vital Sign     Unable to Pay for Housing in the Last Year: No     Number of Places Lived in the Last Year: 1     Unstable Housing in the Last Year: No     Family History   Problem Relation Age of Onset    Hypertension Father     Coronary artery disease Father     Cancer Father         prostate ca, metastatic    Heart disease Father         stenting    Kidney disease Father          "stones    Retinal detachment Father     Colon cancer Paternal Grandfather     Melanoma Mother     Cancer Mother         melanoma    Hyperlipidemia Brother     Keratoconus Brother     No Known Problems Brother     Stroke Maternal Grandmother     Lung cancer Maternal Grandfather     Breast cancer Paternal Grandmother          at age 62    Ovarian cancer Neg Hx     Uterine cancer Neg Hx     Psoriasis Neg Hx     Lupus Neg Hx     Eczema Neg Hx         Review of Systems   Constitutional:  Negative for appetite change, chills, fever and unexpected weight change.   HENT:  Negative for facial swelling, postnasal drip and sore throat.    Eyes:  Negative for redness and itching.   Respiratory:  Negative for chest tightness and shortness of breath.    Cardiovascular:  Negative for chest pain and palpitations.   Gastrointestinal:  Negative for blood in stool, diarrhea, nausea and vomiting.   Genitourinary:  Negative for difficulty urinating and dysuria.   Musculoskeletal:  Negative for arthralgias and joint swelling.   Skin:  Negative for rash and wound.   Neurological:  Negative for dizziness and syncope.   Hematological:  Negative for adenopathy.   Psychiatric/Behavioral:  Negative for agitation. The patient is not nervous/anxious.      Objective:   /73 (BP Location: Right arm, Patient Position: Sitting, BP Method: Medium (Automatic))   Pulse 71   Ht 5' 9" (1.753 m)   Wt 74.8 kg (165 lb)   LMP 2017 (Exact Date)   BMI 24.37 kg/m²     Physical Exam   HENT:   Head: Normocephalic and atraumatic.   Eyes: Conjunctivae are normal. No scleral icterus.   Cardiovascular:  Normal rate, regular rhythm and normal pulses.            Pulmonary/Chest: Effort normal and breath sounds normal. No accessory muscle usage or stridor. No respiratory distress. She has no wheezes. Right breast exhibits no inverted nipple, no mass, no nipple discharge and no skin change. Left breast exhibits no inverted nipple, no mass, no nipple " discharge and no skin change.   Abdominal: Soft. Normal appearance. She exhibits no mass.   Musculoskeletal: No deformity. Lymphadenopathy:      Cervical: No cervical adenopathy.     Neurological: She is alert.   Skin: Skin is warm and dry.     Psychiatric: Mood and judgment normal.       Radiology review: Images personally reviewed by me in the clinic.     6/6/23 Bilateral Screening Mammo:     Findings:  This procedure was performed using tomosynthesis. Computer-aided detection was utilized in the interpretation of this examination.  The breasts have scattered areas of fibroglandular density.      Right  There are post-surgical findings from a previous lumpectomy seen in the right breast at 12 o'clock. Compared to the previous study, there are no significant changes. There has been no interval development of a suspicious mass, microcalcification, or architectural distortion.      There is a focal asymmetry seen in the upper outer quadrant of the right breast in the middle depth. Compared to the previous study, the asymmetry is more defined; there are possible associated faint calcifications. This is located anterior to a group of benign calcifications with a biopsy clip.      Left  There is no evidence of suspicious masses, calcifications, or other abnormal findings in the left breast.     Impression:  Right  Focal Asymmetry: Right breast focal asymmetry at the upper outer middle position. Assessment: 0 - Incomplete. Diagnostic Mammogram with magnification views, and an ultrasound is recommended.  Left  There is no mammographic evidence of malignancy in the left breast.     BI-RADS Category:   Overall: 0 - Incomplete: Needs Additional Imaging Evaluation        Recommendation:  Right Diagnostic mammogram and ultrasound is recommended.        6/13/23 Right Diagnostic Mammo/US:     Findings:  This procedure was performed using tomosynthesis. Computer-aided detection was utilized in the interpretation of this  examination.  Mammo Digital Diagnostic Right with Vlad  The right breast has scattered areas of fibroglandular density.     There is a focal asymmetry seen in the upper outer quadrant of the right breast in the middle depth. The asymmetry correlates with the screening mammogram finding. Associated features include architectural distortion.      US Breast Right Limited  There is a 3 mm x 3 mm x 4 mm irregularly shaped, hypoechoic mass with angular margins seen in the right breast at 9 o'clock, 9 cm from the nipple. The mass correlates with the asymmetry seen on today's mammogram. Compared to the previous study, the mass is more defined. The axilla is normal.       Impression:  Right  Mass: Right breast 3 mm x 3 mm x 4 mm mass at the 9 o'clock position. Assessment: 4 - Suspicious finding. Ultrasound-guided biopsy is recommended.       I discussed the findings and recommendations for today's exam in detail with the patient.       BI-RADS Category:   Overall: 4 - Suspicious        Recommendation:  Ultrasound-guided biopsy is recommended.         Assessment:       1. Malignant neoplasm of overlapping sites of right breast in female, estrogen receptor positive    2. Abnormal mammogram    3. Preop testing        Plan:     Options for management were discussed with the patient. We reviewed the existing data noting the equivalency of breast conserving surgery with radiation therapy and mastectomy. We also reviewed the guidelines of the National Comprehensive Cancer Network for Stage I breast carcinoma. We discussed the need for lumpectomy margins to be negative for carcinoma, the necessity for postoperative radiation therapy after breast conservation in most cases, the possibility of a failed or false negative sentinel lymph node biopsy and the potential need for complete lymphadenectomy for a failed or positive sentinel lymph node biopsy were fully discussed. In the setting of mastectomy, delayed or immediate reconstruction  options are available and were discussed.     In the setting of lumpectomy, radiation therapy would be recommended majority of the time.  The duration and treatment side effects were discussed with the patient.  This will coordinated with the radiation oncologist pending final pathology.    We also discussed the role of systemic therapy in the treatment of early stage breast cancer.  We discussed that this is based on tumor biology and steve status and will be determined based on final pathology.  We discussed that if the cancer is hormone positive, endocrine therapy would be recommended in most cases and its use can reduce the risk of recurrence as well as improve survival. Side effects of treatment were briefly discussed. We also discussed the potential role for chemotherapy based on a number of factors such as tumor phenotype (ER+ vs. triple negative vs. Cej0lqn+) and this would be determined in coordination with the medical oncologist.    The patient has elected to proceed with right total mastectomy, and right sentinel lymph node biopsy. We discussed potential for axillary dissection since she has had prior radiation and SLNB before.  I think there is low benefit to this being that the tumor is very small and I am unsure if this is a recurrence or a new cancer.  Will present at tumor board.  We discussed the contralateral breast and recon options in great detail.  She has decided on aesthetic flat closure without reconstruction at this time. The operative risks of bleeding, infection, recurrence, scarring, and anesthetic complications and the possibility of requiring further surgery were all noted and informed consent obtained.    Surgery scheduled. Follow-up in clinic roughly 14 days after surgery.     Patient was educated on breast cancer, receptors, wire localization lumpectomy, mastectomy, sentinel lymph node mapping and biopsy, axillary lymph node dissection, reconstruction, breast prosthesis with  post-mastectomy bra and radiation therapy. Patient was given patient information binder including [facility] breast cancer treatment brochure.  All her questions were answered.    Total time spent with the patient: 45 minutes.  30 minutes of face to face consultation and 15 minutes of chart review and coordination of care.

## 2023-09-01 ENCOUNTER — HOSPITAL ENCOUNTER (OUTPATIENT)
Dept: PREADMISSION TESTING | Facility: OTHER | Age: 63
Discharge: HOME | End: 2023-09-01
Attending: SURGERY
Payer: COMMERCIAL

## 2023-09-01 ENCOUNTER — ANESTHESIA EVENT (OUTPATIENT)
Dept: SURGERY | Facility: OTHER | Age: 63
End: 2023-09-01
Payer: COMMERCIAL

## 2023-09-01 VITALS
OXYGEN SATURATION: 97 % | WEIGHT: 165 LBS | BODY MASS INDEX: 24.44 KG/M2 | DIASTOLIC BLOOD PRESSURE: 69 MMHG | TEMPERATURE: 98 F | HEART RATE: 70 BPM | SYSTOLIC BLOOD PRESSURE: 133 MMHG | HEIGHT: 69 IN

## 2023-09-01 DIAGNOSIS — Z01.818 PREOP TESTING: ICD-10-CM

## 2023-09-01 LAB
ALBUMIN SERPL BCP-MCNC: 4.5 G/DL (ref 3.5–5.2)
ALP SERPL-CCNC: 65 U/L (ref 55–135)
ALT SERPL W/O P-5'-P-CCNC: 17 U/L (ref 10–44)
ANION GAP SERPL CALC-SCNC: 9 MMOL/L (ref 8–16)
AST SERPL-CCNC: 19 U/L (ref 10–40)
BASOPHILS # BLD AUTO: 0.04 K/UL (ref 0–0.2)
BASOPHILS NFR BLD: 1 % (ref 0–1.9)
BILIRUB SERPL-MCNC: 0.6 MG/DL (ref 0.1–1)
BUN SERPL-MCNC: 11 MG/DL (ref 8–23)
CALCIUM SERPL-MCNC: 9.6 MG/DL (ref 8.7–10.5)
CHLORIDE SERPL-SCNC: 107 MMOL/L (ref 95–110)
CO2 SERPL-SCNC: 26 MMOL/L (ref 23–29)
CREAT SERPL-MCNC: 0.9 MG/DL (ref 0.5–1.4)
DIFFERENTIAL METHOD: ABNORMAL
EOSINOPHIL # BLD AUTO: 0.2 K/UL (ref 0–0.5)
EOSINOPHIL NFR BLD: 5.3 % (ref 0–8)
ERYTHROCYTE [DISTWIDTH] IN BLOOD BY AUTOMATED COUNT: 13.5 % (ref 11.5–14.5)
EST. GFR  (NO RACE VARIABLE): >60 ML/MIN/1.73 M^2
GLUCOSE SERPL-MCNC: 115 MG/DL (ref 70–110)
HCT VFR BLD AUTO: 41.2 % (ref 37–48.5)
HGB BLD-MCNC: 13.6 G/DL (ref 12–16)
IMM GRANULOCYTES # BLD AUTO: 0.01 K/UL (ref 0–0.04)
IMM GRANULOCYTES NFR BLD AUTO: 0.3 % (ref 0–0.5)
LYMPHOCYTES # BLD AUTO: 1.3 K/UL (ref 1–4.8)
LYMPHOCYTES NFR BLD: 32.8 % (ref 18–48)
MCH RBC QN AUTO: 32.5 PG (ref 27–31)
MCHC RBC AUTO-ENTMCNC: 33 G/DL (ref 32–36)
MCV RBC AUTO: 99 FL (ref 82–98)
MONOCYTES # BLD AUTO: 0.4 K/UL (ref 0.3–1)
MONOCYTES NFR BLD: 11 % (ref 4–15)
NEUTROPHILS # BLD AUTO: 2 K/UL (ref 1.8–7.7)
NEUTROPHILS NFR BLD: 49.6 % (ref 38–73)
NRBC BLD-RTO: 0 /100 WBC
PLATELET # BLD AUTO: 173 K/UL (ref 150–450)
PMV BLD AUTO: 10 FL (ref 9.2–12.9)
POTASSIUM SERPL-SCNC: 4.2 MMOL/L (ref 3.5–5.1)
PROT SERPL-MCNC: 6.8 G/DL (ref 6–8.4)
RBC # BLD AUTO: 4.18 M/UL (ref 4–5.4)
SODIUM SERPL-SCNC: 142 MMOL/L (ref 136–145)
WBC # BLD AUTO: 3.99 K/UL (ref 3.9–12.7)

## 2023-09-01 PROCEDURE — 93005 ELECTROCARDIOGRAM TRACING: CPT

## 2023-09-01 PROCEDURE — 93010 EKG 12-LEAD: ICD-10-PCS | Mod: ,,, | Performed by: INTERNAL MEDICINE

## 2023-09-01 PROCEDURE — 36415 COLL VENOUS BLD VENIPUNCTURE: CPT | Performed by: SURGERY

## 2023-09-01 PROCEDURE — 85025 COMPLETE CBC W/AUTO DIFF WBC: CPT | Performed by: SURGERY

## 2023-09-01 PROCEDURE — 93010 ELECTROCARDIOGRAM REPORT: CPT | Mod: ,,, | Performed by: INTERNAL MEDICINE

## 2023-09-01 PROCEDURE — 80053 COMPREHEN METABOLIC PANEL: CPT | Performed by: SURGERY

## 2023-09-01 RX ORDER — ACETAMINOPHEN 500 MG
1000 TABLET ORAL
Status: CANCELLED | OUTPATIENT
Start: 2023-09-01 | End: 2023-09-01

## 2023-09-01 RX ORDER — FAMOTIDINE 20 MG/1
20 TABLET, FILM COATED ORAL
Status: CANCELLED | OUTPATIENT
Start: 2023-09-01 | End: 2023-09-01

## 2023-09-01 RX ORDER — LIDOCAINE HYDROCHLORIDE 10 MG/ML
0.5 INJECTION, SOLUTION EPIDURAL; INFILTRATION; INTRACAUDAL; PERINEURAL ONCE
Status: CANCELLED | OUTPATIENT
Start: 2023-09-01 | End: 2023-09-01

## 2023-09-01 RX ORDER — SCOLOPAMINE TRANSDERMAL SYSTEM 1 MG/1
1 PATCH, EXTENDED RELEASE TRANSDERMAL ONCE
Status: CANCELLED | OUTPATIENT
Start: 2023-09-01 | End: 2023-09-01

## 2023-09-01 RX ORDER — SODIUM CHLORIDE, SODIUM LACTATE, POTASSIUM CHLORIDE, CALCIUM CHLORIDE 600; 310; 30; 20 MG/100ML; MG/100ML; MG/100ML; MG/100ML
INJECTION, SOLUTION INTRAVENOUS CONTINUOUS
Status: CANCELLED | OUTPATIENT
Start: 2023-09-01

## 2023-09-01 NOTE — ANESTHESIA PREPROCEDURE EVALUATION
09/01/2023  Noelle Rivas is a 62 y.o., female.      Pre-op Assessment    I have reviewed the Patient Summary Reports.     I have reviewed the Nursing Notes. I have reviewed the NPO Status.   I have reviewed the Medications.     Review of Systems  Anesthesia Hx:  No problems with previous Anesthesia  Denies Family Hx of Anesthesia complications.  Personal Hx of Anesthesia complications, Post-Operative Nausea/Vomiting   Social:  Non-Smoker    Hematology/Oncology:  Hematology Normal      Hematology Comments: Factor V Leiden deficiency  Baby ASA Current/Recent Cancer. (recurance )  --  Cancer in past history:  Breast right   EENT/Dental:EENT/Dental Normal   Cardiovascular:   Exercise tolerance: good    Pulmonary:  Pulmonary Normal    Renal/:   Denies Chronic Renal Disease. renal calculi     Hepatic/GI:   Liver Disease, (fatty)    Musculoskeletal:   Arthritis   Spine Disorders: lumbar Degenerative disease    Neurological:   CVA, no residual symptoms    Endocrine:  Endocrine Normal    Dermatological:  Skin Normal    Psych:  Psychiatric Normal           Physical Exam  General: Well nourished, Cooperative, Alert and Oriented    Airway:  Mallampati: II   Mouth Opening: Normal  TM Distance: Normal  Neck ROM: Normal ROM    Dental:  Intact        Anesthesia Plan  Type of Anesthesia, risks & benefits discussed:    Anesthesia Type: Gen ETT  Intra-op Monitoring Plan: Standard ASA Monitors  Post Op Pain Control Plan: multimodal analgesia and IV/PO Opioids PRN  Induction:  IV  Airway Plan: Video  Informed Consent: Informed consent signed with the Patient and all parties understand the risks and agree with anesthesia plan.  All questions answered.   ASA Score: 3  Anesthesia Plan Notes: EKG/lab ordered today by surgeon    Pt had extreme PONV after renal stone removal 10/22, not with other procedures    Ready For Surgery  From Anesthesia Perspective.     .

## 2023-09-01 NOTE — DISCHARGE INSTRUCTIONS
Information to Prepare you for your Surgery    PRE-ADMIT TESTING -  [phone]    [address]          Your surgery has been scheduled at [facility]. We are pleased to have the opportunity to serve you. For Further Information please call [phone].    On the day of surgery please report to the Information Desk on the 1st floor.    CONTACT YOUR PHYSICIAN'S OFFICE THE DAY PRIOR TO YOUR SURGERY TO OBTAIN YOUR ARRIVAL TIME.     The evening before surgery do not eat anything after 9 p.m. ( this includes hard candy, chewing gum and mints).  You may only have GATORADE, POWERADE AND WATER  from 9 p.m. until you leave your home.   DO NOT DRINK ANY LIQUIDS ON THE WAY TO THE HOSPITAL.      Why does your anesthesiologist allow you to drink Gatorade/Powerade before surgery?  Gatorade/Powerade helps to increase your comfort before surgery and to decrease your nausea after surgery. The carbohydrates in Gatorade/Powerade help reduce your body's stress response to surgery.  If you are a diabetic-drink only water prior to surgery.    Outpatient Surgery- May allow 2 adult Support Persons (1 being the designated ) for all surgical/procedural patients. A breastfeeding mother will be allowed her infant (6 months and under) and 2 adult Support Persons.       SPECIAL MEDICATION INSTRUCTIONS: TAKE medications checked off by the Anesthesiologist on your Medication List.    Angiogram Patients: Take medications as instructed by your physician, including aspirin.     Surgery Patients:    If you take ASPIRIN - Your PHYSICIAN/SURGEON will need to inform you IF/OR when you need to stop taking aspirin prior to your surgery.     The week prior to surgery do not ot take any medications containing IBUPROFEN or NSAIDS ( Advil, Motrin, Goodys, BC, Aleve, Naproxen etc) If you are not sure if you should take a medicine please call your surgeon's office.  Ok to take  Tylenol    Do Not Wear any make-up (especially eye make-up) to surgery. Please remove any false eyelashes or eyelash extensions. If you arrive the day of surgery with makeup/eyelashes on you will be required to remove prior to surgery. (There is a risk of corneal abrasions if eye makeup/eyelash extensions are not removed)      Leave all valuables at home.   Do Not wear any jewelry or watches, including any metal in body piercings. Jewelry must be removed prior to coming to the hospital.  There is a possibility that rings that are unable to be removed may be cut off if they are on the surgical extremity.    Please remove all hair extensions, wigs, clips and any other metal accessories/ ornaments from your hair.  These items may pose a flammable/fire risk in Surgery and must be removed.    Do not shave your surgical area at least 5 days prior to your surgery. The surgical prep will be performed at the hospital according to Infection Control regulations.    Contact Lens must be removed before surgery. Either do not wear the contact lens or bring a case and solution for storage.  Please bring a container for eyeglasses or dentures as required.  Bring any paperwork your physician has provided, such as consent forms,  history and physicals, doctor's orders, etc.   Bring comfortable clothes that are loose fitting to wear upon discharge. Take into consideration the type of surgery being performed.  Maintain your diet as advised per your physician the day prior to surgery.      Adequate rest the night before surgery is advised.   Park in the Parking lot behind the hospital or in the [facility] across the street from the parking lot. Parking is complimentary.  If you will be discharged the same day as your procedure, please arrange for a responsible adult to drive you home or to accompany you if traveling by taxi.   YOU WILL NOT BE PERMITTED TO DRIVE OR TO LEAVE THE HOSPITAL ALONE AFTER SURGERY.   If you are  being discharged the same day, it is strongly recommended that you arrange for someone to remain with you for the first 24 hrs following your surgery.    The Surgeon will speak to your family/visitor after your surgery regarding the outcome of your surgery and post op care.  The Surgeon may speak to you after your surgery, but there is a possibility you may not remember the details.  Please check with your family members regarding the conversation with the Surgeon.    We strongly recommend whoever is bringing you home be present for discharge instructions.  This will ensure a thorough understanding for your post op home care.          Thank you for your cooperation.  The Staff of [facility].            Bathing Instructions with Hibiclens    Shower the evening before and morning of your procedure with Chlorhexidine (Hibiclens)  do not use Chlorhexidine on your face or genitals. Do not get in your eyes.  Wash your face with water and your regular face wash/soap  Use your regular shampoo  Apply Chlorhexidine (Hibiclens) directly on your skin or on a wet washcloth and wash gently. When showering: Move away from the shower stream when applying Chlorhexidine (Hibiclens) to avoid rinsing off too soon.  Rinse thoroughly with warm water  Do not dilute Chlorhexidine (Hibiclens)   Dry off as usual, do not use any deodorant, powder, body lotions, perfume, after shave or cologne.

## 2023-09-05 ENCOUNTER — TELEPHONE (OUTPATIENT)
Dept: SURGERY | Facility: CLINIC | Age: 63
End: 2023-09-05
Payer: COMMERCIAL

## 2023-09-05 ENCOUNTER — PATIENT MESSAGE (OUTPATIENT)
Dept: SURGERY | Facility: CLINIC | Age: 63
End: 2023-09-05
Payer: COMMERCIAL

## 2023-09-05 NOTE — TELEPHONE ENCOUNTER
Message was left for patient regarding arrival time for surgery on 9/6/23 at the [facility] . Arrival time is for 10 am surgery is scheduled for 10 am surgery is scheduled for 12 noon .  Nothing to eat after 9 pm this evening on 9/5/23 . Clear liquids Gatorade  up until patient leaves home . Please leave all jewelry home also wear a button down shirt the morning of surgery. Please call [phone] with any question.

## 2023-09-06 ENCOUNTER — HOSPITAL ENCOUNTER (OUTPATIENT)
Dept: RADIOLOGY | Facility: OTHER | Age: 63
Discharge: HOME | End: 2023-09-06
Attending: SURGERY
Payer: COMMERCIAL

## 2023-09-06 ENCOUNTER — HOSPITAL ENCOUNTER (OUTPATIENT)
Facility: OTHER | Age: 63
Discharge: HOME | End: 2023-09-06
Attending: SURGERY | Admitting: SURGERY
Payer: COMMERCIAL

## 2023-09-06 ENCOUNTER — ANESTHESIA (OUTPATIENT)
Dept: SURGERY | Facility: OTHER | Age: 63
End: 2023-09-06
Payer: COMMERCIAL

## 2023-09-06 ENCOUNTER — HOSPITAL ENCOUNTER (OUTPATIENT)
Dept: RADIOLOGY | Facility: OTHER | Age: 63
Discharge: HOME | End: 2023-09-06
Attending: SURGERY | Admitting: SURGERY
Payer: COMMERCIAL

## 2023-09-06 DIAGNOSIS — Z17.0 MALIGNANT NEOPLASM OF OVERLAPPING SITES OF RIGHT BREAST IN FEMALE, ESTROGEN RECEPTOR POSITIVE: ICD-10-CM

## 2023-09-06 DIAGNOSIS — C50.811 MALIGNANT NEOPLASM OF OVERLAPPING SITES OF RIGHT BREAST IN FEMALE, ESTROGEN RECEPTOR POSITIVE: ICD-10-CM

## 2023-09-06 PROCEDURE — D9220A PRA ANESTHESIA: ICD-10-PCS | Mod: ANES,,, | Performed by: ANESTHESIOLOGY

## 2023-09-06 PROCEDURE — 38525 PR BIOPSY/REM LYMPH NODES, AXILLARY: ICD-10-PCS | Mod: 51,RT,, | Performed by: SURGERY

## 2023-09-06 PROCEDURE — 71000015 HC POSTOP RECOV 1ST HR: Performed by: SURGERY

## 2023-09-06 PROCEDURE — 71000016 HC POSTOP RECOV ADDL HR: Performed by: SURGERY

## 2023-09-06 PROCEDURE — 76098 PR  X-RAY EXAM, BREAST SPECIMEN: ICD-10-PCS | Mod: 26,,, | Performed by: SURGERY

## 2023-09-06 PROCEDURE — 88341 IMHCHEM/IMCYTCHM EA ADD ANTB: CPT | Performed by: PATHOLOGY

## 2023-09-06 PROCEDURE — 71000033 HC RECOVERY, INTIAL HOUR: Performed by: SURGERY

## 2023-09-06 PROCEDURE — 19303 MAST SIMPLE COMPLETE: CPT | Mod: RT,,, | Performed by: SURGERY

## 2023-09-06 PROCEDURE — 88342 IMHCHEM/IMCYTCHM 1ST ANTB: CPT | Mod: 26,,, | Performed by: PATHOLOGY

## 2023-09-06 PROCEDURE — 36000706: Performed by: SURGERY

## 2023-09-06 PROCEDURE — 63600175 PHARM REV CODE 636 W HCPCS: Performed by: ANESTHESIOLOGY

## 2023-09-06 PROCEDURE — 25000003 PHARM REV CODE 250: Performed by: SURGERY

## 2023-09-06 PROCEDURE — 36000707: Performed by: SURGERY

## 2023-09-06 PROCEDURE — D9220A PRA ANESTHESIA: ICD-10-PCS | Mod: CRNA,,, | Performed by: NURSE ANESTHETIST, CERTIFIED REGISTERED

## 2023-09-06 PROCEDURE — 88341 PR IHC OR ICC EACH ADD'L SINGLE ANTIBODY  STAINPR: ICD-10-PCS | Mod: 26,,, | Performed by: PATHOLOGY

## 2023-09-06 PROCEDURE — 88307 TISSUE EXAM BY PATHOLOGIST: CPT | Mod: 59 | Performed by: PATHOLOGY

## 2023-09-06 PROCEDURE — 38900 IO MAP OF SENT LYMPH NODE: CPT | Mod: RT,,, | Performed by: SURGERY

## 2023-09-06 PROCEDURE — 88307 PR  SURG PATH,LEVEL V: ICD-10-PCS | Mod: 26,,, | Performed by: PATHOLOGY

## 2023-09-06 PROCEDURE — 25000003 PHARM REV CODE 250: Performed by: NURSE ANESTHETIST, CERTIFIED REGISTERED

## 2023-09-06 PROCEDURE — C9290 INJ, BUPIVACAINE LIPOSOME: HCPCS | Performed by: SURGERY

## 2023-09-06 PROCEDURE — 76098 X-RAY EXAM SURGICAL SPECIMEN: CPT | Mod: TC

## 2023-09-06 PROCEDURE — 38792 PR IDENTIFY SENTINEL 2DE: ICD-10-PCS | Mod: RT,,, | Performed by: SURGERY

## 2023-09-06 PROCEDURE — A9520 TC99 TILMANOCEPT DIAG 0.5MCI: HCPCS

## 2023-09-06 PROCEDURE — 88342 IMHCHEM/IMCYTCHM 1ST ANTB: CPT | Mod: 59 | Performed by: PATHOLOGY

## 2023-09-06 PROCEDURE — 38900 PR INTRAOPERATIVE SENTINEL LYMPH NODE ID W DYE INJECTION: ICD-10-PCS | Mod: RT,,, | Performed by: SURGERY

## 2023-09-06 PROCEDURE — 63600175 PHARM REV CODE 636 W HCPCS: Performed by: NURSE ANESTHETIST, CERTIFIED REGISTERED

## 2023-09-06 PROCEDURE — 25000003 PHARM REV CODE 250: Performed by: ANESTHESIOLOGY

## 2023-09-06 PROCEDURE — C1729 CATH, DRAINAGE: HCPCS | Performed by: SURGERY

## 2023-09-06 PROCEDURE — 88307 TISSUE EXAM BY PATHOLOGIST: CPT | Mod: 26,,, | Performed by: PATHOLOGY

## 2023-09-06 PROCEDURE — 63600175 PHARM REV CODE 636 W HCPCS: Mod: JW,JG | Performed by: SURGERY

## 2023-09-06 PROCEDURE — 37000008 HC ANESTHESIA 1ST 15 MINUTES: Performed by: SURGERY

## 2023-09-06 PROCEDURE — D9220A PRA ANESTHESIA: Mod: ANES,,, | Performed by: ANESTHESIOLOGY

## 2023-09-06 PROCEDURE — 71000039 HC RECOVERY, EACH ADD'L HOUR: Performed by: SURGERY

## 2023-09-06 PROCEDURE — 63600175 PHARM REV CODE 636 W HCPCS: Performed by: SURGERY

## 2023-09-06 PROCEDURE — 27201423 OPTIME MED/SURG SUP & DEVICES STERILE SUPPLY: Performed by: SURGERY

## 2023-09-06 PROCEDURE — D9220A PRA ANESTHESIA: Mod: CRNA,,, | Performed by: NURSE ANESTHETIST, CERTIFIED REGISTERED

## 2023-09-06 PROCEDURE — 37000009 HC ANESTHESIA EA ADD 15 MINS: Performed by: SURGERY

## 2023-09-06 PROCEDURE — 88341 IMHCHEM/IMCYTCHM EA ADD ANTB: CPT | Mod: 26,,, | Performed by: PATHOLOGY

## 2023-09-06 PROCEDURE — 76098 X-RAY EXAM SURGICAL SPECIMEN: CPT | Mod: 26,,, | Performed by: SURGERY

## 2023-09-06 PROCEDURE — 38525 BIOPSY/REMOVAL LYMPH NODES: CPT | Mod: 51,RT,, | Performed by: SURGERY

## 2023-09-06 PROCEDURE — 38792 RA TRACER ID OF SENTINL NODE: CPT | Mod: RT,,, | Performed by: SURGERY

## 2023-09-06 PROCEDURE — 88342 CHG IMMUNOCYTOCHEMISTRY: ICD-10-PCS | Mod: 26,,, | Performed by: PATHOLOGY

## 2023-09-06 PROCEDURE — 19303 PR MASTECTOMY, SIMPLE, COMPLETE: ICD-10-PCS | Mod: RT,,, | Performed by: SURGERY

## 2023-09-06 RX ORDER — SCOLOPAMINE TRANSDERMAL SYSTEM 1 MG/1
1 PATCH, EXTENDED RELEASE TRANSDERMAL ONCE
Status: COMPLETED | OUTPATIENT
Start: 2023-09-06 | End: 2023-09-06

## 2023-09-06 RX ORDER — FAMOTIDINE 20 MG/1
20 TABLET, FILM COATED ORAL
Status: COMPLETED | OUTPATIENT
Start: 2023-09-06 | End: 2023-09-06

## 2023-09-06 RX ORDER — SODIUM CHLORIDE, SODIUM LACTATE, POTASSIUM CHLORIDE, CALCIUM CHLORIDE 600; 310; 30; 20 MG/100ML; MG/100ML; MG/100ML; MG/100ML
INJECTION, SOLUTION INTRAVENOUS CONTINUOUS
Status: DISCONTINUED | OUTPATIENT
Start: 2023-09-06 | End: 2023-09-06 | Stop reason: HOSPADM

## 2023-09-06 RX ORDER — ISOSULFAN BLUE 50 MG/5ML
INJECTION, SOLUTION SUBCUTANEOUS
Status: DISCONTINUED | OUTPATIENT
Start: 2023-09-06 | End: 2023-09-06 | Stop reason: HOSPADM

## 2023-09-06 RX ORDER — ONDANSETRON 2 MG/ML
INJECTION INTRAMUSCULAR; INTRAVENOUS
Status: DISCONTINUED | OUTPATIENT
Start: 2023-09-06 | End: 2023-09-06

## 2023-09-06 RX ORDER — SODIUM CHLORIDE 0.9 % (FLUSH) 0.9 %
3 SYRINGE (ML) INJECTION
Status: DISCONTINUED | OUTPATIENT
Start: 2023-09-06 | End: 2023-09-06 | Stop reason: HOSPADM

## 2023-09-06 RX ORDER — OXYCODONE HYDROCHLORIDE 5 MG/1
5 TABLET ORAL
Status: DISCONTINUED | OUTPATIENT
Start: 2023-09-06 | End: 2023-09-06 | Stop reason: HOSPADM

## 2023-09-06 RX ORDER — ACETAMINOPHEN 500 MG
1000 TABLET ORAL
Status: COMPLETED | OUTPATIENT
Start: 2023-09-06 | End: 2023-09-06

## 2023-09-06 RX ORDER — ONDANSETRON 2 MG/ML
4 INJECTION INTRAMUSCULAR; INTRAVENOUS EVERY 12 HOURS PRN
Status: DISCONTINUED | OUTPATIENT
Start: 2023-09-06 | End: 2023-09-06 | Stop reason: HOSPADM

## 2023-09-06 RX ORDER — HYDROMORPHONE HYDROCHLORIDE 2 MG/ML
0.4 INJECTION, SOLUTION INTRAMUSCULAR; INTRAVENOUS; SUBCUTANEOUS EVERY 5 MIN PRN
Status: DISCONTINUED | OUTPATIENT
Start: 2023-09-06 | End: 2023-09-06 | Stop reason: HOSPADM

## 2023-09-06 RX ORDER — SODIUM CHLORIDE 9 MG/ML
INJECTION, SOLUTION INTRAVENOUS CONTINUOUS
Status: DISCONTINUED | OUTPATIENT
Start: 2023-09-06 | End: 2023-09-06 | Stop reason: HOSPADM

## 2023-09-06 RX ORDER — MEPERIDINE HYDROCHLORIDE 25 MG/ML
12.5 INJECTION INTRAMUSCULAR; INTRAVENOUS; SUBCUTANEOUS ONCE AS NEEDED
Status: DISCONTINUED | OUTPATIENT
Start: 2023-09-06 | End: 2023-09-06 | Stop reason: HOSPADM

## 2023-09-06 RX ORDER — LIDOCAINE HYDROCHLORIDE 10 MG/ML
0.5 INJECTION, SOLUTION EPIDURAL; INFILTRATION; INTRACAUDAL; PERINEURAL ONCE
Status: DISCONTINUED | OUTPATIENT
Start: 2023-09-06 | End: 2023-09-06 | Stop reason: HOSPADM

## 2023-09-06 RX ORDER — PROCHLORPERAZINE EDISYLATE 5 MG/ML
5 INJECTION INTRAMUSCULAR; INTRAVENOUS EVERY 30 MIN PRN
Status: DISCONTINUED | OUTPATIENT
Start: 2023-09-06 | End: 2023-09-06 | Stop reason: HOSPADM

## 2023-09-06 RX ORDER — FENTANYL CITRATE 50 UG/ML
INJECTION, SOLUTION INTRAMUSCULAR; INTRAVENOUS
Status: DISCONTINUED | OUTPATIENT
Start: 2023-09-06 | End: 2023-09-06

## 2023-09-06 RX ORDER — BUPIVACAINE HYDROCHLORIDE 2.5 MG/ML
INJECTION, SOLUTION EPIDURAL; INFILTRATION; INTRACAUDAL
Status: DISCONTINUED | OUTPATIENT
Start: 2023-09-06 | End: 2023-09-06 | Stop reason: HOSPADM

## 2023-09-06 RX ORDER — HYDROMORPHONE HYDROCHLORIDE 2 MG/ML
INJECTION, SOLUTION INTRAMUSCULAR; INTRAVENOUS; SUBCUTANEOUS
Status: DISCONTINUED | OUTPATIENT
Start: 2023-09-06 | End: 2023-09-06

## 2023-09-06 RX ORDER — PROPOFOL 10 MG/ML
VIAL (ML) INTRAVENOUS CONTINUOUS PRN
Status: DISCONTINUED | OUTPATIENT
Start: 2023-09-06 | End: 2023-09-06

## 2023-09-06 RX ORDER — DIPHENHYDRAMINE HYDROCHLORIDE 50 MG/ML
INJECTION INTRAMUSCULAR; INTRAVENOUS
Status: DISCONTINUED | OUTPATIENT
Start: 2023-09-06 | End: 2023-09-06

## 2023-09-06 RX ORDER — PROPOFOL 10 MG/ML
VIAL (ML) INTRAVENOUS
Status: DISCONTINUED | OUTPATIENT
Start: 2023-09-06 | End: 2023-09-06

## 2023-09-06 RX ORDER — DEXAMETHASONE SODIUM PHOSPHATE 4 MG/ML
INJECTION, SOLUTION INTRA-ARTICULAR; INTRALESIONAL; INTRAMUSCULAR; INTRAVENOUS; SOFT TISSUE
Status: DISCONTINUED | OUTPATIENT
Start: 2023-09-06 | End: 2023-09-06

## 2023-09-06 RX ORDER — SUCCINYLCHOLINE CHLORIDE 20 MG/ML
INJECTION INTRAMUSCULAR; INTRAVENOUS
Status: DISCONTINUED | OUTPATIENT
Start: 2023-09-06 | End: 2023-09-06

## 2023-09-06 RX ORDER — LIDOCAINE HYDROCHLORIDE 20 MG/ML
INJECTION INTRAVENOUS
Status: DISCONTINUED | OUTPATIENT
Start: 2023-09-06 | End: 2023-09-06

## 2023-09-06 RX ORDER — HYDROCODONE BITARTRATE AND ACETAMINOPHEN 10; 325 MG/1; MG/1
1 TABLET ORAL EVERY 6 HOURS PRN
Qty: 15 TABLET | Refills: 0 | Status: SHIPPED | OUTPATIENT
Start: 2023-09-06 | End: 2023-12-04

## 2023-09-06 RX ADMIN — SCOPALAMINE 1 PATCH: 1 PATCH, EXTENDED RELEASE TRANSDERMAL at 10:09

## 2023-09-06 RX ADMIN — DEXAMETHASONE SODIUM PHOSPHATE 8 MG: 4 INJECTION, SOLUTION INTRAMUSCULAR; INTRAVENOUS at 12:09

## 2023-09-06 RX ADMIN — CEFAZOLIN 2 G: 2 INJECTION, POWDER, FOR SOLUTION INTRAMUSCULAR; INTRAVENOUS at 12:09

## 2023-09-06 RX ADMIN — SODIUM CHLORIDE, SODIUM LACTATE, POTASSIUM CHLORIDE, AND CALCIUM CHLORIDE: .6; .31; .03; .02 INJECTION, SOLUTION INTRAVENOUS at 11:09

## 2023-09-06 RX ADMIN — FAMOTIDINE 20 MG: 20 TABLET, FILM COATED ORAL at 10:09

## 2023-09-06 RX ADMIN — HYDROMORPHONE HYDROCHLORIDE 0.4 MG: 2 INJECTION INTRAMUSCULAR; INTRAVENOUS; SUBCUTANEOUS at 02:09

## 2023-09-06 RX ADMIN — DIPHENHYDRAMINE HYDROCHLORIDE 12.5 MG: 50 INJECTION, SOLUTION INTRAMUSCULAR; INTRAVENOUS at 12:09

## 2023-09-06 RX ADMIN — SUCCINYLCHOLINE CHLORIDE 160 MG: 20 INJECTION, SOLUTION INTRAMUSCULAR; INTRAVENOUS at 12:09

## 2023-09-06 RX ADMIN — PROPOFOL 30 MG: 10 INJECTION, EMULSION INTRAVENOUS at 01:09

## 2023-09-06 RX ADMIN — ACETAMINOPHEN 1000 MG: 500 TABLET ORAL at 10:09

## 2023-09-06 RX ADMIN — SODIUM CHLORIDE, SODIUM LACTATE, POTASSIUM CHLORIDE, AND CALCIUM CHLORIDE: .6; .31; .03; .02 INJECTION, SOLUTION INTRAVENOUS at 02:09

## 2023-09-06 RX ADMIN — FENTANYL CITRATE 50 MCG: 0.05 INJECTION, SOLUTION INTRAMUSCULAR; INTRAVENOUS at 01:09

## 2023-09-06 RX ADMIN — FENTANYL CITRATE 50 MCG: 0.05 INJECTION, SOLUTION INTRAMUSCULAR; INTRAVENOUS at 12:09

## 2023-09-06 RX ADMIN — ONDANSETRON HYDROCHLORIDE 4 MG: 2 INJECTION INTRAMUSCULAR; INTRAVENOUS at 12:09

## 2023-09-06 RX ADMIN — PROPOFOL 170 MG: 10 INJECTION, EMULSION INTRAVENOUS at 12:09

## 2023-09-06 RX ADMIN — GLYCOPYRROLATE 0.2 MG: 0.2 INJECTION, SOLUTION INTRAMUSCULAR; INTRAVITREAL at 12:09

## 2023-09-06 RX ADMIN — PROPOFOL 125 MCG/KG/MIN: 10 INJECTION, EMULSION INTRAVENOUS at 12:09

## 2023-09-06 RX ADMIN — PROPOFOL 30 MG: 10 INJECTION, EMULSION INTRAVENOUS at 02:09

## 2023-09-06 RX ADMIN — LIDOCAINE HYDROCHLORIDE 100 MG: 20 INJECTION, SOLUTION INTRAVENOUS at 12:09

## 2023-09-06 RX ADMIN — OXYCODONE HYDROCHLORIDE 5 MG: 5 TABLET ORAL at 03:09

## 2023-09-06 NOTE — OP NOTE
Operative Note     9/6/2023    PRE-OP DIAGNOSIS: Malignant neoplasm of overlapping sites of right breast in female, estrogen receptor positive [C50.811, Z17.0]      POST-OP DIAGNOSIS: Post-Op Diagnosis Codes:     * Malignant neoplasm of overlapping sites of right breast in female, estrogen receptor positive [C50.811, Z17.0]    Procedure(s):  MASTECTOMY RIGHT TOTAL RIGHT  BIOPSY, LYMPH NODE, DEEP SENTINEL RIGHT  INJECTION, FOR SENTINEL NODE IDENTIFICATION RIGHT  IDENTIFICATION OF SENTINEL NODE RIGHT  SURGEON INTERPRETATION OF SPECIMEN RADIOGRAPH    SURGEON: Surgeon(s) and Role:     * Giana De Leon MD - Primary    ANESTHESIA: General     OPERATIVE FINDINGS: Healthy appearing skin flaps at the completion of the mastectomy.  [place] node not clinically suspicion.    INDICATION FOR PROCEDURE: This patient presents with a history of invasive carcinoma of the right breast    PROCEDURE IN DETAIL:  Noelle Rivas is a 62 y.o. female brought to the operating room for definitive surgery of invasive carcinoma of the right breast.  The patient has elected to undergo right simple mastectomy with sentinel lymph node biopsy for steve assessment. The patient was informed of the possible risks and complications of the procedure, including but not limited to anesthetic risks, bleeding, infection, and need for additional surgery.  The patient concurred with the proposed plan, and has given informed consent.  The site of surgery was properly noted/marked in the preoperative holding area.    The patient was then brought to the operating room and placed in the supine position with both upper extremities extended.  local and general anesthesia was administered. Perioperative antibiotics were administered consisting of Ancef and a time out was performed confirming the patient, site, and procedure.   The patient's right breast was injected with technetium to facilitate sentinel lymph node identification.The right chest and axilla was  then prepped and draped in the usual sterile fashion.    We then turned our attention to the right breast where an elliptical incision was fashioned to incorporate the nipple areolar complex.  The incision was made with a 10-blade and extended through the subcutaneous tissues with Bovie electrocautery.  Skin flaps were raised to the clavicle superiorly.  We then  turned our attention to the right axilla.  Blue dye was injected prior to the incision in the usual subareolar fashion. The gamma probe was used to identify an area of increased radioactivity within the lower axilla. The clavipectoral sheath was sharply incised to reveal the level I axillary lymph nodes. The probe was used to identify a single node with increased radioactivity.  This node was brought into the operative field and carefully dissected free of the surrounding lymphovascular structures.  The highest ex vivo count of the node was 220.  The node was then sent to pathology for frozen section evaluation, labeled as sentinel node #1.  A total of 1 axillary sentinel nodes and 0 axillary non-sentinel nodes were identified, excised and submitted to pathology.  Bed counts were obtained to confirm that the 10% rule had not been violated.   The wound was irrigated with normal saline, and all bleeding points were secured with Bovie electrocautery.     We then proceeded to raise the remainder of the flaps to the lateral border of the sternum medially, to the inframammary fold inferiorly, and to the anterior border of the latissimus dorsi muscle laterally. The breast tissue was sharply excised off the chest wall taking care to incorporate the pectoralis fascia while leaving the serratus fascia behind.  The resulting mastectomy specimen was marked using a short stitch superiorly and long stitch laterally.  We evaluated the breast with a Mozart specimen radiograph which identified the clip within the breast without concern for margin involvement and this was  surgeon interpreted.  The breast was sent to pathology for permanent evaluation.      Therefore, the operative field was irrigated with normal saline and all bleeding points were secured with Bovie electrocautery.  A 19 Fr arlin drain was placed under the mastectomy flap. The incision was closed using an interrupted 3-0 vicryl deep dermal stitch followed by a running 4-0 vicryl subcuticular.      Dermabond was applied. A post surgical bra was placed on the patient. At the end of the operation, all sponge, instrument, and needle counts x 2 were correct.    ESTIMATED BLOOD LOSS: less than 50 mL    COMPLICATIONS: none    DISPOSITION: PACU - hemodynamically stable.    ATTESTATION:   I was present and scrubbed for the entire procedure.    [place] Node Biopsy for Breast Cancer  Operation performed with curative intent Yes   Tracer(s) used to identify sentinel nodes in the upfront surgery (non-neoadjuvant) setting Dye and Radioactive tracer   Tracer(s) used to identify sentinel nodes in the neoadjuvant setting N/A   All nodes (colored or non-colored) present at the end of a dye-filled lymphatic channel were removed Yes   All significantly radioactive nodes were removed Yes   All palpably suspicious nodes were removed N/A   Biopsy-proven positive nodes marked with clips prior to chemotherapy were identified and removed N/A

## 2023-09-06 NOTE — TRANSFER OF CARE
Anesthesia Transfer of Care Note    Patient: Noelle Rivas    Procedure(s) Performed: Procedure(s) (LRB):  MASTECTOMY RIGHT (Right)  BIOPSY, LYMPH NODE, SENTINEL (Right)  INJECTION, FOR SENTINEL NODE IDENTIFICATION (Right)    Patient location: PACU    Anesthesia Type: general    Transport from OR: Transported from OR on 2-3 L/min O2 by NC with adequate spontaneous ventilation    Post pain: adequate analgesia    Post assessment: no apparent anesthetic complications    Post vital signs: stable    Level of consciousness: awake, alert and oriented    Nausea/Vomiting: no nausea/vomiting    Complications: none    Transfer of care protocol was followed      Last vitals:   Visit Vitals  /69 (BP Location: Right arm, Patient Position: Sitting)   Pulse 76   Temp 36.6 °C (97.8 °F) (Oral)   Resp 18   LMP 04/01/2017 (Exact Date)   SpO2 98%   Breastfeeding No

## 2023-09-06 NOTE — PLAN OF CARE
Noelle Rivas has met all discharge criteria from Phase II. Vital Signs are stable, ambulating  without difficulty.Pain is now under control and tolerable for the pt. Pain score 1 at this time.  Discharge instructions given, patient verbalized understanding. Discharged from facility via wheelchair in stable condition.

## 2023-09-06 NOTE — H&P
Breast Surgery  [facility]  Department of Surgery        REFERRING PROVIDER: No referring provider defined for this encounter.     Chief Complaint: surgery planning        Subjective:      Patient ID: Noelle Rivas is a 62 y.o. female with a PMHx of R breast IDC s/p lumpectomy and SLNB (Madhavi, 2012) who presents with new abnormality on screening mammogram, diagnosed with infiltrating ductal carcinoma of the R breast . She presents today for surgical planning and preadmission p/t surgery.     Follow-up mammogram (6/13/23) showed there is a focal asymmetry seen in the upper outer quadrant of the right breast in the middle depth. The asymmetry correlates with the screening mammogram finding. Associated features include architectural distortion. Ultrasound (6/13/23) showed there is a 3 mm x 3 mm x 4 mm irregularly shaped, hypoechoic mass with angular margins seen in the right breast at 9 o'clock, 9 cm from the nipple. The mass correlates with the asymmetry seen on today's mammogram. Compared to the previous study, the mass is more defined. The axilla is normal. . A ultrasound guided biopsy was performed on 6/27/23 with pathology revealing infiltrating ductal carcinoma of the breast.      R breast mass 9OC biopsy:   Tumor size: 0.4 cm   Tumor ndgndrndanddndend:nd nd2nd Estrogen Receptor: + (nearly 100%, strong)  Progesterone Receptor: + (15% moderate to strong)  Her-2 yumiko: -   Lymph node status: clinically negative    Lymphatic invasion: not identified      Patient does routinely do self breast exams.  Patient has not noted a change on breast exam.  Patient denies nipple discharge. Patient admits to to previous breast biopsy.Patient admits to a personal history of breast cancer.  It was IDC gr 2, 1.3cm with clear margins following lumpectomy and SLNB.  Declined AI with Dr. Layne. Completed XRT.     Past Medical History: Factor V Leiden - She would be on therapeutic Lovenox followed by extended DVT prophylaxis with  prophylactic dosing with Eliquis, kidney stones     Interval History 8/29/23:  Previously discussed proceed with right total mastectomy, possibly bilateral, and right sentinel lymph node biopsy. We discussed potential for axillary dissection since she has had prior radiation and SLNB before.  I think there is low benefit to this being that the tumor is very small and I am unsure if this is a recurrence or a new cancer.  Will present at tumor board.  We discussed the contralateral breast and recon options in great detail.  She is considering aesthetic flat closure, but wants to definitely explore recon options.  Will meet plastics.The operative risks of bleeding, infection, recurrence, scarring, and anesthetic complications and the possibility of requiring further surgery were all noted and informed consent obtained.  We will check on her genetic panel to make sure it is complete.  Leaning towards NAKIA     Met with Dr. Jensen, per documentation, discussed  The patient was very worried about having to have multiple surgeries.  She also does not know if the recovery breast reconstruction would be worth it but it is hesitant that she may not being reconstructed in the future.  She is currently undecided though I think she is leaning towards foregoing breast reconstruction in the immediate setting.  Think she would be a good candidate for NAKIA flap reconstruction, especially if she proceeds with unilateral reconstruction but we also discussed unilateral implant based reconstruction with symmetry procedures.  Also presented the option of immediate tissue expander placement converting to autologous or implant based reconstruction at a later time.  We will be available if she proceeds with breast reconstruction.  With a history of radiation probably best suited for delayed NAKIA flap reconstruction with initial tissue expander placement.     Per patient, she is now deciding between unilateral versus bilateral flat  mastectomy.     Genetics was negative           Past Medical History:   Diagnosis Date    Abnormal TSH 06/05/2018    Age-related nuclear cataract of both eyes 08/03/2022    Allergy      Anxiety      Anxiety 12/17/2012    BRCA1 negative 07/23/19    BRCA2 negative 07/23/19    Breast cancer 2012     right    Calcium nephrolithiasis      Complex endometrial hyperplasia without atypia 06/16/2016    Degenerative disc disease      DJD (degenerative joint disease) of knee      Endometrial hyperplasia, unspecified: see evaluation 2017 05/22/2018    Factor V Leiden mutation No h/o DVT    Kidney stones      Leukopenia 12/19/2012    PMB (postmenopausal bleeding) 03/06/2017    Stroke       questionable  - TIA    Vitamin B 12 deficiency              Past Surgical History:   Procedure Laterality Date    BREAST BIOPSY Right 2012     malignant, radiation    BREAST BIOPSY Right 2014     benign    BREAST LUMPECTOMY Right 06/11/2012     w/ radiation    BREAST LUMPECTOMY W/ NEEDLE LOCALIZATION        CYSTOSCOPY W/ URETERAL STENT PLACEMENT Left 10/28/2022     Procedure: CYSTOSCOPY, WITH URETERAL STENT INSERTION;  Surgeon: Ileana Souza MD;  Location: [facility] OR;  Service: Urology;  Laterality: Left;  6x26    CYSTOSCOPY W/ URETERAL STENT REMOVAL Left 10/28/2022     Procedure: CYSTOSCOPY, WITH URETERAL STENT REMOVAL;  Surgeon: Ileana Souza MD;  Location: [facility] OR;  Service: Urology;  Laterality: Left;    DILATION AND CURETTAGE OF UTERUS        URETERAL STENT PLACEMENT N/A 10/14/2022     Procedure: INSERTION, STENT, URETER;  Surgeon: Ileana Souza MD;  Location: [facility] OR;  Service: Urology;  Laterality: N/A;  6x26 soft    URETEROSCOPIC REMOVAL OF URETERIC CALCULUS Left 10/14/2022     Procedure: REMOVAL, CALCULUS, URETER, URETEROSCOPIC;  Surgeon: Ileana Souza MD;  Location: [facility] OR;  Service: Urology;  Laterality: Left;    URETEROSCOPIC REMOVAL OF URETERIC CALCULUS Left 10/28/2022     Procedure: REMOVAL, CALCULUS,  URETER, URETEROSCOPIC;  Surgeon: Ileana Souza MD;  Location: [facility];  Service: Urology;  Laterality: Left;             Current Outpatient Medications on File Prior to Visit   Medication Sig Dispense Refill    aspirin (ECOTRIN) 81 MG EC tablet Take 81 mg by mouth once daily.        cholecalciferol, vitamin D3, (VITAMIN D3) 25 mcg (1,000 unit) capsule Take 1 capsule (1,000 Units total) by mouth once daily. 30 capsule 12    cyanocobalamin (VITAMIN B-12) 1000 MCG tablet Take 1,000 mcg by mouth every other day.         loratadine (CLARITIN) 10 mg tablet Take 10 mg by mouth daily as needed.        multivitamin (THERAGRAN) per tablet Take 1 tablet by mouth once daily.                    Current Facility-Administered Medications on File Prior to Visit   Medication Dose Route Frequency Provider Last Rate Last Admin    diphenhydrAMINE injection 12.5 mg  12.5 mg Intravenous Once PRN Santiago Loving MD        electrolyte-S (ISOLYTE)   Intravenous Continuous Santiago Loving MD   Stopped at 10/28/22 1050    fentaNYL 50 mcg/mL injection 25 mcg  25 mcg Intravenous Q5 Min PRN Santiago Loving MD        HYDROmorphone (PF) injection 0.2 mg  0.2 mg Intravenous Q5 Min PRN Santiago Loving MD        lactated ringers infusion  10 mL/hr Intravenous Continuous Santiago Loving MD        lactated ringers infusion  500 mL Intravenous Once Santiago Loving MD        ondansetron injection 4 mg  4 mg Intravenous Once PRN Santiago Loving MD        oxyCODONE immediate release tablet 5 mg  5 mg Oral Once PRN Santiago Loving MD        prochlorperazine injection Soln 5 mg  5 mg Intravenous Q30 Min PRN Santiago Loving MD        sodium chloride 0.9% flush 3 mL  3 mL Intravenous Q8H Santiago Loving MD          Social History               Socioeconomic History    Marital status: Single   Occupational History    Occupation: Nurse         Employer: [employer]   Tobacco  Use    Smoking status: Never    Smokeless tobacco: Never   Substance and Sexual Activity    Alcohol use: Not Currently       Comment: Minimal    Drug use: No    Sexual activity: Not Currently       Birth control/protection: None   Other Topics Concern    Are you pregnant or think you may be? No    Breast-feeding No      Social Determinants of Health           Financial Resource Strain: Low Risk  (7/20/2023)     Overall Financial Resource Strain (CARDIA)      Difficulty of Paying Living Expenses: Not hard at all   Food Insecurity: No Food Insecurity (7/20/2023)     Hunger Vital Sign      Worried About Running Out of Food in the Last Year: Never true      Ran Out of Food in the Last Year: Never true   Transportation Needs: No Transportation Needs (7/20/2023)     PRAPARE - Transportation      Lack of Transportation (Medical): No      Lack of Transportation (Non-Medical): No   Physical Activity: Sufficiently Active (7/20/2023)     Exercise Vital Sign      Days of Exercise per Week: 3 days      Minutes of Exercise per Session: 70 min   Stress: No Stress Concern Present (7/20/2023)     [employer] [place] [employer] - Occupational Stress Questionnaire      Feeling of Stress : Only a little   Social Connections: Unknown (7/20/2023)     Social Connection and Isolation Panel [NHANES]      Frequency of Communication with Friends and Family: More than three times a week      Frequency of Social Gatherings with Friends and Family: More than three times a week      Active Member of Clubs or Organizations: Yes      Attends Club or Organization Meetings: More than 4 times per year      Marital Status: Never    Housing Stability: Low Risk  (7/20/2023)     Housing Stability Vital Sign      Unable to Pay for Housing in the Last Year: No      Number of Places Lived in the Last Year: 1      Unstable Housing in the Last Year: No               Family History   Problem Relation Age of Onset    Hypertension Father       "Coronary artery disease Father      Cancer Father           prostate ca, metastatic    Heart disease Father           stenting    Kidney disease Father           stones    Retinal detachment Father      Colon cancer Paternal Grandfather      Melanoma Mother      Cancer Mother           melanoma    Hyperlipidemia Brother      Keratoconus Brother      No Known Problems Brother      Stroke Maternal Grandmother      Lung cancer Maternal Grandfather      Breast cancer Paternal Grandmother            at age 62    Ovarian cancer Neg Hx      Uterine cancer Neg Hx      Psoriasis Neg Hx      Lupus Neg Hx      Eczema Neg Hx           Review of Systems   Constitutional:  Negative for appetite change, chills, fever and unexpected weight change.   HENT:  Negative for facial swelling, postnasal drip and sore throat.    Eyes:  Negative for redness and itching.   Respiratory:  Negative for chest tightness and shortness of breath.    Cardiovascular:  Negative for chest pain and palpitations.   Gastrointestinal:  Negative for blood in stool, diarrhea, nausea and vomiting.   Genitourinary:  Negative for difficulty urinating and dysuria.   Musculoskeletal:  Negative for arthralgias and joint swelling.   Skin:  Negative for rash and wound.   Neurological:  Negative for dizziness and syncope.   Hematological:  Negative for adenopathy.   Psychiatric/Behavioral:  Negative for agitation. The patient is not nervous/anxious.       Objective:   /73 (BP Location: Right arm, Patient Position: Sitting, BP Method: Medium (Automatic))   Pulse 71   Ht 5' 9" (1.753 m)   Wt 74.8 kg (165 lb)   LMP 2017 (Exact Date)   BMI 24.37 kg/m²      Physical Exam     Radiology review: Images personally reviewed by me in the clinic.      23 Bilateral Screening Mammo:     Findings:  This procedure was performed using tomosynthesis. Computer-aided detection was utilized in the interpretation of this examination.  The breasts have scattered " areas of fibroglandular density.      Right  There are post-surgical findings from a previous lumpectomy seen in the right breast at 12 o'clock. Compared to the previous study, there are no significant changes. There has been no interval development of a suspicious mass, microcalcification, or architectural distortion.      There is a focal asymmetry seen in the upper outer quadrant of the right breast in the middle depth. Compared to the previous study, the asymmetry is more defined; there are possible associated faint calcifications. This is located anterior to a group of benign calcifications with a biopsy clip.      Left  There is no evidence of suspicious masses, calcifications, or other abnormal findings in the left breast.     Impression:  Right  Focal Asymmetry: Right breast focal asymmetry at the upper outer middle position. Assessment: 0 - Incomplete. Diagnostic Mammogram with magnification views, and an ultrasound is recommended.  Left  There is no mammographic evidence of malignancy in the left breast.     BI-RADS Category:   Overall: 0 - Incomplete: Needs Additional Imaging Evaluation        Recommendation:  Right Diagnostic mammogram and ultrasound is recommended.        6/13/23 Right Diagnostic Mammo/US:     Findings:  This procedure was performed using tomosynthesis. Computer-aided detection was utilized in the interpretation of this examination.  Mammo Digital Diagnostic Right with Vlad  The right breast has scattered areas of fibroglandular density.     There is a focal asymmetry seen in the upper outer quadrant of the right breast in the middle depth. The asymmetry correlates with the screening mammogram finding. Associated features include architectural distortion.      US Breast Right Limited  There is a 3 mm x 3 mm x 4 mm irregularly shaped, hypoechoic mass with angular margins seen in the right breast at 9 o'clock, 9 cm from the nipple. The mass correlates with the asymmetry seen on today's  mammogram. Compared to the previous study, the mass is more defined. The axilla is normal.       Impression:  Right  Mass: Right breast 3 mm x 3 mm x 4 mm mass at the 9 o'clock position. Assessment: 4 - Suspicious finding. Ultrasound-guided biopsy is recommended.       I discussed the findings and recommendations for today's exam in detail with the patient.       BI-RADS Category:   Overall: 4 - Suspicious        Recommendation:  Ultrasound-guided biopsy is recommended.           Assessment:      Assessment   1. Malignant neoplasm of overlapping sites of right breast in female, estrogen receptor positive    2. Abnormal mammogram    3. Preop testing          Plan:      Options for management were discussed with the patient and her family. We reviewed the existing data noting the equivalency of breast conserving surgery with radiation therapy and mastectomy. We also reviewed the guidelines of the National Comprehensive Cancer Network for Stage I breast carcinoma. We discussed the need for lumpectomy margins to be negative for carcinoma, the necessity for postoperative radiation therapy after breast conservation in most cases, the possibility of a failed or false negative sentinel lymph node biopsy and the potential need for complete lymphadenectomy for a failed or positive sentinel lymph node biopsy were fully discussed. In the setting of mastectomy, delayed or immediate reconstruction options are available and were discussed.      In the setting of lumpectomy, radiation therapy would be recommended majority of the time.  The duration and treatment side effects were discussed with the patient.  This will coordinated with the radiation oncologist pending final pathology.     We also discussed the role of systemic therapy in the treatment of early stage breast cancer.  We discussed that this is based on tumor biology and steve status and will be determined based on final pathology.  We discussed that if the cancer is  hormone positive, endocrine therapy would be recommended in most cases and its use can reduce the risk of recurrence as well as improve survival. Side effects of treatment were briefly discussed. We also discussed the potential role for chemotherapy based on a number of factors such as tumor phenotype (ER+ vs. triple negative vs. Urm5zng+) and this would be determined in coordination with the medical oncologist.     The patient has elected to proceed with right total mastectomy, and right sentinel lymph node biopsy. We discussed potential for axillary dissection since she has had prior radiation and SLNB before.  I think there is low benefit to this being that the tumor is very small and I am unsure if this is a recurrence or a new cancer.  Will present at tumor board.  We discussed the contralateral breast and recon options in great detail.  She has decided on aesthetic flat closure without reconstruction at this time. The operative risks of bleeding, infection, recurrence, scarring, and anesthetic complications and the possibility of requiring further surgery were all noted and informed consent obtained.     Surgery scheduled. Follow-up in clinic roughly 14 days after surgery.      Patient was educated on breast cancer, receptors, wire localization lumpectomy, mastectomy, sentinel lymph node mapping and biopsy, axillary lymph node dissection, reconstruction, breast prosthesis with post-mastectomy bra and radiation therapy. Patient was given patient information binder including [facility] breast cancer treatment brochure.  All her questions were answered.

## 2023-09-06 NOTE — DISCHARGE INSTRUCTIONS
Remove Scopolamine patch on post op day 2.    Ex (surgery on Mon, remove on Wed)    Wash hands thoroughly after removing patch and wash area where patch was placed.    Fold in half and discard in garbage.     Your Surgeon Gave You EXPAREL® (bupivacaine liposome injectable suspension)    To help control your pain after surgery, your surgeon injected EXPAREL into your surgical incision just before the end of the procedure.   EXPAREL is a local analgesic that contains the local anesthetic bupivacaine. Local anesthetics provide pain relief by numbing the tissue  around the surgical site.   EXPAREL is specifically designed to release pain medication over time and can control pain for up to 72 hours.   In addition to EXPAREL, your surgeon may provide other pain medications to control your pain.   Each patient is different and responds differently to painmedication. Depending on how you respond to EXPAREL, you may require less  additional pain medication during your recovery.    Possible Side Effects    Side effects can occur with any medication and it is important not to ignore anything you may be experiencing. Some patients who  received EXPAREL experienced nausea, vomiting, or constipation. Rarely, patients who receive bupivacaine (the active ingredient in  EXPAREL) have experienced numbness and tingling in their mouth or lips, lightheadedness, or anxiety. Speak with your doctor right  away if you think you may be experiencing any of these sensations, or if you have other questions regarding possible side effects.    Your Recovery    When your pain is under control, your body can better focus on healing. This is not the time to test your pain tolerance, or grin and  bear it.Work with your surgeon and nurse to make your recovery as speedy and pain-free as possible.   Follow the post-op orders your nurse gave you.   Eat a healthy diet and drink plenty of water. Surgery stresses your body; your body responds by needing  more energy to heal.      Important Information About EXPAREL  Products that contain bupivacaine, like EXPAREL, may cause a temporary loss of  sensation or the ability to move in the area where bupivacaine was injected.    Date Administered: 9-6-23  Time Administered: 1410    Other Forms of Bupivicaine should not be administered within 96hrs following administration of EXPAREL.

## 2023-09-06 NOTE — ANESTHESIA POSTPROCEDURE EVALUATION
Anesthesia Post Evaluation    Patient: Noelle Rivas    Procedure(s) Performed: Procedure(s) (LRB):  MASTECTOMY RIGHT (Right)  BIOPSY, LYMPH NODE, SENTINEL (Right)  INJECTION, FOR SENTINEL NODE IDENTIFICATION (Right)    Final Anesthesia Type: general      Patient location during evaluation: PACU  Patient participation: Yes- Able to Participate  Level of consciousness: awake and alert  Post-procedure vital signs: reviewed and stable  Pain management: adequate  Airway patency: patent  BERNICE mitigation strategies: Extubation while patient is awake  PONV status at discharge: No PONV  Anesthetic complications: no      Cardiovascular status: hemodynamically stable  Respiratory status: unassisted  Hydration status: euvolemic  Follow-up not needed.          Vitals Value Taken Time   /64 09/06/23 1601   Temp 36.8 °C (98.3 °F) 09/06/23 1600   Pulse 58 09/06/23 1601   Resp 18 09/06/23 1600   SpO2 96 % 09/06/23 1601   Vitals shown include unvalidated device data.      Event Time   Out of Recovery 16:06:01         Pain/Emeli Score: Pain Rating Prior to Med Admin: 4 (9/6/2023  3:27 PM)  Emeli Score: 10 (9/6/2023  3:59 PM)

## 2023-09-06 NOTE — PATIENT INSTRUCTIONS
POSTOPERATIVE INSTRUCTIONS FOLLOWING BIOPSY OR LUMPECTOMY    The following are post-operative instructions that will help you to recover from your surgery.  Please read over these instructions carefully and contact us if we can answer any of your questions or concerns.    Dressing/breast binder (surgi-bra)  A surgical bra may be placed around your chest after your surgery.  If you are given the bra, please wear it as close to 24 hours a day as possible until your post-operative clinic appointment.  If the elastic around the bra irritates your skin, you may wear a soft t-shirt underneath the bra.  You may go without wearing the bra long enough to shower, to launder and dry the bra.  If the bra is extremely uncomfortable, you may wear a supportive sports bra instead after 2 days.  You may shower the day after surgery.  Do not take a tub bath and do not soak the surgical site.    Activity   You should be able to return to your regular activities 2 days after your surgery.  However, do not engage in strenuous activities in which you use your upper body such as:  golf, tennis, aerobics, washing windows, raking the yard, mopping, vacuuming, heavy lifting (e.g children) until you are seen for your follow-up appointment in clinic.    Medication for pain  You may find that over the counter pain medications may be sufficient for your pain.  You will be given a prescription for pain medication for more severe pain.  You should not drive or operate machinery while taking these.  Please take narcotics with food.  Narcotics can cause, or worse, constipation.  You will need to increase your fluid intake, eat high fiber foods (such as fruits and bran) and make sure that you are up and walking. You may need to take an over the counter stool softener for constipation.    Please report the following:  Temperature greater than 101 degrees  Discharge or bad odor from the wound  Excessive bleeding, such as bloody dressing or extreme  bruising  Redness at incision and/or drain sites  Swelling or buildup of fluid around incision      Drain Care: You have a drain placed. Please empty drain when full and record the amount of fluid and appearance of fluid each time so we can calculate total amount of fluid from drain over 24 hour period. Please return to clinic in two weeks where we may be able to remove the drain.     Additional information  I will see you approximately 2 weeks following your surgery.  If this follow-up appointment has not been made, please call the office.    If you have any questions or problems, please call my office or my nurse.    MD Nhi Howard, RN  [phone]    After hours and on weekends, you may call the main [facility] line at [phone] and ask to have the general surgery resident paged or have me paged.

## 2023-09-06 NOTE — ANESTHESIA PROCEDURE NOTES
Intubation    Date/Time: 9/6/2023 12:17 PM    Performed by: Jina Valle CRNA  Authorized by: Lloyd Lewis MD    Intubation:     Induction:  Intravenous    Intubated:  Postinduction    Mask Ventilation:  Easy mask    Attempts:  1    Attempted By:  CRNA    Method of Intubation:  Video laryngoscopy    Blade:  Garcia 3    Laryngeal View Grade: Grade I - full view of cords      Difficult Airway Encountered?: No      Complications:  None    Airway Device:  Oral endotracheal tube    Airway Device Size:  7.0    Style/Cuff Inflation:  Cuffed (inflated to minimal occlusive pressure)    Tube secured:  21    Secured at:  The lips    Placement Verified By:  Capnometry    Complicating Factors:  None    Findings Post-Intubation:  BS equal bilateral and atraumatic/condition of teeth unchanged

## 2023-09-06 NOTE — BRIEF OP NOTE
[facility] - Surgery ([place])  Brief Operative Note    Surgery Date: 9/6/2023     Surgeon(s) and Role:     * Giana Mott MD - Primary    Assisting Surgeon: None    Pre-op Diagnosis:  Malignant neoplasm of overlapping sites of right breast in female, estrogen receptor positive [C50.811, Z17.0]    Post-op Diagnosis:  Post-Op Diagnosis Codes:     * Malignant neoplasm of overlapping sites of right breast in female, estrogen receptor positive [C50.811, Z17.0]    Procedure(s) (LRB):  MASTECTOMY RIGHT (Right)  BIOPSY, LYMPH NODE, SENTINEL (Right)  INJECTION, FOR SENTINEL NODE IDENTIFICATION (Right)  See op dictation    Anesthesia: General    Operative Findings: Right radical mastectomy performed without complication. Patient tolerated procedure well. Right axillary sentinel node biopsy performed, one lymph node removed and sent for permanent pathology. Excellent hemostasis was noted before closure. Drain left.     Estimated Blood Loss:<50cc         Specimens:   Specimen (24h ago, onward)       Start     Ordered    09/06/23 1318  Specimen to Pathology, Surgery Breast  Once        Comments: Pre-op Diagnosis: Malignant neoplasm of overlapping sites of right breast in female, estrogen receptor positive [C50.811, Z17.0]Procedure(s):MASTECTOMY RIGHTBIOPSY, LYMPH NODE, SENTINELINJECTION, FOR SENTINEL NODE IDENTIFICATIONLYMPHADENECTOMY, AXILLARY Number of specimens: 2Name of specimens: 1. Right breast, short superior long lateral SENT FRESH2. Right axillary sentinel lymph node hot 220 SENT FRESH     References:    Click here for ordering Quick Tip   Question Answer Comment   Procedure Type: Breast    Specimen Class: Known or suspected malignancy    Which provider would you like to cc? GIANA MOTT    Release to patient Immediate        09/06/23 1404                      Discharge Note    OUTCOME: Patient tolerated treatment/procedure well without complication and is now ready for discharge.    DISPOSITION: Home or Self  Care    FINAL DIAGNOSIS:  Malignant neoplasm of overlapping sites of right breast in female, estrogen receptor positive [C50.811, Z17.0]    FOLLOWUP: In clinic    DISCHARGE INSTRUCTIONS:    Discharge Procedure Orders   Diet general

## 2023-09-07 VITALS
HEART RATE: 66 BPM | DIASTOLIC BLOOD PRESSURE: 70 MMHG | SYSTOLIC BLOOD PRESSURE: 131 MMHG | TEMPERATURE: 98 F | OXYGEN SATURATION: 96 % | RESPIRATION RATE: 16 BRPM

## 2023-09-18 ENCOUNTER — PATIENT MESSAGE (OUTPATIENT)
Dept: SURGERY | Facility: CLINIC | Age: 63
End: 2023-09-18
Payer: COMMERCIAL

## 2023-09-26 ENCOUNTER — OFFICE VISIT (OUTPATIENT)
Dept: SURGERY | Facility: CLINIC | Age: 63
End: 2023-09-26
Payer: COMMERCIAL

## 2023-09-26 VITALS
DIASTOLIC BLOOD PRESSURE: 69 MMHG | HEIGHT: 69 IN | HEART RATE: 83 BPM | BODY MASS INDEX: 24.44 KG/M2 | SYSTOLIC BLOOD PRESSURE: 110 MMHG | WEIGHT: 165 LBS | OXYGEN SATURATION: 100 %

## 2023-09-26 DIAGNOSIS — T36.95XA ANTIBIOTIC-INDUCED YEAST INFECTION: ICD-10-CM

## 2023-09-26 DIAGNOSIS — Z12.31 SCREENING MAMMOGRAM FOR BREAST CANCER: ICD-10-CM

## 2023-09-26 DIAGNOSIS — Z90.11 S/P RIGHT MASTECTOMY: Primary | ICD-10-CM

## 2023-09-26 DIAGNOSIS — B37.9 ANTIBIOTIC-INDUCED YEAST INFECTION: ICD-10-CM

## 2023-09-26 DIAGNOSIS — Z17.0 MALIGNANT NEOPLASM OF RIGHT BREAST, STAGE 1, ESTROGEN RECEPTOR POSITIVE: ICD-10-CM

## 2023-09-26 DIAGNOSIS — L03.90 CELLULITIS, UNSPECIFIED CELLULITIS SITE: ICD-10-CM

## 2023-09-26 DIAGNOSIS — C50.911 MALIGNANT NEOPLASM OF RIGHT BREAST, STAGE 1, ESTROGEN RECEPTOR POSITIVE: ICD-10-CM

## 2023-09-26 PROCEDURE — 3074F PR MOST RECENT SYSTOLIC BLOOD PRESSURE < 130 MM HG: ICD-10-PCS | Mod: CPTII,S$GLB,, | Performed by: SURGERY

## 2023-09-26 PROCEDURE — 3074F SYST BP LT 130 MM HG: CPT | Mod: CPTII,S$GLB,, | Performed by: SURGERY

## 2023-09-26 PROCEDURE — 99999 PR PBB SHADOW E&M-EST. PATIENT-LVL III: CPT | Mod: PBBFAC,,, | Performed by: SURGERY

## 2023-09-26 PROCEDURE — 99024 POSTOP FOLLOW-UP VISIT: CPT | Mod: S$GLB,,, | Performed by: SURGERY

## 2023-09-26 PROCEDURE — 99999 PR PBB SHADOW E&M-EST. PATIENT-LVL III: ICD-10-PCS | Mod: PBBFAC,,, | Performed by: SURGERY

## 2023-09-26 PROCEDURE — 3078F PR MOST RECENT DIASTOLIC BLOOD PRESSURE < 80 MM HG: ICD-10-PCS | Mod: CPTII,S$GLB,, | Performed by: SURGERY

## 2023-09-26 PROCEDURE — 3078F DIAST BP <80 MM HG: CPT | Mod: CPTII,S$GLB,, | Performed by: SURGERY

## 2023-09-26 PROCEDURE — 99024 PR POST-OP FOLLOW-UP VISIT: ICD-10-PCS | Mod: S$GLB,,, | Performed by: SURGERY

## 2023-09-26 RX ORDER — FLUCONAZOLE 150 MG/1
150 TABLET ORAL DAILY
Qty: 2 TABLET | Refills: 0 | Status: SHIPPED | OUTPATIENT
Start: 2023-09-26 | End: 2023-09-28

## 2023-09-26 RX ORDER — SULFAMETHOXAZOLE AND TRIMETHOPRIM 800; 160 MG/1; MG/1
1 TABLET ORAL 2 TIMES DAILY
Qty: 10 TABLET | Refills: 0 | Status: SHIPPED | OUTPATIENT
Start: 2023-09-26 | End: 2023-10-01

## 2023-09-26 NOTE — PROGRESS NOTES
Post-Op  [facility]  Department of Surgery    REFERRING PROVIDER: No referring provider defined for this encounter. Emelyn Bernardo MD    MEDICAL ONCOLOGIST:    Dr. Whitehead   RADIATION ONCOLOGIST:  N/A    DIAGNOSIS:    This is a 62 y.o. female with a stage pT1b N0 grade 1 ER + OH + HER2 - Invasive Carcinoma of the right breast.    TREATMENT SUMMARY:  The patient is status post right total mastectomy and sentinel node biopsy on 9/6/2023.  Final pathology showed  10 mm focus of invasive carcinoma. 1 lymph node was negative     INTERVAL HISTORY:   Noelle Rivas comes in for a post-op check.  She denies fever, chills, chest pain or shortness of breath.  Her pain is well controlled.      MEDICATIONS:  Current Outpatient Medications   Medication Sig Dispense Refill    aspirin (ECOTRIN) 81 MG EC tablet Take 81 mg by mouth once daily.      cholecalciferol, vitamin D3, (VITAMIN D3) 25 mcg (1,000 unit) capsule Take 1 capsule (1,000 Units total) by mouth once daily. 30 capsule 12    cyanocobalamin (VITAMIN B-12) 1000 MCG tablet Take 1,000 mcg by mouth every other day.       loratadine (CLARITIN) 10 mg tablet Take 10 mg by mouth daily as needed.      multivitamin (THERAGRAN) per tablet Take 1 tablet by mouth once daily.      fluconazole (DIFLUCAN) 150 MG Tab Take 1 tablet (150 mg total) by mouth once daily. for 2 days 2 tablet 0    HYDROcodone-acetaminophen (NORCO)  mg per tablet Take 1 tablet by mouth every 6 (six) hours as needed for Pain. 15 tablet 0    sulfamethoxazole-trimethoprim 800-160mg (BACTRIM DS) 800-160 mg Tab Take 1 tablet by mouth 2 (two) times daily. for 5 days 10 tablet 0     No current facility-administered medications for this visit.     Facility-Administered Medications Ordered in Other Visits   Medication Dose Route Frequency Provider Last Rate Last Admin    diphenhydrAMINE injection 12.5 mg  12.5 mg Intravenous Once PRN Santiago Loving MD        electrolyte-S (ISOLYTE)    Intravenous Continuous Santiago Loving MD   Stopped at 10/28/22 1050    fentaNYL 50 mcg/mL injection 25 mcg  25 mcg Intravenous Q5 Min PRN Santiago Loving MD        HYDROmorphone (PF) injection 0.2 mg  0.2 mg Intravenous Q5 Min PRN Santiago Loving MD        lactated ringers infusion  10 mL/hr Intravenous Continuous Santiago Loving MD   New Bag at 09/06/23 1431    lactated ringers infusion  500 mL Intravenous Once Santiago Loving MD        ondansetron injection 4 mg  4 mg Intravenous Once PRN Santiago Loving MD        oxyCODONE immediate release tablet 5 mg  5 mg Oral Once PRN Santiago Loving MD        prochlorperazine injection Soln 5 mg  5 mg Intravenous Q30 Min PRN Santiago Loving MD        sodium chloride 0.9% flush 3 mL  3 mL Intravenous Q8H Santiago Loving MD           ALLERGIES:   Review of patient's allergies indicates:   Allergen Reactions    Adhesive Itching and Rash     Micropore/Medipore Tape - paper tape is ok    Iodine and iodide containing products Itching    Nickel Itching     Allergic reaction to nickel and other related metals    Latex, natural rubber Itching       PHYSICAL EXAMINATION:   General:  This is a well appearing female with appropriate speech, affect and gait.     Breast:  Incision clean, dry, and intact      IMPRESSION:   The patient has had an uneventful postoperative course.    PLAN:   1. return in June for a follow up office visit and breast exam  2. left mammogram in June 2024  3. The patient is advised in continued exam of the breast chest wall and to report to this office sooner should she note any areas of abnormality or concern.   4.  She has been instructed to meet with med onc for discussion of adjuvant treatment recommendations  5. Will send in Bactrim

## 2023-09-28 ENCOUNTER — TELEPHONE (OUTPATIENT)
Dept: HEMATOLOGY/ONCOLOGY | Facility: CLINIC | Age: 63
End: 2023-09-28
Payer: COMMERCIAL

## 2023-09-28 DIAGNOSIS — Z17.0 MALIGNANT NEOPLASM OF OVERLAPPING SITES OF RIGHT BREAST IN FEMALE, ESTROGEN RECEPTOR POSITIVE: Primary | ICD-10-CM

## 2023-09-28 DIAGNOSIS — C50.811 MALIGNANT NEOPLASM OF OVERLAPPING SITES OF RIGHT BREAST IN FEMALE, ESTROGEN RECEPTOR POSITIVE: Primary | ICD-10-CM

## 2023-09-28 NOTE — NURSING
Spoke with pt.  Introduced myself and explained my role in her care.  She is s/p R mastectomy (9/6/23).  She said she had some complications with a drain, but other than that she was doing ok.  She lives in [place] and would like to see Dr. Whitehead here in [place].  An oncotype is pending, so scheduled pt for 10/16/23.  Reviewed location and contact information.  Encouraged her to call me with any questions or concerns.  She thanked me and verbalized understanding.

## 2023-09-29 ENCOUNTER — CLINICAL SUPPORT (OUTPATIENT)
Dept: REHABILITATION | Facility: HOSPITAL | Age: 63
End: 2023-09-29
Attending: SURGERY
Payer: COMMERCIAL

## 2023-09-29 ENCOUNTER — PATIENT MESSAGE (OUTPATIENT)
Dept: SURGERY | Facility: CLINIC | Age: 63
End: 2023-09-29
Payer: COMMERCIAL

## 2023-09-29 DIAGNOSIS — M25.611 DECREASED RANGE OF MOTION OF RIGHT SHOULDER: ICD-10-CM

## 2023-09-29 DIAGNOSIS — Z17.0 MALIGNANT NEOPLASM OF OVERLAPPING SITES OF RIGHT BREAST IN FEMALE, ESTROGEN RECEPTOR POSITIVE: Primary | ICD-10-CM

## 2023-09-29 DIAGNOSIS — C50.811 MALIGNANT NEOPLASM OF OVERLAPPING SITES OF RIGHT BREAST IN FEMALE, ESTROGEN RECEPTOR POSITIVE: Primary | ICD-10-CM

## 2023-09-29 PROCEDURE — 97530 THERAPEUTIC ACTIVITIES: CPT | Mod: PN

## 2023-09-29 PROCEDURE — 97164 PT RE-EVAL EST PLAN CARE: CPT | Mod: PN

## 2023-09-29 NOTE — PLAN OF CARE
"Physical Therapy Daily Treatment Note   Re-assessment post right mastectomy    Name: Noelle Rivas  Clinic Number: [id number]    Therapy Diagnosis:   Malignant neoplasm of overlapping sites of right breast in female, estrogen receptor positive  Decreased range of motion of right shoulder.  Physician: Giana De Leon MD    Visit Date: 9/29/2023    Physician Orders: PT Eval and Treat   Medical Diagnosis from Referral: C50.811,Z17.0 (ICD-10-CM) - Malignant neoplasm of overlapping sites of right breast in female, estrogen receptor positive  Evaluation Date: 8/22/2023  Authorization Period Expiration: 7/31/23-12/31/23  Plan of Care Expiration: 11/30/23  Visit # / Visits authorized: 1/ 20     Time In: 1000  Time Out: 1045  Total Billable Time: 45 minutes    Precautions: Diabetes and cancer    Subjective     Pt reports: "I was really uncomfortable. My drain was infected."   Patient reports right mastectomy was 9/6/23 with 1 sentinel lymph node biopsy. She had one drain which was removed 9/26. She is currently on antibiotics for infection from drain. She is awaiting reports back from hematology regarding cancer typing, no plans for radiation at this time.  She was compliant with home exercise program.  Response to previous treatment: ongoing  Functional change: ongoing    Pain: did not grade pain  Location: feels like egg underneath right arm    Objective     Objective Measures updated at progress report unless specified.   Re-assessment post surgery perform.  Upper extremity range of motion:  JOINT RIGHT UE  8/22/23 LEFT UE  8/22/23 Right upper extremity  9/29/23 NORMAL RANGE   Shoulder flexion 180 ° 175 ° 90/124 0-180 °   Shoulder abduction 180 ° 180 ° 64/90 0-180 °   Shoulder internal rotation 70 ° 70 ° 70 0-70 °   Shoulder external rotation 90 ° 90 ° 20 0-90 °   Elbow flexion WFL ° WFL ° WFL 0-150 °    numbness/tingling right upper shoulder  Right shoulder motion with empty end feel due to pain.    Bilateral upper " extremities strength 5/5 grossly  21cm incision. All areas closed active areas of healing noted.          Assessment     Ms. Rivas presents s/p right mastectomy 9/6/23. She presents with limited right shoudler range of motion and in active stage of healing. Plan for range of motion exercised and strengthening to return to normal and independent prior level of function. Patient is only 3 weeks post-op, will complete range of motion exercises and strengthening below shoudler height for next 3 weeks prior to performing scar massage and overhead stretching and lifting to allow for natural healing phase. Patient in agreement.     Noelle Is progressing well towards her goals.   Pt prognosis is Good.     Pt will continue to benefit from skilled outpatient physical therapy to address the deficits listed in the problem list box on initial evaluation, provide pt/family education and to maximize pt's level of independence in the home and community environment.     Pt's spiritual, cultural and educational needs considered and pt agreeable to plan of care and goals.     Anticipated barriers to physical therapy: none    Goals:     1) patient to be independent with home exercise program  2) patient to increase shoulder flexion to 180degrees with no complaints of pain.  3) patient to be independent with self scar massage.  4) patient to increase right shoulder abduction to 180 degrees with no complaints of pain  5) patient to report return to yoga with < 2-3/10 rate of pain.    Plan     Continue physical therapy for 1x/wk for 12wks to include therapeutic exercise, therapeutic activity, manual therapy and self-care & management.    Fide Bobo PT, DPT, CLT  9/29/2023

## 2023-10-04 ENCOUNTER — PATIENT MESSAGE (OUTPATIENT)
Dept: SURGERY | Facility: CLINIC | Age: 63
End: 2023-10-04
Payer: COMMERCIAL

## 2023-10-04 ENCOUNTER — PATIENT MESSAGE (OUTPATIENT)
Dept: OPHTHALMOLOGY | Facility: CLINIC | Age: 63
End: 2023-10-04
Payer: COMMERCIAL

## 2023-10-06 ENCOUNTER — CLINICAL SUPPORT (OUTPATIENT)
Dept: REHABILITATION | Facility: HOSPITAL | Age: 63
End: 2023-10-06
Payer: COMMERCIAL

## 2023-10-06 DIAGNOSIS — M25.611 DECREASED RANGE OF MOTION OF RIGHT SHOULDER: Primary | ICD-10-CM

## 2023-10-06 PROCEDURE — 97110 THERAPEUTIC EXERCISES: CPT | Mod: PO

## 2023-10-06 PROCEDURE — 97140 MANUAL THERAPY 1/> REGIONS: CPT | Mod: PO

## 2023-10-06 NOTE — PROGRESS NOTES
"[facility]   Physical Therapy Treatment Note      Name: Noelle Rivas  Clinic Number: [id number]    Therapy Diagnosis:   Encounter Diagnosis   Name Primary?    Decreased range of motion of right shoulder Yes     Physician: Giana De Leon MD    Visit Date: 10/6/2023  Physician Orders: PT Eval and Treat   Medical Diagnosis from Referral: C50.811,Z17.0 (ICD-10-CM) - Malignant neoplasm of overlapping sites of right breast in female, estrogen receptor positive  Evaluation Date: 8/22/2023  Authorization Period Expiration: 7/31/23-12/31/23  Plan of Care Expiration: 11/30/23  Visit # / Visits authorized: 2/ 20      Time In: 1305  Time Out: 1400  Total Billable Time: 55 minutes     Precautions: Diabetes and cancer    PTA Visit #: 0/5       Subjective     Patient reports: "I'm really feeling so much better. Every day, I feel better."  She was compliant with home exercise program.  Response to previous treatment: ongoing  Functional change: ongoing    Pain: 0/10    Objective      Objective Measures updated at progress report unless specified.     Treatment     Noelle received the treatments listed below:      therapeutic exercises to develop strength, ROM, flexibility, posture, and core stabilization for 35 minutes including:  Finger ladder flexion x5  Finger ladder abduction x5  Scap retractions green theraband x20  Right shoulder IR with green theraband x20  Right shoulder ER with green theraband x20  Bilateral bicep curls x20 1#  Right tricep extension overhead x8 1#    manual therapy techniques: Soft tissue Mobilization were applied to the: right shoulder for 20 minutes, including:  Supine:  Right shoulder PROM shoulder flexion  Right shoulder PROM shoulder abduction  Right shoulder IR/ER  Right upper extremity D2 x20 with hand over hand    Patient Education and Home Exercises       Education provided:   - continue gentle stretching as she is still within 6 weeks of healing.   -cues for " posture    Written Home Exercises Provided: yes. Exercises were reviewed and Noelle was able to demonstrate them prior to the end of the session.  Noelle demonstrated good  understanding of the education provided. See Electronic Medical Record under Patient Instructions for exercises provided during therapy sessions    Assessment     Ms. iRvas is healing well from right mastectomy. She is very eager to return to independent level of function. She has made significant gains in right shoulder range of motion with minimal complaints of pain. As healing stages progress, intend to increase scar mobility and massage.     Noelle Is progressing well towards her goals.   Patient prognosis is Good.     Patient will continue to benefit from skilled outpatient physical therapy to address the deficits listed in the problem list box on initial evaluation, provide pt/family education and to maximize pt's level of independence in the home and community environment.     Patient's spiritual, cultural and educational needs considered and pt agreeable to plan of care and goals.     Anticipated barriers to physical therapy: none     Goals:      1) patient to be independent with home exercise program  2) patient to increase shoulder flexion to 180degrees with no complaints of pain.  3) patient to be independent with self scar massage.  4) patient to increase right shoulder abduction to 180 degrees with no complaints of pain  5) patient to report return to yoga with < 2-3/10 rate of pain.     Plan      Continue physical therapy for 1x/wk for 12wks to include therapeutic exercise, therapeutic activity, manual therapy and self-care & management.     Fide Bobo PT, DPT, CLT  10/6/2023

## 2023-10-11 ENCOUNTER — CLINICAL SUPPORT (OUTPATIENT)
Dept: REHABILITATION | Facility: HOSPITAL | Age: 63
End: 2023-10-11
Payer: COMMERCIAL

## 2023-10-11 DIAGNOSIS — M25.611 DECREASED RANGE OF MOTION OF RIGHT SHOULDER: Primary | ICD-10-CM

## 2023-10-11 PROCEDURE — 97110 THERAPEUTIC EXERCISES: CPT | Mod: PO

## 2023-10-11 NOTE — PROGRESS NOTES
"[facility]   Physical Therapy Treatment Note    Discharge Summary    Name: Noelle Rivas  Clinic Number: [id number]    Therapy Diagnosis:   Encounter Diagnosis   Name Primary?    Decreased range of motion of right shoulder Yes     Physician: Giana De Leon MD    Visit Date: 10/11/2023  Physician Orders: PT Eval and Treat   Medical Diagnosis from Referral: C50.811,Z17.0 (ICD-10-CM) - Malignant neoplasm of overlapping sites of right breast in female, estrogen receptor positive  Evaluation Date: 8/22/2023  Authorization Period Expiration: 7/31/23-12/31/23  Plan of Care Expiration: 11/30/23  Visit # / Visits authorized: 2/ 20      Time In: 1430  Time Out: 1530  Total Billable Time: 55 minutes     Precautions: Diabetes and cancer    PTA Visit #: 0/5       Subjective     Patient reports: "I went to beginner yoga on Monday." Patient reports feeling fatigues after session however no pain or soreness afterwards.   She was compliant with home exercise program.  Response to previous treatment: ongoing  Functional change: ongoing    Pain: 0/10    Objective      Objective Measures updated at progress report unless specified.   Upper extremity range of motion:  JOINT RIGHT UE  8/22/23 LEFT UE  8/22/23 Right upper extremity  9/29/23 Right upper extremity  10/11/23 NORMAL RANGE   Shoulder flexion 180 ° 175 ° 90/124 180* 0-180 °   Shoulder abduction 180 ° 180 ° 64/90 180* 0-180 °   Shoulder internal rotation 70 ° 70 ° 70 70* 0-70 °   Shoulder external rotation 90 ° 90 ° 20 90* 0-90 °   Elbow flexion WFL ° WFL ° WFL WFL 0-150 °       Treatment     Noelle received the treatments listed below:      therapeutic exercises to develop strength, ROM, flexibility, posture, and core stabilization for 45 minutes including:    Finger ladder flexion x5  Finger ladder abduction x5  Standing bicep curls 3# dowel. 3x10  Standing chest press 3# dowel. 3x10  Standing tricep extension without weight. 3x10    Patient " Education and Home Exercises       Education provided:   -Continue home exercise program and maintain non-weightbearing yoga poses for two more weeks until reaching the 6 week post-surgery.   -Patient having questions about returning to work, activity, normal independent activities. Instructed her, she has full range of motion and strength, she has no physical restrictions or limitations after 6 week post-surgery. She verbalized understanding.  Patient has questions regarding right breast prosthetic, if she will need therapy after receiving it. Ensured patient should would not require therapy with use of breast prosthetic.  -provided education if any changes in mobility, strength, range of motion or endurance were to occur, to let her MD know for a new referral. Patient voiced understanding.    Written Home Exercises Provided: yes. Exercises were reviewed and Noelle was able to demonstrate them prior to the end of the session.  Noelle demonstrated good  understanding of the education provided. See Electronic Medical Record under Patient Instructions for exercises provided during therapy sessions    Assessment     Ms. Rivas has met all goals. She has returned to full range of motion with little to no pain. She continues to have signs of active healing along scar. Provided education on scar massage for Ms. Rivas to perform independently. Patient no longer requires physical therapy at this time.    Noelle Is progressing well towards her goals.   Patient prognosis is Good.     Patient will continue to benefit from skilled outpatient physical therapy to address the deficits listed in the problem list box on initial evaluation, provide pt/family education and to maximize pt's level of independence in the home and community environment.     Patient's spiritual, cultural and educational needs considered and pt agreeable to plan of care and goals.     Anticipated barriers to physical therapy: none     Goals:      1) patient to  be independent with home exercise program. MET  2) patient to increase shoulder flexion to 180degrees with no complaints of pain. MET  3) patient to be independent with self scar massage. MET  4) patient to increase right shoulder abduction to 180 degrees with no complaints of pain. MET  5) patient to report return to yoga with < 2-3/10 rate of pain. MET     Plan      Discharge physical therapy at this time.     Fide Bobo PT, DPT, CLT  10/11/2023

## 2023-10-12 ENCOUNTER — PATIENT MESSAGE (OUTPATIENT)
Dept: HEMATOLOGY/ONCOLOGY | Facility: CLINIC | Age: 63
End: 2023-10-12
Payer: COMMERCIAL

## 2023-10-12 LAB
FINAL PATHOLOGIC DIAGNOSIS: NORMAL
GROSS: NORMAL
Lab: NORMAL
MICROSCOPIC EXAM: NORMAL
SUPPLEMENTAL DIAGNOSIS: NORMAL

## 2023-10-16 ENCOUNTER — OFFICE VISIT (OUTPATIENT)
Dept: HEMATOLOGY/ONCOLOGY | Facility: CLINIC | Age: 63
End: 2023-10-16
Payer: COMMERCIAL

## 2023-10-16 ENCOUNTER — TELEPHONE (OUTPATIENT)
Dept: HEMATOLOGY/ONCOLOGY | Facility: CLINIC | Age: 63
End: 2023-10-16
Payer: COMMERCIAL

## 2023-10-16 VITALS
HEART RATE: 68 BPM | WEIGHT: 161.19 LBS | SYSTOLIC BLOOD PRESSURE: 121 MMHG | DIASTOLIC BLOOD PRESSURE: 80 MMHG | RESPIRATION RATE: 16 BRPM | OXYGEN SATURATION: 100 % | HEIGHT: 69 IN | BODY MASS INDEX: 23.87 KG/M2 | TEMPERATURE: 97 F

## 2023-10-16 DIAGNOSIS — Z17.0 MALIGNANT NEOPLASM OF OVERLAPPING SITES OF RIGHT BREAST IN FEMALE, ESTROGEN RECEPTOR POSITIVE: ICD-10-CM

## 2023-10-16 DIAGNOSIS — Z17.0 MALIGNANT NEOPLASM OF RIGHT BREAST, STAGE 1, ESTROGEN RECEPTOR POSITIVE: Primary | ICD-10-CM

## 2023-10-16 DIAGNOSIS — D68.51 FACTOR V LEIDEN MUTATION: ICD-10-CM

## 2023-10-16 DIAGNOSIS — C50.911 MALIGNANT NEOPLASM OF RIGHT BREAST, STAGE 1, ESTROGEN RECEPTOR POSITIVE: Primary | ICD-10-CM

## 2023-10-16 DIAGNOSIS — C50.811 MALIGNANT NEOPLASM OF OVERLAPPING SITES OF RIGHT BREAST IN FEMALE, ESTROGEN RECEPTOR POSITIVE: ICD-10-CM

## 2023-10-16 PROCEDURE — 1159F PR MEDICATION LIST DOCUMENTED IN MEDICAL RECORD: ICD-10-PCS | Mod: CPTII,S$GLB,, | Performed by: INTERNAL MEDICINE

## 2023-10-16 PROCEDURE — 99214 OFFICE O/P EST MOD 30 MIN: CPT | Mod: S$GLB,,, | Performed by: INTERNAL MEDICINE

## 2023-10-16 PROCEDURE — 1160F RVW MEDS BY RX/DR IN RCRD: CPT | Mod: CPTII,S$GLB,, | Performed by: INTERNAL MEDICINE

## 2023-10-16 PROCEDURE — 99999 PR PBB SHADOW E&M-EST. PATIENT-LVL IV: ICD-10-PCS | Mod: PBBFAC,,, | Performed by: INTERNAL MEDICINE

## 2023-10-16 PROCEDURE — 3074F PR MOST RECENT SYSTOLIC BLOOD PRESSURE < 130 MM HG: ICD-10-PCS | Mod: CPTII,S$GLB,, | Performed by: INTERNAL MEDICINE

## 2023-10-16 PROCEDURE — 99214 PR OFFICE/OUTPT VISIT, EST, LEVL IV, 30-39 MIN: ICD-10-PCS | Mod: S$GLB,,, | Performed by: INTERNAL MEDICINE

## 2023-10-16 PROCEDURE — 1160F PR REVIEW ALL MEDS BY PRESCRIBER/CLIN PHARMACIST DOCUMENTED: ICD-10-PCS | Mod: CPTII,S$GLB,, | Performed by: INTERNAL MEDICINE

## 2023-10-16 PROCEDURE — 3079F DIAST BP 80-89 MM HG: CPT | Mod: CPTII,S$GLB,, | Performed by: INTERNAL MEDICINE

## 2023-10-16 PROCEDURE — 99999 PR PBB SHADOW E&M-EST. PATIENT-LVL IV: CPT | Mod: PBBFAC,,, | Performed by: INTERNAL MEDICINE

## 2023-10-16 PROCEDURE — 3008F BODY MASS INDEX DOCD: CPT | Mod: CPTII,S$GLB,, | Performed by: INTERNAL MEDICINE

## 2023-10-16 PROCEDURE — 3074F SYST BP LT 130 MM HG: CPT | Mod: CPTII,S$GLB,, | Performed by: INTERNAL MEDICINE

## 2023-10-16 PROCEDURE — 3008F PR BODY MASS INDEX (BMI) DOCUMENTED: ICD-10-PCS | Mod: CPTII,S$GLB,, | Performed by: INTERNAL MEDICINE

## 2023-10-16 PROCEDURE — 1159F MED LIST DOCD IN RCRD: CPT | Mod: CPTII,S$GLB,, | Performed by: INTERNAL MEDICINE

## 2023-10-16 PROCEDURE — 3079F PR MOST RECENT DIASTOLIC BLOOD PRESSURE 80-89 MM HG: ICD-10-PCS | Mod: CPTII,S$GLB,, | Performed by: INTERNAL MEDICINE

## 2023-10-16 RX ORDER — ANASTROZOLE 1 MG/1
1 TABLET ORAL DAILY
Qty: 30 TABLET | Refills: 2 | Status: SHIPPED | OUTPATIENT
Start: 2023-10-16 | End: 2023-12-14 | Stop reason: SDUPTHER

## 2023-10-16 NOTE — TELEPHONE ENCOUNTER
"Called patient back and reviewed dr rasmussen's answers to her questions.  Informed patient juanjo and deana do not do dexas on weekends.  Patient wants to see dr traore tomorrow in clinic, as she doesn't feel comfortable with dr rasmussen, as "things weren't discussed clearly--and she needs more statistics."     She needs to know how much vit d and calcium she needs to be taking daily, as well.      Nurse spent 25 mins listening to patient reiterate all her concerns and complaints again.    Message routed to dr liang  "

## 2023-10-16 NOTE — PROGRESS NOTES
CONSULT NOTE    Subjective:       Patient ID: Noelle Riavs is a 62 y.o. female.  MRN: [id number]  : 1960    Chief Complaint: pT1b N0 grade 1 ER + NC + HER2 - Invasive Carcinoma of the right breast.    History of Present Illness:   Noelle Rivas is a 62 y.o. female who is referred for Right sided IDC, detected on a screening mammogram. Has previously seen Dr. Goetz and Dr. Jensen. See Onc history.     Follow-up mammogram (23) showed there is a focal asymmetry seen in the upper outer quadrant of the right breast in the middle depth. The asymmetry correlates with the screening mammogram finding. Associated features include architectural distortion.   Ultrasound (23) showed there is a 3 mm x 3 mm x 4 mm irregularly shaped, hypoechoic mass with angular margins seen in the right breast at 9 o'clock, 9 cm from the nipple.   An ultrasound guided biopsy was performed on 23 with pathology revealing infiltrating ductal carcinoma of the breast. Grade 1, %, NC 15%+, Her 2 1+, Ki 67 20%     Patient has personal history of breast cancer.  It was IDC gr 2, 1.3cm with clear margins following lumpectomy and SLNB.  Declined AI with Dr. Layne.  Completed XRT.    S/p Right mastectomy and sentinel node exam. Had a post op infection, needed antibiotics x 5 days.She completed PT.  Has started yoga.   Had anxiety associated with the diagnosis and treatments. Her father passed away earlier this year so she has had a tough year.      We reviewed pathology and oncotype testing today.     She has a factor V Leiden mutation. She is on low dose aspirin. Has not had any personal history of thrombosis or TIA.     GYN History:  Age of menarche was 14. Age of menopause was 58, natural. Patient denies hormonal therapy. Patient is .     Oncology History:  Oncology History   Malignant neoplasm of right breast, stage 1, estrogen receptor positive   2012  Initial Diagnosis    Malignant neoplasm of right breast, stage 1, estrogen receptor positive     6/11/2012 Surgery    Surgeon: Dr. Chavarria    She underwent lumpectomy and sentinel node biopsy. The pathology revealed invasive ductal carcinoma with mucin production, measuring 1.3 cm with the closest margin at 0.1 cm posteriorly. One out of one sentinel lymph node was negative for involvement. The lesion was ER positive, ME positive, and HER-2 negative.      - 9/27/2012 Radiation Therapy    Treating physician: Dr. Mayo  Total Dose: 66.4 Gy  Fractions: 36     Per prior Onc notes and updated.     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 The sentinel lymph node was negative. Her tumor was strongly ER positive, ME positive, and HER-2 negative. She received adjuvant radiation therapy but decided AGAINST tamoxifen therapy because she has documented factor V Leiden mutation and a questionable history of TIA. She was on low-dose aspirin.      she followed with [facility] oncology till 2016    9/6/23  Final Pathologic Diagnosis     1. Right breast, mastectomy:   Invasive ductal carcinoma, Grade 1 (tubules = 2, nuclei = 2, mitoses = 1), measuring 10 mm (1.0 cm) in greatest linear dimension   Intermediate grade ductal carcinoma in-situ (DCIS), cribriform pattern   All margins are negative for both invasive carcinoma and DCIS   Twirl shaped biopsy clip associated with invasive ductal carcinoma   Barbell shaped biopsy clip associated with dense fibrosis and fibrofatty tissue   Previous lumpectomy site with dense fibrosis   Calcifications associated with nonneoplastic breast   Unremarkable skin and nipple   See surgical pathology cancer case summary below   Best tumor block: 1B     2. Right axillary sentinel lymph node hot 220, excision:   1 lymph node with no evidence of metastatic carcinoma (0/1)   Multiple H&E levels were examined.  Immunohistochemical  stains for AE1/AE3, WSK, and Cam 5.2 were performed with adequate controls and are negative          History:  Past Medical History:   Diagnosis Date    Abnormal TSH 06/05/2018    Age-related nuclear cataract of both eyes 08/03/2022    Allergy     Anxiety     Anxiety 12/17/2012    BRCA1 negative 07/23/19    BRCA2 negative 07/23/19    Breast cancer 2012    right    Calcium nephrolithiasis     Complex endometrial hyperplasia without atypia 06/16/2016    Degenerative disc disease     DJD (degenerative joint disease) of knee     Endometrial hyperplasia, unspecified: see evaluation 2017 05/22/2018    Factor V Leiden mutation No h/o DVT    Kidney stones     Leukopenia 12/19/2012    PMB (postmenopausal bleeding) 03/06/2017    PONV (postoperative nausea and vomiting)     10/28/22 - severe ponv    Stroke     questionable  - TIA    Vitamin B 12 deficiency       Past Surgical History:   Procedure Laterality Date    BREAST BIOPSY Right 2012    malignant, radiation    BREAST BIOPSY Right 2014    benign    BREAST LUMPECTOMY Right 06/11/2012    w/ radiation    BREAST LUMPECTOMY W/ NEEDLE LOCALIZATION      CYSTOSCOPY W/ URETERAL STENT PLACEMENT Left 10/28/2022    Procedure: CYSTOSCOPY, WITH URETERAL STENT INSERTION;  Surgeon: Ileana Souza MD;  Location: [facility] OR;  Service: Urology;  Laterality: Left;  6x26    CYSTOSCOPY W/ URETERAL STENT REMOVAL Left 10/28/2022    Procedure: CYSTOSCOPY, WITH URETERAL STENT REMOVAL;  Surgeon: Ileana Souza MD;  Location: [facility] OR;  Service: Urology;  Laterality: Left;    DILATION AND CURETTAGE OF UTERUS      INJECTION FOR SENTINEL NODE IDENTIFICATION Right 9/6/2023    Procedure: INJECTION, FOR SENTINEL NODE IDENTIFICATION;  Surgeon: Giana De Leon MD;  Location: [facility] OR;  Service: General;  Laterality: Right;    MASTECTOMY Right 9/6/2023    Procedure: MASTECTOMY RIGHT;  Surgeon: Giana De Leon MD;  Location: [facility] OR;  Service: General;  Laterality: Right;  2.5 HRS    SENTINEL LYMPH NODE  BIOPSY Right 2023    Procedure: BIOPSY, LYMPH NODE, SENTINEL;  Surgeon: Giana De Leon MD;  Location: [facility] OR;  Service: General;  Laterality: Right;    URETERAL STENT PLACEMENT N/A 10/14/2022    Procedure: INSERTION, STENT, URETER;  Surgeon: Ileana Souza MD;  Location: [facility] OR;  Service: Urology;  Laterality: N/A;  6x26 soft    URETEROSCOPIC REMOVAL OF URETERIC CALCULUS Left 10/14/2022    Procedure: REMOVAL, CALCULUS, URETER, URETEROSCOPIC;  Surgeon: Ileana Souza MD;  Location: [facility] OR;  Service: Urology;  Laterality: Left;    URETEROSCOPIC REMOVAL OF URETERIC CALCULUS Left 10/28/2022    Procedure: REMOVAL, CALCULUS, URETER, URETEROSCOPIC;  Surgeon: Ileana Souza MD;  Location: [facility] OR;  Service: Urology;  Laterality: Left;     Family History   Problem Relation Age of Onset    Hypertension Father     Coronary artery disease Father     Cancer Father         prostate ca, metastatic    Heart disease Father         stenting    Kidney disease Father         stones    Retinal detachment Father     Colon cancer Paternal Grandfather     Melanoma Mother     Cancer Mother         melanoma    Hyperlipidemia Brother     Keratoconus Brother     No Known Problems Brother     Stroke Maternal Grandmother     Lung cancer Maternal Grandfather     Breast cancer Paternal Grandmother          at age 62    Ovarian cancer Neg Hx     Uterine cancer Neg Hx     Psoriasis Neg Hx     Lupus Neg Hx     Eczema Neg Hx       Social History     Tobacco Use    Smoking status: Never    Smokeless tobacco: Never   Substance and Sexual Activity    Alcohol use: Not Currently    Drug use: No    Sexual activity: Not Currently     Birth control/protection: None        ROS:   Review of Systems   Constitutional:  Negative for fever, malaise/fatigue and weight loss.   HENT:  Negative for congestion, hearing loss, nosebleeds and sore throat.    Eyes:  Negative for double vision and photophobia.   Respiratory:  Negative for cough,  "hemoptysis, sputum production, shortness of breath and wheezing.    Cardiovascular:  Negative for chest pain, palpitations, orthopnea and leg swelling.   Gastrointestinal:  Negative for abdominal pain, blood in stool, constipation, diarrhea, heartburn, nausea and vomiting.   Genitourinary:  Negative for dysuria, frequency, hematuria and urgency.   Musculoskeletal:  Negative for back pain, joint pain and myalgias.   Skin:  Negative for itching and rash.   Neurological:  Negative for dizziness, tingling, seizures, weakness and headaches.   Endo/Heme/Allergies:  Negative for polydipsia. Does not bruise/bleed easily.   Psychiatric/Behavioral:  Negative for depression and memory loss. The patient is nervous/anxious. The patient does not have insomnia.         Objective:     Vitals:    10/16/23 1316   BP: 121/80   Pulse: 68   Resp: 16   Temp: 97.1 °F (36.2 °C)   TempSrc: Temporal   SpO2: 100%   Weight: 73.1 kg (161 lb 2.5 oz)   Height: 5' 9" (1.753 m)   PainSc: 0-No pain       Physical Examination:   Physical Exam  Vitals and nursing note reviewed.   Constitutional:       General: She is not in acute distress.     Appearance: She is not diaphoretic.   HENT:      Head: Normocephalic.   Eyes:      General: No scleral icterus.     Conjunctiva/sclera: Conjunctivae normal.   Neck:      Thyroid: No thyromegaly.   Cardiovascular:      Rate and Rhythm: Normal rate.   Pulmonary:      Effort: Pulmonary effort is normal.   Abdominal:      General: Bowel sounds are normal.   Musculoskeletal:         General: Normal range of motion.   Skin:     General: Skin is warm and dry.      Comments: Breast exam deferred today.    Neurological:      Mental Status: She is alert and oriented to person, place, and time.      Cranial Nerves: No cranial nerve deficit.      Coordination: Coordination normal.      Gait: Gait is intact.   Psychiatric:         Mood and Affect: Affect normal.         Cognition and Memory: Memory normal.      "     Diagnostic Tests:  Significant Imaging: I have reviewed and interpreted all pertinent imaging results/findings.      Laboratory Data:  All pertinent labs have been reviewed.    Labs:   Lab Results   Component Value Date    WBC 3.99 09/01/2023    HGB 13.6 09/01/2023    HCT 41.2 09/01/2023    MCV 99 (H) 09/01/2023     09/01/2023       Assessment/Plan:   Malignant neoplasm of right breast, stage 1, estrogen receptor positive  pT1b pN0 Grade 1, %, [place] 15%+, Her 2 1+, Ki 67 20%   Oncotype DX score is 19    S/p mastectomy and sentinel node exam.   Oncotype is 19, intermediate risk with 6% risk of distant risk of recurrence at 9 years.     We discussed the role of adjuvant endocrine therapy to reduce to risk of recurrence by 50% in the next 10 years.   Side effects including fatigue, hot flashes, bone loss were discussed. Rare side effects including risk of thrombosis, heart disease, rash, mood changes were also discussed.     She wants to think about her options. Concerned about risk of thrombosis. If she agrees, AI will be preferred. Will obtain a baseline DEXA scan to assess for bone health. She would like to schedule at her own convenience.   Will avoid Tamoxifen given heterozygous Factor V Leiden mutation.     Anastrozole script sent to pharmacy as per her request and we discussed her that we will review DEXA results together before she makes a final decision whether to take the medication or not.     By the time of this dictation, she has transferred care to Dr. Goetz.       -     DXA Bone Density Axial Skeleton 1 or more sites; Future; Expected date: 10/16/2023  -     anastrozole (ARIMIDEX) 1 mg Tab; Take 1 tablet (1 mg total) by mouth once daily.  Dispense: 30 tablet; Refill: 2    Malignant neoplasm of overlapping sites of right breast in female, estrogen receptor positive  Prior history noted.     Factor V Leiden mutation  No personal h/o Thrombosis.      ECOG SCORE    0 - Fully active-able  to carry on all pre-disease performance without restriction       I spent 81 minutes on this encounter.     Discussion:   No follow-ups on file.    Plan was discussed with the patient at length, and she verbalized understanding. Noelle was given an opportunity to ask questions that were answered to her satisfaction, and she was advised to call in the interval if any problems or questions arise.    Electronically signed by Matilde Whitehead MD      Answers submitted by the patient for this visit:  Review of Systems Questionnaire (Submitted on 10/12/2023)  appetite change : No  unexpected weight change: No  mouth sores: No  visual disturbance: No  adenopathy: No      Route Chart for Scheduling    Med Onc Chart Routing      Follow up with physician No follow up needed.   Follow up with RENÉ    Infusion scheduling note    Injection scheduling note    Labs    Imaging DXA scan   in [place], patient will schedule   Pharmacy appointment    Other referrals

## 2023-10-16 NOTE — TELEPHONE ENCOUNTER
"Spoke with patient.  She is extremely upset, as she was notified via text she is scheduled for dexa and dr rasmussen appointment in jan/feb of next year. She states, "I told dr rasmussen I wanted to schedule the scan on my own. Why is it scheduled for so far away? This is not what we discussed. I am so confused. I wasn't even called. I don't understand what is going on."    Patient vented to nurse for about 15 mins stating the above in several different ways. Patient states she is busy and has a life--and after such a long visit with the MD, she would have thought the plan would have been clear but it is not---and now she has concerns.      Nurse attempted to calm patient down---this was unsuccessful.     She wants to know when dexa needs to be scheduled---now or in jan/feb  How will she get results and when/what day  Can she do scan at [facility] on a weekend  What on the scan determines whether she will start the medication?  "

## 2023-10-16 NOTE — TELEPHONE ENCOUNTER
----- Message from Jimena Felix, Patient Care Assistant sent at 10/16/2023  3:40 PM CDT -----  Type: Needs Medical Advice  Who Called:  janes Greer Call Back Number: [phone]    Additional Information: patient  just left and is upset confused about appointments and would like to speak to someone  please call to further discuss thank you

## 2023-10-17 ENCOUNTER — OFFICE VISIT (OUTPATIENT)
Dept: HEMATOLOGY/ONCOLOGY | Facility: CLINIC | Age: 63
End: 2023-10-17
Payer: COMMERCIAL

## 2023-10-17 VITALS
HEART RATE: 60 BPM | TEMPERATURE: 97 F | SYSTOLIC BLOOD PRESSURE: 111 MMHG | BODY MASS INDEX: 23.8 KG/M2 | WEIGHT: 160.69 LBS | RESPIRATION RATE: 16 BRPM | DIASTOLIC BLOOD PRESSURE: 62 MMHG | HEIGHT: 69 IN | OXYGEN SATURATION: 100 %

## 2023-10-17 DIAGNOSIS — D68.51 FACTOR V LEIDEN MUTATION: ICD-10-CM

## 2023-10-17 DIAGNOSIS — Z17.0 MALIGNANT NEOPLASM OF RIGHT BREAST, STAGE 1, ESTROGEN RECEPTOR POSITIVE: Primary | ICD-10-CM

## 2023-10-17 DIAGNOSIS — C50.911 MALIGNANT NEOPLASM OF RIGHT BREAST, STAGE 1, ESTROGEN RECEPTOR POSITIVE: Primary | ICD-10-CM

## 2023-10-17 PROCEDURE — 3074F SYST BP LT 130 MM HG: CPT | Mod: CPTII,S$GLB,, | Performed by: INTERNAL MEDICINE

## 2023-10-17 PROCEDURE — 3008F BODY MASS INDEX DOCD: CPT | Mod: CPTII,S$GLB,, | Performed by: INTERNAL MEDICINE

## 2023-10-17 PROCEDURE — 3078F DIAST BP <80 MM HG: CPT | Mod: CPTII,S$GLB,, | Performed by: INTERNAL MEDICINE

## 2023-10-17 PROCEDURE — 3078F PR MOST RECENT DIASTOLIC BLOOD PRESSURE < 80 MM HG: ICD-10-PCS | Mod: CPTII,S$GLB,, | Performed by: INTERNAL MEDICINE

## 2023-10-17 PROCEDURE — 99999 PR PBB SHADOW E&M-EST. PATIENT-LVL IV: CPT | Mod: PBBFAC,,, | Performed by: INTERNAL MEDICINE

## 2023-10-17 PROCEDURE — 1159F PR MEDICATION LIST DOCUMENTED IN MEDICAL RECORD: ICD-10-PCS | Mod: CPTII,S$GLB,, | Performed by: INTERNAL MEDICINE

## 2023-10-17 PROCEDURE — 99999 PR PBB SHADOW E&M-EST. PATIENT-LVL IV: ICD-10-PCS | Mod: PBBFAC,,, | Performed by: INTERNAL MEDICINE

## 2023-10-17 PROCEDURE — 99215 PR OFFICE/OUTPT VISIT, EST, LEVL V, 40-54 MIN: ICD-10-PCS | Mod: S$GLB,,, | Performed by: INTERNAL MEDICINE

## 2023-10-17 PROCEDURE — 99215 OFFICE O/P EST HI 40 MIN: CPT | Mod: S$GLB,,, | Performed by: INTERNAL MEDICINE

## 2023-10-17 PROCEDURE — 3008F PR BODY MASS INDEX (BMI) DOCUMENTED: ICD-10-PCS | Mod: CPTII,S$GLB,, | Performed by: INTERNAL MEDICINE

## 2023-10-17 PROCEDURE — 1159F MED LIST DOCD IN RCRD: CPT | Mod: CPTII,S$GLB,, | Performed by: INTERNAL MEDICINE

## 2023-10-17 PROCEDURE — 3074F PR MOST RECENT SYSTOLIC BLOOD PRESSURE < 130 MM HG: ICD-10-PCS | Mod: CPTII,S$GLB,, | Performed by: INTERNAL MEDICINE

## 2023-10-17 NOTE — PROGRESS NOTES
Subjective     Patient ID: Noelle Rivas is a 62 y.o. female.    Chief Complaint: No chief complaint on file.    HPI    Mrs. Rivas presents today for follow-up.  The last time I had seen her was in 2016.  She has a remote history of a right-sided grade stage I carcinoma (T1c N0 M0, ER positive HER 2 negative, 1.3 cm in diameter) for which she had undergone lumpectomy followed by radiation therapy.  She had declined adjuvant treatment due to her concern about thrombosis given the fact that she is heterozygous for factor 5 Leiden.      Apparently she was recently diagnosed with an ipsilateral breast cancer and she underwent a right mastectomy on September 6, 2023 that showed a 10 mm grade 1 carcinoma.  The sentinel lymph node was negative.  Her course was complicated by a soft tissue infection for which she was treated with antibiotics.  She has now fully recovered from the operation.    An Oncotype was sent and the score was 19 indicating a 94% chance of disease-free survival at 9 years with adjuvant hormonal therapy.    Mrs. Rivas was seen by Dr. Whitehead yesterday.  I have reviewed her note.  She presents to me for a 2nd opinion since I was her treating physician for the 1st occurrence      Review of Systems  Overall she feels well.  Her only complaint is mild discomfort in the right axilla.  Her incision has healed and her infection has all but resolved.  Her ECOG performance status is 1.  She is somewhat anxious with the reoccurrence but she denies any depression, easy bruising, fevers, chills, night  sweats, weight loss, nausea, vomiting, diarrhea, constipation, diplopia, blurred vision, headache, chest pain, palpitations, shortness of breath, breast pain, abdominal pain, extremity pain, or difficulty ambulating.  The remainder of the ten-point ROS, including general, skin, lymph, H/N, cardiorespiratory, GI, , Neuro, Endocrine, and psychiatric is negative.       Physical Exam    She is alert, oriented to  time, place, person, pleasant, well      nourished, in no acute physical distress.                                    VITAL SIGNS:  Reviewed                                      HEENT:  Normal.  There are no nasal, oral, lip, gingival, auricular, lid,    or conjunctival lesions.  Mucosae are moist and pink, and there is no        thrush.  Pupils are equal, reactive to light and accommodation.              Extraocular muscle movements are intact.  Dentition is good.                                       NECK:  Supple without JVD, adenopathy, or thyromegaly.                       LUNGS:  Clear to auscultation without wheezing, rales, or rhonchi.           CARDIOVASCULAR:  Reveals an S1, S2, no murmurs, no rubs, no gallops.         ABDOMEN:  Soft, nontender, without organomegaly.  Bowel sounds are    present.                                                                     EXTREMITIES:  No cyanosis, clubbing, or edema.                               BREASTS:  She is status post right mastectomy with a well-healed incision.  There is mild erythema around her incision.    There are no masses in the left breast.                                      LYMPHATIC:  There is no cervical, axillary, or supraclavicular adenopathy.   SKIN:  Warm and moist, without petechiae, rashes, induration, or ecchymoses.           NEUROLOGIC:  DTRs are 0-1+ bilaterally, symmetrical, motor function is 5/5,  and cranial nerves are  within normal limits.      Assessment and Plan   Remote history of breast cancer status post lumpectomy and radiation therapy now with a recent ipsilateral recurrence versus a new primary.  Her new cancer is a T1b N0 M0 ER positive HER2 negative, grade 1 stage I.   2.   Heterozygous state for factor 5 Leiden      PLAN    I had a very long discussion with Mrs. Rivas.  We went over the specifics of her recent diagnosis.  We discussed the Oncotype results in detail.  I explained to her that she has approximately a 90%  chance of disease-free survival at 9 years just with surgery, however, she will come up to 95% chance of DFS if she completes 5 years of adjuvant hormonal therapy.  That 5% benefit clearly outweighs the risk of thrombosis which is calculated at 1%, even though in her case it may be somewhat higher given the fact that she is heterozygous for the factor 5 Leiden.  I recommended that she consider 5 years of adjuvant hormonal therapy which she had declined at the time of her initial diagnosis.  The pros and cons of such an approach were discussed in detail.      After a long deliberation she stated that she would be willing to start the anastrozole.  She has already been given a prescription by Dr. Whitehead.  I asked her to start taking the anastrozole on the 1st of November and I will see her in mid December for follow-up.  She will also have a bone density scan that has been tentatively scheduled for tomorrow.  Her multiple questions were answered to her satisfaction.  I spent approximately 45 minutes reviewing the available records and evaluating the patient, out of which over 50% of the time was spent face to face with the patient in counseling and coordinating this patient's care.

## 2023-10-18 ENCOUNTER — HOSPITAL ENCOUNTER (OUTPATIENT)
Dept: RADIOLOGY | Facility: CLINIC | Age: 63
Discharge: HOME | End: 2023-10-18
Attending: INTERNAL MEDICINE
Payer: COMMERCIAL

## 2023-10-18 ENCOUNTER — PATIENT MESSAGE (OUTPATIENT)
Dept: HEMATOLOGY/ONCOLOGY | Facility: CLINIC | Age: 63
End: 2023-10-18
Payer: COMMERCIAL

## 2023-10-18 DIAGNOSIS — M81.0 OSTEOPOROSIS, UNSPECIFIED OSTEOPOROSIS TYPE, UNSPECIFIED PATHOLOGICAL FRACTURE PRESENCE: Primary | ICD-10-CM

## 2023-10-18 DIAGNOSIS — Z17.0 MALIGNANT NEOPLASM OF RIGHT BREAST, STAGE 1, ESTROGEN RECEPTOR POSITIVE: ICD-10-CM

## 2023-10-18 DIAGNOSIS — C50.911 MALIGNANT NEOPLASM OF RIGHT BREAST, STAGE 1, ESTROGEN RECEPTOR POSITIVE: ICD-10-CM

## 2023-10-18 PROCEDURE — 77080 DXA BONE DENSITY AXIAL: CPT | Mod: 26,,, | Performed by: RADIOLOGY

## 2023-10-18 PROCEDURE — 77080 DXA BONE DENSITY AXIAL SKELETON 1 OR MORE SITES: ICD-10-PCS | Mod: 26,,, | Performed by: RADIOLOGY

## 2023-10-18 PROCEDURE — 77080 DXA BONE DENSITY AXIAL: CPT | Mod: TC,PO

## 2023-10-19 RX ORDER — ALENDRONATE SODIUM 70 MG/1
70 TABLET ORAL
Qty: 4 TABLET | Refills: 11 | Status: SHIPPED | OUTPATIENT
Start: 2023-10-19 | End: 2024-10-18

## 2023-11-14 ENCOUNTER — PATIENT MESSAGE (OUTPATIENT)
Dept: ADMINISTRATIVE | Facility: HOSPITAL | Age: 63
End: 2023-11-14
Payer: COMMERCIAL

## 2023-11-21 ENCOUNTER — TELEPHONE (OUTPATIENT)
Dept: OPHTHALMOLOGY | Facility: CLINIC | Age: 63
End: 2023-11-21
Payer: COMMERCIAL

## 2023-12-11 ENCOUNTER — PATIENT MESSAGE (OUTPATIENT)
Dept: HEMATOLOGY/ONCOLOGY | Facility: CLINIC | Age: 63
End: 2023-12-11
Payer: COMMERCIAL

## 2023-12-14 ENCOUNTER — OFFICE VISIT (OUTPATIENT)
Dept: HEMATOLOGY/ONCOLOGY | Facility: CLINIC | Age: 63
End: 2023-12-14
Payer: COMMERCIAL

## 2023-12-14 VITALS
SYSTOLIC BLOOD PRESSURE: 110 MMHG | OXYGEN SATURATION: 99 % | TEMPERATURE: 97 F | DIASTOLIC BLOOD PRESSURE: 70 MMHG | HEART RATE: 76 BPM | BODY MASS INDEX: 24.51 KG/M2 | WEIGHT: 166 LBS

## 2023-12-14 DIAGNOSIS — Z17.0 MALIGNANT NEOPLASM OF RIGHT BREAST, STAGE 1, ESTROGEN RECEPTOR POSITIVE: Primary | ICD-10-CM

## 2023-12-14 DIAGNOSIS — C50.911 MALIGNANT NEOPLASM OF RIGHT BREAST, STAGE 1, ESTROGEN RECEPTOR POSITIVE: Primary | ICD-10-CM

## 2023-12-14 DIAGNOSIS — Z79.811 PROPHYLACTIC USE OF ANASTROZOLE (ARIMIDEX): ICD-10-CM

## 2023-12-14 PROCEDURE — 3074F PR MOST RECENT SYSTOLIC BLOOD PRESSURE < 130 MM HG: ICD-10-PCS | Mod: CPTII,S$GLB,, | Performed by: INTERNAL MEDICINE

## 2023-12-14 PROCEDURE — 3008F BODY MASS INDEX DOCD: CPT | Mod: CPTII,S$GLB,, | Performed by: INTERNAL MEDICINE

## 2023-12-14 PROCEDURE — 3008F PR BODY MASS INDEX (BMI) DOCUMENTED: ICD-10-PCS | Mod: CPTII,S$GLB,, | Performed by: INTERNAL MEDICINE

## 2023-12-14 PROCEDURE — 3078F PR MOST RECENT DIASTOLIC BLOOD PRESSURE < 80 MM HG: ICD-10-PCS | Mod: CPTII,S$GLB,, | Performed by: INTERNAL MEDICINE

## 2023-12-14 PROCEDURE — 99214 OFFICE O/P EST MOD 30 MIN: CPT | Mod: S$GLB,,, | Performed by: INTERNAL MEDICINE

## 2023-12-14 PROCEDURE — 3078F DIAST BP <80 MM HG: CPT | Mod: CPTII,S$GLB,, | Performed by: INTERNAL MEDICINE

## 2023-12-14 PROCEDURE — 99999 PR PBB SHADOW E&M-EST. PATIENT-LVL III: ICD-10-PCS | Mod: PBBFAC,,, | Performed by: INTERNAL MEDICINE

## 2023-12-14 PROCEDURE — 1159F MED LIST DOCD IN RCRD: CPT | Mod: CPTII,S$GLB,, | Performed by: INTERNAL MEDICINE

## 2023-12-14 PROCEDURE — 99214 PR OFFICE/OUTPT VISIT, EST, LEVL IV, 30-39 MIN: ICD-10-PCS | Mod: S$GLB,,, | Performed by: INTERNAL MEDICINE

## 2023-12-14 PROCEDURE — 1159F PR MEDICATION LIST DOCUMENTED IN MEDICAL RECORD: ICD-10-PCS | Mod: CPTII,S$GLB,, | Performed by: INTERNAL MEDICINE

## 2023-12-14 PROCEDURE — 99999 PR PBB SHADOW E&M-EST. PATIENT-LVL III: CPT | Mod: PBBFAC,,, | Performed by: INTERNAL MEDICINE

## 2023-12-14 PROCEDURE — 3074F SYST BP LT 130 MM HG: CPT | Mod: CPTII,S$GLB,, | Performed by: INTERNAL MEDICINE

## 2023-12-14 RX ORDER — ANASTROZOLE 1 MG/1
1 TABLET ORAL DAILY
Qty: 90 TABLET | Refills: 3 | Status: SHIPPED | OUTPATIENT
Start: 2023-12-14 | End: 2024-12-13

## 2023-12-14 NOTE — PROGRESS NOTES
Subjective     Patient ID: Noelle Rivas is a 62 y.o. female.    Chief Complaint: No chief complaint on file.    HPI    Mrs. Rivas presents today for follow-up.  The last time I had seen her was on October 17, 2023.  As of November 1, 2023 she has been on anastrozole in the adjuvant setting and has tolerated it well so far.  She has a remote history of a right-sided grade stage I carcinoma (T1c N0 M0, ER positive HER 2 negative, 1.3 cm in diameter) for which she had undergone lumpectomy followed by radiation therapy.  She had declined adjuvant treatment due to her concern about thrombosis given the fact that she is heterozygous for factor 5 Leiden.      Apparently she was recently diagnosed with an ipsilateral breast cancer and she underwent a right mastectomy on September 6, 2023 that showed a 10 mm grade 1 carcinoma.  The sentinel lymph node was negative.  Her course was complicated by a soft tissue infection for which she was treated with antibiotics.  She has now fully recovered from the operation.    An Oncotype was sent and the score was 19 indicating a 94% chance of disease-free survival at 9 years with adjuvant hormonal therapy.    ROS: Overall she feels well.  She does not report any significant musculoskeletal complaints since she started on the anastrozole.  However, she states that she fell for the 1st 1-2 weeks that she was in a fog and had mild difficulty with finding occasional words.  These symptoms have since decreased    Her ECOG performance status is 1.  She is somewhat anxious with the reoccurrence but she denies any depression, easy bruising, fevers, chills, night  sweats, weight loss, nausea, vomiting, diarrhea, constipation, diplopia, blurred vision, headache, chest pain, palpitations, shortness of breath, breast pain, abdominal pain, extremity pain, or difficulty ambulating.  The remainder of the ten-point ROS, including general, skin, lymph, H/N, cardiorespiratory, GI, , Neuro,  Endocrine, and psychiatric is negative.       Physical Exam    She is alert, oriented to time, place, person, pleasant, well      nourished, in no acute physical distress.                                    VITAL SIGNS:  Reviewed                                      HEENT:  Normal.  There are no nasal, oral, lip, gingival, auricular, lid,    or conjunctival lesions.  Mucosae are moist and pink, and there is no        thrush.  Pupils are equal, reactive to light and accommodation.              Extraocular muscle movements are intact.  Dentition is good.                                       NECK:  Supple without JVD, adenopathy, or thyromegaly.                       LUNGS:  Clear to auscultation without wheezing, rales, or rhonchi.           CARDIOVASCULAR:  Reveals an S1, S2, no murmurs, no rubs, no gallops.         ABDOMEN:  Soft, nontender, without organomegaly.  Bowel sounds are    present.                                                                     EXTREMITIES:  No cyanosis, clubbing, or edema.                               BREASTS:  She is status post right mastectomy with a well-healed incision.  There is mild erythema around her incision.    There are no masses in the left breast.                                      LYMPHATIC:  There is no cervical, axillary, or supraclavicular adenopathy.   SKIN:  Warm and moist, without petechiae, rashes, induration, or ecchymoses.           NEUROLOGIC:  DTRs are 0-1+ bilaterally, symmetrical, motor function is 5/5,  and cranial nerves are  within normal limits.      Assessment and Plan   Remote history of breast cancer status post lumpectomy and radiation therapy now with a recent ipsilateral recurrence versus a new primary.  Her new cancer is a T1b N0 M0 ER positive HER2 negative, grade 1 stage I.   2.   Heterozygous state for factor 5 Leiden      PLAN    I had a very long discussion with Mrs. Rivas.  We went over the guidelines for follow-up.  We also discussed  the alternatives of letrozole and exemestane.  I recommended that she remain on anastrozole for now unless her symptoms really worsen.  She is agreeable.   In regards to her osteoporosis, she is on vitamin-D replacement therapy and Fosamax on a weekly basis.  She is not on calcium due to the prior history of renal stones.  She will be due for another DXA scan in October of 2025.  Her multiple questions were answered to her satisfaction.  I spent approximately 30 minutes reviewing the available records and evaluating the patient, out of which over 50% of the time was spent face to face with the patient in counseling and coordinating this patient's care.

## 2024-01-10 ENCOUNTER — PATIENT MESSAGE (OUTPATIENT)
Dept: HEMATOLOGY/ONCOLOGY | Facility: CLINIC | Age: 64
End: 2024-01-10
Payer: COMMERCIAL

## 2024-01-10 ENCOUNTER — OFFICE VISIT (OUTPATIENT)
Dept: OPHTHALMOLOGY | Facility: CLINIC | Age: 64
End: 2024-01-10
Payer: COMMERCIAL

## 2024-01-10 DIAGNOSIS — H25.13 AGE-RELATED NUCLEAR CATARACT OF BOTH EYES: ICD-10-CM

## 2024-01-10 DIAGNOSIS — H43.821 VITREOMACULAR ADHESION, RIGHT EYE: ICD-10-CM

## 2024-01-10 DIAGNOSIS — H43.812 POSTERIOR VITREOUS DETACHMENT, LEFT EYE: Primary | ICD-10-CM

## 2024-01-10 DIAGNOSIS — H04.123 DRY EYE SYNDROME OF BOTH EYES: ICD-10-CM

## 2024-01-10 PROCEDURE — 92134 CPTRZ OPH DX IMG PST SGM RTA: CPT | Mod: S$GLB,,, | Performed by: OPHTHALMOLOGY

## 2024-01-10 PROCEDURE — 92202 OPSCPY EXTND ON/MAC DRAW: CPT | Mod: 59,S$GLB,, | Performed by: OPHTHALMOLOGY

## 2024-01-10 PROCEDURE — 1159F MED LIST DOCD IN RCRD: CPT | Mod: CPTII,S$GLB,, | Performed by: OPHTHALMOLOGY

## 2024-01-10 PROCEDURE — 92014 COMPRE OPH EXAM EST PT 1/>: CPT | Mod: S$GLB,,, | Performed by: OPHTHALMOLOGY

## 2024-01-10 PROCEDURE — 1160F RVW MEDS BY RX/DR IN RCRD: CPT | Mod: CPTII,S$GLB,, | Performed by: OPHTHALMOLOGY

## 2024-01-10 PROCEDURE — 99999 PR PBB SHADOW E&M-EST. PATIENT-LVL III: CPT | Mod: PBBFAC,,, | Performed by: OPHTHALMOLOGY

## 2024-02-20 ENCOUNTER — OFFICE VISIT (OUTPATIENT)
Dept: INTERNAL MEDICINE | Facility: CLINIC | Age: 64
End: 2024-02-20
Payer: COMMERCIAL

## 2024-02-20 VITALS
HEIGHT: 69 IN | WEIGHT: 165.81 LBS | DIASTOLIC BLOOD PRESSURE: 50 MMHG | HEART RATE: 72 BPM | BODY MASS INDEX: 24.56 KG/M2 | SYSTOLIC BLOOD PRESSURE: 110 MMHG

## 2024-02-20 DIAGNOSIS — N85.00 ENDOMETRIAL HYPERPLASIA, UNSPECIFIED: ICD-10-CM

## 2024-02-20 DIAGNOSIS — Z79.811 PROPHYLACTIC USE OF ANASTROZOLE (ARIMIDEX): ICD-10-CM

## 2024-02-20 DIAGNOSIS — D70.0 CONGENITAL NEUTROPENIA: ICD-10-CM

## 2024-02-20 DIAGNOSIS — M81.8 OTHER OSTEOPOROSIS WITHOUT CURRENT PATHOLOGICAL FRACTURE: ICD-10-CM

## 2024-02-20 DIAGNOSIS — D68.51 FACTOR V LEIDEN MUTATION: ICD-10-CM

## 2024-02-20 DIAGNOSIS — Z00.00 ANNUAL PHYSICAL EXAM: Primary | ICD-10-CM

## 2024-02-20 DIAGNOSIS — Z80.8 FAMILY HISTORY OF MALIGNANT MELANOMA: ICD-10-CM

## 2024-02-20 DIAGNOSIS — C50.811 MALIGNANT NEOPLASM OF OVERLAPPING SITES OF RIGHT BREAST IN FEMALE, ESTROGEN RECEPTOR POSITIVE: ICD-10-CM

## 2024-02-20 DIAGNOSIS — Z17.0 MALIGNANT NEOPLASM OF OVERLAPPING SITES OF RIGHT BREAST IN FEMALE, ESTROGEN RECEPTOR POSITIVE: ICD-10-CM

## 2024-02-20 DIAGNOSIS — Z12.11 COLON CANCER SCREENING: ICD-10-CM

## 2024-02-20 DIAGNOSIS — E53.8 VITAMIN B 12 DEFICIENCY: ICD-10-CM

## 2024-02-20 DIAGNOSIS — Z17.0 ESTROGEN RECEPTOR POSITIVE STATUS (ER+): ICD-10-CM

## 2024-02-20 DIAGNOSIS — N20.0 KIDNEY STONES: ICD-10-CM

## 2024-02-20 DIAGNOSIS — E55.9 MILD VITAMIN D DEFICIENCY: ICD-10-CM

## 2024-02-20 PROCEDURE — 3078F DIAST BP <80 MM HG: CPT | Mod: CPTII,S$GLB,, | Performed by: INTERNAL MEDICINE

## 2024-02-20 PROCEDURE — 99396 PREV VISIT EST AGE 40-64: CPT | Mod: 25,S$GLB,, | Performed by: INTERNAL MEDICINE

## 2024-02-20 PROCEDURE — 3074F SYST BP LT 130 MM HG: CPT | Mod: CPTII,S$GLB,, | Performed by: INTERNAL MEDICINE

## 2024-02-20 PROCEDURE — 90677 PCV20 VACCINE IM: CPT | Mod: S$GLB,,, | Performed by: INTERNAL MEDICINE

## 2024-02-20 PROCEDURE — 3008F BODY MASS INDEX DOCD: CPT | Mod: CPTII,S$GLB,, | Performed by: INTERNAL MEDICINE

## 2024-02-20 PROCEDURE — 1159F MED LIST DOCD IN RCRD: CPT | Mod: CPTII,S$GLB,, | Performed by: INTERNAL MEDICINE

## 2024-02-20 PROCEDURE — 90471 IMMUNIZATION ADMIN: CPT | Mod: S$GLB,,, | Performed by: INTERNAL MEDICINE

## 2024-02-20 PROCEDURE — 99999 PR PBB SHADOW E&M-EST. PATIENT-LVL IV: CPT | Mod: PBBFAC,,, | Performed by: INTERNAL MEDICINE

## 2024-02-20 NOTE — PROGRESS NOTES
Patient ID: Noelle Rivas is a 63 y.o. female.    Chief Complaint: Annual Exam      Assessment:       1. Annual physical exam    2. Malignant neoplasm of overlapping sites of right breast in female, estrogen receptor positive    3. Estrogen receptor positive status (ER+)    4. Congenital neutropenia    5. Factor V Leiden mutation    6. Prophylactic use of anastrozole (Arimidex)    7. Family history of malignant melanoma    8. Mild vitamin D deficiency    9. Vitamin B 12 deficiency    10. Other osteoporosis without current pathological fracture: see DEXA 2023    11. Colon cancer screening    12. Endometrial hyperplasia, unspecified: see evaluation [id number]; follows in GYN    13. Kidney stones: s/p ureteroscopy 2022, follows in Urology          Plan:         1. Annual physical exam  -     CBC Auto Differential; Future; Expected date: 02/20/2024  -     Comprehensive Metabolic Panel; Future; Expected date: 02/20/2024  -     Hemoglobin A1C; Future; Expected date: 02/20/2024  -     Lipid Panel; Future; Expected date: 02/20/2024  -     TSH; Future; Expected date: 02/20/2024  -     Vitamin B12; Future; Expected date: 02/20/2024  -     Vitamin D; Future; Expected date: 02/20/2024    2. Malignant neoplasm of overlapping sites of right breast in female, estrogen receptor positive    3. Estrogen receptor positive status (ER+)    4. Congenital neutropenia    5. Factor V Leiden mutation    6. Prophylactic use of anastrozole (Arimidex)    7. Family history of malignant melanoma    8. Mild vitamin D deficiency    9. Vitamin B 12 deficiency    10. Other osteoporosis without current pathological fracture: see DEXA 2023    11. Colon cancer screening  -     Fecal Immunochemical Test (iFOBT); Future; Expected date: 02/20/2024    12. Endometrial hyperplasia, unspecified: see evaluation [id number]; follows in GYN    13. Kidney stones: s/p ureteroscopy 2022, follows in Urology    Other orders  -     (In Office Administered)  "Pneumococcal Conjugate Vaccine (20 Valent) (IM) (Preferred)         Labs and review    Keep urology, gyn and Dermatology follow-ups  along with imaging with each specialty    Prevnar 20 today    RSV vaccine    Fit kit    Continue with exercise, healthy habits, fall prevention    Oncology follow-up    She states she had a COVID booster done a few months ago, will try to get those outside records   I will review all studies and determine further tx depending on findings      Subjective:   Annual exam    R breast cancer last year- had to have a mastectomy ; recall prior diagnosis in .    Seen recently in Hematology-Oncology:  "As of 2023 she has been on anastrozole in the adjuvant setting and has tolerated it well so far.  She has a remote history of a right-sided grade stage I carcinoma (T1c N0 M0, ER positive HER 2 negative, 1.3 cm in diameter) for which she had undergone lumpectomy followed by radiation therapy.  She had declined adjuvant treatment due to her concern about thrombosis given the fact that she is heterozygous for factor 5 Leiden.       Apparently she was recently diagnosed with an ipsilateral breast cancer and she underwent a right mastectomy on 2023 that showed a 10 mm grade 1 carcinoma.  The sentinel lymph node was negative.  Her course was complicated by a soft tissue infection for which she was treated with antibiotics.  She has now fully recovered from the operation.     An Oncotype was sent and the score was 19 indicating a 94% chance of disease-free survival at 9 years with adjuvant hormonal therapy."     Stress last year- dad  last year, she took care of him as well.    Diagnosis of osteoporosis, on tx now.    Kidney stones; seen in Urology;  "S/P left ureteroscopy on 10/28/22.  Stone analysis: 100% Calcium oxalate monohydrate   US: possible mild lower pole caliectasis (drainage of left kidney has been previously evaluated and is good)"    Will be seeing " them soon.  Also has GYN and a pelvic u/s scheduled as well as DERM.    Feeling well, hobbies- crocheting.  Works full time from home.    Some exercise, yoga, getting lots of stress, meditation.    Has had a COVID booster November 2023; is willing to get Prevnar 20 and RSV.    Prefers to do FIT KIT this year.    Patient Active Problem List:     Factor V Leiden mutation     Estrogen receptor positive status (ER+)     Anxiety     Vitamin B 12 deficiency     Degenerative disc disease     Mild vitamin D deficiency     Congenital neutropenia     Malignant neoplasm of right breast, stage 1, estrogen receptor positive     Endometrial hyperplasia, unspecified: see evaluation [id number]     Family history of malignant melanoma     Ganglion cyst of foot     Fatty liver:  See ultrasound May 2021     Posterior vitreous detachment, left eye     Vitreomacular adhesion, right eye     Age-related nuclear cataract of both eyes     Kidney stones     Malignant neoplasm of overlapping sites of right breast in female, estrogen receptor positive     Decreased range of motion of right shoulder     Prophylactic use of anastrozole (Arimidex)     Dry eye syndrome of both eyes     Other osteoporosis without current pathological fracture: see DEXA 2023               Review of Systems   Constitutional:  Negative for activity change and unexpected weight change.   HENT:  Negative for hearing loss, rhinorrhea and trouble swallowing.    Eyes:  Negative for discharge and visual disturbance.   Respiratory:  Negative for chest tightness and wheezing.    Cardiovascular:  Negative for chest pain and palpitations.   Gastrointestinal:  Negative for blood in stool, constipation, diarrhea and vomiting.   Endocrine: Negative for polydipsia and polyuria.   Genitourinary:  Negative for difficulty urinating, dysuria, hematuria and menstrual problem.   Musculoskeletal:  Negative for arthralgias, joint swelling and neck pain.   Neurological:  Negative for  weakness and headaches.   Psychiatric/Behavioral:  Negative for confusion and dysphoric mood.          Objective:      Physical Exam  Vitals and nursing note reviewed.   Constitutional:       Appearance: She is well-developed.   HENT:      Head: Normocephalic and atraumatic.      Right Ear: External ear normal.      Left Ear: External ear normal.      Nose: Nose normal.      Mouth/Throat:      Pharynx: No oropharyngeal exudate.   Eyes:      General: No scleral icterus.     Extraocular Movements: Extraocular movements intact.      Conjunctiva/sclera: Conjunctivae normal.   Neck:      Thyroid: No thyromegaly.      Vascular: No JVD.   Cardiovascular:      Rate and Rhythm: Normal rate and regular rhythm.      Heart sounds: Normal heart sounds. No murmur heard.     No gallop.   Pulmonary:      Effort: Pulmonary effort is normal. No respiratory distress.      Breath sounds: Normal breath sounds. No wheezing.   Abdominal:      General: Bowel sounds are normal. There is no distension.      Palpations: Abdomen is soft. There is no mass.      Tenderness: There is no abdominal tenderness. There is no guarding or rebound.   Musculoskeletal:         General: No tenderness. Normal range of motion.      Cervical back: Normal range of motion and neck supple.   Lymphadenopathy:      Cervical: No cervical adenopathy.   Skin:     General: Skin is warm.      Findings: No erythema or rash.   Neurological:      General: No focal deficit present.      Mental Status: She is alert and oriented to person, place, and time.      Cranial Nerves: No cranial nerve deficit.      Coordination: Coordination normal.   Psychiatric:         Behavior: Behavior normal.         Thought Content: Thought content normal.         Judgment: Judgment normal.             Health Maintenance Due   Topic Date Due    Colorectal Cancer Screening  03/18/2023    COVID-19 Vaccine (6 - 2023-24 season) 09/01/2023

## 2024-03-02 ENCOUNTER — LAB VISIT (OUTPATIENT)
Dept: LAB | Facility: HOSPITAL | Age: 64
End: 2024-03-02
Attending: INTERNAL MEDICINE
Payer: COMMERCIAL

## 2024-03-02 DIAGNOSIS — Z00.00 ANNUAL PHYSICAL EXAM: ICD-10-CM

## 2024-03-02 LAB
25(OH)D3+25(OH)D2 SERPL-MCNC: 56 NG/ML (ref 30–96)
ALBUMIN SERPL BCP-MCNC: 4.4 G/DL (ref 3.5–5.2)
ALP SERPL-CCNC: 55 U/L (ref 55–135)
ALT SERPL W/O P-5'-P-CCNC: 27 U/L (ref 10–44)
ANION GAP SERPL CALC-SCNC: 11 MMOL/L (ref 8–16)
AST SERPL-CCNC: 24 U/L (ref 10–40)
BASOPHILS # BLD AUTO: 0.04 K/UL (ref 0–0.2)
BASOPHILS NFR BLD: 1 % (ref 0–1.9)
BILIRUB SERPL-MCNC: 0.5 MG/DL (ref 0.1–1)
BUN SERPL-MCNC: 9 MG/DL (ref 8–23)
CALCIUM SERPL-MCNC: 9.6 MG/DL (ref 8.7–10.5)
CHLORIDE SERPL-SCNC: 105 MMOL/L (ref 95–110)
CHOLEST SERPL-MCNC: 207 MG/DL (ref 120–199)
CHOLEST/HDLC SERPL: 2.6 {RATIO} (ref 2–5)
CO2 SERPL-SCNC: 23 MMOL/L (ref 23–29)
CREAT SERPL-MCNC: 0.9 MG/DL (ref 0.5–1.4)
DIFFERENTIAL METHOD BLD: ABNORMAL
EOSINOPHIL # BLD AUTO: 0.1 K/UL (ref 0–0.5)
EOSINOPHIL NFR BLD: 2 % (ref 0–8)
ERYTHROCYTE [DISTWIDTH] IN BLOOD BY AUTOMATED COUNT: 13.2 % (ref 11.5–14.5)
EST. GFR  (NO RACE VARIABLE): >60 ML/MIN/1.73 M^2
GLUCOSE SERPL-MCNC: 101 MG/DL (ref 70–110)
HCT VFR BLD AUTO: 40.1 % (ref 37–48.5)
HDLC SERPL-MCNC: 81 MG/DL (ref 40–75)
HDLC SERPL: 39.1 % (ref 20–50)
HGB BLD-MCNC: 13.3 G/DL (ref 12–16)
IMM GRANULOCYTES # BLD AUTO: 0.01 K/UL (ref 0–0.04)
IMM GRANULOCYTES NFR BLD AUTO: 0.2 % (ref 0–0.5)
LDLC SERPL CALC-MCNC: 107.8 MG/DL (ref 63–159)
LYMPHOCYTES # BLD AUTO: 1.3 K/UL (ref 1–4.8)
LYMPHOCYTES NFR BLD: 31.2 % (ref 18–48)
MCH RBC QN AUTO: 32.1 PG (ref 27–31)
MCHC RBC AUTO-ENTMCNC: 33.2 G/DL (ref 32–36)
MCV RBC AUTO: 97 FL (ref 82–98)
MONOCYTES # BLD AUTO: 0.3 K/UL (ref 0.3–1)
MONOCYTES NFR BLD: 8.4 % (ref 4–15)
NEUTROPHILS # BLD AUTO: 2.3 K/UL (ref 1.8–7.7)
NEUTROPHILS NFR BLD: 57.2 % (ref 38–73)
NONHDLC SERPL-MCNC: 126 MG/DL
NRBC BLD-RTO: 0 /100 WBC
PLATELET # BLD AUTO: 185 K/UL (ref 150–450)
PMV BLD AUTO: 9.7 FL (ref 9.2–12.9)
POTASSIUM SERPL-SCNC: 3.6 MMOL/L (ref 3.5–5.1)
PROT SERPL-MCNC: 7.2 G/DL (ref 6–8.4)
RBC # BLD AUTO: 4.14 M/UL (ref 4–5.4)
SODIUM SERPL-SCNC: 139 MMOL/L (ref 136–145)
TRIGL SERPL-MCNC: 91 MG/DL (ref 30–150)
TSH SERPL DL<=0.005 MIU/L-ACNC: 2.13 UIU/ML (ref 0.4–4)
VIT B12 SERPL-MCNC: 1154 PG/ML (ref 210–950)
WBC # BLD AUTO: 4.04 K/UL (ref 3.9–12.7)

## 2024-03-02 PROCEDURE — 80053 COMPREHEN METABOLIC PANEL: CPT | Performed by: INTERNAL MEDICINE

## 2024-03-02 PROCEDURE — 82306 VITAMIN D 25 HYDROXY: CPT | Performed by: INTERNAL MEDICINE

## 2024-03-02 PROCEDURE — 83036 HEMOGLOBIN GLYCOSYLATED A1C: CPT | Performed by: INTERNAL MEDICINE

## 2024-03-02 PROCEDURE — 36415 COLL VENOUS BLD VENIPUNCTURE: CPT | Performed by: INTERNAL MEDICINE

## 2024-03-02 PROCEDURE — 80061 LIPID PANEL: CPT | Performed by: INTERNAL MEDICINE

## 2024-03-02 PROCEDURE — 84443 ASSAY THYROID STIM HORMONE: CPT | Performed by: INTERNAL MEDICINE

## 2024-03-02 PROCEDURE — 85025 COMPLETE CBC W/AUTO DIFF WBC: CPT | Performed by: INTERNAL MEDICINE

## 2024-03-02 PROCEDURE — 82607 VITAMIN B-12: CPT | Performed by: INTERNAL MEDICINE

## 2024-03-03 LAB
ESTIMATED AVG GLUCOSE: 120 MG/DL (ref 68–131)
HBA1C MFR BLD: 5.8 % (ref 4.5–6.2)

## 2024-04-03 ENCOUNTER — LAB VISIT (OUTPATIENT)
Dept: LAB | Facility: HOSPITAL | Age: 64
End: 2024-04-03
Attending: INTERNAL MEDICINE
Payer: COMMERCIAL

## 2024-04-03 DIAGNOSIS — Z12.11 COLON CANCER SCREENING: ICD-10-CM

## 2024-04-03 LAB — HEMOCCULT STL QL IA: NEGATIVE

## 2024-04-03 PROCEDURE — 82274 ASSAY TEST FOR BLOOD FECAL: CPT | Performed by: INTERNAL MEDICINE

## 2024-04-04 ENCOUNTER — OFFICE VISIT (OUTPATIENT)
Dept: HEMATOLOGY/ONCOLOGY | Facility: CLINIC | Age: 64
End: 2024-04-04
Payer: COMMERCIAL

## 2024-04-04 VITALS
OXYGEN SATURATION: 98 % | WEIGHT: 169.31 LBS | HEIGHT: 69 IN | DIASTOLIC BLOOD PRESSURE: 66 MMHG | RESPIRATION RATE: 16 BRPM | BODY MASS INDEX: 25.08 KG/M2 | SYSTOLIC BLOOD PRESSURE: 122 MMHG | HEART RATE: 67 BPM | TEMPERATURE: 97 F

## 2024-04-04 DIAGNOSIS — Z17.0 MALIGNANT NEOPLASM OF OVERLAPPING SITES OF RIGHT BREAST IN FEMALE, ESTROGEN RECEPTOR POSITIVE: Primary | ICD-10-CM

## 2024-04-04 DIAGNOSIS — C50.911 MALIGNANT NEOPLASM OF RIGHT BREAST, STAGE 1, ESTROGEN RECEPTOR POSITIVE: Primary | ICD-10-CM

## 2024-04-04 DIAGNOSIS — Z17.0 MALIGNANT NEOPLASM OF RIGHT BREAST, STAGE 1, ESTROGEN RECEPTOR POSITIVE: Primary | ICD-10-CM

## 2024-04-04 DIAGNOSIS — M81.8 OTHER OSTEOPOROSIS WITHOUT CURRENT PATHOLOGICAL FRACTURE: ICD-10-CM

## 2024-04-04 DIAGNOSIS — C50.811 MALIGNANT NEOPLASM OF OVERLAPPING SITES OF RIGHT BREAST IN FEMALE, ESTROGEN RECEPTOR POSITIVE: Primary | ICD-10-CM

## 2024-04-04 DIAGNOSIS — Z79.811 PROPHYLACTIC USE OF ANASTROZOLE (ARIMIDEX): ICD-10-CM

## 2024-04-04 PROCEDURE — 1160F RVW MEDS BY RX/DR IN RCRD: CPT | Mod: CPTII,S$GLB,, | Performed by: INTERNAL MEDICINE

## 2024-04-04 PROCEDURE — 3044F HG A1C LEVEL LT 7.0%: CPT | Mod: CPTII,S$GLB,, | Performed by: INTERNAL MEDICINE

## 2024-04-04 PROCEDURE — 3008F BODY MASS INDEX DOCD: CPT | Mod: CPTII,S$GLB,, | Performed by: INTERNAL MEDICINE

## 2024-04-04 PROCEDURE — 99999 PR PBB SHADOW E&M-EST. PATIENT-LVL IV: CPT | Mod: PBBFAC,,, | Performed by: INTERNAL MEDICINE

## 2024-04-04 PROCEDURE — 1159F MED LIST DOCD IN RCRD: CPT | Mod: CPTII,S$GLB,, | Performed by: INTERNAL MEDICINE

## 2024-04-04 PROCEDURE — 3074F SYST BP LT 130 MM HG: CPT | Mod: CPTII,S$GLB,, | Performed by: INTERNAL MEDICINE

## 2024-04-04 PROCEDURE — 3078F DIAST BP <80 MM HG: CPT | Mod: CPTII,S$GLB,, | Performed by: INTERNAL MEDICINE

## 2024-04-04 PROCEDURE — 99215 OFFICE O/P EST HI 40 MIN: CPT | Mod: S$GLB,,, | Performed by: INTERNAL MEDICINE

## 2024-04-04 NOTE — PROGRESS NOTES
Subjective:       Name: Noelle Rivas  : 1960  MRN: [id number]    Chief Complaint   Patient presents with    Malignant neoplasm of right breast, stage 1, estrogen recep     Malignant neoplasm of right breast, stage 1, estrogen receptor positive        Patient is in clinic by herself.    HPI: Noelle Rivas is a 63 y.o. female presents for evaluation of Malignant neoplasm of right breast, stage 1, estrogen recep (Malignant neoplasm of right breast, stage 1, estrogen receptor positive)  This is my 1st encounter with the patient.  She was previously seen by Dr. Whitehead and .  As of 2023 she has been on anastrozole in the adjuvant setting and has tolerated it well so far.      She has been tolerating well her treatment.  She denies any joint pain hot flashes mood swings or weight gain.        ONCOLOGIC HISTORY.  She has a remote history of a right-sided grade stage I carcinoma (T1c N0 M0, ER positive HER 2 negative, 1.3 cm in diameter) for which she had undergone lumpectomy followed by radiation therapy.  She had declined adjuvant treatment due to her concern about thrombosis given the fact that she is heterozygous for factor 5 Leiden.       Apparently she was recently diagnosed with an ipsilateral breast cancer and she underwent a right mastectomy on 2023 that showed a 10 mm grade 1 carcinoma.  The sentinel lymph node was negative.  Her course was complicated by a soft tissue infection for which she was treated with antibiotics.  She has now fully recovered from the operation.     An Oncotype was sent and the score was 19 indicating a 94% chance of disease-free survival at 9 years with adjuvant hormonal therapy.         Oncology History   Malignant neoplasm of right breast, stage 1, estrogen receptor positive   2012 Initial Diagnosis    Malignant neoplasm of right breast, stage 1, estrogen receptor positive     2012 Surgery    Surgeon: Dr. Chavarria    She  underwent lumpectomy and sentinel node biopsy. The pathology revealed invasive ductal carcinoma with mucin production, measuring 1.3 cm with the closest margin at 0.1 cm posteriorly. One out of one sentinel lymph node was negative for involvement. The lesion was ER positive, PA positive, and HER-2 negative.      - 9/27/2012 Radiation Therapy    Treating physician: Dr. Mayo  Total Dose: 66.4 Gy  Fractions: 36     10/17/2023 Cancer Staged    Staging form: Onc Breast AJCC V7  - Pathologic: Stage IA (T1b, N0, cM0)          Past Medical History:   Diagnosis Date    Abnormal TSH 06/05/2018    Age-related nuclear cataract of both eyes 08/03/2022    Allergy     Anxiety     Anxiety 12/17/2012    BRCA1 negative 07/23/19    BRCA2 negative 07/23/19    Breast cancer 2012    right    Calcium nephrolithiasis     Complex endometrial hyperplasia without atypia 06/16/2016    Degenerative disc disease     DJD (degenerative joint disease) of knee     Endometrial hyperplasia, unspecified: see evaluation 2017 05/22/2018    Factor V Leiden mutation No h/o DVT    Kidney stones     Leukopenia 12/19/2012    PMB (postmenopausal bleeding) 03/06/2017    PONV (postoperative nausea and vomiting)     10/28/22 - severe ponv    Stroke     questionable  - TIA    Vitamin B 12 deficiency        Past Surgical History:   Procedure Laterality Date    BREAST BIOPSY Right 2012    malignant, radiation    BREAST BIOPSY Right 2014    benign    BREAST LUMPECTOMY Right 06/11/2012    w/ radiation    BREAST LUMPECTOMY W/ NEEDLE LOCALIZATION      CYSTOSCOPY W/ URETERAL STENT PLACEMENT Left 10/28/2022    Procedure: CYSTOSCOPY, WITH URETERAL STENT INSERTION;  Surgeon: Ileana Souza MD;  Location: [facility] OR;  Service: Urology;  Laterality: Left;  6x26    CYSTOSCOPY W/ URETERAL STENT REMOVAL Left 10/28/2022    Procedure: CYSTOSCOPY, WITH URETERAL STENT REMOVAL;  Surgeon: Ileana Souza MD;  Location: [facility] OR;  Service: Urology;  Laterality: Left;     DILATION AND CURETTAGE OF UTERUS      INJECTION FOR SENTINEL NODE IDENTIFICATION Right 2023    Procedure: INJECTION, FOR SENTINEL NODE IDENTIFICATION;  Surgeon: Giana De Leon MD;  Location: [facility];  Service: General;  Laterality: Right;    MASTECTOMY Right 2023    Procedure: MASTECTOMY RIGHT;  Surgeon: Giana De Leon MD;  Location: [facility];  Service: General;  Laterality: Right;  2.5 HRS    SENTINEL LYMPH NODE BIOPSY Right 2023    Procedure: BIOPSY, LYMPH NODE, SENTINEL;  Surgeon: Giana De Leon MD;  [facility];  Service: General;  Laterality: Right;    URETERAL STENT PLACEMENT N/A 10/14/2022    Procedure: INSERTION, STENT, URETER;  Surgeon: Ileana Souza MD;  Location: [facility];  Service: Urology;  Laterality: N/A;  6x26 soft    URETEROSCOPIC REMOVAL OF URETERIC CALCULUS Left 10/14/2022    Procedure: REMOVAL, CALCULUS, URETER, URETEROSCOPIC;  Surgeon: Ileana Souza MD;  Location: [facility] OR;  Service: Urology;  Laterality: Left;    URETEROSCOPIC REMOVAL OF URETERIC CALCULUS Left 10/28/2022    Procedure: REMOVAL, CALCULUS, URETER, URETEROSCOPIC;  Surgeon: Ileana Souza MD;  Location: [facility] OR;  Service: Urology;  Laterality: Left;       Family History   Problem Relation Age of Onset    Melanoma Mother     Cancer Mother         melanoma    COPD Father     Hypertension Father     Coronary artery disease Father     Cancer Father         prostate ca, metastatic    Heart disease Father         stenting    Kidney disease Father         stones    Retinal detachment Father     Osteoporosis Father     Hyperlipidemia Brother     Keratoconus Brother     No Known Problems Brother     Stroke Maternal Grandmother     Lung cancer Maternal Grandfather     Breast cancer Paternal Grandmother          at age 62    Colon cancer Paternal Grandfather     Ovarian cancer Neg Hx     Uterine cancer Neg Hx     Psoriasis Neg Hx     Lupus Neg Hx     Eczema Neg Hx        Social History     Socioeconomic  History    Marital status: Single   Occupational History    Occupation: Nurse       Employer: [employer]   Tobacco Use    Smoking status: Never    Smokeless tobacco: Never   Substance and Sexual Activity    Alcohol use: Not Currently    Drug use: No    Sexual activity: Not Currently     Birth control/protection: None   Other Topics Concern    Are you pregnant or think you may be? No    Breast-feeding No     Social Determinants of Health     Financial Resource Strain: Low Risk  (2/19/2024)    Overall Financial Resource Strain (CARDIA)     Difficulty of Paying Living Expenses: Not hard at all   Food Insecurity: No Food Insecurity (2/19/2024)    Hunger Vital Sign     Worried About Running Out of Food in the Last Year: Never true     Ran Out of Food in the Last Year: Never true   Transportation Needs: No Transportation Needs (2/19/2024)    PRAPARE - Transportation     Lack of Transportation (Medical): No     Lack of Transportation (Non-Medical): No   Physical Activity: Sufficiently Active (2/19/2024)    Exercise Vital Sign     Days of Exercise per Week: 3 days     Minutes of Exercise per Session: 70 min   Stress: No Stress Concern Present (2/19/2024)    [employer] [place] [employer] - Occupational Stress Questionnaire     Feeling of Stress : Not at all   Social Connections: Unknown (2/19/2024)    Social Connection and Isolation Panel [NHANES]     Frequency of Communication with Friends and Family: More than three times a week     Frequency of Social Gatherings with Friends and Family: More than three times a week     Active Member of Clubs or Organizations: Yes     Attends Club or Organization Meetings: More than 4 times per year     Marital Status: Never    Housing Stability: Low Risk  (2/19/2024)    Housing Stability Vital Sign     Unable to Pay for Housing in the Last Year: No     Number of Places Lived in the Last Year: 1     Unstable Housing in the Last Year: No       Review  "of patient's allergies indicates:   Allergen Reactions    Adhesive Itching and Rash     Micropore/Medipore Tape - paper tape is ok    Nickel Itching     Allergic reaction to nickel and other related metals    Iodine and iodide containing products Itching and Rash    Latex, natural rubber Itching       Review of Systems   Constitutional:  Negative for appetite change and unexpected weight change.   HENT:  Negative for mouth sores.    Eyes:  Negative for visual disturbance.   Respiratory:  Negative for cough and shortness of breath.    Cardiovascular:  Negative for chest pain.   Gastrointestinal:  Negative for abdominal pain and diarrhea.   Genitourinary:  Negative for frequency.   Musculoskeletal:  Negative for back pain.   Integumentary:  Negative for rash.   Neurological:  Negative for headaches.   Hematological:  Negative for adenopathy.   Psychiatric/Behavioral:  The patient is not nervous/anxious.             Objective:     Vitals:    04/04/24 1030 04/04/24 1042   BP: 139/76 122/66   Pulse: 67    Resp: 16    Temp: 97.3 °F (36.3 °C)    TempSrc: Temporal    SpO2: 98%    Weight: 76.8 kg (169 lb 5 oz)    Height: 5' 9" (1.753 m)         Physical Exam  Vitals reviewed.   Constitutional:       Appearance: Normal appearance.   Eyes:      General: No scleral icterus.     Pupils: Pupils are equal, round, and reactive to light.   Cardiovascular:      Rate and Rhythm: Normal rate and regular rhythm.      Pulses: Normal pulses.      Heart sounds: Normal heart sounds.   Pulmonary:      Effort: Pulmonary effort is normal.      Breath sounds: Normal breath sounds.   Chest:      Comments: Patient is status post right breast mastectomy and sentinel lymph node dissection.  The scar is healing well ; there is no signs of local recurrence.  Abdominal:      General: Bowel sounds are normal. There is no distension.   Musculoskeletal:         General: No swelling.   Lymphadenopathy:      Cervical: No cervical adenopathy.   Skin:     " General: Skin is warm.      Findings: No rash.   Neurological:      General: No focal deficit present.      Mental Status: She is alert.                Current Outpatient Medications on File Prior to Visit   Medication Sig    alendronate (FOSAMAX) 70 MG tablet Take 1 tablet (70 mg total) by mouth every 7 days.    anastrozole (ARIMIDEX) 1 mg Tab Take 1 tablet (1 mg total) by mouth once daily.    aspirin (ECOTRIN) 81 MG EC tablet Take 81 mg by mouth once daily.    cholecalciferol, vitamin D3, (VITAMIN D3) 25 mcg (1,000 unit) capsule Take 1 capsule (1,000 Units total) by mouth once daily.    cyanocobalamin (VITAMIN B-12) 1000 MCG tablet Take 1,000 mcg by mouth every other day.     loratadine (CLARITIN) 10 mg tablet Take 10 mg by mouth daily as needed.    multivitamin (THERAGRAN) per tablet Take 1 tablet by mouth once daily.     Current Facility-Administered Medications on File Prior to Visit   Medication    diphenhydrAMINE injection 12.5 mg    electrolyte-S (ISOLYTE)    fentaNYL 50 mcg/mL injection 25 mcg    HYDROmorphone (PF) injection 0.2 mg    lactated ringers infusion    lactated ringers infusion    ondansetron injection 4 mg    oxyCODONE immediate release tablet 5 mg    prochlorperazine injection Soln 5 mg    sodium chloride 0.9% flush 3 mL       CBC:  Lab Results   Component Value Date    WBC 4.04 03/02/2024    HGB 13.3 03/02/2024    HCT 40.1 03/02/2024    MCV 97 03/02/2024     03/02/2024         CMP:  Sodium   Date Value Ref Range Status   03/02/2024 139 136 - 145 mmol/L Final     Potassium   Date Value Ref Range Status   03/02/2024 3.6 3.5 - 5.1 mmol/L Final     Chloride   Date Value Ref Range Status   03/02/2024 105 95 - 110 mmol/L Final     CO2   Date Value Ref Range Status   03/02/2024 23 23 - 29 mmol/L Final     Glucose   Date Value Ref Range Status   03/02/2024 101 70 - 110 mg/dL Final     BUN   Date Value Ref Range Status   03/02/2024 9 8 - 23 mg/dL Final     Creatinine   Date Value Ref Range  Status   03/02/2024 0.9 0.5 - 1.4 mg/dL Final     Calcium   Date Value Ref Range Status   03/02/2024 9.6 8.7 - 10.5 mg/dL Final     Total Protein   Date Value Ref Range Status   03/02/2024 7.2 6.0 - 8.4 g/dL Final     Albumin   Date Value Ref Range Status   03/02/2024 4.4 3.5 - 5.2 g/dL Final     Total Bilirubin   Date Value Ref Range Status   03/02/2024 0.5 0.1 - 1.0 mg/dL Final     Comment:     For infants and newborns, interpretation of results should be based  on gestational age, weight and in agreement with clinical  observations.    Premature Infant recommended reference ranges:  Up to 24 hours.............<8.0 mg/dL  Up to 48 hours............<12.0 mg/dL  3-5 days..................<15.0 mg/dL  6-29 days.................<15.0 mg/dL       Alkaline Phosphatase   Date Value Ref Range Status   03/02/2024 55 55 - 135 U/L Final     AST   Date Value Ref Range Status   03/02/2024 24 10 - 40 U/L Final     ALT   Date Value Ref Range Status   03/02/2024 27 10 - 44 U/L Final     Anion Gap   Date Value Ref Range Status   03/02/2024 11 8 - 16 mmol/L Final     eGFR if    Date Value Ref Range Status   07/12/2022 >60.0 >60 mL/min/1.73 m^2 Final     eGFR if non    Date Value Ref Range Status   07/12/2022 >60.0 >60 mL/min/1.73 m^2 Final     Comment:     Calculation used to obtain the estimated glomerular filtration  rate (eGFR) is the CKD-EPI equation.          OCT, Retina - OU - Both Eyes  Right Eye  Quality was good. Scan locations included macula. Progression has been   stable. Findings include normal foveal contour.     Left Eye  Quality was good. Scan locations included macula. Progression has been   stable. Findings include normal foveal contour.     Notes  VMA OD, PVD OS       ECOG SCORE    0 - Fully active-able to carry on all pre-disease performance without restriction              Assessment/Plan:        Cancer Staging   Malignant neoplasm of right breast, stage 1, estrogen receptor  positive  Staging form: Onc Breast AJCC V7  - Pathologic: Stage IA (T1b, N0, cM0) - Signed by Matilde Whitehead MD on 10/17/2023  -I reviewed independently the patient medical record including laboratory radiologic and pathologic findings.  -An Oncotype was sent and the score was 19 indicating a 94% chance of disease-free survival at 9 years with adjuvant hormonal therapy.  -Her stage prognosis and plan of care was discussed at length.  The patient will require to take at least 5-7 years treatment with AI.  -she is scheduled to undergo repeat mammogram in June 2024 to follow up with Dr. De Leon.  -she will be seen back again in 4 months for a follow up visit.      Heterozygous state for factor 5 Leiden   -asymptomatic with no history of thromboembolic events.    Osteoporosis.    -bone density on October 18, 2023 showed the presence of osteoporosis.    -patient currently is on weekly Fosamax and vitamin-D.  She has not been taking calcium in view of her history of kidney stones.  -We will repeat a bone density in October 2025  -the patient is very active and exercise at least 3 times a week.                     Med Onc Chart Routing      Follow up with physician 4 months.   Follow up with RENÉ    Infusion scheduling note    Injection scheduling note    Labs    Imaging    Pharmacy appointment    Other referrals                   Plan was discussed with the patient at length, and she verbalized understanding. Noelle was given an opportunity to ask questions that were answered to her satisfaction, and she was advised to call in the interval if any problems or questions arise.  Signed:  Padmini Feldman MD   Hematology and Oncology  [facility] - HEMATOLOGY ONCOLOGY  [facility], [place]

## 2024-04-05 ENCOUNTER — PATIENT MESSAGE (OUTPATIENT)
Dept: HEMATOLOGY/ONCOLOGY | Facility: CLINIC | Age: 64
End: 2024-04-05
Payer: COMMERCIAL

## 2024-04-16 ENCOUNTER — OFFICE VISIT (OUTPATIENT)
Dept: DERMATOLOGY | Facility: CLINIC | Age: 64
End: 2024-04-16
Payer: COMMERCIAL

## 2024-04-16 VITALS — HEIGHT: 69 IN | BODY MASS INDEX: 25.08 KG/M2 | WEIGHT: 169.31 LBS

## 2024-04-16 DIAGNOSIS — D22.9 MULTIPLE BENIGN NEVI: ICD-10-CM

## 2024-04-16 DIAGNOSIS — D18.01 CHERRY ANGIOMA: ICD-10-CM

## 2024-04-16 DIAGNOSIS — Z12.83 SKIN CANCER SCREENING: ICD-10-CM

## 2024-04-16 DIAGNOSIS — D23.9 DERMATOFIBROMA: ICD-10-CM

## 2024-04-16 DIAGNOSIS — L82.1 SEBORRHEIC KERATOSES: ICD-10-CM

## 2024-04-16 DIAGNOSIS — Z80.8 FAMILY HISTORY OF MALIGNANT MELANOMA: ICD-10-CM

## 2024-04-16 DIAGNOSIS — D48.5 NEOPLASM OF UNCERTAIN BEHAVIOR OF SKIN: Primary | ICD-10-CM

## 2024-04-16 DIAGNOSIS — L81.4 SOLAR LENTIGO: ICD-10-CM

## 2024-04-16 PROCEDURE — 11102 TANGNTL BX SKIN SINGLE LES: CPT | Mod: S$GLB,,, | Performed by: DERMATOLOGY

## 2024-04-16 PROCEDURE — 88341 IMHCHEM/IMCYTCHM EA ADD ANTB: CPT | Mod: 26,,, | Performed by: PATHOLOGY

## 2024-04-16 PROCEDURE — 3008F BODY MASS INDEX DOCD: CPT | Mod: CPTII,S$GLB,, | Performed by: DERMATOLOGY

## 2024-04-16 PROCEDURE — 88341 IMHCHEM/IMCYTCHM EA ADD ANTB: CPT | Performed by: PATHOLOGY

## 2024-04-16 PROCEDURE — 88305 TISSUE EXAM BY PATHOLOGIST: CPT | Mod: 26,,, | Performed by: PATHOLOGY

## 2024-04-16 PROCEDURE — 88342 IMHCHEM/IMCYTCHM 1ST ANTB: CPT | Performed by: PATHOLOGY

## 2024-04-16 PROCEDURE — 99213 OFFICE O/P EST LOW 20 MIN: CPT | Mod: 25,S$GLB,, | Performed by: DERMATOLOGY

## 2024-04-16 PROCEDURE — 1160F RVW MEDS BY RX/DR IN RCRD: CPT | Mod: CPTII,S$GLB,, | Performed by: DERMATOLOGY

## 2024-04-16 PROCEDURE — 11103 TANGNTL BX SKIN EA SEP/ADDL: CPT | Mod: S$GLB,,, | Performed by: DERMATOLOGY

## 2024-04-16 PROCEDURE — 88342 IMHCHEM/IMCYTCHM 1ST ANTB: CPT | Mod: 26,,, | Performed by: PATHOLOGY

## 2024-04-16 PROCEDURE — 1159F MED LIST DOCD IN RCRD: CPT | Mod: CPTII,S$GLB,, | Performed by: DERMATOLOGY

## 2024-04-16 PROCEDURE — 88305 TISSUE EXAM BY PATHOLOGIST: CPT | Mod: 59 | Performed by: PATHOLOGY

## 2024-04-16 PROCEDURE — 3044F HG A1C LEVEL LT 7.0%: CPT | Mod: CPTII,S$GLB,, | Performed by: DERMATOLOGY

## 2024-04-16 NOTE — PROGRESS NOTES
Subjective:      Patient ID:  Noelle Rivas is a 63 y.o. female who presents for   Chief Complaint   Patient presents with    Skin Check     TBSC      LOV 22 SK, Lentigo, Angioma, DF    Patient here today for TBSC    Breast cancer , Right total mastectomy 2023 *(lumpectomy 11 year prior)    Derm Hx:  Has no hx of NMSC  Has fhx of MM- mother     Current Outpatient Medications:   ·  alendronate (FOSAMAX) 70 MG tablet, Take 1 tablet (70 mg total) by mouth every 7 days., Disp: 4 tablet, Rfl: 11  ·  anastrozole (ARIMIDEX) 1 mg Tab, Take 1 tablet (1 mg total) by mouth once daily., Disp: 90 tablet, Rfl: 3  ·  aspirin (ECOTRIN) 81 MG EC tablet, Take 81 mg by mouth once daily., Disp: , Rfl:   ·  cholecalciferol, vitamin D3, (VITAMIN D3) 25 mcg (1,000 unit) capsule, Take 1 capsule (1,000 Units total) by mouth once daily., Disp: 30 capsule, Rfl: 12  ·  cyanocobalamin (VITAMIN B-12) 1000 MCG tablet, Take 1,000 mcg by mouth every other day. , Disp: , Rfl:   ·  loratadine (CLARITIN) 10 mg tablet, Take 10 mg by mouth daily as needed., Disp: , Rfl:   ·  multivitamin (THERAGRAN) per tablet, Take 1 tablet by mouth once daily., Disp: , Rfl:           Review of Systems   Constitutional:  Negative for fever, chills and fatigue.   Skin:  Positive for activity-related sunscreen use. Negative for daily sunscreen use.   Psychiatric/Behavioral:  Positive for high stress.        Objective:   Physical Exam   Constitutional: She appears well-developed and well-nourished. No distress.   Neurological: She is alert and oriented to person, place, and time. She is not disoriented.   Psychiatric: She has a normal mood and affect.   Skin:   Areas Examined (abnormalities noted in diagram):   Scalp / Hair Palpated and Inspected  Head / Face Inspection Performed  Neck Inspection Performed  Chest / Axilla Inspection Performed  Abdomen Inspection Performed  Genitals / Buttocks / Groin Inspection Performed  Back Inspection  Performed  RUE Inspected  LUE Inspection Performed  RLE Inspected  LLE Inspection Performed  Nails and Digits Inspection Performed                 Diagram Legend     Erythematous scaling macule/papule c/w actinic keratosis       Vascular papule c/w angioma      Pigmented verrucoid papule/plaque c/w seborrheic keratosis      Yellow umbilicated papule c/w sebaceous hyperplasia      Irregularly shaped tan macule c/w lentigo     1-2 mm smooth white papules consistent with Milia      Movable subcutaneous cyst with punctum c/w epidermal inclusion cyst      Subcutaneous movable cyst c/w pilar cyst      Firm pink to brown papule c/w dermatofibroma      Pedunculated fleshy papule(s) c/w skin tag(s)      Evenly pigmented macule c/w junctional nevus     Mildly variegated pigmented, slightly irregular-bordered macule c/w mildly atypical nevus      Flesh colored to evenly pigmented papule c/w intradermal nevus       Pink pearly papule/plaque c/w basal cell carcinoma      Erythematous hyperkeratotic cursted plaque c/w SCC      Surgical scar with no sign of skin cancer recurrence      Open and closed comedones      Inflammatory papules and pustules      Verrucoid papule consistent consistent with wart     Erythematous eczematous patches and plaques     Dystrophic onycholytic nail with subungual debris c/w onychomycosis     Umbilicated papule    Erythematous-base heme-crusted tan verrucoid plaque consistent with inflamed seborrheic keratosis     Erythematous Silvery Scaling Plaque c/w Psoriasis     See annotation            Assessment / Plan:      Pathology Orders:       Normal Orders This Visit    Specimen to Pathology, Dermatology     Comments:    Number of Specimens:->2  ------------------------->-------------------------  Spec 1 Procedure:->Biopsy  Spec 1 Clinical Impression:->inflamed nevus vs AMM vs other  Spec 1 Source:->left lateral clavicle  ------------------------->-------------------------  Spec 2  Procedure:->Biopsy  Spec 2 Clinical Impression:->inflamed nevus vs AMM vs other  Spec 2 Source:->R upper arm    Questions:    Procedure Type: Dermatology and skin neoplasms    Number of Specimens: 2    ------------------------: -------------------------    Spec 1 Procedure: Biopsy    Spec 1 Clinical Impression: inflamed nevus vs AMM vs other    Spec 1 Source: left lateral clavicle    ------------------------: -------------------------    Spec 2 Procedure: Biopsy    Spec 2 Clinical Impression: inflamed nevus vs AMM vs other    Spec 2 Source: R upper arm    Release to patient:           Noelle was seen today for skin check.    Diagnoses and all orders for this visit:    Neoplasm of uncertain behavior of skin  -     Specimen to Pathology, Dermatology  Shave biopsy procedure note:x2    Shave biopsy performed after verbal consent including risk of infection, scar, recurrence, need for additional treatment of site. Area prepped with alcohol, anesthetized with approximately 1.0cc of 1% lidocaine with epinephrine. Lesional tissue shaved with razor blade. Hemostasis achieved with application of aluminum chloride followed by hyfrecation. No complications. Dressing applied. Wound care explained.    Seborrheic keratoses  These are benign inherited growths without a malignant potential. Reassurance given to patient. No treatment is necessary.     Solar lentigo  This is a benign hyperpigmented sun induced lesion. Daily sun protection will reduce the number of new lesions. Treatment of these benign lesions are considered cosmetic.    Multiple benign nevi  Careful dermoscopy evaluation of nevi performed with none identified as needing biopsy today  Monitor for new mole or moles that are becoming bigger, darker, irritated, or developing irregular borders.     Dermatofibroma  This is a benign scar-like lesion secondary to minor trauma. No treatment required.     Cherry angioma  This is a benign vascular lesion. Reassurance given. No  treatment required.     Family history of malignant melanoma  Skin cancer screening  Total body skin examination performed today including at least 12 points as noted in physical examination. 2 lesions suspicious for malignancy noted.    Patient instructed in importance in daily broad spectrum sun protection of at least spf 30. Mineral sunscreen ingredients preferred (Zinc +/- Titanium) and can be found OTC.   Recommend Elta MD for daily use on face and neck.  Patient encouraged to wear hat for all outdoor exposure.   Also discussed sun avoidance and use of protective clothing.                 Follow up in about 1 year (around 4/16/2025), or if symptoms worsen or fail to improve.

## 2024-04-16 NOTE — PATIENT INSTRUCTIONS
Shave Biopsy Wound Care    Your doctor has performed a shave biopsy today.  A band aid and vaseline ointment has been placed over the site.  This should remain in place for 24 hours.  It is recommended that you keep the area dry for the first 24 hours.  After 24 hours, you may remove the band aid and wash the area with warm soap and water and apply Vaseline jelly.  Many patients prefer to use Neosporin or Bacitracin ointment.  This is acceptable; however, know that you can develop an allergy to this medication even if you have used it safely for years.  It is important to keep the area moist.  Letting it dry out and get air slows healing time, and will worsen the scar.  Band aid is optional after first 24 hours.      If you notice increasing redness, tenderness, pain, or yellow drainage at the biopsy site, please notify your doctor.  These are signs of an infection.    If your biopsy site is bleeding, apply firm pressure for 15 minutes straight.  Repeat for another 15 minutes, if it is still bleeding.   If the surgical site continues to bleed, then please contact your doctor.       [facility] - DERMATOLOGY  [address], [address]  [place] [address]  Dept: [phone]  Dept Fax: [phone]

## 2024-04-20 ENCOUNTER — HOSPITAL ENCOUNTER (OUTPATIENT)
Dept: RADIOLOGY | Facility: HOSPITAL | Age: 64
Discharge: HOME | End: 2024-04-20
Attending: OBSTETRICS & GYNECOLOGY
Payer: COMMERCIAL

## 2024-04-20 DIAGNOSIS — N85.00 ENDOMETRIAL HYPERPLASIA, UNSPECIFIED: ICD-10-CM

## 2024-04-24 LAB
FINAL PATHOLOGIC DIAGNOSIS: NORMAL
GROSS: NORMAL
Lab: NORMAL
MICROSCOPIC EXAM: NORMAL

## 2024-04-26 ENCOUNTER — PATIENT MESSAGE (OUTPATIENT)
Dept: HEMATOLOGY/ONCOLOGY | Facility: CLINIC | Age: 64
End: 2024-04-26
Payer: COMMERCIAL

## 2024-04-27 ENCOUNTER — HOSPITAL ENCOUNTER (OUTPATIENT)
Dept: RADIOLOGY | Facility: HOSPITAL | Age: 64
Discharge: HOME | End: 2024-04-27
Attending: OBSTETRICS & GYNECOLOGY
Payer: COMMERCIAL

## 2024-04-27 PROCEDURE — 76830 TRANSVAGINAL US NON-OB: CPT | Mod: TC

## 2024-04-27 PROCEDURE — 76856 US EXAM PELVIC COMPLETE: CPT | Mod: 26,,, | Performed by: RADIOLOGY

## 2024-04-27 PROCEDURE — 76830 TRANSVAGINAL US NON-OB: CPT | Mod: 26,,, | Performed by: RADIOLOGY

## 2024-04-30 ENCOUNTER — OFFICE VISIT (OUTPATIENT)
Dept: OBSTETRICS AND GYNECOLOGY | Facility: CLINIC | Age: 64
End: 2024-04-30
Payer: COMMERCIAL

## 2024-04-30 ENCOUNTER — TELEPHONE (OUTPATIENT)
Dept: HEMATOLOGY/ONCOLOGY | Facility: CLINIC | Age: 64
End: 2024-04-30
Payer: COMMERCIAL

## 2024-04-30 VITALS
DIASTOLIC BLOOD PRESSURE: 72 MMHG | SYSTOLIC BLOOD PRESSURE: 118 MMHG | BODY MASS INDEX: 24.78 KG/M2 | WEIGHT: 167.31 LBS | HEIGHT: 69 IN

## 2024-04-30 DIAGNOSIS — M81.8 OTHER OSTEOPOROSIS WITHOUT CURRENT PATHOLOGICAL FRACTURE: ICD-10-CM

## 2024-04-30 DIAGNOSIS — Z01.419 ENCOUNTER FOR GYNECOLOGICAL EXAMINATION (GENERAL) (ROUTINE) WITHOUT ABNORMAL FINDINGS: Primary | ICD-10-CM

## 2024-04-30 DIAGNOSIS — Z12.4 CERVICAL CANCER SCREENING: ICD-10-CM

## 2024-04-30 DIAGNOSIS — Z17.0 MALIGNANT NEOPLASM OF OVERLAPPING SITES OF RIGHT BREAST IN FEMALE, ESTROGEN RECEPTOR POSITIVE: ICD-10-CM

## 2024-04-30 DIAGNOSIS — N85.00 ENDOMETRIAL HYPERPLASIA, UNSPECIFIED: ICD-10-CM

## 2024-04-30 DIAGNOSIS — C50.811 MALIGNANT NEOPLASM OF OVERLAPPING SITES OF RIGHT BREAST IN FEMALE, ESTROGEN RECEPTOR POSITIVE: ICD-10-CM

## 2024-04-30 PROCEDURE — 3078F DIAST BP <80 MM HG: CPT | Mod: CPTII,S$GLB,, | Performed by: OBSTETRICS & GYNECOLOGY

## 2024-04-30 PROCEDURE — 99396 PREV VISIT EST AGE 40-64: CPT | Mod: 25,S$GLB,, | Performed by: OBSTETRICS & GYNECOLOGY

## 2024-04-30 PROCEDURE — 88175 CYTOPATH C/V AUTO FLUID REDO: CPT | Performed by: OBSTETRICS & GYNECOLOGY

## 2024-04-30 PROCEDURE — 99213 OFFICE O/P EST LOW 20 MIN: CPT | Mod: 25,S$GLB,, | Performed by: OBSTETRICS & GYNECOLOGY

## 2024-04-30 PROCEDURE — 1159F MED LIST DOCD IN RCRD: CPT | Mod: CPTII,S$GLB,, | Performed by: OBSTETRICS & GYNECOLOGY

## 2024-04-30 PROCEDURE — 3008F BODY MASS INDEX DOCD: CPT | Mod: CPTII,S$GLB,, | Performed by: OBSTETRICS & GYNECOLOGY

## 2024-04-30 PROCEDURE — 99999 PR PBB SHADOW E&M-EST. PATIENT-LVL III: CPT | Mod: PBBFAC,,, | Performed by: OBSTETRICS & GYNECOLOGY

## 2024-04-30 PROCEDURE — 3044F HG A1C LEVEL LT 7.0%: CPT | Mod: CPTII,S$GLB,, | Performed by: OBSTETRICS & GYNECOLOGY

## 2024-04-30 PROCEDURE — 87624 HPV HI-RISK TYP POOLED RSLT: CPT | Performed by: OBSTETRICS & GYNECOLOGY

## 2024-04-30 PROCEDURE — 3074F SYST BP LT 130 MM HG: CPT | Mod: CPTII,S$GLB,, | Performed by: OBSTETRICS & GYNECOLOGY

## 2024-04-30 NOTE — PROGRESS NOTES
History and Physical:  Noelle Rivas is a 63 y.o. F who presents today for her routine annual GYN exam. The patient has  no Gynecology complaints.    History of endometrial hyperplasia:   History of atypical endometrial hyperplasia. She declined hysterectomy and unable to take progestins because of progesterone receptor positive breast cancer. She has been followed conservatively. Last saw GynOnc 11/2021. Plan for yearly ultrasound  11/2017 Atypical endometrial hyperplasia   3/2023 EMS 3mm  11/2023 she started taking anastrozole for breast cancer  3/2024 ultrasound: Previously described 7 mm fibroid could not be confidently identified on today's study  Endometrium: There is a trace amount of fluid in the endometrial cavity on today's images; a measurement of the endometrial stripe excluding the fluid is 3.3 mm, similar to the prior study.  Measurement with the fluid is 3.8 mm.  She denies pain, bleeding, or discharge    Annual:   Patient's last menstrual period was 04/01/2017 (exact date).  Menopause: yes, 57yr, FSH 96 2019  Last Pap: 3/2023 NILM/HPV neg; next 3/2028  History of abnormal pap: never  Last Mammogram: history of breast cancer ER+, BRCA neg. 2023 Bx invasive ductal carcinoma. 2023 mastectomy; MMG scheduled 6/2024. She is taking anastrozole.   Colonosocpy: 3/2013, repeat 10yr; FOBT next 4/2025  Dexa: 10/2023 Osteoporosis; next 10/2026. She is taking Vit D & Fosamax.   PCP: Emelyn Bernardo MD orders routine labs 3/2024  Immunization status: up to date and documented. Status post Zoster & Pneumococcal     Allergies:   Review of patient's allergies indicates:   Allergen Reactions    Adhesive Itching and Rash     Micropore/Medipore Tape - paper tape is ok    Nickel Itching     Allergic reaction to nickel and other related metals    Iodine and iodide containing products Itching and Rash    Latex, natural rubber Itching     Past Medical History:   Diagnosis Date    Abnormal TSH 06/05/2018    Age-related  nuclear cataract of both eyes 08/03/2022    Allergy     Anxiety     Anxiety 12/17/2012    BRCA1 negative 07/23/19    BRCA2 negative 07/23/19    Breast cancer 2012    right    Calcium nephrolithiasis     Complex endometrial hyperplasia without atypia 06/16/2016    Degenerative disc disease     DJD (degenerative joint disease) of knee     Endometrial hyperplasia, unspecified: see evaluation 2017 05/22/2018    Factor V Leiden mutation No h/o DVT    Kidney stones     Leukopenia 12/19/2012    PMB (postmenopausal bleeding) 03/06/2017    PONV (postoperative nausea and vomiting)     10/28/22 - severe ponv    Stroke     questionable  - TIA    Vitamin B 12 deficiency      Past Surgical History:   Procedure Laterality Date    BREAST BIOPSY Right 2012    malignant, radiation    BREAST BIOPSY Right 2014    benign    BREAST LUMPECTOMY Right 06/11/2012    w/ radiation    BREAST LUMPECTOMY W/ NEEDLE LOCALIZATION      CYSTOSCOPY W/ URETERAL STENT PLACEMENT Left 10/28/2022    Procedure: CYSTOSCOPY, WITH URETERAL STENT INSERTION;  Surgeon: Ileana Souza MD;  Location: [facility] OR;  Service: Urology;  Laterality: Left;  6x26    CYSTOSCOPY W/ URETERAL STENT REMOVAL Left 10/28/2022    Procedure: CYSTOSCOPY, WITH URETERAL STENT REMOVAL;  Surgeon: Ileana Souza MD;  Location: [facility] OR;  Service: Urology;  Laterality: Left;    DILATION AND CURETTAGE OF UTERUS      INJECTION FOR SENTINEL NODE IDENTIFICATION Right 9/6/2023    Procedure: INJECTION, FOR SENTINEL NODE IDENTIFICATION;  Surgeon: Giana De Leon MD;  Location: [facility] OR;  Service: General;  Laterality: Right;    MASTECTOMY Right 9/6/2023    Procedure: MASTECTOMY RIGHT;  Surgeon: Giana De Leon MD;  Location: [facility] OR;  Service: General;  Laterality: Right;  2.5 HRS    SENTINEL LYMPH NODE BIOPSY Right 9/6/2023    Procedure: BIOPSY, LYMPH NODE, SENTINEL;  Surgeon: Giana De Loen MD;  Location: [facility] OR;  Service: General;  Laterality: Right;    URETERAL STENT PLACEMENT N/A  10/14/2022    Procedure: INSERTION, STENT, URETER;  Surgeon: Ileana Souza MD;  Location: [facility] OR;  Service: Urology;  Laterality: N/A;  6x26 soft    URETEROSCOPIC REMOVAL OF URETERIC CALCULUS Left 10/14/2022    Procedure: REMOVAL, CALCULUS, URETER, URETEROSCOPIC;  Surgeon: Ileana Souza MD;  Location: [facility] OR;  Service: Urology;  Laterality: Left;    URETEROSCOPIC REMOVAL OF URETERIC CALCULUS Left 10/28/2022    Procedure: REMOVAL, CALCULUS, URETER, URETEROSCOPIC;  Surgeon: Ileana Souza MD;  Location: [facility] OR;  Service: Urology;  Laterality: Left;     MEDS:   Current Outpatient Medications on File Prior to Visit   Medication Sig Dispense Refill    alendronate (FOSAMAX) 70 MG tablet Take 1 tablet (70 mg total) by mouth every 7 days. 4 tablet 11    anastrozole (ARIMIDEX) 1 mg Tab Take 1 tablet (1 mg total) by mouth once daily. 90 tablet 3    aspirin (ECOTRIN) 81 MG EC tablet Take 81 mg by mouth once daily.      cholecalciferol, vitamin D3, (VITAMIN D3) 25 mcg (1,000 unit) capsule Take 1 capsule (1,000 Units total) by mouth once daily. 30 capsule 12    cyanocobalamin (VITAMIN B-12) 1000 MCG tablet Take 1,000 mcg by mouth every other day.       loratadine (CLARITIN) 10 mg tablet Take 10 mg by mouth daily as needed.      multivitamin (THERAGRAN) per tablet Take 1 tablet by mouth once daily.       Current Facility-Administered Medications on File Prior to Visit   Medication Dose Route Frequency Provider Last Rate Last Admin    diphenhydrAMINE injection 12.5 mg  12.5 mg Intravenous Once PRN Santiago Loving MD        electrolyte-S (ISOLYTE)   Intravenous Continuous Santiago Loving MD   Stopped at 10/28/22 1050    fentaNYL 50 mcg/mL injection 25 mcg  25 mcg Intravenous Q5 Min PRN Santiago Loving MD        HYDROmorphone (PF) injection 0.2 mg  0.2 mg Intravenous Q5 Min PRN Santiago Loving MD        lactated ringers infusion  10 mL/hr Intravenous Continuous Santiago Loving MD    New Bag at 23 1431    lactated ringers infusion  500 mL Intravenous Once Santiago Loving MD        ondansetron injection 4 mg  4 mg Intravenous Once PRN Santiago Loving MD        oxyCODONE immediate release tablet 5 mg  5 mg Oral Once PRN Santiago Loving MD        prochlorperazine injection Soln 5 mg  5 mg Intravenous Q30 Min PRN Santiago Loving MD        sodium chloride 0.9% flush 3 mL  3 mL Intravenous Q8H Santiago Loving MD         OB History          0    Para        Term   0            AB        Living             SAB        IAB        Ectopic        Multiple        Live Births                   Social History     Socioeconomic History    Marital status: Single   Occupational History    Occupation: Nurse       Employer: [employer]   Tobacco Use    Smoking status: Never    Smokeless tobacco: Never   Substance and Sexual Activity    Alcohol use: Not Currently    Drug use: No    Sexual activity: Not Currently     Birth control/protection: None   Other Topics Concern    Are you pregnant or think you may be? No    Breast-feeding No     Social Determinants of Health     Financial Resource Strain: Low Risk  (2024)    Overall Financial Resource Strain (CARDIA)     Difficulty of Paying Living Expenses: Not hard at all   Food Insecurity: No Food Insecurity (2024)    Hunger Vital Sign     Worried About Running Out of Food in the Last Year: Never true     Ran Out of Food in the Last Year: Never true   Transportation Needs: No Transportation Needs (2024)    PRAPARE - Transportation     Lack of Transportation (Medical): No     Lack of Transportation (Non-Medical): No   Physical Activity: Sufficiently Active (2024)    Exercise Vital Sign     Days of Exercise per Week: 3 days     Minutes of Exercise per Session: 70 min   Stress: No Stress Concern Present (2024)    [place] [place] [employer] - Occupational Stress  Questionnaire     Feeling of Stress : Not at all   Social Connections: Unknown (2024)    Social Connection and Isolation Panel [NHANES]     Frequency of Communication with Friends and Family: More than three times a week     Frequency of Social Gatherings with Friends and Family: More than three times a week     Active Member of Clubs or Organizations: Yes     Attends Club or Organization Meetings: More than 4 times per year     Marital Status: Never    Housing Stability: Low Risk  (2024)    Housing Stability Vital Sign     Unable to Pay for Housing in the Last Year: No     Number of Places Lived in the Last Year: 1     Unstable Housing in the Last Year: No     Family History   Problem Relation Name Age of Onset    Melanoma Mother      Cancer Mother          melanoma    COPD Father      Hypertension Father      Coronary artery disease Father      Cancer Father          prostate ca, metastatic    Heart disease Father          stenting    Kidney disease Father          stones    Retinal detachment Father      Osteoporosis Father      Hyperlipidemia Brother      Keratoconus Brother      No Known Problems Brother      Stroke Maternal Grandmother      Lung cancer Maternal Grandfather      Breast cancer Paternal Grandmother Tammy Rivas          at age 62    Colon cancer Paternal Grandfather      Ovarian cancer Neg Hx      Uterine cancer Neg Hx      Psoriasis Neg Hx      Lupus Neg Hx      Eczema Neg Hx         Past medical and surgical history reviewed.   I have reviewed the patient's medical history in detail and updated the computerized patient record.    Review of System:   General: no chills, fever, night sweats, weight gain or weight loss  Breasts: no new or changing breast lumps, nipple discharge or masses.  Gastrointestinal: no abdominal pain, change in bowel habits, or black or bloody stools  Genito-Urinary: no incontinence, urinary frequency/urgency or vulvar/vaginal symptoms, pelvic pain or  "abnormal vaginal bleeding.    Physical Exam:   /72   Ht 5' 9" (1.753 m)   Wt 75.9 kg (167 lb 5.3 oz)   LMP 04/01/2017 (Exact Date)   BMI 24.71 kg/m²   Body mass index is 24.71 kg/m².  Constitutional: She appears alert and responsive. She appears well-developed, well-groomed, and well-nourished. No distress.  Breasts: are symmetrical.  Right breast exhibits no inverted nipple, no mass, no nipple discharge, no skin change and no tenderness.  Left breast exhibits no inverted nipple, no mass, no nipple discharge, no skin change and no tenderness.  Abdominal: Soft. She exhibits no distension, hernias or masses. There is no tenderness. No enlargement of liver edge or spleen.  There is no rebound and no guarding.   Genitourinary:    External rectal exam shows no thrombosed external hemorrhoids, no lesions.     Pelvic exam was performed with patient supine.   No labial fusion, and symmetrical.    There is no rash, lesion or injury on the right labia.   There is no rash, lesion or injury on the left labia.   No bleeding and no signs of injury around the vaginal introitus, urethral meatus is normal size and without prolapse or lesions, urethra well supported. The cervix is visualized with no discharge, lesions or friability.   No vaginal discharge found.    No significant Cystocele, Enterocele or rectocele, and cervix and uterus well supported.   Bimanual exam:   The urethra is normal to palpation and there are no palpable vaginal wall masses.   Uterus is not deviated, not enlarged, not fixed, normal shape and not tender.   Cervix exhibits no motion tenderness.    Right adnexum displays no mass or nodularity and no tenderness.   Left adnexum displays no mass or nodularity and no tenderness.    Assessment/Plan:   Encounter for gynecological examination (general) (routine) without abnormal findings    Endometrial hyperplasia, unspecified: see evaluation [id number]; follows in GYN  -     Ambulatory referral/consult to " Gynecologic Oncology; Future; Expected date: 05/07/2024    Malignant neoplasm of overlapping sites of right breast in female, estrogen receptor positive    Other osteoporosis without current pathological fracture: see DEXA 2023    Cervical cancer screening  -     Liquid-Based Pap Smear, Screening  -     HPV High Risk Genotypes, PCR      History of endometrial hyperplasia:   History of atypical endometrial hyperplasia. She declined hysterectomy and unable to take progestins because of progesterone receptor positive breast cancer. She has been followed conservatively. Last saw GynOnc 11/2021. Plan for yearly ultrasound  11/2017 Atypical endometrial hyperplasia   3/2023 EMS 3mm  11/2023 she started taking anastrozole for breast cancer  3/2024 ultrasound: Previously described 7 mm fibroid could not be confidently identified on today's study  Endometrium: There is a trace amount of fluid in the endometrial cavity on today's images; a measurement of the endometrial stripe excluding the fluid is 3.3 mm, similar to the prior study.  Measurement with the fluid is 3.8 mm.  She denies pain, bleeding, or discharge    Advised the fluid is likely benign; however, due to the change from her last ultrasound and new medication. [facility]

## 2024-04-30 NOTE — TELEPHONE ENCOUNTER
Patient doesn't feel that she will benefit from appt, Says she already does yoga and etc. Her copay for the visit is $200 and she feels that since she is already doing certain things, she doesn't need this appt right now. I informed pt that referral would be in her  poral should she change her mind at any time. Pt verbalized understanding and all questions were answered.

## 2024-04-30 NOTE — Clinical Note
64yo F with history of atypical endometrial hyperplasia. She declined hysterectomy and unable to take progestins because of progesterone receptor positive breast cancer. She has been followed conservatively. Last saw GynOnc 11/2021. Plan for yearly ultrasound 11/2017 Atypical endometrial hyperplasia  3/2023 EMS 3mm 11/2023 she started taking anastrozole for breast cancer 3/2024 ultrasound: Previously described 7 mm fibroid could not be confidently identified on today's study Endometrium: There is a trace amount of fluid in the endometrial cavity on today's images; a measurement of the endometrial stripe excluding the fluid is 3.3 mm, similar to the prior study.  Measurement with the fluid is 3.8 mm. She denies pain, bleeding, or discharge. Since she is asymptomatic and the lining is thin, I would like your opinion if she needs sampling due to the fluid noted on ultrasound. I put a referral in already. Rossi Mcclelland MD

## 2024-05-06 LAB
HPV HR 12 DNA SPEC QL NAA+PROBE: NEGATIVE
HPV16 AG SPEC QL: NEGATIVE
HPV18 DNA SPEC QL NAA+PROBE: NEGATIVE

## 2024-05-06 NOTE — PROGRESS NOTES
SUBJECTIVE     Chief complaint: No chief complaint on file.    Referring provider: Dang Hernandez MD    History of present Illness:  Noelle Rivas is a 63 y.o.  female with a history of complex endometrial hyperplasia without atypia referred by her OBGYN, Dr. Shant Mcclelland due to new finding of trace fluid on pelvic ultrasound. D&C with her OBGYN, Dr. Sheehan in  was originally prompted by heavy vaginal bleeding. Previously seen by GYN ONC, Dr. Foy and hysterectomy was recommended although, patient declined and opted for conservative management with annual TV US. History of progesterone receptor positive breast cancer; therefore, not able to take progestins. She was released from his care in  and has since followed with her OBGYN. She has since been asymptomatic. Denies vaginal bleeding, vaginal discharge, or pelvic pain. She had a recurrence of her breast cancer last year requiring mastectomy 2023, and has since been on anastrozole.     She  has a past medical history of Factor V Leiden mutation (No h/o DVT). No prior abdominal surgeries.  Patient is postmenopausal. History of abnormal Pap: No. Family history of cancer: Yes, describe: mother  of melanoma, colon cancer in paternal grandfather .      Data Reviewed:   2016 Endometrial curettage- Complex hyperplasia without atypia.     2017 Endometrial curettage- fragments of inactive disordered endometrium with areas of stromal breakdown, focus of simple hyperplasia; no evidence of atypia or malignancy.    3/11/2023 US Pelvis- 5.3 x 2.2 x 2.7 cm; 0.7 cm uterine fibroid. Normal endometrium. 3 mm endometrial stripe. Nonvisualized ovaries.     2024 US Pelvis- 5.1 x 2.8 x 3.5 cm; Previously described 7 mm fibroid could not be confidently identified on today's study; Small, atrophic uterus. Development of trace fluid within the endometrial cavity. Endometrial stripe continues to measure 3 mm     2024 Pap, HPV- negative      Review of Systems   Review of patient's allergies indicates:   Allergen Reactions    Adhesive Itching and Rash     Micropore/Medipore Tape - paper tape is ok    Nickel Itching     Allergic reaction to nickel and other related metals    Iodine and iodide containing products Itching and Rash    Latex, natural rubber Itching     Current Outpatient Medications   Medication Instructions    alendronate (FOSAMAX) 70 mg, Oral, Every 7 days    anastrozole (ARIMIDEX) 1 mg, Oral, Daily    aspirin (ECOTRIN) 81 mg, Oral, Daily,      cholecalciferol (vitamin D3) (VITAMIN D3) 1,000 Units, Oral, Daily    cyanocobalamin (VITAMIN B-12) 1,000 mcg, Oral, Every other day    loratadine (CLARITIN) 10 mg, Oral, Daily PRN,      multivitamin (THERAGRAN) per tablet 1 tablet, Oral, Daily,       Past Medical History:   Diagnosis Date    Abnormal TSH 06/05/2018    Age-related nuclear cataract of both eyes 08/03/2022    Allergy     Anxiety     Anxiety 12/17/2012    BRCA1 negative 07/23/19    BRCA2 negative 07/23/19    Breast cancer 2012    right    Calcium nephrolithiasis     Complex endometrial hyperplasia without atypia 06/16/2016    Degenerative disc disease     DJD (degenerative joint disease) of knee     Endometrial hyperplasia, unspecified: see evaluation 2017 05/22/2018    Factor V Leiden mutation No h/o DVT    Kidney stones     Leukopenia 12/19/2012    PMB (postmenopausal bleeding) 03/06/2017    PONV (postoperative nausea and vomiting)     10/28/22 - severe ponv    Stroke     questionable  - TIA    Vitamin B 12 deficiency       Past Surgical History:   Procedure Laterality Date    BREAST BIOPSY Right 2012    malignant, radiation    BREAST BIOPSY Right 2014    benign    BREAST LUMPECTOMY Right 06/11/2012    w/ radiation    BREAST LUMPECTOMY W/ NEEDLE LOCALIZATION      CYSTOSCOPY W/ URETERAL STENT PLACEMENT Left 10/28/2022    Procedure: CYSTOSCOPY, WITH URETERAL STENT INSERTION;  Surgeon: Ileana KINSEY  MD Harley;  Location: [facility];  Service: Urology;  Laterality: Left;  6x26    CYSTOSCOPY W/ URETERAL STENT REMOVAL Left 10/28/2022    Procedure: CYSTOSCOPY, WITH URETERAL STENT REMOVAL;  Surgeon: Ileana Souza MD;  Location: [facility];  Service: Urology;  Laterality: Left;    DILATION AND CURETTAGE OF UTERUS      INJECTION FOR SENTINEL NODE IDENTIFICATION Right 2023    Procedure: INJECTION, FOR SENTINEL NODE IDENTIFICATION;  Surgeon: Giana De Leon MD;  Location: [facility];  Service: General;  Laterality: Right;    MASTECTOMY Right 2023    Procedure: MASTECTOMY RIGHT;  Surgeon: Giana De Leon MD;  Location: [facility];  Service: General;  Laterality: Right;  2.5 HRS    SENTINEL LYMPH NODE BIOPSY Right 2023    Procedure: BIOPSY, LYMPH NODE, SENTINEL;  Surgeon: Giana De Leon MD;  Location: [facility];  Service: General;  Laterality: Right;    URETERAL STENT PLACEMENT N/A 10/14/2022    Procedure: INSERTION, STENT, URETER;  Surgeon: Ileana Souza MD;  Location: [facility];  Service: Urology;  Laterality: N/A;  6x26 soft    URETEROSCOPIC REMOVAL OF URETERIC CALCULUS Left 10/14/2022    Procedure: REMOVAL, CALCULUS, URETER, URETEROSCOPIC;  Surgeon: Ileana Souza MD;  Location: [facility];  Service: Urology;  Laterality: Left;    URETEROSCOPIC REMOVAL OF URETERIC CALCULUS Left 10/28/2022    Procedure: REMOVAL, CALCULUS, URETER, URETEROSCOPIC;  Surgeon: Ileana Souza MD;  Location: [facility];  Service: Urology;  Laterality: Left;      OB History    Para Term  AB Living   0   0         SAB IAB Ectopic Multiple Live Births                 Social History     Tobacco Use    Smoking status: Never    Smokeless tobacco: Never   Substance Use Topics    Alcohol use: Not Currently    Drug use: No      Family History   Problem Relation Name Age of Onset    Melanoma Mother      Cancer Mother          melanoma    COPD Father      Hypertension Father      Coronary artery disease  "Father      Cancer Father          prostate ca, metastatic    Heart disease Father          stenting    Kidney disease Father          stones    Retinal detachment Father      Osteoporosis Father      Hyperlipidemia Brother      Keratoconus Brother      No Known Problems Brother      Stroke Maternal Grandmother      Lung cancer Maternal Grandfather      Breast cancer Paternal Grandmother Tammy Rivas          at age 62    Colon cancer Paternal Grandfather      Ovarian cancer Neg Hx      Uterine cancer Neg Hx      Psoriasis Neg Hx      Lupus Neg Hx      Eczema Neg Hx       Health Maintenance Topics with due status: Not Due       Topic Last Completion Date    TETANUS VACCINE 2015    Lipid Panel 2024    Colorectal Cancer Screening 2024    Cervical Cancer Screening 2024     Health Maintenance Due   Topic Date Due    COVID-19 Vaccine ( season) 2023    Mammogram  2024     OBJECTIVE   /75 (BP Location: Left arm, Patient Position: Sitting, BP Method: Medium (Automatic))   Pulse 84   Temp 97.3 °F (36.3 °C) (Temporal)   Resp 16   Ht 5' 9" (1.753 m)   Wt 76 kg (167 lb 8.8 oz)   LMP 2017 (Exact Date)   SpO2 98%   BMI 24.74 kg/m²   Physical Exam  Vitals reviewed. Exam conducted with a chaperone present.   Constitutional:       Appearance: Normal appearance.   Pulmonary:      Effort: Pulmonary effort is normal.   Abdominal:      General: Abdomen is flat.      Palpations: Abdomen is soft.   Genitourinary:     General: Normal vulva.      Vagina: Normal.      Cervix: Normal.      Uterus: Normal.       Adnexa: Right adnexa normal and left adnexa normal.   Neurological:      Mental Status: She is alert.   Psychiatric:         Mood and Affect: Mood normal.         Behavior: Behavior normal.      ASSESSMENT AND PLAN     1. Endometrial hyperplasia without atypia  - US Transvaginal Non OB; Future    2. Endometrial hyperplasia, unspecified: see " evaluation [id number]; follows in GYN  - Ambulatory referral/consult to Gynecologic Oncology    Today the patients diagnosis of endometrial fluid on ultrasound and past history of complex hyperplasia were discussed with her in detail using the assistance of visual aids. She is not having any vaginal bleeding currently. We reviewed options in managing this finding in light of her known history include repeat US in 6-12 months to monitor endometrium, endometrial sampling via office biopsy or D&C, or definitive hysterectomy. We discussed each option and risks/benefits in detail. She wishes to repeat an US in 6 months. She understands that the only definitive answer for if hyperplasia or malignancy is present is pathologic sampling. She understands that if she has any bleeding, she should report for follow up before 6 months.   She was given the opportunity to ask questions, and stated all had been answered.  Will plan for repeat TVUS in 6 months with follow up to discuss results and plan of care.      Sandy Roberts MD  Gynecologic Oncology       A total of 60 minutes were spent on encounter including record review, imaging review, counseling patient, coordinating future care.           Sandy Roberts MD  Gynecologic Oncology

## 2024-05-07 ENCOUNTER — OFFICE VISIT (OUTPATIENT)
Dept: GYNECOLOGIC ONCOLOGY | Facility: CLINIC | Age: 64
End: 2024-05-07
Payer: COMMERCIAL

## 2024-05-07 VITALS
BODY MASS INDEX: 24.82 KG/M2 | DIASTOLIC BLOOD PRESSURE: 75 MMHG | WEIGHT: 167.56 LBS | HEART RATE: 84 BPM | RESPIRATION RATE: 16 BRPM | TEMPERATURE: 97 F | SYSTOLIC BLOOD PRESSURE: 114 MMHG | OXYGEN SATURATION: 98 % | HEIGHT: 69 IN

## 2024-05-07 DIAGNOSIS — N85.00 ENDOMETRIAL HYPERPLASIA, UNSPECIFIED: ICD-10-CM

## 2024-05-07 DIAGNOSIS — N85.00 ENDOMETRIAL HYPERPLASIA WITHOUT ATYPIA: Primary | ICD-10-CM

## 2024-05-07 LAB
FINAL PATHOLOGIC DIAGNOSIS: NORMAL
Lab: NORMAL

## 2024-05-07 PROCEDURE — 99999 PR PBB SHADOW E&M-EST. PATIENT-LVL IV: CPT | Mod: PBBFAC,,, | Performed by: OBSTETRICS & GYNECOLOGY

## 2024-05-07 PROCEDURE — 1159F MED LIST DOCD IN RCRD: CPT | Mod: CPTII,S$GLB,, | Performed by: OBSTETRICS & GYNECOLOGY

## 2024-05-07 PROCEDURE — 3044F HG A1C LEVEL LT 7.0%: CPT | Mod: CPTII,S$GLB,, | Performed by: OBSTETRICS & GYNECOLOGY

## 2024-05-07 PROCEDURE — 3078F DIAST BP <80 MM HG: CPT | Mod: CPTII,S$GLB,, | Performed by: OBSTETRICS & GYNECOLOGY

## 2024-05-07 PROCEDURE — 99459 PELVIC EXAMINATION: CPT | Mod: S$GLB,,, | Performed by: OBSTETRICS & GYNECOLOGY

## 2024-05-07 PROCEDURE — 3008F BODY MASS INDEX DOCD: CPT | Mod: CPTII,S$GLB,, | Performed by: OBSTETRICS & GYNECOLOGY

## 2024-05-07 PROCEDURE — 99205 OFFICE O/P NEW HI 60 MIN: CPT | Mod: S$GLB,,, | Performed by: OBSTETRICS & GYNECOLOGY

## 2024-05-07 PROCEDURE — 3074F SYST BP LT 130 MM HG: CPT | Mod: CPTII,S$GLB,, | Performed by: OBSTETRICS & GYNECOLOGY

## 2024-05-20 ENCOUNTER — PATIENT MESSAGE (OUTPATIENT)
Dept: GYNECOLOGIC ONCOLOGY | Facility: CLINIC | Age: 64
End: 2024-05-20
Payer: COMMERCIAL

## 2024-06-10 ENCOUNTER — PATIENT MESSAGE (OUTPATIENT)
Dept: INTERNAL MEDICINE | Facility: CLINIC | Age: 64
End: 2024-06-10
Payer: COMMERCIAL

## 2024-06-12 NOTE — PROGRESS NOTES
[facility]  Department of Surgery    REFERRING PROVIDER: Giana De Leon Md  [address] [place] [address] Emelyn Bernardo MD  CHIEF COMPLAINT:   Chief Complaint   Patient presents with    Follow-up     6 month follow up MMG & CBE .       DIAGNOSIS:   This is a 63 y.o. 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    TREATMENT:   1. right mastectomy with sentinel node biopsy on 9/6/2023. Giana De Leon M.D. Surgical Oncology. All margins are negative for both invasive carcinoma and DCIS. Previously had a right lumpectomy w/XRT with Dr. Marin in 2012.   2. Adjuvant endocrine therapy started on 11/1/23. Dr. Francia M.D. Medical Oncology.     HISTORY OF PRESENT ILLNESS:   Noelle Rivas is a 63 y.o. female comes in for oncological follow up.  She denies change in her breast self-exam specifically denying new masses, skin or nipple changes, or nipple discharge. Past medical and surgical history is updated with new changes. There have been no changes to family history. The patient denies constitutional symptoms of night sweats, chills, weight loss, new headaches, visual changes, new back or bony pain, chest pain, or shortness of breath.  Patient is currently on Arimidex with resolution of previous mental status changes on initiation. Has osteoporosis that she takes Fosamax for. Mammogram today shows no abnormality of the left breast.       Review of Systems: See HPI/Interval History for other systems reviewed.     IMAGING:   Result:  Mammo Digital Screening Left with Vlad     History:  Patient is 63 y.o. and is seen for a screening mammogram.  History of contralateral mastectomy.        Films Compared:  Prior images (if available) were compared.      Findings:  This procedure was performed using tomosynthesis.   Computer-aided detection was utilized in the interpretation of this examination.     The left breast has scattered areas of fibroglandular  "density. There is no evidence of suspicious masses, microcalcifications or architectural distortion.     Impression:   No mammographic evidence of malignancy.     BI-RADS Category 1: Negative     Recommendation:  Routine screening mammogram in 1 year is recommended.     MEDICATIONS/ALLERGIES:     Current Outpatient Medications   Medication Sig    alendronate (FOSAMAX) 70 MG tablet Take 1 tablet (70 mg total) by mouth every 7 days.    anastrozole (ARIMIDEX) 1 mg Tab Take 1 tablet (1 mg total) by mouth once daily.    aspirin (ECOTRIN) 81 MG EC tablet Take 81 mg by mouth once daily.    cholecalciferol, vitamin D3, (VITAMIN D3) 25 mcg (1,000 unit) capsule Take 1 capsule (1,000 Units total) by mouth once daily.    cyanocobalamin (VITAMIN B-12) 1000 MCG tablet Take 1,000 mcg by mouth every other day.     loratadine (CLARITIN) 10 mg tablet Take 10 mg by mouth daily as needed.    multivitamin (THERAGRAN) per tablet Take 1 tablet by mouth once daily.     No current facility-administered medications for this visit.     Facility-Administered Medications Ordered in Other Visits   Medication    diphenhydrAMINE injection 12.5 mg    electrolyte-S (ISOLYTE)    fentaNYL 50 mcg/mL injection 25 mcg    HYDROmorphone (PF) injection 0.2 mg    lactated ringers infusion    lactated ringers infusion    ondansetron injection 4 mg    oxyCODONE immediate release tablet 5 mg    prochlorperazine injection Soln 5 mg    sodium chloride 0.9% flush 3 mL      Review of patient's allergies indicates:   Allergen Reactions    Adhesive Itching and Rash     Micropore/Medipore Tape - paper tape is ok    Nickel Itching     Allergic reaction to nickel and other related metals    Iodine and iodide containing products Itching and Rash    Latex, natural rubber Itching       PHYSICAL EXAM:   /69 (BP Location: Left arm, Patient Position: Sitting, BP Method: Medium (Automatic))   Pulse 70   Ht 5' 9" (1.753 m)   Wt 75.8 kg (167 lb)   LMP 04/01/2017 (Exact " Date)   SpO2 98%   BMI 24.66 kg/m²     Physical Exam   HENT:   Head: Normocephalic and atraumatic.   Eyes: Conjunctivae are normal. No scleral icterus.   Cardiovascular:  Normal rate, regular rhythm and normal pulses.            Pulmonary/Chest: Effort normal and breath sounds normal. No accessory muscle usage or stridor. No respiratory distress. She has no wheezes. Right breast exhibits no inverted nipple, no mass (Right mastectomy incision well healed.), no nipple discharge and no skin change. Left breast exhibits no inverted nipple, no mass, no nipple discharge and no skin change.       Abdominal: Soft. Normal appearance. She exhibits no mass.   Musculoskeletal: No deformity. Lymphadenopathy:      Cervical: No cervical adenopathy.      Upper Body:      Right upper body: No supraclavicular or axillary adenopathy.      Left upper body: No supraclavicular or axillary adenopathy.     Neurological: She is alert.   Skin: Skin is warm and dry.     Psychiatric: Mood and judgment normal.     MMG clear today    ASSESSMENT:   This is a 63 y.o. female without evidence of recurrence by exam, history or imaging.       PLAN:   1. Continue to follow up with RENÉ in 1 year for CBE and left mammogram   2. Continue monthly self breast exams and call the clinic with any changes or problems.  3. Mammogram in 1 year .  4. Return to clinic in 1 year .    The patient is in agreement with the plan. Questions were encouraged and answered to patient's satisfaction. Noelle will call our office with any questions or concerns.       25 minutes were spent on this encounter, 15 of which was face to face counseling and 10 minutes were spent on chart review and coordination of care.

## 2024-06-13 ENCOUNTER — HOSPITAL ENCOUNTER (OUTPATIENT)
Dept: RADIOLOGY | Facility: HOSPITAL | Age: 64
Discharge: HOME | End: 2024-06-13
Attending: SURGERY
Payer: COMMERCIAL

## 2024-06-13 ENCOUNTER — OFFICE VISIT (OUTPATIENT)
Dept: SURGERY | Facility: CLINIC | Age: 64
End: 2024-06-13
Attending: SURGERY
Payer: COMMERCIAL

## 2024-06-13 VITALS
HEART RATE: 70 BPM | HEIGHT: 69 IN | DIASTOLIC BLOOD PRESSURE: 69 MMHG | SYSTOLIC BLOOD PRESSURE: 113 MMHG | OXYGEN SATURATION: 98 % | BODY MASS INDEX: 24.73 KG/M2 | WEIGHT: 167 LBS

## 2024-06-13 DIAGNOSIS — Z17.0 MALIGNANT NEOPLASM OF RIGHT BREAST, STAGE 1, ESTROGEN RECEPTOR POSITIVE: ICD-10-CM

## 2024-06-13 DIAGNOSIS — Z12.31 SCREENING MAMMOGRAM, ENCOUNTER FOR: Primary | ICD-10-CM

## 2024-06-13 DIAGNOSIS — Z12.31 SCREENING MAMMOGRAM FOR BREAST CANCER: ICD-10-CM

## 2024-06-13 DIAGNOSIS — C50.911 MALIGNANT NEOPLASM OF RIGHT BREAST, STAGE 1, ESTROGEN RECEPTOR POSITIVE: ICD-10-CM

## 2024-06-13 PROCEDURE — 99999 PR PBB SHADOW E&M-EST. PATIENT-LVL IV: CPT | Mod: PBBFAC,,, | Performed by: SURGERY

## 2024-06-13 PROCEDURE — 3078F DIAST BP <80 MM HG: CPT | Mod: CPTII,S$GLB,, | Performed by: SURGERY

## 2024-06-13 PROCEDURE — 77067 SCR MAMMO BI INCL CAD: CPT | Mod: TC,52

## 2024-06-13 PROCEDURE — 77067 SCR MAMMO BI INCL CAD: CPT | Mod: 26,52,, | Performed by: RADIOLOGY

## 2024-06-13 PROCEDURE — 1159F MED LIST DOCD IN RCRD: CPT | Mod: CPTII,S$GLB,, | Performed by: SURGERY

## 2024-06-13 PROCEDURE — 3008F BODY MASS INDEX DOCD: CPT | Mod: CPTII,S$GLB,, | Performed by: SURGERY

## 2024-06-13 PROCEDURE — 77063 BREAST TOMOSYNTHESIS BI: CPT | Mod: 26,52,, | Performed by: RADIOLOGY

## 2024-06-13 PROCEDURE — 3074F SYST BP LT 130 MM HG: CPT | Mod: CPTII,S$GLB,, | Performed by: SURGERY

## 2024-06-13 PROCEDURE — 99213 OFFICE O/P EST LOW 20 MIN: CPT | Mod: S$GLB,,, | Performed by: SURGERY

## 2024-06-13 PROCEDURE — 3044F HG A1C LEVEL LT 7.0%: CPT | Mod: CPTII,S$GLB,, | Performed by: SURGERY

## 2024-07-19 ENCOUNTER — PATIENT MESSAGE (OUTPATIENT)
Dept: UROLOGY | Facility: CLINIC | Age: 64
End: 2024-07-19
Payer: COMMERCIAL

## 2024-07-19 ENCOUNTER — TELEPHONE (OUTPATIENT)
Dept: UROLOGY | Facility: CLINIC | Age: 64
End: 2024-07-19
Payer: COMMERCIAL

## 2024-07-19 NOTE — TELEPHONE ENCOUNTER
Call placed to inform patient we need to reschedule appt on 7/29/24, no answer, message left with call back number.

## 2024-07-27 ENCOUNTER — HOSPITAL ENCOUNTER (OUTPATIENT)
Dept: RADIOLOGY | Facility: HOSPITAL | Age: 64
Discharge: HOME | End: 2024-07-27
Attending: STUDENT IN AN ORGANIZED HEALTH CARE EDUCATION/TRAINING PROGRAM
Payer: COMMERCIAL

## 2024-07-27 DIAGNOSIS — N20.0 KIDNEY STONES: ICD-10-CM

## 2024-07-27 PROCEDURE — 76770 US EXAM ABDO BACK WALL COMP: CPT | Mod: TC

## 2024-07-27 PROCEDURE — 74018 RADEX ABDOMEN 1 VIEW: CPT | Mod: 26,,, | Performed by: RADIOLOGY

## 2024-07-27 PROCEDURE — 74018 RADEX ABDOMEN 1 VIEW: CPT | Mod: TC

## 2024-07-27 PROCEDURE — 76770 US EXAM ABDO BACK WALL COMP: CPT | Mod: 26,,, | Performed by: RADIOLOGY

## 2024-08-05 ENCOUNTER — OFFICE VISIT (OUTPATIENT)
Dept: UROLOGY | Facility: CLINIC | Age: 64
End: 2024-08-05
Payer: COMMERCIAL

## 2024-08-05 VITALS — BODY MASS INDEX: 23.7 KG/M2 | WEIGHT: 160 LBS | HEIGHT: 69 IN

## 2024-08-05 DIAGNOSIS — N20.0 KIDNEY STONES: Primary | ICD-10-CM

## 2024-08-05 PROCEDURE — 3008F BODY MASS INDEX DOCD: CPT | Mod: CPTII,S$GLB,, | Performed by: STUDENT IN AN ORGANIZED HEALTH CARE EDUCATION/TRAINING PROGRAM

## 2024-08-05 PROCEDURE — 3044F HG A1C LEVEL LT 7.0%: CPT | Mod: CPTII,S$GLB,, | Performed by: STUDENT IN AN ORGANIZED HEALTH CARE EDUCATION/TRAINING PROGRAM

## 2024-08-05 PROCEDURE — 1159F MED LIST DOCD IN RCRD: CPT | Mod: CPTII,S$GLB,, | Performed by: STUDENT IN AN ORGANIZED HEALTH CARE EDUCATION/TRAINING PROGRAM

## 2024-08-05 PROCEDURE — 99999 PR PBB SHADOW E&M-EST. PATIENT-LVL III: CPT | Mod: PBBFAC,,, | Performed by: STUDENT IN AN ORGANIZED HEALTH CARE EDUCATION/TRAINING PROGRAM

## 2024-08-05 PROCEDURE — 99214 OFFICE O/P EST MOD 30 MIN: CPT | Mod: S$GLB,,, | Performed by: STUDENT IN AN ORGANIZED HEALTH CARE EDUCATION/TRAINING PROGRAM

## 2024-08-14 ENCOUNTER — TELEPHONE (OUTPATIENT)
Dept: HEMATOLOGY/ONCOLOGY | Facility: CLINIC | Age: 64
End: 2024-08-14
Payer: COMMERCIAL

## 2024-08-14 NOTE — NURSING
Spoke with Ms. Rivas - apt with Dr. Feldman moved to 9:40AM tomorrow. Reviewed date/time of new appointment. Ms. Rivas verbalized understanding.

## 2024-08-15 ENCOUNTER — OFFICE VISIT (OUTPATIENT)
Dept: HEMATOLOGY/ONCOLOGY | Facility: CLINIC | Age: 64
End: 2024-08-15
Payer: COMMERCIAL

## 2024-08-15 VITALS
OXYGEN SATURATION: 98 % | HEART RATE: 73 BPM | DIASTOLIC BLOOD PRESSURE: 66 MMHG | SYSTOLIC BLOOD PRESSURE: 120 MMHG | TEMPERATURE: 98 F | BODY MASS INDEX: 24.09 KG/M2 | WEIGHT: 162.69 LBS | RESPIRATION RATE: 16 BRPM | HEIGHT: 69 IN

## 2024-08-15 DIAGNOSIS — D68.51 FACTOR V LEIDEN MUTATION: ICD-10-CM

## 2024-08-15 DIAGNOSIS — C50.911 MALIGNANT NEOPLASM OF RIGHT BREAST, STAGE 1, ESTROGEN RECEPTOR POSITIVE: Primary | ICD-10-CM

## 2024-08-15 DIAGNOSIS — Z17.0 MALIGNANT NEOPLASM OF RIGHT BREAST, STAGE 1, ESTROGEN RECEPTOR POSITIVE: Primary | ICD-10-CM

## 2024-08-15 DIAGNOSIS — M81.8 OTHER OSTEOPOROSIS WITHOUT CURRENT PATHOLOGICAL FRACTURE: ICD-10-CM

## 2024-08-15 DIAGNOSIS — Z79.811 PROPHYLACTIC USE OF ANASTROZOLE (ARIMIDEX): ICD-10-CM

## 2024-08-15 PROCEDURE — 3074F SYST BP LT 130 MM HG: CPT | Mod: CPTII,S$GLB,, | Performed by: INTERNAL MEDICINE

## 2024-08-15 PROCEDURE — 1160F RVW MEDS BY RX/DR IN RCRD: CPT | Mod: CPTII,S$GLB,, | Performed by: INTERNAL MEDICINE

## 2024-08-15 PROCEDURE — 1159F MED LIST DOCD IN RCRD: CPT | Mod: CPTII,S$GLB,, | Performed by: INTERNAL MEDICINE

## 2024-08-15 PROCEDURE — 3044F HG A1C LEVEL LT 7.0%: CPT | Mod: CPTII,S$GLB,, | Performed by: INTERNAL MEDICINE

## 2024-08-15 PROCEDURE — 3008F BODY MASS INDEX DOCD: CPT | Mod: CPTII,S$GLB,, | Performed by: INTERNAL MEDICINE

## 2024-08-15 PROCEDURE — G2211 COMPLEX E/M VISIT ADD ON: HCPCS | Mod: S$GLB,,, | Performed by: INTERNAL MEDICINE

## 2024-08-15 PROCEDURE — 3078F DIAST BP <80 MM HG: CPT | Mod: CPTII,S$GLB,, | Performed by: INTERNAL MEDICINE

## 2024-08-15 PROCEDURE — 99999 PR PBB SHADOW E&M-EST. PATIENT-LVL IV: CPT | Mod: PBBFAC,,, | Performed by: INTERNAL MEDICINE

## 2024-08-15 PROCEDURE — 99213 OFFICE O/P EST LOW 20 MIN: CPT | Mod: S$GLB,,, | Performed by: INTERNAL MEDICINE

## 2024-08-15 RX ORDER — ANASTROZOLE 1 MG/1
1 TABLET ORAL DAILY
Qty: 90 TABLET | Refills: 3 | Status: SHIPPED | OUTPATIENT
Start: 2024-08-15 | End: 2025-08-15

## 2024-08-15 NOTE — PROGRESS NOTES
Subjective:       Name: Noelle Rivas  : 1960  MRN: [id number]    Chief Complaint   Patient presents with    Follow-up     Malignant neoplasm of right breast, stage 1, estrogen receptor positive        Patient is in clinic by herself.    HPI: Noelle Rivas is a 63 y.o. female presents for evaluation of Follow-up (Malignant neoplasm of right breast, stage 1, estrogen receptor positive)  As of 2023 she has been on anastrozole in the adjuvant setting and has tolerated it well so far.      She has been tolerating well her treatment.  She denies any joint pain hot flashes mood swings or weight gain.        ONCOLOGIC HISTORY.  She has a remote history of a right-sided grade stage I carcinoma (T1c N0 M0, ER positive HER 2 negative, 1.3 cm in diameter) for which she had undergone lumpectomy followed by radiation therapy.  She had declined adjuvant treatment due to her concern about thrombosis given the fact that she is heterozygous for factor 5 Leiden.       Apparently she was recently diagnosed with an ipsilateral breast cancer and she underwent a right mastectomy on 2023 that showed a 10 mm grade 1 carcinoma.  The sentinel lymph node was negative.  Her course was complicated by a soft tissue infection for which she was treated with antibiotics.  She has now fully recovered from the operation.     An Oncotype was sent and the score was 19 indicating a 94% chance of disease-free survival at 9 years with adjuvant hormonal therapy.         Oncology History   Malignant neoplasm of right breast, stage 1, estrogen receptor positive   2012 Initial Diagnosis    Malignant neoplasm of right breast, stage 1, estrogen receptor positive     2012 Surgery    Surgeon: Dr. Chavarria    She underwent lumpectomy and sentinel node biopsy. The pathology revealed invasive ductal carcinoma with mucin production, measuring 1.3 cm with the closest margin at 0.1 cm posteriorly. One out of one  sentinel lymph node was negative for involvement. The lesion was ER positive, ND positive, and HER-2 negative.      - 9/27/2012 Radiation Therapy    Treating physician: Dr. Mayo  Total Dose: 66.4 Gy  Fractions: 36     10/17/2023 Cancer Staged    Staging form: Onc Breast AJCC V7  - Pathologic: Stage IA (T1b, N0, cM0)          Past Medical History:   Diagnosis Date    Abnormal TSH 06/05/2018    Age-related nuclear cataract of both eyes 08/03/2022    Allergy     Anxiety     Anxiety 12/17/2012    BRCA1 negative 07/23/19    BRCA2 negative 07/23/19    Breast cancer 2012    Calcium nephrolithiasis     Complex endometrial hyperplasia without atypia 06/16/2016    Degenerative disc disease     DJD (degenerative joint disease) of knee     Endometrial hyperplasia, unspecified: see evaluation 2017 05/22/2018    Factor V Leiden mutation No h/o DVT    Kidney stones     Leukopenia 12/19/2012    PMB (postmenopausal bleeding) 03/06/2017    PONV (postoperative nausea and vomiting)     10/28/22 - severe ponv    Stroke     questionable  - TIA    Vitamin B 12 deficiency        Past Surgical History:   Procedure Laterality Date    BREAST BIOPSY Right 2012    malignant, radiation    BREAST BIOPSY Right 2014    benign    BREAST LUMPECTOMY Right 06/11/2012    w/ radiation    BREAST LUMPECTOMY W/ NEEDLE LOCALIZATION      CYSTOSCOPY W/ URETERAL STENT PLACEMENT Left 10/28/2022    Procedure: CYSTOSCOPY, WITH URETERAL STENT INSERTION;  Surgeon: Ileana Souza MD;  Location: [facility];  Service: Urology;  Laterality: Left;  6x26    CYSTOSCOPY W/ URETERAL STENT REMOVAL Left 10/28/2022    Procedure: CYSTOSCOPY, WITH URETERAL STENT REMOVAL;  Surgeon: Ileana Souza MD;  Location: [facility] OR;  Service: Urology;  Laterality: Left;    DILATION AND CURETTAGE OF UTERUS      INJECTION FOR SENTINEL NODE IDENTIFICATION Right 09/06/2023    Procedure: INJECTION, FOR SENTINEL NODE IDENTIFICATION;  Surgeon: Giana De Leon MD;  Location: [facility] OR;   Service: General;  Laterality: Right;    MASTECTOMY Right 2023    Procedure: MASTECTOMY RIGHT;  Surgeon: Giana De Leon MD;  Location: [facility] OR;  Service: General;  Laterality: Right;  2.5 HRS    MASTECTOMY  2023    SENTINEL LYMPH NODE BIOPSY Right 2023    Procedure: BIOPSY, LYMPH NODE, SENTINEL;  Surgeon: Giana De Leon MD;  Location: [facility] OR;  Service: General;  Laterality: Right;    URETERAL STENT PLACEMENT N/A 10/14/2022    Procedure: INSERTION, STENT, URETER;  Surgeon: Ileana Souza MD;  Location: [facility] OR;  Service: Urology;  Laterality: N/A;  6x26 soft    URETEROSCOPIC REMOVAL OF URETERIC CALCULUS Left 10/14/2022    Procedure: REMOVAL, CALCULUS, URETER, URETEROSCOPIC;  Surgeon: Ileana Souza MD;  Location: [facility] OR;  Service: Urology;  Laterality: Left;    URETEROSCOPIC REMOVAL OF URETERIC CALCULUS Left 10/28/2022    Procedure: REMOVAL, CALCULUS, URETER, URETEROSCOPIC;  Surgeon: Ileana Souza MD;  Location: [facility] OR;  Service: Urology;  Laterality: Left;       Family History   Problem Relation Name Age of Onset    Melanoma Mother      Cancer Mother          melanoma    COPD Father      Hypertension Father      Coronary artery disease Father      Cancer Father          prostate ca, metastatic    Heart disease Father          stenting    Kidney disease Father          stones    Retinal detachment Father      Osteoporosis Father      Hyperlipidemia Brother      Keratoconus Brother      No Known Problems Brother      Stroke Maternal Grandmother      Lung cancer Maternal Grandfather      Breast cancer Paternal Grandmother Tammy Rivas          at age 62    Colon cancer Paternal Grandfather      Ovarian cancer Neg Hx      Uterine cancer Neg Hx      Psoriasis Neg Hx      Lupus Neg Hx      Eczema Neg Hx         Social History     Socioeconomic History    Marital status: Single   Occupational History    Occupation: Nurse       Employer: [employer]   Tobacco Use     Smoking status: Never    Smokeless tobacco: Never   Substance and Sexual Activity    Alcohol use: Not Currently    Drug use: No    Sexual activity: Not Currently     Birth control/protection: None   Other Topics Concern    Are you pregnant or think you may be? No    Breast-feeding No     Social Determinants of Health     Financial Resource Strain: Low Risk  (2/19/2024)    Overall Financial Resource Strain (CARDIA)     Difficulty of Paying Living Expenses: Not hard at all   Food Insecurity: No Food Insecurity (2/19/2024)    Hunger Vital Sign     Worried About Running Out of Food in the Last Year: Never true     Ran Out of Food in the Last Year: Never true   Transportation Needs: No Transportation Needs (2/19/2024)    PRAPARE - Transportation     Lack of Transportation (Medical): No     Lack of Transportation (Non-Medical): No   Physical Activity: Sufficiently Active (2/19/2024)    Exercise Vital Sign     Days of Exercise per Week: 3 days     Minutes of Exercise per Session: 70 min   Stress: No Stress Concern Present (2/19/2024)    [place] [employer] - Occupational Stress Questionnaire     Feeling of Stress : Not at all   Housing Stability: Low Risk  (2/19/2024)    Housing Stability Vital Sign     Unable to Pay for Housing in the Last Year: No     Number of Places Lived in the Last Year: 1     Unstable Housing in the Last Year: No       Review of patient's allergies indicates:   Allergen Reactions    Adhesive Itching and Rash     Micropore/Medipore Tape - paper tape is ok    Nickel Itching     Allergic reaction to nickel and other related metals    Iodine and iodide containing products Itching and Rash    Latex, natural rubber Itching     Answers submitted by the patient for this visit:  Review of Systems Questionnaire (Submitted on 8/11/2024)  appetite change : No  unexpected weight change: No  mouth sores: No  visual disturbance: No  cough: No  shortness of breath: No  chest pain: No  abdominal  "pain: No  diarrhea: No  frequency: No  back pain: No  rash: No  headaches: No  adenopathy: No  nervous/ anxious: No         Objective:     Vitals:    08/15/24 0956   BP: 120/66   BP Location: Right arm   Patient Position: Sitting   BP Method: Medium (Automatic)   Pulse: 73   Resp: 16   Temp: 97.5 °F (36.4 °C)   TempSrc: Temporal   SpO2: 98%   Weight: 73.8 kg (162 lb 11.2 oz)   Height: 5' 9" (1.753 m)        Physical Exam  Vitals reviewed.   Constitutional:       Appearance: Normal appearance.   Eyes:      General: No scleral icterus.     Pupils: Pupils are equal, round, and reactive to light.   Cardiovascular:      Rate and Rhythm: Normal rate and regular rhythm.      Pulses: Normal pulses.      Heart sounds: Normal heart sounds.   Pulmonary:      Effort: Pulmonary effort is normal.      Breath sounds: Normal breath sounds.   Chest:      Comments: Patient is status post right breast mastectomy and sentinel lymph node dissection.  The scar is healing well ; there is no signs of local recurrence.  Abdominal:      General: Bowel sounds are normal. There is no distension.   Musculoskeletal:         General: No swelling.   Lymphadenopathy:      Cervical: No cervical adenopathy.   Skin:     General: Skin is warm.      Findings: No rash.   Neurological:      General: No focal deficit present.      Mental Status: She is alert.                Current Outpatient Medications on File Prior to Visit   Medication Sig    alendronate (FOSAMAX) 70 MG tablet Take 1 tablet (70 mg total) by mouth every 7 days.    anastrozole (ARIMIDEX) 1 mg Tab Take 1 tablet (1 mg total) by mouth once daily.    aspirin (ECOTRIN) 81 MG EC tablet Take 81 mg by mouth once daily.    cholecalciferol, vitamin D3, (VITAMIN D3) 25 mcg (1,000 unit) capsule Take 1 capsule (1,000 Units total) by mouth once daily.    cyanocobalamin (VITAMIN B-12) 1000 MCG tablet Take 1,000 mcg by mouth every other day.     loratadine (CLARITIN) 10 mg tablet Take 10 mg by mouth " daily as needed.    multivitamin (THERAGRAN) per tablet Take 1 tablet by mouth once daily.     Current Facility-Administered Medications on File Prior to Visit   Medication    diphenhydrAMINE injection 12.5 mg    electrolyte-S (ISOLYTE)    fentaNYL 50 mcg/mL injection 25 mcg    HYDROmorphone (PF) injection 0.2 mg    lactated ringers infusion    lactated ringers infusion    ondansetron injection 4 mg    oxyCODONE immediate release tablet 5 mg    prochlorperazine injection Soln 5 mg    sodium chloride 0.9% flush 3 mL       CBC:  Lab Results   Component Value Date    WBC 4.04 03/02/2024    HGB 13.3 03/02/2024    HCT 40.1 03/02/2024    MCV 97 03/02/2024     03/02/2024         CMP:  Sodium   Date Value Ref Range Status   03/02/2024 139 136 - 145 mmol/L Final     Potassium   Date Value Ref Range Status   03/02/2024 3.6 3.5 - 5.1 mmol/L Final     Chloride   Date Value Ref Range Status   03/02/2024 105 95 - 110 mmol/L Final     CO2   Date Value Ref Range Status   03/02/2024 23 23 - 29 mmol/L Final     Glucose   Date Value Ref Range Status   03/02/2024 101 70 - 110 mg/dL Final     BUN   Date Value Ref Range Status   03/02/2024 9 8 - 23 mg/dL Final     Creatinine   Date Value Ref Range Status   03/02/2024 0.9 0.5 - 1.4 mg/dL Final     Calcium   Date Value Ref Range Status   03/02/2024 9.6 8.7 - 10.5 mg/dL Final     Total Protein   Date Value Ref Range Status   03/02/2024 7.2 6.0 - 8.4 g/dL Final     Albumin   Date Value Ref Range Status   03/02/2024 4.4 3.5 - 5.2 g/dL Final     Total Bilirubin   Date Value Ref Range Status   03/02/2024 0.5 0.1 - 1.0 mg/dL Final     Comment:     For infants and newborns, interpretation of results should be based  on gestational age, weight and in agreement with clinical  observations.    Premature Infant recommended reference ranges:  Up to 24 hours.............<8.0 mg/dL  Up to 48 hours............<12.0 mg/dL  3-5 days..................<15.0 mg/dL  6-29 days.................<15.0  mg/dL       Alkaline Phosphatase   Date Value Ref Range Status   03/02/2024 55 55 - 135 U/L Final     AST   Date Value Ref Range Status   03/02/2024 24 10 - 40 U/L Final     ALT   Date Value Ref Range Status   03/02/2024 27 10 - 44 U/L Final     Anion Gap   Date Value Ref Range Status   03/02/2024 11 8 - 16 mmol/L Final     eGFR if    Date Value Ref Range Status   07/12/2022 >60.0 >60 mL/min/1.73 m^2 Final     eGFR if non    Date Value Ref Range Status   07/12/2022 >60.0 >60 mL/min/1.73 m^2 Final     Comment:     Calculation used to obtain the estimated glomerular filtration  rate (eGFR) is the CKD-EPI equation.               ECOG SCORE    0 - Fully active-able to carry on all pre-disease performance without restriction              Assessment/Plan:        Cancer Staging   Malignant neoplasm of right breast, stage 1, estrogen receptor positive  Staging form: Onc Breast AJCC V7  - Pathologic: Stage IA (T1b, N0, cM0) - Signed by Matilde Whitehead MD on 10/17/2023  -I reviewed independently the patient medical record including laboratory radiologic and pathologic findings.  -An Oncotype was sent and the score was 19 indicating a 94% chance of disease-free survival at 9 years with adjuvant hormonal therapy.  -Her stage prognosis and plan of care was discussed at length.  The patient will require to take at least 5-7 years treatment with AI.  -she underwent a repeat mammogram in June 13, 2024 and it came back normal.  She was seen as well by Dr. De Leon.  -she will be seen back again in 4 months for a follow up visit.      Heterozygous state for factor 5 Leiden   -asymptomatic with no history of thromboembolic events.    Osteoporosis.    -bone density on October 18, 2023 showed the presence of osteoporosis.    -patient currently is on weekly Fosamax and vitamin-D.  She has not been taking calcium in view of her history of kidney stones.  -We will repeat a bone density in October 2025  -the  patient is very active and exercise at least 3 times a week.                     Med Onc Chart Routing      Follow up with physician 4 months.   Follow up with RENÉ    Infusion scheduling note    Injection scheduling note    Labs    Imaging    Pharmacy appointment    Other referrals                   Plan was discussed with the patient at length, and she verbalized understanding. Noelle was given an opportunity to ask questions that were answered to her satisfaction, and she was advised to call in the interval if any problems or questions arise.  Signed:  Padmini Feldman MD   Hematology and Oncology  [facility] - HEMATOLOGY ONCOLOGY  [facility], [place]

## 2024-09-10 ENCOUNTER — OFFICE VISIT (OUTPATIENT)
Dept: OTOLARYNGOLOGY | Facility: CLINIC | Age: 64
End: 2024-09-10
Payer: COMMERCIAL

## 2024-09-10 VITALS — HEIGHT: 69 IN | BODY MASS INDEX: 24.29 KG/M2 | WEIGHT: 164 LBS

## 2024-09-10 DIAGNOSIS — R09.82 PND (POST-NASAL DRIP): ICD-10-CM

## 2024-09-10 DIAGNOSIS — H81.12 BPPV (BENIGN PAROXYSMAL POSITIONAL VERTIGO), LEFT: Primary | ICD-10-CM

## 2024-09-10 PROCEDURE — 3044F HG A1C LEVEL LT 7.0%: CPT | Mod: CPTII,S$GLB,, | Performed by: STUDENT IN AN ORGANIZED HEALTH CARE EDUCATION/TRAINING PROGRAM

## 2024-09-10 PROCEDURE — 99204 OFFICE O/P NEW MOD 45 MIN: CPT | Mod: 25,S$GLB,, | Performed by: STUDENT IN AN ORGANIZED HEALTH CARE EDUCATION/TRAINING PROGRAM

## 2024-09-10 PROCEDURE — 99999 PR PBB SHADOW E&M-EST. PATIENT-LVL III: CPT | Mod: PBBFAC,,, | Performed by: STUDENT IN AN ORGANIZED HEALTH CARE EDUCATION/TRAINING PROGRAM

## 2024-09-10 PROCEDURE — 1159F MED LIST DOCD IN RCRD: CPT | Mod: CPTII,S$GLB,, | Performed by: STUDENT IN AN ORGANIZED HEALTH CARE EDUCATION/TRAINING PROGRAM

## 2024-09-10 PROCEDURE — 3008F BODY MASS INDEX DOCD: CPT | Mod: CPTII,S$GLB,, | Performed by: STUDENT IN AN ORGANIZED HEALTH CARE EDUCATION/TRAINING PROGRAM

## 2024-09-10 PROCEDURE — 95992 CANALITH REPOSITIONING PROC: CPT | Mod: S$GLB,,, | Performed by: STUDENT IN AN ORGANIZED HEALTH CARE EDUCATION/TRAINING PROGRAM

## 2024-09-10 NOTE — PROGRESS NOTES
.Otolaryngology Clinic Note    Subjective:       Patient ID: Noelle Rivas is a 63 y.o. female.    Chief Complaint: Sinus Problem (Palate bumps)      History of Present Illness: Noelle Rivas is a 63 y.o. female presenting with concern from dentist. Saw in July, saw nodules in her throat. Had a procedure- showed her a picture. Looked like bumps on soft palate. Had breast cancer twice, so wanted eval. Non smoker. Some sinus issues. Takes claritin. Reports BPPV history. Comes and goes. Stress triggers. Does yoga and up and to left triggers. Does epley at home. Room will spin. No oral pain, no swallowing issues. Has PND. No reflux.       Past Surgical History:   Procedure Laterality Date    BREAST BIOPSY Right 2012    malignant, radiation    BREAST BIOPSY Right 2014    benign    BREAST LUMPECTOMY Right 06/11/2012    w/ radiation    BREAST LUMPECTOMY W/ NEEDLE LOCALIZATION      CYSTOSCOPY W/ URETERAL STENT PLACEMENT Left 10/28/2022    Procedure: CYSTOSCOPY, WITH URETERAL STENT INSERTION;  Surgeon: Ileana Souza MD;  Location: [facility] OR;  Service: Urology;  Laterality: Left;  6x26    CYSTOSCOPY W/ URETERAL STENT REMOVAL Left 10/28/2022    Procedure: CYSTOSCOPY, WITH URETERAL STENT REMOVAL;  Surgeon: Ileana Souza MD;  Location: [facility] OR;  Service: Urology;  Laterality: Left;    DILATION AND CURETTAGE OF UTERUS      INJECTION FOR SENTINEL NODE IDENTIFICATION Right 09/06/2023    Procedure: INJECTION, FOR SENTINEL NODE IDENTIFICATION;  Surgeon: Giana De Leon MD;  Location: [facility];  Service: General;  Laterality: Right;    MASTECTOMY Right 09/06/2023    Procedure: MASTECTOMY RIGHT;  Surgeon: Giana De Leon MD;  Location: [facility];  Service: General;  Laterality: Right;  2.5 HRS    MASTECTOMY  9/6/2023    SENTINEL LYMPH NODE BIOPSY Right 09/06/2023    Procedure: BIOPSY, LYMPH NODE, SENTINEL;  Surgeon: Giana De Leon MD;  Location: [facility];  Service: General;  Laterality: Right;    URETERAL STENT PLACEMENT  N/A 10/14/2022    Procedure: INSERTION, STENT, URETER;  Surgeon: Ileana Souza MD;  Location: [facility] OR;  Service: Urology;  Laterality: N/A;  6x26 soft    URETEROSCOPIC REMOVAL OF URETERIC CALCULUS Left 10/14/2022    Procedure: REMOVAL, CALCULUS, URETER, URETEROSCOPIC;  Surgeon: Ileana Souza MD;  Location: [facility] OR;  Service: Urology;  Laterality: Left;    URETEROSCOPIC REMOVAL OF URETERIC CALCULUS Left 10/28/2022    Procedure: REMOVAL, CALCULUS, URETER, URETEROSCOPIC;  Surgeon: Ileana Souza MD;  Location: [facility] OR;  Service: Urology;  Laterality: Left;     Past Medical History:   Diagnosis Date    Abnormal TSH 06/05/2018    Age-related nuclear cataract of both eyes 08/03/2022    Allergy     Anxiety     Anxiety 12/17/2012    BRCA1 negative 07/23/19    BRCA2 negative 07/23/19    Breast cancer 2012    Calcium nephrolithiasis     Complex endometrial hyperplasia without atypia 06/16/2016    Degenerative disc disease     DJD (degenerative joint disease) of knee     Endometrial hyperplasia, unspecified: see evaluation 2017 05/22/2018    Factor V Leiden mutation No h/o DVT    Kidney stones     Leukopenia 12/19/2012    PMB (postmenopausal bleeding) 03/06/2017    PONV (postoperative nausea and vomiting)     10/28/22 - severe ponv    Stroke     questionable  - TIA    Vitamin B 12 deficiency      Social Determinants of Health     Tobacco Use: Low Risk  (9/10/2024)    Patient History     Smoking Tobacco Use: Never     Smokeless Tobacco Use: Never     Passive Exposure: Not on file   Alcohol Use: Not At Risk (2/19/2024)    AUDIT-C     Frequency of Alcohol Consumption: Never     Average Number of Drinks: Patient does not drink     Frequency of Binge Drinking: Never   Financial Resource Strain: Low Risk  (2/19/2024)    Overall Financial Resource Strain (CARDIA)     Difficulty of Paying Living Expenses: Not hard at all   Food Insecurity: No Food Insecurity (2/19/2024)    Hunger Vital Sign     Worried About Running  Out of Food in the Last Year: Never true     Ran Out of Food in the Last Year: Never true   Transportation Needs: No Transportation Needs (2/19/2024)    PRAPARE - Transportation     Lack of Transportation (Medical): No     Lack of Transportation (Non-Medical): No   Physical Activity: Sufficiently Active (2/19/2024)    Exercise Vital Sign     Days of Exercise per Week: 3 days     Minutes of Exercise per Session: 70 min   Stress: No Stress Concern Present (2/19/2024)    [place] [employer] - Occupational Stress Questionnaire     Feeling of Stress : Not at all   Housing Stability: Low Risk  (2/19/2024)    Housing Stability Vital Sign     Unable to Pay for Housing in the Last Year: No     Number of Places Lived in the Last Year: 1     Unstable Housing in the Last Year: No   Depression: Low Risk  (8/15/2024)    Depression     Last PHQ-4: Flowsheet Data: 0   Utilities: Not on file   Health Literacy: Not on file   Social Isolation: Not on file     Review of patient's allergies indicates:   Allergen Reactions    Adhesive Itching and Rash     Micropore/Medipore Tape - paper tape is ok    Nickel Itching     Allergic reaction to nickel and other related metals    Iodine and iodide containing products Itching and Rash    Latex, natural rubber Itching     Current Outpatient Medications   Medication Instructions    alendronate (FOSAMAX) 70 mg, Oral, Every 7 days    anastrozole (ARIMIDEX) 1 mg, Oral, Daily    aspirin (ECOTRIN) 81 mg, Oral, Daily,      cholecalciferol (vitamin D3) (VITAMIN D3) 1,000 Units, Oral, Daily    cyanocobalamin (VITAMIN B-12) 1,000 mcg, Oral, Every other day    loratadine (CLARITIN) 10 mg, Oral, Daily PRN,      multivitamin (THERAGRAN) per tablet 1 tablet, Oral, Daily,           ENT ROS negative except as stated above.     Patient answers are not available for this visit.            Objective:      There were no vitals filed for this visit.    General: NAD, well appearing  Eyes: Normal  conjunctiva and lids  Face: symmetric, nerve intact  Nose: The nose is without any evidence of any deformity. The nasal mucosa is moist. The septum is midline. There is no evidence of septal hematoma. The turbinates are without abnormality.   Ears: The ears are with normal-appearing pinna. Examination of the canals is normal appearing bilaterally. There is no drainage or erythema noted. The tympanic membranes are normal appearing with pearly color, normal-appearing landmarks and normal light reflex. Hearing is grossly intact.  Mouth: No obvious abnormalities to the lips. The teeth are unremarkable. The gingivae are without any obvious evidence of infection or lesion. The oral mucosa is moist and pink. There are no obvious masses to the hard or soft palate.   Oropharynx: The uvula is midline.  The tongue is midline. The posterior pharynx has cobblestoning. The tonsils are normal appearing 0+.  Salivary glands: The salivary glands are symmetric and not enlarged, no masses  Neck: No lymphadenopathy, trachea midline, thryoid not enlarged.  Psych: Normal mood and affect.   Neuro: Grossly intact  Speech: fluent  Right [place]-Hallpike - No vertigo, No  rotary nystagmus  Left [place]-Hallpike - Yes -     vertigo, Yes rotary nystagmus  Horizontal Canal testing negative       Assessment and Plan:       1. BPPV (benign paroxysmal positional vertigo), left    2. PND (post-nasal drip)          Has PND, cobblestoning in throat. Reviewed. Not concerning. Taking claritin every day. Can add astelin/astepro when needed. Uses saline PRN    Epley to left today. Will have her do at home until better. Avoid sleeping flat on left side until better.     RTC: PRN    Plan of care was discussed in detail with the patient, who agreed with the plan as above. All questions were answered in detail.     Kayla Vickers MD  Otolaryngology

## 2024-09-10 NOTE — PATIENT INSTRUCTIONS
EPLEY TO THE LEFT 3 times a day, twice in a row until better.   Avoid sleeping on left or flat until better.   Keep head neutral for next 48 hours as able.

## 2024-09-24 ENCOUNTER — CLINICAL SUPPORT (OUTPATIENT)
Dept: OPHTHALMOLOGY | Facility: CLINIC | Age: 64
End: 2024-09-24
Payer: COMMERCIAL

## 2024-09-24 ENCOUNTER — OFFICE VISIT (OUTPATIENT)
Dept: OPTOMETRY | Facility: CLINIC | Age: 64
End: 2024-09-24
Payer: COMMERCIAL

## 2024-09-24 DIAGNOSIS — H40.052 OCULAR HYPERTENSION, LEFT: ICD-10-CM

## 2024-09-24 DIAGNOSIS — Z01.00 EXAMINATION OF EYES AND VISION: Primary | ICD-10-CM

## 2024-09-24 DIAGNOSIS — H25.13 NUCLEAR SCLEROSIS, BILATERAL: ICD-10-CM

## 2024-09-24 DIAGNOSIS — H52.7 REFRACTIVE ERROR: ICD-10-CM

## 2024-09-24 PROCEDURE — 92015 DETERMINE REFRACTIVE STATE: CPT | Mod: S$GLB,,, | Performed by: OPTOMETRIST

## 2024-09-24 PROCEDURE — 99999 PR PBB SHADOW E&M-EST. PATIENT-LVL III: CPT | Mod: PBBFAC,,, | Performed by: OPTOMETRIST

## 2024-09-24 PROCEDURE — 92014 COMPRE OPH EXAM EST PT 1/>: CPT | Mod: S$GLB,,, | Performed by: OPTOMETRIST

## 2024-09-24 NOTE — PROGRESS NOTES
pt did  SF and OCT RNFL today OU  Pt noticed OS blurred va but little movement  Pt was anxious         Assessment /Plan     For exam results, see Encounter Report.    Ocular hypertension, left  -     OCT - Optic Nerve  -     Noriega Visual Field - OU - Extended - Both Eyes

## 2024-09-24 NOTE — PROGRESS NOTES
HPI    Pt states lately she seems to be able to see better without glasses  Still uses them to drive and work but feels she does not need them as much   for basic tasks, yoga class  Eyes still dry    (+)Refresh a few times a day, PRN  (-) headaches   (+)occasional FOL OS, mainly when stressed  (-)floaters  Last edited by Divya Bradley on 9/24/2024 10:52 AM.            Assessment /Plan     For exam results, see Encounter Report.    Examination of eyes and vision    Nuclear sclerosis, bilateral    Refractive error    Ocular hypertension, left  -     OCT - Optic Nerve; Future  -     Noriega Visual Field - OU - Extended - Both Eyes; Future      1. Examination of eyes and vision    2. Nuclear sclerosis, bilateral  Mild OU, not VS  Discussed possible ocular affects of cataracts. Acceptable BCVA OU.   Discussed treatment options. Surgery not recommended at this time.   Monitor yearly.     3. Refractive error  Dispensed updated spectacle Rx. Discussed various spectacle lens options. Discussed adaptation period to new specs.     4. Ocular hypertension, left  High IOP OS  CCT today -- 545 / 591  Neg fam hx of glc  Reviewed findings, scheduled for baseline F 24-2 SF / OCT RNFL, will call/message with results     - OCT - Optic Nerve; Future  - Noriega Visual Field - OU - Extended - Both Eyes; Future

## 2024-09-26 ENCOUNTER — TELEPHONE (OUTPATIENT)
Dept: OPTOMETRY | Facility: CLINIC | Age: 64
End: 2024-09-26
Payer: COMMERCIAL

## 2024-09-26 NOTE — TELEPHONE ENCOUNTER
Spoke to pt regarding hvf/oct -- notes started checking out at the end of left eye and saw some but didn't click because was tired.    Scheduled repeat in 3 months with iop check

## 2024-09-27 DIAGNOSIS — M81.0 OSTEOPOROSIS, UNSPECIFIED OSTEOPOROSIS TYPE, UNSPECIFIED PATHOLOGICAL FRACTURE PRESENCE: ICD-10-CM

## 2024-09-27 RX ORDER — ALENDRONATE SODIUM 70 MG/1
70 TABLET ORAL
Qty: 4 TABLET | Refills: 11 | Status: SHIPPED | OUTPATIENT
Start: 2024-09-27 | End: 2025-09-27

## 2024-10-04 ENCOUNTER — PATIENT MESSAGE (OUTPATIENT)
Dept: OPTOMETRY | Facility: CLINIC | Age: 64
End: 2024-10-04
Payer: COMMERCIAL

## 2024-11-08 ENCOUNTER — TELEPHONE (OUTPATIENT)
Dept: RADIOLOGY | Facility: HOSPITAL | Age: 64
End: 2024-11-08

## 2024-11-09 ENCOUNTER — HOSPITAL ENCOUNTER (OUTPATIENT)
Dept: RADIOLOGY | Facility: HOSPITAL | Age: 64
Discharge: HOME | End: 2024-11-09
Attending: OBSTETRICS & GYNECOLOGY
Payer: COMMERCIAL

## 2024-11-09 DIAGNOSIS — N85.00 ENDOMETRIAL HYPERPLASIA WITHOUT ATYPIA: ICD-10-CM

## 2024-11-09 PROCEDURE — 76830 TRANSVAGINAL US NON-OB: CPT | Mod: TC

## 2024-11-09 PROCEDURE — 76830 TRANSVAGINAL US NON-OB: CPT | Mod: 26,,, | Performed by: RADIOLOGY

## 2024-11-19 ENCOUNTER — OFFICE VISIT (OUTPATIENT)
Dept: GYNECOLOGIC ONCOLOGY | Facility: CLINIC | Age: 64
End: 2024-11-19
Payer: COMMERCIAL

## 2024-11-19 VITALS
DIASTOLIC BLOOD PRESSURE: 73 MMHG | BODY MASS INDEX: 24.65 KG/M2 | TEMPERATURE: 98 F | HEART RATE: 68 BPM | SYSTOLIC BLOOD PRESSURE: 116 MMHG | HEIGHT: 69 IN | WEIGHT: 166.44 LBS | RESPIRATION RATE: 16 BRPM | OXYGEN SATURATION: 97 %

## 2024-11-19 DIAGNOSIS — N85.00 ENDOMETRIAL HYPERPLASIA WITHOUT ATYPIA: Primary | ICD-10-CM

## 2024-11-19 PROCEDURE — 3008F BODY MASS INDEX DOCD: CPT | Mod: CPTII,S$GLB,, | Performed by: OBSTETRICS & GYNECOLOGY

## 2024-11-19 PROCEDURE — 1159F MED LIST DOCD IN RCRD: CPT | Mod: CPTII,S$GLB,, | Performed by: OBSTETRICS & GYNECOLOGY

## 2024-11-19 PROCEDURE — 3074F SYST BP LT 130 MM HG: CPT | Mod: CPTII,S$GLB,, | Performed by: OBSTETRICS & GYNECOLOGY

## 2024-11-19 PROCEDURE — 99215 OFFICE O/P EST HI 40 MIN: CPT | Mod: S$GLB,,, | Performed by: OBSTETRICS & GYNECOLOGY

## 2024-11-19 PROCEDURE — 99999 PR PBB SHADOW E&M-EST. PATIENT-LVL IV: CPT | Mod: PBBFAC,,, | Performed by: OBSTETRICS & GYNECOLOGY

## 2024-11-19 PROCEDURE — 3044F HG A1C LEVEL LT 7.0%: CPT | Mod: CPTII,S$GLB,, | Performed by: OBSTETRICS & GYNECOLOGY

## 2024-11-19 PROCEDURE — 3078F DIAST BP <80 MM HG: CPT | Mod: CPTII,S$GLB,, | Performed by: OBSTETRICS & GYNECOLOGY

## 2024-11-19 NOTE — PROGRESS NOTES
SUBJECTIVE     Chief complaint: History of complex endometrial hyperplasia without atypia    Referring provider:  BOOKER Mcclelland MD     History of present Illness:  Noelle Rivas is a 63 y.o.  female with a history of complex endometrial hyperplasia without atypia here today for 6 month follow-up after trace fluid seen on 2024 pelvic US. Repeat pelvic US on 2024 WNL, small calcification noted, endometrium 2mm. Denies vaginal bleeding, discharge, or pelvic pain.     Referral history:  History of complex endometrial hyperplasia without atypia. Referred by her OBGYN, Dr. Shant Mcclelland due to new finding of trace fluid on pelvic ultrasound. D&C with her OBGYN, Dr. Sheehan in  was originally prompted by heavy vaginal bleeding. Previously seen by GYN ONC, Dr. Foy and hysterectomy was recommended although, patient declined and opted for conservative management with annual TV US. History of progesterone receptor positive breast cancer; therefore, not able to take progestins. She was released from his care in  and has since followed with her OBGYN. She has since been asymptomatic. She had a recurrence of her breast cancer last year requiring mastectomy 2023, and has since been on anastrozole.     She  has a past medical history of Factor V Leiden mutation (No h/o DVT). No prior abdominal surgeries.  Patient is postmenopausal. History of abnormal Pap: No. Family history of cancer: Yes, describe: mother  of melanoma, colon cancer in paternal grandfather .      2016 Endometrial curettage- Complex hyperplasia without atypia.     2017 Endometrial curettage- fragments of inactive disordered endometrium with areas of stromal breakdown, focus of simple hyperplasia; no evidence of atypia or malignancy.    3/11/2023 US Pelvis- 5.3 x 2.2 x 2.7 cm; 0.7 cm uterine fibroid. Normal endometrium. 3 mm endometrial stripe. Nonvisualized ovaries.     2024 US Pelvis- 5.1 x 2.8 x 3.5 cm; Previously  "described 7 mm fibroid could not be confidently identified on today's study; Small, atrophic uterus. Development of trace fluid within the endometrial cavity. Endometrial stripe continues to measure 3 mm     4/30/2024 Pap, HPV- negative     Review of Systems See HPI    OBJECTIVE   /73 (BP Location: Left arm, Patient Position: Sitting)   Pulse 68   Temp 97.9 °F (36.6 °C) (Temporal)   Resp 16   Ht 5' 9" (1.753 m)   Wt 75.5 kg (166 lb 7.2 oz)   LMP 04/01/2017 (Exact Date)   SpO2 97%   BMI 24.58 kg/m²   Physical Exam  Vitals reviewed.   Constitutional:       Appearance: Normal appearance.   Cardiovascular:      Rate and Rhythm: Normal rate.   Pulmonary:      Effort: Pulmonary effort is normal.   Neurological:      General: No focal deficit present.      Mental Status: She is alert and oriented to person, place, and time.   Psychiatric:         Mood and Affect: Mood normal.         Behavior: Behavior normal.        ASSESSMENT AND PLAN     1. Endometrial hyperplasia without atypia    Repeat ultrasound with resolution of endometrial fluid, small calcification noted but normal endometrial stripe 2mm and she has not had any vaginal bleeding. Discussed likely benign etiology of this findings.  Options remain for endometrial sampling, serial surveillance with sonography or definitive surgery given her history of complex hyperplasia. Risks and benefits of each approach have been discussed with her in detail.  At this time, she would like to continue serial sonography. She will plan to do so with her gyn at time of annual exams. If increased endometrial thickness is noted, I would recommend endometrial sampling. She can return to gyn onc if future concerns arise or if she desires definitive hysterectomy. All questions were welcomed and answered.         Sandy Roberts MD  Gynecologic Oncology      "

## 2024-11-27 ENCOUNTER — PATIENT MESSAGE (OUTPATIENT)
Dept: OBSTETRICS AND GYNECOLOGY | Facility: CLINIC | Age: 64
End: 2024-11-27
Payer: COMMERCIAL

## 2024-12-19 ENCOUNTER — OFFICE VISIT (OUTPATIENT)
Dept: HEMATOLOGY/ONCOLOGY | Facility: CLINIC | Age: 64
End: 2024-12-19
Payer: COMMERCIAL

## 2024-12-19 VITALS
SYSTOLIC BLOOD PRESSURE: 123 MMHG | HEART RATE: 73 BPM | WEIGHT: 168.19 LBS | BODY MASS INDEX: 24.91 KG/M2 | OXYGEN SATURATION: 98 % | TEMPERATURE: 98 F | DIASTOLIC BLOOD PRESSURE: 73 MMHG | RESPIRATION RATE: 16 BRPM | HEIGHT: 69 IN

## 2024-12-19 DIAGNOSIS — M81.8 OTHER OSTEOPOROSIS WITHOUT CURRENT PATHOLOGICAL FRACTURE: ICD-10-CM

## 2024-12-19 DIAGNOSIS — E55.9 MILD VITAMIN D DEFICIENCY: ICD-10-CM

## 2024-12-19 DIAGNOSIS — C50.911 MALIGNANT NEOPLASM OF RIGHT BREAST, STAGE 1, ESTROGEN RECEPTOR POSITIVE: Primary | ICD-10-CM

## 2024-12-19 DIAGNOSIS — Z17.0 MALIGNANT NEOPLASM OF RIGHT BREAST, STAGE 1, ESTROGEN RECEPTOR POSITIVE: Primary | ICD-10-CM

## 2024-12-19 DIAGNOSIS — D68.51 FACTOR V LEIDEN MUTATION: ICD-10-CM

## 2024-12-19 DIAGNOSIS — Z79.811 PROPHYLACTIC USE OF ANASTROZOLE (ARIMIDEX): ICD-10-CM

## 2024-12-19 PROCEDURE — 3008F BODY MASS INDEX DOCD: CPT | Mod: CPTII,S$GLB,, | Performed by: INTERNAL MEDICINE

## 2024-12-19 PROCEDURE — 3074F SYST BP LT 130 MM HG: CPT | Mod: CPTII,S$GLB,, | Performed by: INTERNAL MEDICINE

## 2024-12-19 PROCEDURE — 3044F HG A1C LEVEL LT 7.0%: CPT | Mod: CPTII,S$GLB,, | Performed by: INTERNAL MEDICINE

## 2024-12-19 PROCEDURE — 3078F DIAST BP <80 MM HG: CPT | Mod: CPTII,S$GLB,, | Performed by: INTERNAL MEDICINE

## 2024-12-19 PROCEDURE — 1159F MED LIST DOCD IN RCRD: CPT | Mod: CPTII,S$GLB,, | Performed by: INTERNAL MEDICINE

## 2024-12-19 PROCEDURE — G2211 COMPLEX E/M VISIT ADD ON: HCPCS | Mod: S$GLB,,, | Performed by: INTERNAL MEDICINE

## 2024-12-19 PROCEDURE — 99999 PR PBB SHADOW E&M-EST. PATIENT-LVL IV: CPT | Mod: PBBFAC,,, | Performed by: INTERNAL MEDICINE

## 2024-12-19 PROCEDURE — 99213 OFFICE O/P EST LOW 20 MIN: CPT | Mod: S$GLB,,, | Performed by: INTERNAL MEDICINE

## 2024-12-19 NOTE — PROGRESS NOTES
Subjective:       Name: Noelle Rivas  : 1960  MRN: [id number]    Chief Complaint   Patient presents with    Follow-up     Malignant neoplasm of right breast, stage 1, estrogen receptor positive            HPI: Noelle Rivas is a 63 y.o. female presents for evaluation of Follow-up (Malignant neoplasm of right breast, stage 1, estrogen receptor positive)  Patient is reporting to the office by herself.  As of 2023 she has been on anastrozole in the adjuvant setting and has tolerated it well so far.      She has been tolerating well her treatment.  She denies any joint pain hot flashes mood swings or weight gain.        ONCOLOGIC HISTORY.  She has a remote history of a right-sided grade stage I carcinoma (T1c N0 M0, ER positive HER 2 negative, 1.3 cm in diameter) for which she had undergone lumpectomy followed by radiation therapy.  She had declined adjuvant treatment due to her concern about thrombosis given the fact that she is heterozygous for factor 5 Leiden.       Apparently she was recently diagnosed with an ipsilateral breast cancer and she underwent a right mastectomy on 2023 that showed a 10 mm grade 1 carcinoma.  The sentinel lymph node was negative.  Her course was complicated by a soft tissue infection for which she was treated with antibiotics.  She has now fully recovered from the operation.     An Oncotype was sent and the score was 19 indicating a 94% chance of disease-free survival at 9 years with adjuvant hormonal therapy.         Oncology History   Malignant neoplasm of right breast, stage 1, estrogen receptor positive   2012 Initial Diagnosis    Malignant neoplasm of right breast, stage 1, estrogen receptor positive     2012 Surgery    Surgeon: Dr. Chavarria    She underwent lumpectomy and sentinel node biopsy. The pathology revealed invasive ductal carcinoma with mucin production, measuring 1.3 cm with the closest margin at 0.1 cm posteriorly. One  out of one sentinel lymph node was negative for involvement. The lesion was ER positive, MD positive, and HER-2 negative.      - 9/27/2012 Radiation Therapy    Treating physician: Dr. Mayo  Total Dose: 66.4 Gy  Fractions: 36     10/17/2023 Cancer Staged    Staging form: Onc Breast AJCC V7  - Pathologic: Stage IA (T1b, N0, cM0)          Past Medical History:   Diagnosis Date    Abnormal TSH 06/05/2018    Age-related nuclear cataract of both eyes 08/03/2022    Allergy     Anxiety     Anxiety 12/17/2012    BRCA1 negative 07/23/19    BRCA2 negative 07/23/19    Breast cancer 2012    Calcium nephrolithiasis     Complex endometrial hyperplasia without atypia 06/16/2016    Degenerative disc disease     DJD (degenerative joint disease) of knee     Endometrial hyperplasia, unspecified: see evaluation 2017 05/22/2018    Factor V Leiden mutation No h/o DVT    Kidney stones     Leukopenia 12/19/2012    PMB (postmenopausal bleeding) 03/06/2017    PONV (postoperative nausea and vomiting)     10/28/22 - severe ponv    Stroke     questionable  - TIA    Vitamin B 12 deficiency        Past Surgical History:   Procedure Laterality Date    BREAST BIOPSY Right 2012    malignant, radiation    BREAST BIOPSY Right 2014    benign    BREAST LUMPECTOMY Right 06/11/2012    w/ radiation    BREAST LUMPECTOMY W/ NEEDLE LOCALIZATION      CYSTOSCOPY W/ URETERAL STENT PLACEMENT Left 10/28/2022    Procedure: CYSTOSCOPY, WITH URETERAL STENT INSERTION;  Surgeon: Ileana Souza MD;  [facility]: [facility];  Service: Urology;  Laterality: Left;  6x26    CYSTOSCOPY W/ URETERAL STENT REMOVAL Left 10/28/2022    Procedure: CYSTOSCOPY, WITH URETERAL STENT REMOVAL;  Surgeon: Ileana Souza MD;  Location: [facility] OR;  Service: Urology;  Laterality: Left;    DILATION AND CURETTAGE OF UTERUS      INJECTION FOR SENTINEL NODE IDENTIFICATION Right 09/06/2023    Procedure: INJECTION, FOR SENTINEL NODE IDENTIFICATION;  Surgeon: Giana De Leon MD;  Location: [facility]  OR;  Service: General;  Laterality: Right;    MASTECTOMY Right 2023    Procedure: MASTECTOMY RIGHT;  Surgeon: Giana De Leon MD;  Location: [facility] OR;  Service: General;  Laterality: Right;  2.5 HRS    MASTECTOMY  2023    SENTINEL LYMPH NODE BIOPSY Right 2023    Procedure: BIOPSY, LYMPH NODE, SENTINEL;  Surgeon: Giana De Leon MD;  Location: [facility] OR;  Service: General;  Laterality: Right;    URETERAL STENT PLACEMENT N/A 10/14/2022    Procedure: INSERTION, STENT, URETER;  Surgeon: Ileana Souza MD;  Location: [facility] OR;  Service: Urology;  Laterality: N/A;  6x26 soft    URETEROSCOPIC REMOVAL OF URETERIC CALCULUS Left 10/14/2022    Procedure: REMOVAL, CALCULUS, URETER, URETEROSCOPIC;  Surgeon: Ileana Souza MD;  Location: [facility] OR;  Service: Urology;  Laterality: Left;    URETEROSCOPIC REMOVAL OF URETERIC CALCULUS Left 10/28/2022    Procedure: REMOVAL, CALCULUS, URETER, URETEROSCOPIC;  Surgeon: Ileana Souza MD;  Location: [facility] OR;  Service: Urology;  Laterality: Left;       Family History   Problem Relation Name Age of Onset    Melanoma Mother      Cancer Mother          melanoma    COPD Father      Hypertension Father      Coronary artery disease Father      Cancer Father          prostate ca, metastatic    Heart disease Father          stenting    Kidney disease Father          stones    Retinal detachment Father      Osteoporosis Father      Hyperlipidemia Brother      Keratoconus Brother      No Known Problems Brother      Stroke Maternal Grandmother      Lung cancer Maternal Grandfather      Breast cancer Paternal Grandmother Tammy Rivas          at age 62    Colon cancer Paternal Grandfather      Ovarian cancer Neg Hx      Uterine cancer Neg Hx      Psoriasis Neg Hx      Lupus Neg Hx      Eczema Neg Hx         Social History     Socioeconomic History    Marital status: Single   Occupational History    Occupation: Nurse       Employer: [employer]   Tobacco  Use    Smoking status: Never    Smokeless tobacco: Never   Substance and Sexual Activity    Alcohol use: Not Currently    Drug use: No    Sexual activity: Not Currently     Birth control/protection: None   Other Topics Concern    Are you pregnant or think you may be? No    Breast-feeding No     Social Drivers of Health     Financial Resource Strain: Low Risk  (2/19/2024)    Overall Financial Resource Strain (CARDIA)     Difficulty of Paying Living Expenses: Not hard at all   Food Insecurity: No Food Insecurity (2/19/2024)    Hunger Vital Sign     Worried About Running Out of Food in the Last Year: Never true     Ran Out of Food in the Last Year: Never true   Transportation Needs: No Transportation Needs (2/19/2024)    PRAPARE - Transportation     Lack of Transportation (Medical): No     Lack of Transportation (Non-Medical): No   Physical Activity: Sufficiently Active (2/19/2024)    Exercise Vital Sign     Days of Exercise per Week: 3 days     Minutes of Exercise per Session: 70 min   Stress: No Stress Concern Present (2/19/2024)    [employer] - Occupational Stress Questionnaire     Feeling of Stress : Not at all   Housing Stability: Low Risk  (2/19/2024)    Housing Stability Vital Sign     Unable to Pay for Housing in the Last Year: No     Number of Places Lived in the Last Year: 1     Unstable Housing in the Last Year: No       Review of patient's allergies indicates:   Allergen Reactions    Adhesive Itching and Rash     Micropore/Medipore Tape - paper tape is ok    Nickel Itching     Allergic reaction to nickel and other related metals    Iodine and iodide containing products Itching and Rash    Latex, natural rubber Itching     Answers submitted by the patient for this visit:  Review of Systems Questionnaire (Submitted on 12/16/2024)  appetite change : No  unexpected weight change: No  mouth sores: No  visual disturbance: No  cough: No  shortness of breath: No  chest pain:  "No  abdominal pain: No  diarrhea: No  frequency: No  back pain: No  rash: No  headaches: No  adenopathy: No  nervous/ anxious: No         Objective:     Vitals:    12/19/24 1413   BP: 123/73   BP Location: Left arm   Patient Position: Sitting   Pulse: 73   Resp: 16   Temp: 97.5 °F (36.4 °C)   TempSrc: Temporal   SpO2: 98%   Weight: 76.3 kg (168 lb 3.4 oz)   Height: 5' 9" (1.753 m)        Physical Exam  Vitals reviewed.   Constitutional:       Appearance: Normal appearance.   Eyes:      General: No scleral icterus.     Pupils: Pupils are equal, round, and reactive to light.   Cardiovascular:      Rate and Rhythm: Normal rate and regular rhythm.      Pulses: Normal pulses.      Heart sounds: Normal heart sounds.   Pulmonary:      Effort: Pulmonary effort is normal.      Breath sounds: Normal breath sounds.   Chest:      Comments: Patient is status post right breast mastectomy and sentinel lymph node dissection.  The scar is healing well ; there is no signs of local recurrence.  Abdominal:      General: Bowel sounds are normal. There is no distension.   Musculoskeletal:         General: No swelling.   Lymphadenopathy:      Cervical: No cervical adenopathy.   Skin:     General: Skin is warm.      Findings: No rash.   Neurological:      General: No focal deficit present.      Mental Status: She is alert.                Current Outpatient Medications on File Prior to Visit   Medication Sig    alendronate (FOSAMAX) 70 MG tablet Take 1 tablet (70 mg total) by mouth every 7 days.    anastrozole (ARIMIDEX) 1 mg Tab Take 1 tablet (1 mg total) by mouth once daily.    aspirin (ECOTRIN) 81 MG EC tablet Take 81 mg by mouth once daily.    cholecalciferol, vitamin D3, (VITAMIN D3) 25 mcg (1,000 unit) capsule Take 1 capsule (1,000 Units total) by mouth once daily.    cyanocobalamin (VITAMIN B-12) 1000 MCG tablet Take 1,000 mcg by mouth every other day.     loratadine (CLARITIN) 10 mg tablet Take 10 mg by mouth daily as needed.    " multivitamin (THERAGRAN) per tablet Take 1 tablet by mouth once daily.     Current Facility-Administered Medications on File Prior to Visit   Medication    diphenhydrAMINE injection 12.5 mg    electrolyte-S (ISOLYTE)    fentaNYL 50 mcg/mL injection 25 mcg    HYDROmorphone (PF) injection 0.2 mg    lactated ringers infusion    lactated ringers infusion    ondansetron injection 4 mg    oxyCODONE immediate release tablet 5 mg    prochlorperazine injection Soln 5 mg    sodium chloride 0.9% flush 3 mL       CBC:  Lab Results   Component Value Date    WBC 4.04 03/02/2024    HGB 13.3 03/02/2024    HCT 40.1 03/02/2024    MCV 97 03/02/2024     03/02/2024         CMP:  Sodium   Date Value Ref Range Status   03/02/2024 139 136 - 145 mmol/L Final     Potassium   Date Value Ref Range Status   03/02/2024 3.6 3.5 - 5.1 mmol/L Final     Chloride   Date Value Ref Range Status   03/02/2024 105 95 - 110 mmol/L Final     CO2   Date Value Ref Range Status   03/02/2024 23 23 - 29 mmol/L Final     Glucose   Date Value Ref Range Status   03/02/2024 101 70 - 110 mg/dL Final     BUN   Date Value Ref Range Status   03/02/2024 9 8 - 23 mg/dL Final     Creatinine   Date Value Ref Range Status   03/02/2024 0.9 0.5 - 1.4 mg/dL Final     Calcium   Date Value Ref Range Status   03/02/2024 9.6 8.7 - 10.5 mg/dL Final     Total Protein   Date Value Ref Range Status   03/02/2024 7.2 6.0 - 8.4 g/dL Final     Albumin   Date Value Ref Range Status   03/02/2024 4.4 3.5 - 5.2 g/dL Final     Total Bilirubin   Date Value Ref Range Status   03/02/2024 0.5 0.1 - 1.0 mg/dL Final     Comment:     For infants and newborns, interpretation of results should be based  on gestational age, weight and in agreement with clinical  observations.    Premature Infant recommended reference ranges:  Up to 24 hours.............<8.0 mg/dL  Up to 48 hours............<12.0 mg/dL  3-5 days..................<15.0 mg/dL  6-29 days.................<15.0 mg/dL       Alkaline  Phosphatase   Date Value Ref Range Status   03/02/2024 55 55 - 135 U/L Final     AST   Date Value Ref Range Status   03/02/2024 24 10 - 40 U/L Final     ALT   Date Value Ref Range Status   03/02/2024 27 10 - 44 U/L Final     Anion Gap   Date Value Ref Range Status   03/02/2024 11 8 - 16 mmol/L Final     eGFR if    Date Value Ref Range Status   07/12/2022 >60.0 >60 mL/min/1.73 m^2 Final     eGFR if non    Date Value Ref Range Status   07/12/2022 >60.0 >60 mL/min/1.73 m^2 Final     Comment:     Calculation used to obtain the estimated glomerular filtration  rate (eGFR) is the CKD-EPI equation.               ECOG SCORE    0 - Fully active-able to carry on all pre-disease performance without restriction              Assessment/Plan:        Cancer Staging   Malignant neoplasm of right breast, stage 1, estrogen receptor positive  Staging form: Onc Breast AJCC V7  - Pathologic: Stage IA (T1b, N0, cM0) - Signed by Matilde Whitehead MD on 10/17/2023  -I reviewed independently the patient medical record including laboratory radiologic and pathologic findings.  -An Oncotype was sent and the score was 19 indicating a 94% chance of disease-free survival at 9 years with adjuvant hormonal therapy.  -Her stage prognosis and plan of care was discussed at length.  The patient will require to take at least 5-7 years treatment with AI.  -she underwent a repeat mammogram in June 13, 2024 and it came back normal.  She was seen as well by Dr. De Leon.  -Tolerating her treatment well on Anastrozole.  -she will be seen back again in 4 months for a follow up visit with repeat CBC CMP Vitamin D.      Heterozygous state for factor 5 Leiden   -asymptomatic with no history of thromboembolic events.    Osteoporosis.    -bone density on October 18, 2023 showed the presence of osteoporosis.    -patient currently is on weekly Fosamax and vitamin-D.  She has not been taking calcium in view of her history of kidney  stones.  -We will repeat a bone density in October 2025  -the patient is very active and exercise at least 3 times a week.                     Med Onc Chart Routing      Follow up with physician 4 months. with repeat CBC CMP Vitamin D.   Follow up with RENÉ    Infusion scheduling note    Injection scheduling note    Labs    Imaging    Pharmacy appointment    Other referrals                   Plan was discussed with the patient at length, and she verbalized understanding. Noelle was given an opportunity to ask questions that were answered to her satisfaction, and she was advised to call in the interval if any problems or questions arise.  Signed:  Padmini Feldman MD   Hematology and Oncology  [facility] - HEMATOLOGY ONCOLOGY  [facility] [place]

## 2025-01-17 ENCOUNTER — OFFICE VISIT (OUTPATIENT)
Dept: OPTOMETRY | Facility: CLINIC | Age: 65
End: 2025-01-17
Payer: COMMERCIAL

## 2025-01-17 ENCOUNTER — CLINICAL SUPPORT (OUTPATIENT)
Dept: OPHTHALMOLOGY | Facility: CLINIC | Age: 65
End: 2025-01-17
Payer: COMMERCIAL

## 2025-01-17 DIAGNOSIS — H40.052 OCULAR HYPERTENSION, LEFT: Primary | ICD-10-CM

## 2025-01-17 DIAGNOSIS — H01.004 BLEPHARITIS OF LEFT UPPER EYELID, UNSPECIFIED TYPE: ICD-10-CM

## 2025-01-17 PROCEDURE — 92083 EXTENDED VISUAL FIELD XM: CPT | Mod: S$GLB,,, | Performed by: OPTOMETRIST

## 2025-01-17 PROCEDURE — 1160F RVW MEDS BY RX/DR IN RCRD: CPT | Mod: CPTII,S$GLB,, | Performed by: OPTOMETRIST

## 2025-01-17 PROCEDURE — 99213 OFFICE O/P EST LOW 20 MIN: CPT | Mod: S$GLB,,, | Performed by: OPTOMETRIST

## 2025-01-17 PROCEDURE — 92133 CPTRZD OPH DX IMG PST SGM ON: CPT | Mod: S$GLB,,, | Performed by: OPTOMETRIST

## 2025-01-17 PROCEDURE — 1159F MED LIST DOCD IN RCRD: CPT | Mod: CPTII,S$GLB,, | Performed by: OPTOMETRIST

## 2025-01-17 PROCEDURE — 99999 PR PBB SHADOW E&M-EST. PATIENT-LVL III: CPT | Mod: PBBFAC,,, | Performed by: OPTOMETRIST

## 2025-01-17 NOTE — PROGRESS NOTES
24-2 HVF done. OCT nerve done.         Assessment /Plan     For exam results, see Encounter Report.    There are no diagnoses linked to this encounter.

## 2025-01-17 NOTE — PROGRESS NOTES
HPI     Glaucoma     Additional comments: DLE 9-2024           Comments    Pt here today for IOP check with hvf rev for OHTN - OS.   States no visual   changes or complaints.    (+) dry eyes   (+) ATS OU tid    Has noticed a dry patch on LLL x 1 month.   Area gets red & crusty.     Does not feel a bump or no tenderness with touch.          Last edited by Miller Finnegan, OD on 1/17/2025  1:17 PM.            Assessment /Plan     For exam results, see Encounter Report.    Ocular hypertension, left  -     Posterior Segment OCT Optic Nerve- Both eyes  -     Noriega Visual Field - OU - Extended - Both Eyes    Blepharitis of left upper eyelid, unspecified type      1. Ocular hypertension, left (Primary)  High IOP OS  CCT today -- 545 / 591  Neg fam hx of glc    Reviewed updated OCT / HVF today -- improved from previous    Discussed options, recheck in 6 months, sooner if any vision changes     - Posterior Segment OCT Optic Nerve- Both eyes  - Noriega Visual Field - OU - Extended - Both Eyes    2. Blepharitis of left upper eyelid, unspecified type  Start lid scrub twice daily with warm water and baby shampoo on washcloth  Apply otc ointment on upper affected left lid twice daily  Report back if worsening or no resolution

## 2025-02-11 NOTE — PROGRESS NOTES
HPI    DLS: 1/10/2024 pt present for annual DFE/OCT    Pt states not having any vision since last exam. States still seeing some   floaters also some FOL in outer corner of OS but nothing new.    Systane dipesh at night  Last edited by Catherine Linder on 2/12/2025  2:05 PM.          A/P    ICD-10-CM ICD-9-CM   1. Posterior vitreous detachment, left eye  H43.812 379.21   2. Vitreomacular adhesion, right eye  H43.821 379.27   3. Age-related nuclear cataract of both eyes  H25.13 366.16   4. Dry eye syndrome of both eyes  H04.123 375.15   5. Pseudoexfoliation (PXF) glaucoma of left eye  H40.1420 365.52     365.70           1. Posterior vitreous detachment, left eye  2. Vitreomacular adhesion, right eye  F/u for floaters     Exam stable, floaters better  Exam notable for stable VMA OD, PVD OS, no RT/RD OU    Plan: Observation, counseled on RT/RD risk   Pathology of PVD, Retinal Tear, Retinal Detachment reviewed in great detail  RD precautions discussed in detail, patient expressed understanding  RTC immediately PRN (especially ANY change flashes, floaters, vision, visual field)     3. Age-related nuclear cataract of both eyes  Mild NS, NVS  Plan: Observation, update Mrx prn     4. Dry eye syndrome of both eyes  Mild dry eye  Rec ATs prn     5. Pseudoexfoliation (PXF) glaucoma of left eye  Elevated IOP with pseudoexfoliation  IOP 34  Plan: start cosopt BID OS, will refer to cipriano for wilner Phillips 1 year DFE/OCTm OU   RTC Heather darby    I saw and examined the patient and reviewed in detail the findings documented. The final examination findings, image interpretations, and plan as documented in the record represent my personal judgment and conclusions.    Felice Phillips MD  Vitreoretinal Surgery   [facility]

## 2025-02-12 ENCOUNTER — OFFICE VISIT (OUTPATIENT)
Dept: OPHTHALMOLOGY | Facility: CLINIC | Age: 65
End: 2025-02-12
Payer: COMMERCIAL

## 2025-02-12 DIAGNOSIS — H04.123 DRY EYE SYNDROME OF BOTH EYES: ICD-10-CM

## 2025-02-12 DIAGNOSIS — H43.821 VITREOMACULAR ADHESION, RIGHT EYE: ICD-10-CM

## 2025-02-12 DIAGNOSIS — H43.812 POSTERIOR VITREOUS DETACHMENT, LEFT EYE: Primary | ICD-10-CM

## 2025-02-12 DIAGNOSIS — H25.13 AGE-RELATED NUCLEAR CATARACT OF BOTH EYES: ICD-10-CM

## 2025-02-12 DIAGNOSIS — H40.1420: ICD-10-CM

## 2025-02-12 PROCEDURE — 99999 PR PBB SHADOW E&M-EST. PATIENT-LVL III: CPT | Mod: PBBFAC,,, | Performed by: OPHTHALMOLOGY

## 2025-02-12 PROCEDURE — 92202 OPSCPY EXTND ON/MAC DRAW: CPT | Mod: 59,S$GLB,, | Performed by: OPHTHALMOLOGY

## 2025-02-12 PROCEDURE — 1159F MED LIST DOCD IN RCRD: CPT | Mod: CPTII,S$GLB,, | Performed by: OPHTHALMOLOGY

## 2025-02-12 PROCEDURE — 1160F RVW MEDS BY RX/DR IN RCRD: CPT | Mod: CPTII,S$GLB,, | Performed by: OPHTHALMOLOGY

## 2025-02-12 PROCEDURE — 92134 CPTRZ OPH DX IMG PST SGM RTA: CPT | Mod: S$GLB,,, | Performed by: OPHTHALMOLOGY

## 2025-02-12 PROCEDURE — 99214 OFFICE O/P EST MOD 30 MIN: CPT | Mod: S$GLB,,, | Performed by: OPHTHALMOLOGY

## 2025-02-12 RX ORDER — DORZOLAMIDE HYDROCHLORIDE AND TIMOLOL MALEATE 20; 5 MG/ML; MG/ML
1 SOLUTION/ DROPS OPHTHALMIC 2 TIMES DAILY
Qty: 10 ML | Refills: 1 | Status: SHIPPED | OUTPATIENT
Start: 2025-02-12 | End: 2026-02-12

## 2025-02-25 ENCOUNTER — OFFICE VISIT (OUTPATIENT)
Dept: FAMILY MEDICINE | Facility: CLINIC | Age: 65
End: 2025-02-25
Payer: COMMERCIAL

## 2025-02-25 ENCOUNTER — TELEPHONE (OUTPATIENT)
Dept: FAMILY MEDICINE | Facility: CLINIC | Age: 65
End: 2025-02-25
Payer: COMMERCIAL

## 2025-02-25 VITALS — OXYGEN SATURATION: 97 % | WEIGHT: 158.94 LBS | BODY MASS INDEX: 23.47 KG/M2 | HEART RATE: 66 BPM

## 2025-02-25 DIAGNOSIS — D68.51 FACTOR V LEIDEN: ICD-10-CM

## 2025-02-25 DIAGNOSIS — C50.911 MALIGNANT NEOPLASM OF RIGHT BREAST, STAGE 1, ESTROGEN RECEPTOR POSITIVE: ICD-10-CM

## 2025-02-25 DIAGNOSIS — M81.8 OTHER OSTEOPOROSIS WITHOUT CURRENT PATHOLOGICAL FRACTURE: ICD-10-CM

## 2025-02-25 DIAGNOSIS — Z17.0 MALIGNANT NEOPLASM OF RIGHT BREAST, STAGE 1, ESTROGEN RECEPTOR POSITIVE: ICD-10-CM

## 2025-02-25 DIAGNOSIS — E53.8 B12 DEFICIENCY: ICD-10-CM

## 2025-02-25 DIAGNOSIS — R73.03 PREDIABETES: ICD-10-CM

## 2025-02-25 DIAGNOSIS — Z00.01 ANNUAL VISIT FOR GENERAL ADULT MEDICAL EXAMINATION WITH ABNORMAL FINDINGS: Primary | ICD-10-CM

## 2025-02-25 DIAGNOSIS — Z12.11 SCREENING FOR COLON CANCER: ICD-10-CM

## 2025-02-25 PROCEDURE — 99386 PREV VISIT NEW AGE 40-64: CPT | Mod: S$GLB,,, | Performed by: FAMILY MEDICINE

## 2025-02-25 PROCEDURE — 3008F BODY MASS INDEX DOCD: CPT | Mod: CPTII,S$GLB,, | Performed by: FAMILY MEDICINE

## 2025-02-25 PROCEDURE — 99999 PR PBB SHADOW E&M-EST. PATIENT-LVL III: CPT | Mod: PBBFAC,,, | Performed by: FAMILY MEDICINE

## 2025-02-25 PROCEDURE — 1159F MED LIST DOCD IN RCRD: CPT | Mod: CPTII,S$GLB,, | Performed by: FAMILY MEDICINE

## 2025-02-27 NOTE — PROGRESS NOTES
Assessment:       1. Annual visit for general adult medical examination with abnormal findings    2. Other osteoporosis without current pathological fracture    3. Factor V Leiden    4. Screening for colon cancer    5. Malignant neoplasm of right breast, stage 1, estrogen receptor positive    6. B12 deficiency    7. Prediabetes        Assessment & Plan    Annual visit for general adult medical examination with abnormal findings  -     CBC Auto Differential; Future; Expected date: 02/25/2025  -     Comprehensive Metabolic Panel; Future; Expected date: 02/25/2025  -     Lipid Panel; Future; Expected date: 02/25/2025  -     TSH; Future; Expected date: 02/25/2025  -     Vitamin B12; Future; Expected date: 02/25/2025  -     Hemoglobin A1C; Future; Expected date: 02/25/2025  -     Vitamin D; Future; Expected date: 02/25/2025    Other osteoporosis without current pathological fracture: New problem workup needed  -     Vitamin D; Future; Expected date: 02/25/2025    Factor V Leiden: New problem workup needed  -     CBC Auto Differential; Future; Expected date: 02/25/2025    Screening for colon cancer  -     Fecal Immunochemical Test (iFOBT); Future; Expected date: 02/25/2025    Malignant neoplasm of right breast, stage 1, estrogen receptor positive: New problem, no further workup needed    B12 deficiency: New problem workup needed  -     Vitamin B12; Future; Expected date: 02/25/2025    Prediabetes: New problem workup needed  -     Hemoglobin A1C; Future; Expected date: 02/25/2025       Reviewed patient's history of osteoporosis, breast cancer, and recent weight loss  Assessed prediabetes risk based on previous A1C of 5.8  Considered recent lifestyle changes as positive factors for managing prediabetes risk  Evaluated current medication regimen for osteoporosis and breast cancer  Discussed continuation of baby aspirin, acknowledging patient's factor V Leiden history    MALIGNANT NEOPLASM OF UNSPECIFIED SITE OF RIGHT FEMALE  BREAST:  - Reviewed patient's breast cancer treatment history, including lumpectomy and mastectomy on the right breast.  - Continuing Arimidex 1 mg daily, typically used for ER+ breast cancer.  - Scheduled a mammogram at [facility] for diagnostic imaging of the right breast.  - Instructed patient to maintain current treatment regimen.    CONGENITAL NEUTROPENIA:  - Reviewed patient's history of low white blood cell count (leukopenia), which has reportedly resolved.  - Ordered a CBC to monitor current white blood cell count.  - Will reassess the need for hematology referral based on CBC results, considering past referrals for this condition.    PREDIABETES:  - Explained prediabetes range (A1C 5.6-6.4) and diabetes threshold (A1C > 6.5).  - Discussed importance of lifestyle modifications in reversing prediabetes and potentially diabetes.  - Patient to maintain healthy eating habits, with emphasis on reducing sugar intake.    STRESS MANAGEMENT:  - Explained the impact of stress on overall health, particularly in relation to prediabetes management.  - Recommend focusing on stress reduction, especially after penitentiary.    CARDIOVASCULAR HEALTH:  - Patient to continue current yoga practice for cardiovascular health and strength training.               Patient agreed with assessment and plan. Patient verbalized understanding.     Subjective:       Patient ID: Noelle Rivas is a 64 y.o. female.    Chief Complaint: Establish Care      History of Present Illness    CHIEF COMPLAINT:  Patient presents for an initial visit with a new PCP after her previous doctor relocated.    HPI:  Patient is a 61-year-old female transitioning her medical care to the [facility] as she prepares for penitentiary this year. Her previous care was under Dr. Emelyn Bernardo, who has since moved to [place]. She has a history of breast cancer and is followed [facility]. She is currently taking Arimidex 1 mg daily for  "breast cancer treatment. She reports cold sensitivity, often feeling cold even in 70-degree weather, requiring winter clothing and blankets. She also reports occasional anxiety with palpitations, particularly when rushing or doing something new. She had a recent episode of kidney stones, which required 2 surgeries. 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 She is scheduled to have this condition re-evaluated in a few weeks. She notes that the eye drops, which contain a beta-blocker, have lowered her heart rate and made her feel calmer. She reports feeling some pressure in her chest since starting the eye drops. Regarding her prediabetes, her last A1C was 5.8, which she attributes to consuming foods like zaida cake during [place] season. She has since lost 10 lbs and is actively working to improve her diet and maintain her exercise routine. She denies any current swelling in her extremities, though she sometimes has a little swelling in her feet.    MEDICATIONS:  Patient is on Fosamax weekly on Monday morning for osteoporosis. She takes Arimidex 1 mg daily for breast cancer and baby aspirin daily for prevention due to factor V Leiden. She is also on daily Vitamin D 1000 units and Vitamin B12 every other day. For her left eye pseudoglaucoma/inflammation. Patient takes Claritin and Calcium 1000 mg as needed with high oxalate meals for kidney stone prevention.    MEDICAL HISTORY:  Patient has a history of osteoporosis, breast cancer, factor V Leiden, kidney stones, and glaucoma in the left eye (described as "pseudoglaucoma"). She has prediabetes with a past A1C of 5.8, B12 deficiency, and arthritis of the knee. She previously had leukopenia which has resolved. There is a questionable diagnosis of a possible stroke after a chiropractic adjustment, which resulted in brief hospitalization.    FAMILY " HISTORY:  Family history is significant for mother having a blood clot and factor V Leiden. Her father is .    SURGICAL HISTORY:  Patient has undergone several surgical procedures including a breast lumpectomy, cystoscopy, dilation and curettage of the uterus, and a right breast mastectomy. She has also had stent placement for kidney stones and two kidney stone surgeries.    TEST RESULTS:  Patient's Vitamin D levels were okay when checked due to osteoporosis. Her B12 was previous  ly low, but she is currently taking supplements. The last TSH check showed great thyroid function. Her cholesterol went up slightly, but good cholesterol also increased. The A1C was 5.8, in the prediabetic range (between 5.6 and 6.4). CBC results are sometimes abnormal, particularly showing low white blood cell count (leukopenia). A recent fecal test was negative. Patient underwent a bone density scan 2 years ago as a baseline prior to starting Arimidex for breast cancer.        SOCIAL HISTORY:  Denies alcohol use Occupation: Works from home, planning to retire this year    ROS:  ROS as indicated in HPI.          Past medical history, past social history, past surgical history, past Family history was reviewed and discussed with the patient.        Objective:      Physical Exam      Vitals: Weight: 158 lbs. BMI: 23.4. Blood pressure: 123/73.  General: No acute distress. Well-developed. Well-nourished.  Eyes: EOMI. Sclerae anicteric.  HENT: Normocephalic. Atraumatic. Nares patent. Moist oral mucosa.  Cardiovascular: Regular rate. Regular rhythm.   Respiratory: Normal respiratory effort. Clear to auscultation bilaterally. No rales. No rhonchi. No wheezing.  Musculoskeletal: No  obvious deformity.  Extremities: No lower extremity edema.  Neurological: Alert & oriented x3. No slurred speech. Normal gait.  Psychiatric: Normal mood. Normal affect. Good insight. Good judgment.  Skin: Warm. Dry. No rash.                   This note was  generated with the assistance of ambient listening technology. Verbal consent was obtained by the patient and accompanying visitor(s) for the recording of patient appointment to facilitate this note. I attest to having reviewed and edited the generated note for accuracy, though some syntax or spelling errors may persist. Please contact the author of this note for any clarification.

## 2025-03-01 ENCOUNTER — LAB VISIT (OUTPATIENT)
Dept: LAB | Facility: HOSPITAL | Age: 65
End: 2025-03-01
Attending: FAMILY MEDICINE
Payer: COMMERCIAL

## 2025-03-01 DIAGNOSIS — Z00.01 ANNUAL VISIT FOR GENERAL ADULT MEDICAL EXAMINATION WITH ABNORMAL FINDINGS: ICD-10-CM

## 2025-03-01 DIAGNOSIS — E53.8 B12 DEFICIENCY: ICD-10-CM

## 2025-03-01 DIAGNOSIS — R73.03 PREDIABETES: ICD-10-CM

## 2025-03-01 DIAGNOSIS — D68.51 FACTOR V LEIDEN: ICD-10-CM

## 2025-03-01 DIAGNOSIS — M81.8 OTHER OSTEOPOROSIS WITHOUT CURRENT PATHOLOGICAL FRACTURE: ICD-10-CM

## 2025-03-01 LAB
25(OH)D3+25(OH)D2 SERPL-MCNC: 49 NG/ML (ref 30–96)
ALBUMIN SERPL BCP-MCNC: 4.1 G/DL (ref 3.5–5.2)
ALP SERPL-CCNC: 39 U/L (ref 40–150)
ALT SERPL W/O P-5'-P-CCNC: 19 U/L (ref 10–44)
ANION GAP SERPL CALC-SCNC: 11 MMOL/L (ref 8–16)
AST SERPL-CCNC: 21 U/L (ref 10–40)
BASOPHILS # BLD AUTO: 0.04 K/UL (ref 0–0.2)
BASOPHILS NFR BLD: 0.9 % (ref 0–1.9)
BILIRUB SERPL-MCNC: 0.6 MG/DL (ref 0.1–1)
BUN SERPL-MCNC: 12 MG/DL (ref 8–23)
CALCIUM SERPL-MCNC: 9.1 MG/DL (ref 8.7–10.5)
CHLORIDE SERPL-SCNC: 108 MMOL/L (ref 95–110)
CO2 SERPL-SCNC: 23 MMOL/L (ref 23–29)
CREAT SERPL-MCNC: 0.9 MG/DL (ref 0.5–1.4)
DIFFERENTIAL METHOD BLD: ABNORMAL
EOSINOPHIL # BLD AUTO: 0.2 K/UL (ref 0–0.5)
EOSINOPHIL NFR BLD: 4.7 % (ref 0–8)
ERYTHROCYTE [DISTWIDTH] IN BLOOD BY AUTOMATED COUNT: 13.2 % (ref 11.5–14.5)
EST. GFR  (NO RACE VARIABLE): >60 ML/MIN/1.73 M^2
ESTIMATED AVG GLUCOSE: 111 MG/DL (ref 68–131)
GLUCOSE SERPL-MCNC: 86 MG/DL (ref 70–110)
HBA1C MFR BLD: 5.5 % (ref 4.5–6.2)
HCT VFR BLD AUTO: 38.2 % (ref 37–48.5)
HGB BLD-MCNC: 12.7 G/DL (ref 12–16)
IMM GRANULOCYTES # BLD AUTO: 0.02 K/UL (ref 0–0.04)
IMM GRANULOCYTES NFR BLD AUTO: 0.4 % (ref 0–0.5)
LYMPHOCYTES # BLD AUTO: 1.4 K/UL (ref 1–4.8)
LYMPHOCYTES NFR BLD: 29.2 % (ref 18–48)
MCH RBC QN AUTO: 32.7 PG (ref 27–31)
MCHC RBC AUTO-ENTMCNC: 33.2 G/DL (ref 32–36)
MCV RBC AUTO: 99 FL (ref 82–98)
MONOCYTES # BLD AUTO: 0.4 K/UL (ref 0.3–1)
MONOCYTES NFR BLD: 7.9 % (ref 4–15)
NEUTROPHILS # BLD AUTO: 2.7 K/UL (ref 1.8–7.7)
NEUTROPHILS NFR BLD: 56.9 % (ref 38–73)
NRBC BLD-RTO: 0 /100 WBC
PLATELET # BLD AUTO: 156 K/UL (ref 150–450)
PMV BLD AUTO: 10.1 FL (ref 9.2–12.9)
POTASSIUM SERPL-SCNC: 3.7 MMOL/L (ref 3.5–5.1)
PROT SERPL-MCNC: 6.6 G/DL (ref 6–8.4)
RBC # BLD AUTO: 3.88 M/UL (ref 4–5.4)
SODIUM SERPL-SCNC: 142 MMOL/L (ref 136–145)
TSH SERPL DL<=0.005 MIU/L-ACNC: 1.46 UIU/ML (ref 0.4–4)
VIT B12 SERPL-MCNC: 997 PG/ML (ref 210–950)
WBC # BLD AUTO: 4.69 K/UL (ref 3.9–12.7)

## 2025-03-01 PROCEDURE — 80053 COMPREHEN METABOLIC PANEL: CPT | Performed by: FAMILY MEDICINE

## 2025-03-01 PROCEDURE — 82306 VITAMIN D 25 HYDROXY: CPT | Performed by: FAMILY MEDICINE

## 2025-03-01 PROCEDURE — 83036 HEMOGLOBIN GLYCOSYLATED A1C: CPT | Performed by: FAMILY MEDICINE

## 2025-03-01 PROCEDURE — 82607 VITAMIN B-12: CPT | Performed by: FAMILY MEDICINE

## 2025-03-01 PROCEDURE — 84443 ASSAY THYROID STIM HORMONE: CPT | Performed by: FAMILY MEDICINE

## 2025-03-01 PROCEDURE — 85025 COMPLETE CBC W/AUTO DIFF WBC: CPT | Performed by: FAMILY MEDICINE

## 2025-03-04 ENCOUNTER — RESULTS FOLLOW-UP (OUTPATIENT)
Dept: FAMILY MEDICINE | Facility: CLINIC | Age: 65
End: 2025-03-04

## 2025-03-18 ENCOUNTER — OFFICE VISIT (OUTPATIENT)
Dept: OPHTHALMOLOGY | Facility: CLINIC | Age: 65
End: 2025-03-18
Payer: COMMERCIAL

## 2025-03-18 DIAGNOSIS — H26.8 PSEUDOEXFOLIATION OF LENS CAPSULE, LEFT EYE: ICD-10-CM

## 2025-03-18 DIAGNOSIS — H40.052 OCULAR HYPERTENSION, LEFT: Primary | ICD-10-CM

## 2025-03-18 PROCEDURE — 99999 PR PBB SHADOW E&M-EST. PATIENT-LVL III: CPT | Mod: PBBFAC,,, | Performed by: OPHTHALMOLOGY

## 2025-03-18 RX ORDER — KETOROLAC TROMETHAMINE 5 MG/ML
1 SOLUTION OPHTHALMIC 4 TIMES DAILY
Qty: 5 ML | Refills: 0 | Status: SHIPPED | OUTPATIENT
Start: 2025-03-18 | End: 2025-04-09

## 2025-04-08 ENCOUNTER — PATIENT MESSAGE (OUTPATIENT)
Dept: HEMATOLOGY/ONCOLOGY | Facility: CLINIC | Age: 65
End: 2025-04-08
Payer: COMMERCIAL

## 2025-04-16 DIAGNOSIS — N85.00 ENDOMETRIAL HYPERPLASIA, UNSPECIFIED: Primary | ICD-10-CM

## 2025-04-24 ENCOUNTER — TELEPHONE (OUTPATIENT)
Dept: HEMATOLOGY/ONCOLOGY | Facility: CLINIC | Age: 65
End: 2025-04-24
Payer: COMMERCIAL

## 2025-04-24 ENCOUNTER — OFFICE VISIT (OUTPATIENT)
Dept: HEMATOLOGY/ONCOLOGY | Facility: CLINIC | Age: 65
End: 2025-04-24
Payer: COMMERCIAL

## 2025-04-24 VITALS
DIASTOLIC BLOOD PRESSURE: 68 MMHG | SYSTOLIC BLOOD PRESSURE: 114 MMHG | OXYGEN SATURATION: 98 % | BODY MASS INDEX: 24.16 KG/M2 | RESPIRATION RATE: 18 BRPM | WEIGHT: 163.13 LBS | HEART RATE: 64 BPM | HEIGHT: 69 IN | TEMPERATURE: 97 F

## 2025-04-24 DIAGNOSIS — Z79.811 PROPHYLACTIC USE OF ANASTROZOLE (ARIMIDEX): ICD-10-CM

## 2025-04-24 DIAGNOSIS — Z17.0 ESTROGEN RECEPTOR POSITIVE STATUS (ER+): ICD-10-CM

## 2025-04-24 DIAGNOSIS — Z17.0 MALIGNANT NEOPLASM OF RIGHT BREAST, STAGE 1, ESTROGEN RECEPTOR POSITIVE: Primary | ICD-10-CM

## 2025-04-24 DIAGNOSIS — M81.8 OTHER OSTEOPOROSIS WITHOUT CURRENT PATHOLOGICAL FRACTURE: ICD-10-CM

## 2025-04-24 DIAGNOSIS — C50.911 MALIGNANT NEOPLASM OF RIGHT BREAST, STAGE 1, ESTROGEN RECEPTOR POSITIVE: Primary | ICD-10-CM

## 2025-04-24 PROCEDURE — 3074F SYST BP LT 130 MM HG: CPT | Mod: CPTII,S$GLB,, | Performed by: NURSE PRACTITIONER

## 2025-04-24 PROCEDURE — 99215 OFFICE O/P EST HI 40 MIN: CPT | Mod: S$GLB,,, | Performed by: NURSE PRACTITIONER

## 2025-04-24 PROCEDURE — 3078F DIAST BP <80 MM HG: CPT | Mod: CPTII,S$GLB,, | Performed by: NURSE PRACTITIONER

## 2025-04-24 PROCEDURE — 3044F HG A1C LEVEL LT 7.0%: CPT | Mod: CPTII,S$GLB,, | Performed by: NURSE PRACTITIONER

## 2025-04-24 PROCEDURE — 99999 PR PBB SHADOW E&M-EST. PATIENT-LVL IV: CPT | Mod: PBBFAC,,, | Performed by: NURSE PRACTITIONER

## 2025-04-24 PROCEDURE — 3008F BODY MASS INDEX DOCD: CPT | Mod: CPTII,S$GLB,, | Performed by: NURSE PRACTITIONER

## 2025-04-24 PROCEDURE — 1159F MED LIST DOCD IN RCRD: CPT | Mod: CPTII,S$GLB,, | Performed by: NURSE PRACTITIONER

## 2025-04-24 NOTE — TELEPHONE ENCOUNTER
Spoke to Ms. Drake, since Dr. Feldman is out today, she is okay seeing RENÉ today. No other questions or concerns.

## 2025-04-24 NOTE — PROGRESS NOTES
Subjective:       Name: Noelle Rivas  : 1960  MRN: [id number]    CC:  Approximate 17 month post breast evaluation; Anastrozole.     HPI: Noelle Rivas is a 64 y.o. female presents for breast evaluation for a diagnosis of Breast cancer, right, Stage 1, ER+ that was diagnosed 10/2023.  Right mastectomy & started on Anastrozole 2023.  Patient is reporting to the office by herself.  Since her last visit, she has been in good health.  Tolerating Anastrozole; denies any s/e.  Exercising at the gym & doing yoga.  Compliant with Vitamin D (1000 iu/d), Fosamax & now taking B12 qod due to elevated levels.  Compliant with ASA daily & maintaining good daily hydration.  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  Denies any difficulties with hot flashes, mood swings, weight gain, HA's, abdominal discomfort, N/V, myalgias or arthralgias.        ONCOLOGIC HISTORY.  She has a remote history of a right-sided grade stage I carcinoma (T1c N0 M0, ER positive HER 2 negative, 1.3 cm in diameter) for which she had undergone lumpectomy followed by radiation therapy.  She had declined adjuvant treatment due to her concern about thrombosis given the fact that she is heterozygous for factor 5 Leiden.       Apparently she was recently diagnosed with an ipsilateral breast cancer and she underwent a right mastectomy on 2023 that showed a 10 mm grade 1 carcinoma.  The sentinel lymph node was negative.  Her course was complicated by a soft tissue infection for which she was treated with antibiotics.  She has now fully recovered from the operation.     An Oncotype was sent and the score was 19 indicating a 94% chance of disease-free survival at 9 years with adjuvant hormonal therapy.         Oncology History   Malignant neoplasm of right breast, stage 1, estrogen receptor positive   2012 Initial Diagnosis    Malignant neoplasm of right breast, stage  1, estrogen receptor positive     6/11/2012 Surgery    Surgeon: Dr. Chavarria    She underwent lumpectomy and sentinel node biopsy. The pathology revealed invasive ductal carcinoma with mucin production, measuring 1.3 cm with the closest margin at 0.1 cm posteriorly. One out of one sentinel lymph node was negative for involvement. The lesion was ER positive, NE positive, and HER-2 negative.      - 9/27/2012 Radiation Therapy    Treating physician: Dr. Mayo  Total Dose: 66.4 Gy  Fractions: 36     10/17/2023 Cancer Staged    Staging form: Onc Breast AJCC V7  - Pathologic: Stage IA (T1b, N0, cM0)          Past Medical History:   Diagnosis Date    Abnormal TSH 06/05/2018    Age-related nuclear cataract of both eyes 08/03/2022    Allergy     Anxiety     Anxiety 12/17/2012    BRCA1 negative 07/23/19    BRCA2 negative 07/23/19    Breast cancer 2012    Calcium nephrolithiasis     Complex endometrial hyperplasia without atypia 06/16/2016    Degenerative disc disease     DJD (degenerative joint disease) of knee     Endometrial hyperplasia, unspecified: see evaluation 2017 05/22/2018    Factor V Leiden mutation No h/o DVT    Kidney stones     Leukopenia 12/19/2012    PMB (postmenopausal bleeding) 03/06/2017    PONV (postoperative nausea and vomiting)     10/28/22 - severe ponv    Stroke     questionable  - TIA    Vitamin B 12 deficiency        Past Surgical History:   Procedure Laterality Date    BREAST BIOPSY Right 2012    malignant, radiation    BREAST BIOPSY Right 2014    benign    BREAST LUMPECTOMY Right 06/11/2012    w/ radiation    BREAST LUMPECTOMY W/ NEEDLE LOCALIZATION      CYSTOSCOPY W/ URETERAL STENT PLACEMENT Left 10/28/2022    Procedure: CYSTOSCOPY, WITH URETERAL STENT INSERTION;  Surgeon: Ileana Souza MD;  Location: [facility];  Service: Urology;  Laterality: Left;  6x26    CYSTOSCOPY W/ URETERAL STENT REMOVAL Left 10/28/2022    Procedure: CYSTOSCOPY, WITH URETERAL STENT REMOVAL;  Surgeon: Ileana KINSEY  MD Harley;  Location: [facility];  Service: Urology;  Laterality: Left;    DILATION AND CURETTAGE OF UTERUS      INJECTION FOR SENTINEL NODE IDENTIFICATION Right 2023    Procedure: INJECTION, FOR SENTINEL NODE IDENTIFICATION;  Surgeon: Giana De Leon MD;  Location: [facility] OR;  Service: General;  Laterality: Right;    MASTECTOMY Right 2023    Procedure: MASTECTOMY RIGHT;  Surgeon: Giana De Leon MD;  Location: [facility];  Service: General;  Laterality: Right;  2.5 HRS    MASTECTOMY  2023    SENTINEL LYMPH NODE BIOPSY Right 2023    Procedure: BIOPSY, LYMPH NODE, SENTINEL;  Surgeon: Giana De Leon MD;  Location: [facility] OR;  Service: General;  Laterality: Right;    URETERAL STENT PLACEMENT N/A 10/14/2022    Procedure: INSERTION, STENT, URETER;  Surgeon: Ileana Souza MD;  Location: [facility];  Service: Urology;  Laterality: N/A;  6x26 soft    URETEROSCOPIC REMOVAL OF URETERIC CALCULUS Left 10/14/2022    Procedure: REMOVAL, CALCULUS, URETER, URETEROSCOPIC;  Surgeon: Ileana Souza MD;  Location: [facility] OR;  Service: Urology;  Laterality: Left;    URETEROSCOPIC REMOVAL OF URETERIC CALCULUS Left 10/28/2022    Procedure: REMOVAL, CALCULUS, URETER, URETEROSCOPIC;  Surgeon: Ileana Souza MD;  Location: [facility];  Service: Urology;  Laterality: Left;       Family History   Problem Relation Name Age of Onset    Melanoma Mother      Cancer Mother          melanoma    COPD Father      Hypertension Father      Coronary artery disease Father      Cancer Father          prostate ca, metastatic    Heart disease Father          stenting    Kidney disease Father          stones    Retinal detachment Father      Osteoporosis Father      Hyperlipidemia Brother      Keratoconus Brother      No Known Problems Brother      Stroke Maternal Grandmother      Lung cancer Maternal Grandfather      Breast cancer Paternal Grandmother Tammy Rivas          at age 62    Colon cancer Paternal Grandfather      Ovarian  cancer Neg Hx      Uterine cancer Neg Hx      Psoriasis Neg Hx      Lupus Neg Hx      Eczema Neg Hx         Social History     Socioeconomic History    Marital status: Single   Occupational History    Occupation: Nurse       Employer: [employer]   Tobacco Use    Smoking status: Never    Smokeless tobacco: Never   Substance and Sexual Activity    Alcohol use: Not Currently    Drug use: No    Sexual activity: Not Currently     Birth control/protection: None   Other Topics Concern    Are you pregnant or think you may be? No    Breast-feeding No     Social Drivers of Health     Financial Resource Strain: Low Risk  (2/19/2024)    Overall Financial Resource Strain (CARDIA)     Difficulty of Paying Living Expenses: Not hard at all   Food Insecurity: No Food Insecurity (2/19/2024)    Hunger Vital Sign     Worried About Running Out of Food in the Last Year: Never true     Ran Out of Food in the Last Year: Never true   Transportation Needs: No Transportation Needs (2/19/2024)    PRAPARE - Transportation     Lack of Transportation (Medical): No     Lack of Transportation (Non-Medical): No   Physical Activity: Sufficiently Active (2/19/2024)    Exercise Vital Sign     Days of Exercise per Week: 3 days     Minutes of Exercise per Session: 70 min   Stress: No Stress Concern Present (2/19/2024)    [employer] [employer] - Occupational Stress Questionnaire     Feeling of Stress : Not at all   Housing Stability: Low Risk  (2/19/2024)    Housing Stability Vital Sign     Unable to Pay for Housing in the Last Year: No     Number of Places Lived in the Last Year: 1     Unstable Housing in the Last Year: No       Review of patient's allergies indicates:   Allergen Reactions    Adhesive Itching and Rash     Micropore/Medipore Tape - paper tape is ok    Nickel Itching and Rash     Allergic reaction to nickel and other related metals    Iodine and iodide containing products Itching and Rash    Latex,  "natural rubber Itching and Rash       Answers submitted by the patient for this visit:  Review of Systems Questionnaire (Submitted on 4/24/2025)  appetite change : No  unexpected weight change: No  mouth sores: No  visual disturbance: No  cough: No  shortness of breath: No  chest pain: No  abdominal pain: No  diarrhea: No  frequency: No  back pain: No  rash: No  headaches: No  adenopathy: No  nervous/ anxious: No       Objective:   Weight:  Loss of 5 pounds in 4 months  Vitals:    04/24/25 1343   BP: 114/68   BP Location: Left arm   Patient Position: Sitting   Pulse: 64   Resp: 18   Temp: 97.1 °F (36.2 °C)   TempSrc: Temporal   SpO2: 98%   Weight: 74 kg (163 lb 2.3 oz)   Height: 5' 9" (1.753 m)          Physical Exam  Vitals reviewed.   Constitutional:       Appearance: Normal appearance.   Eyes:      General: No scleral icterus.     Pupils: Pupils are equal, round, and reactive to light.   Cardiovascular:      Rate and Rhythm: Normal rate and regular rhythm.      Pulses: Normal pulses.      Heart sounds: Normal heart sounds.   Pulmonary:      Effort: Pulmonary effort is normal.      Breath sounds: Normal breath sounds.   Chest:      Comments: S/P right breast mastectomy and sentinel lymph node dissection.  No signs of local recurrence about the chest wall/axilla.  Left breast soft, NT, free of masses or lesion, nipple discharge or inversion.  Abdominal:      General: Bowel sounds are normal. There is no distension.      Palpations: Abdomen is soft.   Musculoskeletal:         General: No swelling.      Cervical back: Neck supple.   Lymphadenopathy:      Cervical: No cervical adenopathy.      Upper Body:      Right upper body: No supraclavicular or axillary adenopathy.      Left upper body: No supraclavicular or axillary adenopathy.   Skin:     General: Skin is warm and dry.      Findings: No rash.   Neurological:      Mental Status: She is alert and oriented to person, place, and time.   Psychiatric:         " Behavior: Behavior normal.         Thought Content: Thought content normal.                Current Outpatient Medications on File Prior to Visit   Medication Sig    alendronate (FOSAMAX) 70 MG tablet Take 1 tablet (70 mg total) by mouth every 7 days.    anastrozole (ARIMIDEX) 1 mg Tab Take 1 tablet (1 mg total) by mouth once daily.    aspirin (ECOTRIN) 81 MG EC tablet Take 81 mg by mouth once daily.    cholecalciferol, vitamin D3, (VITAMIN D3) 25 mcg (1,000 unit) capsule Take 1 capsule (1,000 Units total) by mouth once daily.    cyanocobalamin (VITAMIN B-12) 1000 MCG tablet Take 1,000 mcg by mouth every other day. Using every third day    dorzolamide-timolol 2-0.5% (COSOPT) 22.3-6.8 mg/mL ophthalmic solution Place 1 drop into the left eye 2 (two) times daily.    loratadine (CLARITIN) 10 mg tablet Take 10 mg by mouth daily as needed.    multivitamin (THERAGRAN) per tablet Take 1 tablet by mouth once daily.     No current facility-administered medications on file prior to visit.     Labs reviewed from 03/01/25  CBC:  Lab Results   Component Value Date    WBC 4.69 03/01/2025    HGB 12.7 03/01/2025    HCT 38.2 03/01/2025    MCV 99 (H) 03/01/2025     03/01/2025     CMP:  Sodium   Date Value Ref Range Status   03/01/2025 142 136 - 145 mmol/L Final     Potassium   Date Value Ref Range Status   03/01/2025 3.7 3.5 - 5.1 mmol/L Final     Chloride   Date Value Ref Range Status   03/01/2025 108 95 - 110 mmol/L Final     CO2   Date Value Ref Range Status   03/01/2025 23 23 - 29 mmol/L Final     Glucose   Date Value Ref Range Status   03/01/2025 86 70 - 110 mg/dL Final     BUN   Date Value Ref Range Status   03/01/2025 12 8 - 23 mg/dL Final     Creatinine   Date Value Ref Range Status   03/01/2025 0.9 0.5 - 1.4 mg/dL Final     Calcium   Date Value Ref Range Status   03/01/2025 9.1 8.7 - 10.5 mg/dL Final     Total Protein   Date Value Ref Range Status   03/01/2025 6.6 6.0 - 8.4 g/dL Final     Albumin   Date Value Ref Range  Status   03/01/2025 4.1 3.5 - 5.2 g/dL Final     Total Bilirubin   Date Value Ref Range Status   03/01/2025 0.6 0.1 - 1.0 mg/dL Final     Comment:     For infants and newborns, interpretation of results should be based  on gestational age, weight and in agreement with clinical  observations.    Premature Infant recommended reference ranges:  Up to 24 hours.............<8.0 mg/dL  Up to 48 hours............<12.0 mg/dL  3-5 days..................<15.0 mg/dL  6-29 days.................<15.0 mg/dL       Alkaline Phosphatase   Date Value Ref Range Status   03/01/2025 39 (L) 40 - 150 U/L Final     AST   Date Value Ref Range Status   03/01/2025 21 10 - 40 U/L Final     ALT   Date Value Ref Range Status   03/01/2025 19 10 - 44 U/L Final     Anion Gap   Date Value Ref Range Status   03/01/2025 11 8 - 16 mmol/L Final     eGFR if    Date Value Ref Range Status   07/12/2022 >60.0 >60 mL/min/1.73 m^2 Final     eGFR if non    Date Value Ref Range Status   07/12/2022 >60.0 >60 mL/min/1.73 m^2 Final     Comment:     Calculation used to obtain the estimated glomerular filtration  rate (eGFR) is the CKD-EPI equation.        Vitamin D:  49    BMD 10/18/23:  Osteoporosis in lumbar spine (T-score = -2.5), Osteopenia in left femoral neck (T-score = -1.4) & Total hip (T-score = -1.8)     Last Mammo, left done 06/13/24; scheduled for 06/17/25  Assessment/Plan:        Cancer Staging   Malignant neoplasm of right breast, stage 1, estrogen receptor positive  Staging form: Onc Breast AJCC V7  - Pathologic: Stage IA (T1b, N0, cM0) - Signed by Matilde Whitehead MD on 10/17/2023  -I reviewed independently the patient medical record including laboratory radiologic and pathologic findings.  -An Oncotype was sent and the score was 19 indicating a 94% chance of disease-free survival at 9 years with adjuvant hormonal therapy.  -Her stage prognosis and plan of care was discussed at length.  The patient will require to  take at least 5-7 years treatment with AI.  -she underwent a repeat mammogram in June 13, 2024 and it came back normal.  She was seen as well by Dr. De Leon.  -Tolerating her treatment well on Anastrozole.  -she will be seen back again in 4 months for a follow up visit with repeat CBC CMP Vitamin D.  04/24/25:  approximate 17 months post start of Anastrozole; tolerating well; continue Anastrozole; f/u in 4 months (08/25) with CBC, CMP.    Heterozygous state for factor 5 Leiden   -asymptomatic with no history of thromboembolic events.  04/24/25:  compliant with ASA 81 mg/d.    Osteoporosis.    -bone density on October 18, 2023 showed the presence of osteoporosis.    -patient currently is on weekly Fosamax and vitamin-D.  She has not been taking calcium in view of her history of kidney stones.  -We will repeat a bone density in October 2025  -the patient is very active and exercise at least 3 times a week.    04/24/25:  vitamin D WNL; compliant with Vitamin D, exercise, repeat BMD on or after 10/18/25 - will schedule ahead.           Med Onc Chart Routing      Follow up with physician 4 months. f/u in 4 months with Dr. Feldman with CBC, CMP prior   Follow up with RENÉ    Infusion scheduling note    Injection scheduling note    Labs    Imaging   BMD:  please schedule on or after 10/18/25   Pharmacy appointment    Other referrals                   Plan was discussed with the patient at length, and she verbalized understanding. Noelle was given an opportunity to ask questions that were answered to her satisfaction, and she was advised to call in the interval if any problems or questions arise.  MICHELLE Myers, FNP-C  [facility]  45 minutes were spent in coordination of patient's care, record review and counseling.

## 2025-04-29 ENCOUNTER — OFFICE VISIT (OUTPATIENT)
Dept: OPHTHALMOLOGY | Facility: CLINIC | Age: 65
End: 2025-04-29
Payer: COMMERCIAL

## 2025-04-29 DIAGNOSIS — H40.052 OCULAR HYPERTENSION, LEFT: Primary | ICD-10-CM

## 2025-04-29 DIAGNOSIS — H26.8 PSEUDOEXFOLIATION OF LENS CAPSULE, LEFT EYE: ICD-10-CM

## 2025-04-29 PROCEDURE — 65855 TRABECULOPLASTY LASER SURG: CPT | Mod: LT,S$GLB,, | Performed by: OPHTHALMOLOGY

## 2025-04-29 PROCEDURE — 99499 UNLISTED E&M SERVICE: CPT | Mod: S$GLB,,, | Performed by: OPHTHALMOLOGY

## 2025-04-29 PROCEDURE — 99999 PR PBB SHADOW E&M-EST. PATIENT-LVL III: CPT | Mod: PBBFAC,,, | Performed by: OPHTHALMOLOGY

## 2025-04-29 NOTE — PROGRESS NOTES
HPI    Pt present for SLT OS    Pt states not having any new complaints with eyes.    Dorz/Benjamin BID OS  Last edited by Catherine Linder on 4/29/2025  2:43 PM.            Assessment /Plan     For exam results, see Encounter Report.    Ocular hypertension, left    Pseudoexfoliation of lens capsule, left eye      SLT OS today  No complications  Post op precautions reviewed        Continue dorz/benjamin bid OS x 4 days then stop  Keto QID OS x 4 days    F/u 4-6 weeks, IOP check

## 2025-06-09 ENCOUNTER — OFFICE VISIT (OUTPATIENT)
Dept: OPHTHALMOLOGY | Facility: CLINIC | Age: 65
End: 2025-06-09
Payer: COMMERCIAL

## 2025-06-09 DIAGNOSIS — H40.052 OCULAR HYPERTENSION, LEFT: Primary | ICD-10-CM

## 2025-06-09 DIAGNOSIS — H26.8 PSEUDOEXFOLIATION OF LENS CAPSULE, LEFT EYE: ICD-10-CM

## 2025-06-09 PROBLEM — H40.1420: Status: RESOLVED | Noted: 2025-02-12 | Resolved: 2025-06-09

## 2025-06-09 PROCEDURE — 99999 PR PBB SHADOW E&M-EST. PATIENT-LVL II: CPT | Mod: PBBFAC,,, | Performed by: OPHTHALMOLOGY

## 2025-06-09 PROCEDURE — 3044F HG A1C LEVEL LT 7.0%: CPT | Mod: CPTII,S$GLB,, | Performed by: OPHTHALMOLOGY

## 2025-06-09 PROCEDURE — 99213 OFFICE O/P EST LOW 20 MIN: CPT | Mod: S$GLB,,, | Performed by: OPHTHALMOLOGY

## 2025-06-09 NOTE — PROGRESS NOTES
HPI     Follow-up     Additional comments: F/u 4-6 weeks, IOP check. Denies eye pain today. No   drops since 4 days after the laser.   POH:  SLT OS 4/29          Last edited by Ruth Cisneros on 6/9/2025  2:43 PM.            Assessment /Plan     For exam results, see Encounter Report.    Ocular hypertension, left    Pseudoexfoliation of lens capsule, left eye      6 weeks s/p SLT OS  IOP excellent off meds, good response    F/u 6 months, DFE, OCT NFL

## 2025-06-17 ENCOUNTER — OFFICE VISIT (OUTPATIENT)
Dept: SURGERY | Facility: CLINIC | Age: 65
End: 2025-06-17
Payer: COMMERCIAL

## 2025-06-17 ENCOUNTER — HOSPITAL ENCOUNTER (OUTPATIENT)
Dept: RADIOLOGY | Facility: HOSPITAL | Age: 65
Discharge: HOME | End: 2025-06-17
Attending: SURGERY
Payer: COMMERCIAL

## 2025-06-17 VITALS
WEIGHT: 163.13 LBS | DIASTOLIC BLOOD PRESSURE: 78 MMHG | BODY MASS INDEX: 24.16 KG/M2 | HEIGHT: 69 IN | HEART RATE: 61 BPM | OXYGEN SATURATION: 98 % | SYSTOLIC BLOOD PRESSURE: 127 MMHG

## 2025-06-17 DIAGNOSIS — Z12.31 SCREENING MAMMOGRAM, ENCOUNTER FOR: ICD-10-CM

## 2025-06-17 DIAGNOSIS — Z90.11 S/P RIGHT MASTECTOMY: ICD-10-CM

## 2025-06-17 DIAGNOSIS — Z17.0 MALIGNANT NEOPLASM OF RIGHT BREAST, STAGE 1, ESTROGEN RECEPTOR POSITIVE: Primary | ICD-10-CM

## 2025-06-17 DIAGNOSIS — Z12.39 SCREENING BREAST EXAMINATION: ICD-10-CM

## 2025-06-17 DIAGNOSIS — Z12.31 SCREENING MAMMOGRAM FOR BREAST CANCER: ICD-10-CM

## 2025-06-17 DIAGNOSIS — C50.911 MALIGNANT NEOPLASM OF RIGHT BREAST, STAGE 1, ESTROGEN RECEPTOR POSITIVE: Primary | ICD-10-CM

## 2025-06-17 PROCEDURE — 77067 SCR MAMMO BI INCL CAD: CPT | Mod: 26,52,, | Performed by: RADIOLOGY

## 2025-06-17 PROCEDURE — 77063 BREAST TOMOSYNTHESIS BI: CPT | Mod: 26,52,, | Performed by: RADIOLOGY

## 2025-06-17 PROCEDURE — 3044F HG A1C LEVEL LT 7.0%: CPT | Mod: CPTII,S$GLB,, | Performed by: PHYSICIAN ASSISTANT

## 2025-06-17 PROCEDURE — 1159F MED LIST DOCD IN RCRD: CPT | Mod: CPTII,S$GLB,, | Performed by: PHYSICIAN ASSISTANT

## 2025-06-17 PROCEDURE — 77063 BREAST TOMOSYNTHESIS BI: CPT | Mod: TC,52

## 2025-06-17 PROCEDURE — 3074F SYST BP LT 130 MM HG: CPT | Mod: CPTII,S$GLB,, | Performed by: PHYSICIAN ASSISTANT

## 2025-06-17 PROCEDURE — 3008F BODY MASS INDEX DOCD: CPT | Mod: CPTII,S$GLB,, | Performed by: PHYSICIAN ASSISTANT

## 2025-06-17 PROCEDURE — 3078F DIAST BP <80 MM HG: CPT | Mod: CPTII,S$GLB,, | Performed by: PHYSICIAN ASSISTANT

## 2025-06-17 PROCEDURE — 99999 PR PBB SHADOW E&M-EST. PATIENT-LVL III: CPT | Mod: PBBFAC,,, | Performed by: PHYSICIAN ASSISTANT

## 2025-06-17 PROCEDURE — 99203 OFFICE O/P NEW LOW 30 MIN: CPT | Mod: S$GLB,,, | Performed by: PHYSICIAN ASSISTANT

## 2025-06-17 NOTE — PROGRESS NOTES
[facility]  Department of Surgery    REFERRING PROVIDER: No referring provider defined for this encounter. Isabell Arredondo MD  CHIEF COMPLAINT:   Chief Complaint   Patient presents with    Follow-up       DIAGNOSIS:   This is a 64 y.o. 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    TREATMENT:   1. right mastectomy with sentinel node biopsy on 2023. Giana De Leon M.D. Surgical Oncology. All margins are negative for both invasive carcinoma and DCIS. Previously had a right lumpectomy w/XRT with Dr. Marin in .   2. Adjuvant endocrine therapy started on 23. Dr. Francia M.D. Medical Oncology.     HISTORY OF PRESENT ILLNESS:   Noelle Rivas is a 64 y.o. female comes in for oncological follow up.  She denies change in her breast self-exam specifically denying new masses, skin or nipple changes, or nipple discharge. Past medical and surgical history is updated with new changes. There have been no changes to family history. The patient denies constitutional symptoms of night sweats, chills, weight loss, new headaches, visual changes, new back or bony pain, chest pain, or shortness of breath. Doing well overall. Continues to do yoga three times a week.       Review of Systems: See HPI/Interval History for other systems reviewed.     IMAGIN/17/25 Left Screening Mammo:    Findings:  This procedure was performed using tomosynthesis.   Computer-aided detection was utilized in the interpretation of this examination.     There are scattered areas of fibroglandular density. There is no evidence of suspicious masses, microcalcifications or architectural distortion.     Impression:   No mammographic evidence of malignancy.     BI-RADS Category 1: Negative     Recommendation:  Routine screening mammogram in 1 year is recommended.     MEDICATIONS/ALLERGIES:     Current Outpatient Medications   Medication Sig    alendronate (FOSAMAX) 70 MG tablet Take 1  "tablet (70 mg total) by mouth every 7 days.    anastrozole (ARIMIDEX) 1 mg Tab Take 1 tablet (1 mg total) by mouth once daily.    aspirin (ECOTRIN) 81 MG EC tablet Take 81 mg by mouth once daily.    cholecalciferol, vitamin D3, (VITAMIN D3) 25 mcg (1,000 unit) capsule Take 1 capsule (1,000 Units total) by mouth once daily.    cyanocobalamin (VITAMIN B-12) 1000 MCG tablet Take 1,000 mcg by mouth every other day. Using every third day    loratadine (CLARITIN) 10 mg tablet Take 10 mg by mouth daily as needed.    multivitamin (THERAGRAN) per tablet Take 1 tablet by mouth once daily.     No current facility-administered medications for this visit.      Review of patient's allergies indicates:   Allergen Reactions    Adhesive Itching and Rash     Micropore/Medipore Tape - paper tape is ok    Nickel Itching and Rash     Allergic reaction to nickel and other related metals    Iodine and iodide containing products Itching and Rash    Latex, natural rubber Itching and Rash       PHYSICAL EXAM:   /78 (BP Location: Right arm, Patient Position: Sitting)   Pulse 61   Ht 5' 9" (1.753 m)   Wt 74 kg (163 lb 2.3 oz)   LMP 04/01/2017 (Exact Date)   SpO2 98%   BMI 24.09 kg/m²     Physical Exam   HENT:   Head: Normocephalic and atraumatic.   Eyes: Conjunctivae are normal. No scleral icterus.   Cardiovascular:  Normal rate, regular rhythm and normal pulses.            Pulmonary/Chest: Effort normal and breath sounds normal. No accessory muscle usage or stridor. No respiratory distress. She has no wheezes. Right breast exhibits no inverted nipple, no mass (Right mastectomy incision well healed.), no nipple discharge and no skin change. Left breast exhibits no inverted nipple, no mass, no nipple discharge and no skin change.       Abdominal: Soft. Normal appearance. She exhibits no mass.   Musculoskeletal: No deformity. Lymphadenopathy:      Cervical: No cervical adenopathy.      Upper Body:      Right upper body: No " supraclavicular or axillary adenopathy.      Left upper body: No supraclavicular or axillary adenopathy.     Neurological: She is alert.   Skin: Skin is warm and dry.     Psychiatric: Mood and judgment normal.     MMG clear today    ASSESSMENT:   This is a 64 y.o. female without evidence of recurrence by exam, history or imaging.       PLAN:   1. CBE without concerning findings. Patient reassured.   2. Continue monthly self breast exams and call the clinic with any changes or problems.  3. Mammogram in 1 year .  4. Return to clinic in 1 year .    The patient is in agreement with the plan. Questions were encouraged and answered to patient's satisfaction. Noelle will call our office with any questions or concerns.       25 minutes were spent on this encounter, 15 of which was face to face counseling and 10 minutes were spent on chart review and coordination of care.

## 2025-06-18 ENCOUNTER — PATIENT MESSAGE (OUTPATIENT)
Dept: FAMILY MEDICINE | Facility: CLINIC | Age: 65
End: 2025-06-18
Payer: COMMERCIAL

## 2025-06-18 ENCOUNTER — PATIENT MESSAGE (OUTPATIENT)
Dept: SURGERY | Facility: CLINIC | Age: 65
End: 2025-06-18
Payer: COMMERCIAL

## 2025-06-18 ENCOUNTER — PATIENT MESSAGE (OUTPATIENT)
Dept: HEMATOLOGY/ONCOLOGY | Facility: CLINIC | Age: 65
End: 2025-06-18
Payer: COMMERCIAL

## 2025-07-22 ENCOUNTER — OFFICE VISIT (OUTPATIENT)
Dept: DERMATOLOGY | Facility: CLINIC | Age: 65
End: 2025-07-22
Payer: COMMERCIAL

## 2025-07-22 VITALS — WEIGHT: 163.13 LBS | HEIGHT: 69 IN | BODY MASS INDEX: 24.16 KG/M2

## 2025-07-22 DIAGNOSIS — L81.4 SOLAR LENTIGO: ICD-10-CM

## 2025-07-22 DIAGNOSIS — D23.9 DERMATOFIBROMA: ICD-10-CM

## 2025-07-22 DIAGNOSIS — D18.01 CHERRY ANGIOMA: ICD-10-CM

## 2025-07-22 DIAGNOSIS — L82.1 SEBORRHEIC KERATOSES: ICD-10-CM

## 2025-07-22 DIAGNOSIS — D22.9 MULTIPLE BENIGN NEVI: ICD-10-CM

## 2025-07-22 DIAGNOSIS — Z80.8 FAMILY HISTORY OF MALIGNANT MELANOMA: ICD-10-CM

## 2025-07-22 DIAGNOSIS — D48.5 NEOPLASM OF UNCERTAIN BEHAVIOR OF SKIN: Primary | ICD-10-CM

## 2025-07-22 PROCEDURE — 3044F HG A1C LEVEL LT 7.0%: CPT | Mod: CPTII,S$GLB,, | Performed by: DERMATOLOGY

## 2025-07-22 PROCEDURE — 3008F BODY MASS INDEX DOCD: CPT | Mod: CPTII,S$GLB,, | Performed by: DERMATOLOGY

## 2025-07-22 PROCEDURE — 1160F RVW MEDS BY RX/DR IN RCRD: CPT | Mod: CPTII,S$GLB,, | Performed by: DERMATOLOGY

## 2025-07-22 PROCEDURE — 11102 TANGNTL BX SKIN SINGLE LES: CPT | Mod: S$GLB,,, | Performed by: DERMATOLOGY

## 2025-07-22 PROCEDURE — 88305 TISSUE EXAM BY PATHOLOGIST: CPT | Mod: TC | Performed by: DERMATOLOGY

## 2025-07-22 PROCEDURE — 88305 TISSUE EXAM BY PATHOLOGIST: CPT | Mod: 26,,, | Performed by: DERMATOLOGY

## 2025-07-22 PROCEDURE — 1159F MED LIST DOCD IN RCRD: CPT | Mod: CPTII,S$GLB,, | Performed by: DERMATOLOGY

## 2025-07-22 PROCEDURE — 99213 OFFICE O/P EST LOW 20 MIN: CPT | Mod: 25,S$GLB,, | Performed by: DERMATOLOGY

## 2025-07-22 NOTE — PATIENT INSTRUCTIONS
Shave Biopsy Wound Care    Your doctor has performed a shave biopsy today.  A band aid and vaseline ointment has been placed over the site.  This should remain in place for 24 hours.  It is recommended that you keep the area dry for the first 24 hours.  After 24 hours, you may remove the band aid and wash the area with warm soap and water and apply Vaseline jelly.  Many patients prefer to use Neosporin or Bacitracin ointment.  This is acceptable; however, know that you can develop an allergy to this medication even if you have used it safely for years.  It is important to keep the area moist.  Letting it dry out and get air slows healing time, and will worsen the scar.  Band aid is optional after first 24 hours.      If you notice increasing redness, tenderness, pain, or yellow drainage at the biopsy site, please notify your doctor.  These are signs of an infection.    If your biopsy site is bleeding, apply firm pressure for 15 minutes straight.  Repeat for another 15 minutes, if it is still bleeding.   If the surgical site continues to bleed, then please contact your doctor.       [facility] - DERMATOLOGY  [address], [address]  [place] [address]  Dept: [phone]  Dept Fax: [phone]

## 2025-07-22 NOTE — PROGRESS NOTES
Subjective:      Patient ID:  Noelle Rivas is a 64 y.o. female who presents for   Chief Complaint   Patient presents with    Skin Check     TBSE     LOV: 24 - NUB, SK, lentigo, nevi, DF, angioma, fhx MM    Skin, left lateral clavicle, shave biopsy:   -INFLAMMATORY PAPULE, see comment     Skin, right upper arm, shave biopsy:   -LICHENOID KERATOSIS, see comment     Skin Check - TBSE    Breast cancer , Right total mastectomy 2023 *(lumpectomy 11 year prior)     Derm Hx:  Has no hx of NMSC  Has fhx of MM- mother     Current Outpatient Medications:   ·  alendronate (FOSAMAX) 70 MG tablet, Take 1 tablet (70 mg total) by mouth every 7 days., Disp: 4 tablet, Rfl: 11  ·  anastrozole (ARIMIDEX) 1 mg Tab, Take 1 tablet (1 mg total) by mouth once daily., Disp: 90 tablet, Rfl: 3  ·  aspirin (ECOTRIN) 81 MG EC tablet, Take 81 mg by mouth once daily., Disp: , Rfl:   ·  cholecalciferol, vitamin D3, (VITAMIN D3) 25 mcg (1,000 unit) capsule, Take 1 capsule (1,000 Units total) by mouth once daily., Disp: 30 capsule, Rfl: 12  ·  cyanocobalamin (VITAMIN B-12) 1000 MCG tablet, Take 1,000 mcg by mouth every other day. Using every third day, Disp: , Rfl:   ·  loratadine (CLARITIN) 10 mg tablet, Take 10 mg by mouth daily as needed., Disp: , Rfl:   ·  multivitamin (THERAGRAN) per tablet, Take 1 tablet by mouth once daily., Disp: , Rfl:           Review of Systems   Constitutional:  Negative for fever, chills and fatigue.   Skin:  Positive for activity-related sunscreen use. Negative for daily sunscreen use.   Psychiatric/Behavioral:  Positive for high stress.        Objective:   Physical Exam   Constitutional: She appears well-developed and well-nourished. No distress.   Neurological: She is alert and oriented to person, place, and time. She is not disoriented.   Psychiatric: She has a normal mood and affect.   Skin:   Areas Examined (abnormalities noted in diagram):   Scalp / Hair Palpated and Inspected  Head / Face  Inspection Performed  Neck Inspection Performed  Chest / Axilla Inspection Performed  Abdomen Inspection Performed  Genitals / Buttocks / Groin Inspection Performed  Back Inspection Performed  RUE Inspected  LUE Inspection Performed  RLE Inspected  LLE Inspection Performed  Nails and Digits Inspection Performed                 Diagram Legend     Erythematous scaling macule/papule c/w actinic keratosis       Vascular papule c/w angioma      Pigmented verrucoid papule/plaque c/w seborrheic keratosis      Yellow umbilicated papule c/w sebaceous hyperplasia      Irregularly shaped tan macule c/w lentigo     1-2 mm smooth white papules consistent with Milia      Movable subcutaneous cyst with punctum c/w epidermal inclusion cyst      Subcutaneous movable cyst c/w pilar cyst      Firm pink to brown papule c/w dermatofibroma      Pedunculated fleshy papule(s) c/w skin tag(s)      Evenly pigmented macule c/w junctional nevus     Mildly variegated pigmented, slightly irregular-bordered macule c/w mildly atypical nevus      Flesh colored to evenly pigmented papule c/w intradermal nevus       Pink pearly papule/plaque c/w basal cell carcinoma      Erythematous hyperkeratotic cursted plaque c/w SCC      Surgical scar with no sign of skin cancer recurrence      Open and closed comedones      Inflammatory papules and pustules      Verrucoid papule consistent consistent with wart     Erythematous eczematous patches and plaques     Dystrophic onycholytic nail with subungual debris c/w onychomycosis     Umbilicated papule    Erythematous-base heme-crusted tan verrucoid plaque consistent with inflamed seborrheic keratosis     Erythematous Silvery Scaling Plaque c/w Psoriasis     See annotation      Assessment / Plan:      Pathology Orders:       Normal Orders This Visit    Specimen to Pathology, Dermatology     Questions:    Procedure Type: Dermatology and skin neoplasms    Number of Specimens: 1    ------------------------:  -------------------------    Spec 1 Procedure: Shave Biopsy    Spec 1 Clinical Impression: ISK vs BLK vs inflamed nevus vs other    Spec 1 Source: left shin    Clinical Information: see EPIC    Clinical History: see EPIC    Specimen Source: Skin    Release to patient: Immediate    Send normal result to authorizing provider's In Basket if patient is active on MyChart: Yes          Neoplasm of uncertain behavior of skin  -     Specimen to Pathology, Dermatology  Shave biopsy procedure note:    Shave biopsy performed after verbal consent including risk of infection, scar, recurrence, need for additional treatment of site. Area prepped with alcohol, anesthetized with approximately 1.0cc of 1% lidocaine with epinephrine. Lesional tissue shaved with razor blade. Hemostasis achieved with application of aluminum chloride followed by hyfrecation. No complications. Dressing applied. Wound care explained.    Seborrheic keratoses  These are benign inherited growths without a malignant potential. Reassurance given to patient. No treatment is necessary.     Solar lentigo  This is a benign hyperpigmented sun induced lesion. Daily sun protection will reduce the number of new lesions. Treatment of these benign lesions are considered cosmetic.    Multiple benign nevi  Careful dermoscopy evaluation of nevi performed with none identified as needing biopsy today  Monitor for new mole or moles that are becoming bigger, darker, irritated, or developing irregular borders.     Cherry angioma  This is a benign vascular lesion. Reassurance given. No treatment required.     Dermatofibroma  This is a benign scar-like lesion secondary to minor trauma. No treatment required.     Family history of malignant melanoma  Skin cancer screening  Total body skin examination performed today including at least 12 points as noted in physical examination. No lesions suspicious for malignancy noted.    Patient instructed in importance in daily broad spectrum  sun protection of at least spf 30. Mineral sunscreen ingredients preferred (Zinc +/- Titanium) and can be found OTC.   Patient encouraged to wear hat for all outdoor exposure.   Also discussed sun avoidance and use of protective clothing.           Follow up in about 1 year (around 7/22/2026), or if symptoms worsen or fail to improve.

## 2025-07-24 ENCOUNTER — RESULTS FOLLOW-UP (OUTPATIENT)
Dept: DERMATOLOGY | Facility: CLINIC | Age: 65
End: 2025-07-24
Payer: COMMERCIAL

## 2025-07-24 LAB
ESTROGEN SERPL-MCNC: NORMAL PG/ML
INSULIN SERPL-ACNC: NORMAL U[IU]/ML
LAB AP CLINICAL INFORMATION: NORMAL
LAB AP GROSS DESCRIPTION: NORMAL
LAB AP REPORT FOOTNOTES: NORMAL
T3RU NFR SERPL: NORMAL %

## 2025-08-23 ENCOUNTER — LAB VISIT (OUTPATIENT)
Dept: LAB | Facility: HOSPITAL | Age: 65
End: 2025-08-23
Attending: INTERNAL MEDICINE
Payer: COMMERCIAL

## 2025-08-23 DIAGNOSIS — Z17.0 MALIGNANT NEOPLASM OF RIGHT BREAST, STAGE 1, ESTROGEN RECEPTOR POSITIVE: ICD-10-CM

## 2025-08-23 DIAGNOSIS — E55.9 MILD VITAMIN D DEFICIENCY: ICD-10-CM

## 2025-08-23 DIAGNOSIS — C50.911 MALIGNANT NEOPLASM OF RIGHT BREAST, STAGE 1, ESTROGEN RECEPTOR POSITIVE: ICD-10-CM

## 2025-08-23 LAB
25(OH)D3+25(OH)D2 SERPL-MCNC: 55 NG/ML (ref 30–96)
ABSOLUTE EOSINOPHIL (SMH): 0.17 K/UL
ABSOLUTE MONOCYTE (SMH): 0.44 K/UL (ref 0.3–1)
ABSOLUTE NEUTROPHIL COUNT (SMH): 1.8 K/UL (ref 1.8–7.7)
ALBUMIN SERPL-MCNC: 4.2 G/DL (ref 3.5–5.2)
ALP SERPL-CCNC: 55 UNIT/L (ref 40–150)
ALT SERPL-CCNC: 16 UNIT/L (ref 10–44)
ANION GAP (SMH): 10 MMOL/L (ref 8–16)
AST SERPL-CCNC: 25 UNIT/L (ref 11–45)
BASOPHILS # BLD AUTO: 0.04 K/UL
BASOPHILS NFR BLD AUTO: 0.8 %
BILIRUB SERPL-MCNC: 0.5 MG/DL (ref 0.1–1)
BUN SERPL-MCNC: 12 MG/DL (ref 8–23)
CALCIUM SERPL-MCNC: 9.4 MG/DL (ref 8.7–10.5)
CHLORIDE SERPL-SCNC: 108 MMOL/L (ref 95–110)
CO2 SERPL-SCNC: 24 MMOL/L (ref 23–29)
CREAT SERPL-MCNC: 0.9 MG/DL (ref 0.5–1.4)
ERYTHROCYTE [DISTWIDTH] IN BLOOD BY AUTOMATED COUNT: 12.4 % (ref 11.5–14.5)
GFR SERPLBLD CREATININE-BSD FMLA CKD-EPI: >60 ML/MIN/1.73/M2
GLUCOSE SERPL-MCNC: 91 MG/DL (ref 70–110)
HCT VFR BLD AUTO: 39.1 % (ref 37–48.5)
HGB BLD-MCNC: 12.7 GM/DL (ref 12–16)
IMM GRANULOCYTES # BLD AUTO: 0.01 K/UL (ref 0–0.04)
IMM GRANULOCYTES NFR BLD AUTO: 0.2 % (ref 0–0.5)
LYMPHOCYTES # BLD AUTO: 2.24 K/UL (ref 1–4.8)
MCH RBC QN AUTO: 32.1 PG (ref 27–31)
MCHC RBC AUTO-ENTMCNC: 32.5 G/DL (ref 32–36)
MCV RBC AUTO: 99 FL (ref 82–98)
NUCLEATED RBC (/100WBC) (SMH): 0 /100 WBC
PLATELET # BLD AUTO: 206 K/UL (ref 150–450)
PMV BLD AUTO: 10.1 FL (ref 9.2–12.9)
POTASSIUM SERPL-SCNC: 3.8 MMOL/L (ref 3.5–5.1)
PROT SERPL-MCNC: 6.8 GM/DL (ref 6–8.4)
RBC # BLD AUTO: 3.96 M/UL (ref 4–5.4)
RELATIVE EOSINOPHIL (SMH): 3.6 % (ref 0–8)
RELATIVE LYMPHOCYTE (SMH): 47.5 % (ref 18–48)
RELATIVE MONOCYTE (SMH): 9.3 % (ref 4–15)
RELATIVE NEUTROPHIL (SMH): 38.6 % (ref 38–73)
SODIUM SERPL-SCNC: 142 MMOL/L (ref 136–145)
WBC # BLD AUTO: 4.72 K/UL (ref 3.9–12.7)

## 2025-08-23 PROCEDURE — 36415 COLL VENOUS BLD VENIPUNCTURE: CPT

## 2025-08-23 PROCEDURE — 85025 COMPLETE CBC W/AUTO DIFF WBC: CPT

## 2025-08-23 PROCEDURE — 80053 COMPREHEN METABOLIC PANEL: CPT

## 2025-08-23 PROCEDURE — 82306 VITAMIN D 25 HYDROXY: CPT

## 2025-08-25 ENCOUNTER — PATIENT MESSAGE (OUTPATIENT)
Dept: HEMATOLOGY/ONCOLOGY | Facility: CLINIC | Age: 65
End: 2025-08-25
Payer: COMMERCIAL

## 2025-08-25 ENCOUNTER — TELEPHONE (OUTPATIENT)
Dept: HEMATOLOGY/ONCOLOGY | Facility: CLINIC | Age: 65
End: 2025-08-25
Payer: COMMERCIAL

## 2025-08-29 PROCEDURE — 82274 ASSAY TEST FOR BLOOD FECAL: CPT | Performed by: FAMILY MEDICINE

## 2025-09-05 ENCOUNTER — TELEPHONE (OUTPATIENT)
Dept: HEMATOLOGY/ONCOLOGY | Facility: CLINIC | Age: 65
End: 2025-09-05
Payer: COMMERCIAL

## (undated) DEVICE — COVER TABLE 44X90 STERILE

## (undated) DEVICE — CONTAINER SPEC OR STRL 4.5OZ

## (undated) DEVICE — GLOVE SURGEONS ULTRA TOUCH 6.5

## (undated) DEVICE — UNDERGLOVES BIOGEL PI SZ 7 LF

## (undated) DEVICE — CONTAINER SPECIMEN OR STER 4OZ

## (undated) DEVICE — GUIDEWIRE AMPLATZ .035X145 STR

## (undated) DEVICE — Device

## (undated) DEVICE — SOL IRRI STRL WATER 1000ML

## (undated) DEVICE — SOL IRR NACL .9% 3000ML

## (undated) DEVICE — SLEEVE SCD EXPRESS KNEE MEDIUM

## (undated) DEVICE — DRAPE T CYSTOSCOPY STERILE

## (undated) DEVICE — JELLY SURGILUBE LUBE TUBE 2OZ

## (undated) DEVICE — CATH 2 LUMEN URET 6/10FX50CM

## (undated) DEVICE — BAG LINGEMAN DRAIN UROLOGY

## (undated) DEVICE — SUT VICRYL 3-0 27 SH

## (undated) DEVICE — JELLY KY LUBRICATING 5G PACKET

## (undated) DEVICE — DRAPE STERI INSTRUMENT 1018

## (undated) DEVICE — GLOVE BIOGEL SKINSENSE PI 6.5

## (undated) DEVICE — SUT 2/0 30IN SILK BLK BRAI

## (undated) DEVICE — SHEATH FLEXOR URET 10.7FRX35CM

## (undated) DEVICE — SCRUB DYNA-HEX LIQ 4% CHG 4OZ

## (undated) DEVICE — SHEATH FLEXOR ACCESS 12X35

## (undated) DEVICE — SOL PVP-I SCRUB 7.5% 4OZ

## (undated) DEVICE — SET IRR URLGY 2LINE UNIV SPIKE

## (undated) DEVICE — SPONGE BULKEE II ABSRB 6X6.75

## (undated) DEVICE — GUIDE WIRE MOTION .035 X 150CM

## (undated) DEVICE — BOWL STERILE LARGE 32OZ

## (undated) DEVICE — WIRE GD LUB STD 3CM .035 150CM

## (undated) DEVICE — ELECTRODE BLADE INSULATED 1 IN

## (undated) DEVICE — SUT VICRYL PLUS 4-0 PS2 27

## (undated) DEVICE — DRAIN CHANNEL ROUND 19FR

## (undated) DEVICE — GOWN NONREINF SET-IN SLV XL

## (undated) DEVICE — SEE MEDLINE ITEM 146313

## (undated) DEVICE — SPONGE LAP 18X18 PREWASHED

## (undated) DEVICE — EVACUATOR WOUND BULB 100CC

## (undated) DEVICE — EXTRACTOR STONE 4WR NIT 2.2F 1

## (undated) DEVICE — SOL 9P NACL IRR PIC IL

## (undated) DEVICE — SET INFLOW TUBE HYSTER

## (undated) DEVICE — DRAPE THREE-QTR REINF 53X77IN

## (undated) DEVICE — APPLIER CLIP LIAGCLIP 9.375IN

## (undated) DEVICE — POSITIONER IV ARMBOARD FOAM

## (undated) DEVICE — TRAY DRY SKIN SCRUB PREP

## (undated) DEVICE — TOWEL OR DISP STRL BLUE 4/PK

## (undated) DEVICE — LEGGING CLEAR POLY 2/PACK

## (undated) DEVICE — NDL ECLIPSE SAFETY 23G 1.5IN

## (undated) DEVICE — MARKER SKIN STND TIP BLUE BARR

## (undated) DEVICE — SYR 10CC LUER LOCK

## (undated) DEVICE — GOWN POLY REINF BRTH SLV LG

## (undated) DEVICE — SET BASIN 48X48IN 6000ML RING

## (undated) DEVICE — SYR ONLY LUER LOCK 20CC

## (undated) DEVICE — BRA CLASSIC COMFORM BLK SZ 36

## (undated) DEVICE — APPLICATOR CHLORAPREP ORN 26ML

## (undated) DEVICE — ADHESIVE DERMABOND ADVANCED

## (undated) DEVICE — SOL BETADINE 5%

## (undated) DEVICE — ELECTRODE REM PLYHSV RETURN 9